# Patient Record
Sex: FEMALE | Race: BLACK OR AFRICAN AMERICAN | NOT HISPANIC OR LATINO | Employment: OTHER | URBAN - METROPOLITAN AREA
[De-identification: names, ages, dates, MRNs, and addresses within clinical notes are randomized per-mention and may not be internally consistent; named-entity substitution may affect disease eponyms.]

---

## 2019-01-30 ENCOUNTER — HOSPITAL ENCOUNTER (OUTPATIENT)
Dept: RADIOLOGY | Facility: HOSPITAL | Age: 64
Discharge: HOME | End: 2019-01-30
Attending: INTERNAL MEDICINE
Payer: COMMERCIAL

## 2019-01-30 ENCOUNTER — IMMUNIZATION (OUTPATIENT)
Dept: INTERNAL MEDICINE | Facility: CLINIC | Age: 64
End: 2019-01-30
Payer: COMMERCIAL

## 2019-01-30 ENCOUNTER — OFFICE VISIT (OUTPATIENT)
Dept: INTERNAL MEDICINE | Facility: CLINIC | Age: 64
End: 2019-01-30
Payer: COMMERCIAL

## 2019-01-30 VITALS
HEART RATE: 74 BPM | DIASTOLIC BLOOD PRESSURE: 98 MMHG | SYSTOLIC BLOOD PRESSURE: 146 MMHG | BODY MASS INDEX: 37.04 KG/M2 | WEIGHT: 201.25 LBS | HEIGHT: 62 IN

## 2019-01-30 DIAGNOSIS — Z23 INFLUENZA VACCINE NEEDED: ICD-10-CM

## 2019-01-30 DIAGNOSIS — L23.9 ALLERGIC DERMATITIS: ICD-10-CM

## 2019-01-30 DIAGNOSIS — Z12.11 COLON CANCER SCREENING: ICD-10-CM

## 2019-01-30 DIAGNOSIS — Z12.31 ENCOUNTER FOR SCREENING MAMMOGRAM FOR BREAST CANCER: ICD-10-CM

## 2019-01-30 DIAGNOSIS — M25.561 CHRONIC PAIN OF RIGHT KNEE: ICD-10-CM

## 2019-01-30 DIAGNOSIS — Z11.59 NEED FOR HEPATITIS C SCREENING TEST: ICD-10-CM

## 2019-01-30 DIAGNOSIS — G89.29 CHRONIC PAIN OF RIGHT KNEE: ICD-10-CM

## 2019-01-30 DIAGNOSIS — Z00.00 ROUTINE MEDICAL EXAM: Primary | ICD-10-CM

## 2019-01-30 DIAGNOSIS — R03.0 ELEVATED BLOOD PRESSURE READING WITHOUT DIAGNOSIS OF HYPERTENSION: ICD-10-CM

## 2019-01-30 DIAGNOSIS — E66.01 SEVERE OBESITY (BMI 35.0-39.9) WITH COMORBIDITY: ICD-10-CM

## 2019-01-30 DIAGNOSIS — Z83.3 FAMILY HISTORY OF TYPE 2 DIABETES MELLITUS IN SISTER: ICD-10-CM

## 2019-01-30 LAB
BILIRUB UR QL STRIP: NEGATIVE
CLARITY UR REFRACT.AUTO: ABNORMAL
COLOR UR AUTO: YELLOW
GLUCOSE UR QL STRIP: NEGATIVE
HGB UR QL STRIP: NEGATIVE
KETONES UR QL STRIP: NEGATIVE
LEUKOCYTE ESTERASE UR QL STRIP: NEGATIVE
NITRITE UR QL STRIP: NEGATIVE
PH UR STRIP: 6 [PH] (ref 5–8)
PROT UR QL STRIP: NEGATIVE
SP GR UR STRIP: 1.02 (ref 1–1.03)
URN SPEC COLLECT METH UR: ABNORMAL

## 2019-01-30 PROCEDURE — 77067 SCR MAMMO BI INCL CAD: CPT | Mod: TC

## 2019-01-30 PROCEDURE — 73560 XR KNEE 1 OR 2 VIEW RIGHT: ICD-10-PCS | Mod: 26,RT,, | Performed by: RADIOLOGY

## 2019-01-30 PROCEDURE — 73560 X-RAY EXAM OF KNEE 1 OR 2: CPT | Mod: TC,RT

## 2019-01-30 PROCEDURE — 99999 PR PBB SHADOW E&M-NEW PATIENT-LVL III: CPT | Mod: PBBFAC,,, | Performed by: INTERNAL MEDICINE

## 2019-01-30 PROCEDURE — 77067 SCR MAMMO BI INCL CAD: CPT | Mod: 26,,, | Performed by: RADIOLOGY

## 2019-01-30 PROCEDURE — 93005 ELECTROCARDIOGRAM TRACING: CPT | Mod: S$GLB,,, | Performed by: INTERNAL MEDICINE

## 2019-01-30 PROCEDURE — 99386 PREV VISIT NEW AGE 40-64: CPT | Mod: 25,S$GLB,, | Performed by: INTERNAL MEDICINE

## 2019-01-30 PROCEDURE — 81003 URINALYSIS AUTO W/O SCOPE: CPT

## 2019-01-30 PROCEDURE — 90686 FLU VACCINE (QUAD) GREATER THAN OR EQUAL TO 3YO PRESERVATIVE FREE IM: ICD-10-PCS | Mod: S$GLB,,, | Performed by: INTERNAL MEDICINE

## 2019-01-30 PROCEDURE — 90471 IMMUNIZATION ADMIN: CPT | Mod: S$GLB,,, | Performed by: INTERNAL MEDICINE

## 2019-01-30 PROCEDURE — 93010 ELECTROCARDIOGRAM REPORT: CPT | Mod: S$GLB,,, | Performed by: INTERNAL MEDICINE

## 2019-01-30 PROCEDURE — 90686 IIV4 VACC NO PRSV 0.5 ML IM: CPT | Mod: S$GLB,,, | Performed by: INTERNAL MEDICINE

## 2019-01-30 PROCEDURE — 90471 FLU VACCINE (QUAD) GREATER THAN OR EQUAL TO 3YO PRESERVATIVE FREE IM: ICD-10-PCS | Mod: S$GLB,,, | Performed by: INTERNAL MEDICINE

## 2019-01-30 PROCEDURE — 93005 EKG 12-LEAD: ICD-10-PCS | Mod: S$GLB,,, | Performed by: INTERNAL MEDICINE

## 2019-01-30 PROCEDURE — 99386 PR PREVENTIVE VISIT,NEW,40-64: ICD-10-PCS | Mod: 25,S$GLB,, | Performed by: INTERNAL MEDICINE

## 2019-01-30 PROCEDURE — 73560 X-RAY EXAM OF KNEE 1 OR 2: CPT | Mod: 26,RT,, | Performed by: RADIOLOGY

## 2019-01-30 PROCEDURE — 77067 MAMMO DIGITAL SCREENING BILAT WITH CAD: ICD-10-PCS | Mod: 26,,, | Performed by: RADIOLOGY

## 2019-01-30 PROCEDURE — 99999 PR PBB SHADOW E&M-NEW PATIENT-LVL III: ICD-10-PCS | Mod: PBBFAC,,, | Performed by: INTERNAL MEDICINE

## 2019-01-30 PROCEDURE — 93010 EKG 12-LEAD: ICD-10-PCS | Mod: S$GLB,,, | Performed by: INTERNAL MEDICINE

## 2019-02-03 ENCOUNTER — TELEPHONE (OUTPATIENT)
Dept: INTERNAL MEDICINE | Facility: CLINIC | Age: 64
End: 2019-02-03

## 2019-02-03 DIAGNOSIS — E55.9 VITAMIN D DEFICIENCY: ICD-10-CM

## 2019-02-03 RX ORDER — ERGOCALCIFEROL 1.25 MG/1
50000 CAPSULE ORAL
Qty: 12 CAPSULE | Refills: 1 | Status: SHIPPED | OUTPATIENT
Start: 2019-02-03 | End: 2019-08-13 | Stop reason: SDUPTHER

## 2019-02-03 NOTE — PROGRESS NOTES
Subjective:       Patient ID: Kandace Rapp is a 63 y.o. female.    Chief Complaint: Establish Care and Annual Exam    She is new to the clinic. Last seen by previous PCP at [facility] . Presents for annual exam with few concerns detailed below.     PMH: .  Mitral Valve Prolapse on Echo .  Plantar Fasciitis and Heel Spurs, bilateral.  Varicose Veins.     PSH: Tonsillectomy and Adenoidectomy. C/S x 1. Cataract Extraction, Benign Left Breast lumpectomy.    Pap normal . Mammogram normal . BMD normal . Cologuard negative . No Colonoscopy. Eye exam 2017, Dr. Elio Cleaning. Last labs 2017. No Flu shots, Shingles or Td.    Social: Non-smoker, occasional alcohol.   (complications of stroke), one child here, one in [place]. 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     FMH: Heart dis in multiple, DM in sister, Breast cancer in mother and maternal aunt, Lymphoma in mother.     NKDA.    Medications: No Rx. Multiple supplements including Glucosamine, Niacin, Turmeric, Cranberry, etc.       Review of Systems   Constitutional: Negative for activity change, appetite change, fatigue, fever and unexpected weight change.   HENT: Negative for congestion, hearing loss, rhinorrhea, sneezing, sore throat, trouble swallowing and voice change.    Eyes: Negative for pain and visual disturbance.   Respiratory: Negative for cough, chest tightness, shortness of breath and wheezing.    Cardiovascular: Negative for chest pain, palpitations and leg swelling.   Gastrointestinal: Negative for abdominal pain, blood in stool, constipation, diarrhea, nausea and vomiting.        Belches a lot.    Genitourinary: Negative for difficulty urinating, dysuria, flank pain, frequency, hematuria, urgency and vaginal bleeding.   Musculoskeletal: Positive for arthralgias. Negative for back pain, joint swelling,  "myalgias and neck pain.        Bilateral knee (worse on right) and foot pain. Mild paresthesias in the feet.   Skin: Positive for rash. Negative for color change.        Intermittent erythematous fine papular eruption on mid upper chest, no known allergies.    Neurological: Negative for dizziness, syncope, facial asymmetry, speech difficulty, weakness, numbness and headaches.   Hematological: Negative for adenopathy. Does not bruise/bleed easily.   Psychiatric/Behavioral: Negative for agitation, dysphoric mood and sleep disturbance. The patient is not nervous/anxious.        Objective:    /96, repeat 146/98, Pulse 74, Ht 5' 2", Wt 201 lbs, BMI=36.8  Physical Exam   Constitutional: She is oriented to person, place, and time. She appears well-developed and well-nourished. No distress.   HENT:   Head: Normocephalic and atraumatic.   Right Ear: External ear normal.   Left Ear: External ear normal.   Nose: Nose normal.   Mouth/Throat: Oropharynx is clear and moist. No oropharyngeal exudate.   Eyes: Conjunctivae and EOM are normal. Pupils are equal, round, and reactive to light. Right conjunctiva is not injected. Left conjunctiva is not injected. No scleral icterus.   Neck: Normal range of motion. Neck supple. No JVD present. Carotid bruit is not present. No thyromegaly present.   Cardiovascular: Normal rate, regular rhythm, normal heart sounds and intact distal pulses. Exam reveals no gallop and no friction rub.   No murmur heard.  Pulmonary/Chest: Effort normal and breath sounds normal. No respiratory distress. She has no wheezes. She has no rhonchi. She has no rales.   Abdominal: Soft. Bowel sounds are normal. She exhibits no distension and no mass. There is no hepatosplenomegaly. There is no tenderness. There is no CVA tenderness.   Musculoskeletal: Normal range of motion. She exhibits no edema, tenderness or deformity.   Crepitus in right knee with pain on range of motion, not much on left, non-tender. "   Lymphadenopathy:     She has no cervical adenopathy.   Neurological: She is alert and oriented to person, place, and time. She has normal strength and normal reflexes. No cranial nerve deficit. She exhibits normal muscle tone. Coordination and gait normal.   Skin: Skin is warm and dry. No lesion noted. She is not diaphoretic. No cyanosis or erythema. No pallor. Nails show no clubbing.   Small area of rash on mid upper chest, 4cm cluster of moderately erythematous fine papules with no vesicles, induration, open sores or drainage.    Psychiatric: She has a normal mood and affect. Her behavior is normal. Judgment and thought content normal.   Vitals reviewed.      EKG: NSR, 70 bpm, non-spec. T abnl (less than 1mm ST elev ant leads).     Assessment:       1. Routine medical exam    2. Elevated blood pressure reading without diagnosis of hypertension    3. Severe obesity (BMI 35.0-39.9) with comorbidity    4. Family history of type 2 diabetes mellitus in sister    5. Chronic pain of right knee    6. Allergic dermatitis    7. Need for hepatitis C screening test    8. Encounter for screening mammogram for breast cancer    9. Colon cancer screening    10. Influenza vaccine needed        Plan:       Routine medical exam  -     CBC auto differential; Future; Expected date: 01/30/2019  -     Comprehensive metabolic panel; Future; Expected date: 01/30/2019  -     Lipid panel; Future; Expected date: 01/30/2019  -     Vitamin D; Future; Expected date: 01/30/2019  -     Urinalysis    Elevated blood pressure reading without diagnosis of hypertension  -     IN OFFICE EKG 12-LEAD (to Muse)  -     Begin daily home monitoring and forward results in 2 weeks.    Severe obesity (BMI 35.0-39.9) with comorbidity  -     TSH; Future; Expected date: 01/30/2019  -     Counseled on diet for weight reduction, diabetes prevention and cardiovascular health.    Family history of type 2 diabetes mellitus in sister  -     Hemoglobin A1c; Future;  Expected date: 01/30/2019    Chronic pain of right knee  -     X-Ray Knee 1 or 2 View Right; Future; Expected date: 01/30/2019    Allergic dermatitis        -     Mild - topical Cortisone cream BID prn.     Need for hepatitis C screening test  -     Hepatitis C antibody; Future; Expected date: 01/30/2019    Encounter for screening mammogram for breast cancer  -     Mammo Digital Screening Bilat; Future; Expected date: 01/30/2019    Colon cancer screening  -     Case request GI: COLONOSCOPY    Influenza vaccine needed - Flu shot today.     RETURN IN 6 WEEKS for BP check - scheduled.

## 2019-02-04 NOTE — TELEPHONE ENCOUNTER
Blood and urine tests are all normal except Vitamin D is very low - Rx vitamin D ordered to be called in, we had no pharmacy on record.   Knee x-rays show mild arthritis.   Mammogram shows a spot on the left, they are going to get previous films for comparison.

## 2019-02-09 ENCOUNTER — PATIENT MESSAGE (OUTPATIENT)
Dept: INTERNAL MEDICINE | Facility: CLINIC | Age: 64
End: 2019-02-09

## 2019-03-13 ENCOUNTER — OFFICE VISIT (OUTPATIENT)
Dept: INTERNAL MEDICINE | Facility: CLINIC | Age: 64
End: 2019-03-13
Payer: COMMERCIAL

## 2019-03-13 VITALS
SYSTOLIC BLOOD PRESSURE: 132 MMHG | HEIGHT: 62 IN | HEART RATE: 73 BPM | DIASTOLIC BLOOD PRESSURE: 86 MMHG | WEIGHT: 205 LBS | BODY MASS INDEX: 37.73 KG/M2

## 2019-03-13 DIAGNOSIS — I10 ESSENTIAL HYPERTENSION: Primary | ICD-10-CM

## 2019-03-13 PROCEDURE — 3008F BODY MASS INDEX DOCD: CPT | Mod: CPTII,S$GLB,, | Performed by: INTERNAL MEDICINE

## 2019-03-13 PROCEDURE — 99213 PR OFFICE/OUTPT VISIT, EST, LEVL III, 20-29 MIN: ICD-10-PCS | Mod: S$GLB,,, | Performed by: INTERNAL MEDICINE

## 2019-03-13 PROCEDURE — 3008F PR BODY MASS INDEX (BMI) DOCUMENTED: ICD-10-PCS | Mod: CPTII,S$GLB,, | Performed by: INTERNAL MEDICINE

## 2019-03-13 PROCEDURE — 3075F SYST BP GE 130 - 139MM HG: CPT | Mod: CPTII,S$GLB,, | Performed by: INTERNAL MEDICINE

## 2019-03-13 PROCEDURE — 3079F PR MOST RECENT DIASTOLIC BLOOD PRESSURE 80-89 MM HG: ICD-10-PCS | Mod: CPTII,S$GLB,, | Performed by: INTERNAL MEDICINE

## 2019-03-13 PROCEDURE — 3075F PR MOST RECENT SYSTOLIC BLOOD PRESS GE 130-139MM HG: ICD-10-PCS | Mod: CPTII,S$GLB,, | Performed by: INTERNAL MEDICINE

## 2019-03-13 PROCEDURE — 99999 PR PBB SHADOW E&M-EST. PATIENT-LVL III: ICD-10-PCS | Mod: PBBFAC,,, | Performed by: INTERNAL MEDICINE

## 2019-03-13 PROCEDURE — 99213 OFFICE O/P EST LOW 20 MIN: CPT | Mod: S$GLB,,, | Performed by: INTERNAL MEDICINE

## 2019-03-13 PROCEDURE — 3079F DIAST BP 80-89 MM HG: CPT | Mod: CPTII,S$GLB,, | Performed by: INTERNAL MEDICINE

## 2019-03-13 PROCEDURE — 99999 PR PBB SHADOW E&M-EST. PATIENT-LVL III: CPT | Mod: PBBFAC,,, | Performed by: INTERNAL MEDICINE

## 2019-03-13 RX ORDER — HYDROCHLOROTHIAZIDE 12.5 MG/1
12.5 TABLET ORAL DAILY
Qty: 30 TABLET | Refills: 3 | Status: SHIPPED | OUTPATIENT
Start: 2019-03-13 | End: 2020-12-02

## 2019-03-14 NOTE — PROGRESS NOTES
Subjective:       Patient ID: Kandace Rapp is a 63 y.o. female.    Chief Complaint: Hypertension    Seen for initial visit to [facility] six weeks ago with elevated blood pressure 146/98 and no known history of hypertension. Screening labs came back normal aside from Vitamin D deficiency. She returns for blood pressure check but has not monitored at home as recommended, even though she already has the equipment. She blames a busy work schedule keeping her preoccupied. No lifestyle modification, has gained four pounds. BP is still elevated, will need treatment with medication.      PMH: .  Mitral Valve Prolapse on Echo .  Plantar Fasciitis and Heel Spurs, bilateral.  Varicose Veins.   Vitamin D deficiency.   Osteoarthritis right knee.     PSH: Tonsillectomy and Adenoidectomy. C/S x 1. Cataract Extraction, Benign Left Breast lumpectomy.    Pap normal . Mammogram normal . BMD normal . Cologuard negative . Colonoscopy ordered, not done yet. Eye exam 2017, Dr. Ballard Leader. Flu shot . Labs : CBC normal, CMP normal, HbA1c 5.3%, Vit D 8, TSH 1.594, Hep C negative, TChol 143, TG 66, HDL 50, LDL 80, Urinalysis clear.     Social: Non-smoker, occasional alcohol.   (complications of stroke), one child here, one in [place]. 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     FMH: Heart dis in multiple, DM in sister, Breast cancer in mother and maternal aunt, Lymphoma in mother.     NKDA.    Medications: list reviewed.        Review of Systems   Constitutional: Negative for diaphoresis, fatigue and fever.   Eyes: Negative for pain and visual disturbance.   Respiratory: Negative for cough and shortness of breath.    Cardiovascular: Negative for chest pain, palpitations and leg swelling.   Neurological: Negative for dizziness, syncope, weakness and headaches.        Objective:    /90, repeat 132/86, Pulse 73, Wt 205 lbs (from 201)  Physical Exam   Constitutional: She is oriented to person, place, and time. She appears well-developed and well-nourished. No distress.   Cardiovascular: Normal rate, regular rhythm and normal heart sounds.   Pulmonary/Chest: Effort normal and breath sounds normal. No respiratory distress.   Musculoskeletal: Normal range of motion. She exhibits no edema.   Neurological: She is alert and oriented to person, place, and time. No cranial nerve deficit.       Assessment:       1. Essential hypertension        Plan:       Essential hypertension  -     Start HydroCHLOROthiazide (HYDRODIURIL) 12.5 MG Tab; Take 1 tablet (12.5 mg total) by mouth once daily. For Blood Pressure.  Dispense: 30 tablet; Refill: 3  -     Basic metabolic panel in 3-4 weeks.   -     Counseled on diet, exercise and home monitoring.   Nurse visit for BP check in 3-4 weeks.

## 2019-08-13 DIAGNOSIS — E55.9 VITAMIN D DEFICIENCY: ICD-10-CM

## 2019-08-13 RX ORDER — ERGOCALCIFEROL 1.25 MG/1
CAPSULE ORAL
Qty: 12 CAPSULE | Refills: 1 | Status: SHIPPED | OUTPATIENT
Start: 2019-08-13 | End: 2020-12-02

## 2020-10-08 ENCOUNTER — PATIENT MESSAGE (OUTPATIENT)
Dept: PRIMARY CARE CLINIC | Facility: CLINIC | Age: 65
End: 2020-10-08

## 2020-10-30 ENCOUNTER — PATIENT MESSAGE (OUTPATIENT)
Dept: ADMINISTRATIVE | Facility: HOSPITAL | Age: 65
End: 2020-10-30

## 2020-12-02 ENCOUNTER — OFFICE VISIT (OUTPATIENT)
Dept: INTERNAL MEDICINE | Facility: CLINIC | Age: 65
End: 2020-12-02
Payer: MEDICARE

## 2020-12-02 ENCOUNTER — IMMUNIZATION (OUTPATIENT)
Dept: INTERNAL MEDICINE | Facility: CLINIC | Age: 65
End: 2020-12-02
Payer: MEDICARE

## 2020-12-02 VITALS
HEIGHT: 62 IN | WEIGHT: 211.88 LBS | BODY MASS INDEX: 38.99 KG/M2 | SYSTOLIC BLOOD PRESSURE: 124 MMHG | HEART RATE: 94 BPM | OXYGEN SATURATION: 97 % | DIASTOLIC BLOOD PRESSURE: 82 MMHG

## 2020-12-02 DIAGNOSIS — Z12.11 COLON CANCER SCREENING: ICD-10-CM

## 2020-12-02 DIAGNOSIS — K21.9 GASTROESOPHAGEAL REFLUX DISEASE WITHOUT ESOPHAGITIS: ICD-10-CM

## 2020-12-02 DIAGNOSIS — R82.90 CLOUDY URINE: Primary | ICD-10-CM

## 2020-12-02 DIAGNOSIS — Z12.31 ENCOUNTER FOR SCREENING MAMMOGRAM FOR MALIGNANT NEOPLASM OF BREAST: ICD-10-CM

## 2020-12-02 PROCEDURE — 99499 RISK ADDL DX/OHS AUDIT: ICD-10-PCS | Mod: S$GLB,,, | Performed by: INTERNAL MEDICINE

## 2020-12-02 PROCEDURE — 90694 FLU VACCINE - QUADRIVALENT - ADJUVANTED: ICD-10-PCS | Mod: S$GLB,,, | Performed by: INTERNAL MEDICINE

## 2020-12-02 PROCEDURE — 3079F PR MOST RECENT DIASTOLIC BLOOD PRESSURE 80-89 MM HG: ICD-10-PCS | Mod: CPTII,S$GLB,, | Performed by: INTERNAL MEDICINE

## 2020-12-02 PROCEDURE — 3074F SYST BP LT 130 MM HG: CPT | Mod: CPTII,S$GLB,, | Performed by: INTERNAL MEDICINE

## 2020-12-02 PROCEDURE — 3288F PR FALLS RISK ASSESSMENT DOCUMENTED: ICD-10-PCS | Mod: CPTII,S$GLB,, | Performed by: INTERNAL MEDICINE

## 2020-12-02 PROCEDURE — 1126F PR PAIN SEVERITY QUANTIFIED, NO PAIN PRESENT: ICD-10-PCS | Mod: S$GLB,,, | Performed by: INTERNAL MEDICINE

## 2020-12-02 PROCEDURE — 87086 URINE CULTURE/COLONY COUNT: CPT

## 2020-12-02 PROCEDURE — 81003 URINALYSIS AUTO W/O SCOPE: CPT

## 2020-12-02 PROCEDURE — G0008 FLU VACCINE - QUADRIVALENT - ADJUVANTED: ICD-10-PCS | Mod: S$GLB,,, | Performed by: INTERNAL MEDICINE

## 2020-12-02 PROCEDURE — 99214 PR OFFICE/OUTPT VISIT, EST, LEVL IV, 30-39 MIN: ICD-10-PCS | Mod: 25,S$GLB,, | Performed by: INTERNAL MEDICINE

## 2020-12-02 PROCEDURE — 1159F MED LIST DOCD IN RCRD: CPT | Mod: S$GLB,,, | Performed by: INTERNAL MEDICINE

## 2020-12-02 PROCEDURE — 99999 PR PBB SHADOW E&M-EST. PATIENT-LVL IV: ICD-10-PCS | Mod: PBBFAC,,, | Performed by: INTERNAL MEDICINE

## 2020-12-02 PROCEDURE — 3074F PR MOST RECENT SYSTOLIC BLOOD PRESSURE < 130 MM HG: ICD-10-PCS | Mod: CPTII,S$GLB,, | Performed by: INTERNAL MEDICINE

## 2020-12-02 PROCEDURE — 3008F PR BODY MASS INDEX (BMI) DOCUMENTED: ICD-10-PCS | Mod: CPTII,S$GLB,, | Performed by: INTERNAL MEDICINE

## 2020-12-02 PROCEDURE — 99499 UNLISTED E&M SERVICE: CPT | Mod: S$GLB,,, | Performed by: INTERNAL MEDICINE

## 2020-12-02 PROCEDURE — 3008F BODY MASS INDEX DOCD: CPT | Mod: CPTII,S$GLB,, | Performed by: INTERNAL MEDICINE

## 2020-12-02 PROCEDURE — G0008 ADMIN INFLUENZA VIRUS VAC: HCPCS | Mod: S$GLB,,, | Performed by: INTERNAL MEDICINE

## 2020-12-02 PROCEDURE — 3079F DIAST BP 80-89 MM HG: CPT | Mod: CPTII,S$GLB,, | Performed by: INTERNAL MEDICINE

## 2020-12-02 PROCEDURE — 99214 OFFICE O/P EST MOD 30 MIN: CPT | Mod: 25,S$GLB,, | Performed by: INTERNAL MEDICINE

## 2020-12-02 PROCEDURE — 1126F AMNT PAIN NOTED NONE PRSNT: CPT | Mod: S$GLB,,, | Performed by: INTERNAL MEDICINE

## 2020-12-02 PROCEDURE — 99999 PR PBB SHADOW E&M-EST. PATIENT-LVL IV: CPT | Mod: PBBFAC,,, | Performed by: INTERNAL MEDICINE

## 2020-12-02 PROCEDURE — 1159F PR MEDICATION LIST DOCUMENTED IN MEDICAL RECORD: ICD-10-PCS | Mod: S$GLB,,, | Performed by: INTERNAL MEDICINE

## 2020-12-02 PROCEDURE — 1101F PT FALLS ASSESS-DOCD LE1/YR: CPT | Mod: CPTII,S$GLB,, | Performed by: INTERNAL MEDICINE

## 2020-12-02 PROCEDURE — 1101F PR PT FALLS ASSESS DOC 0-1 FALLS W/OUT INJ PAST YR: ICD-10-PCS | Mod: CPTII,S$GLB,, | Performed by: INTERNAL MEDICINE

## 2020-12-02 PROCEDURE — 3288F FALL RISK ASSESSMENT DOCD: CPT | Mod: CPTII,S$GLB,, | Performed by: INTERNAL MEDICINE

## 2020-12-02 PROCEDURE — 90694 VACC AIIV4 NO PRSRV 0.5ML IM: CPT | Mod: S$GLB,,, | Performed by: INTERNAL MEDICINE

## 2020-12-02 RX ORDER — OMEPRAZOLE 40 MG/1
40 CAPSULE, DELAYED RELEASE ORAL EVERY MORNING
Qty: 30 CAPSULE | Refills: 2 | Status: SHIPPED | OUTPATIENT
Start: 2020-12-02 | End: 2022-08-17

## 2020-12-02 NOTE — PROGRESS NOTES
Subjective:       Patient ID: Kandace Rapp is a 65 y.o. female.    Chief Complaint: Establish Care    Here for annual exam and to est care.    She suffers from acid reflux. Much belching and gas.   denies warning signs for GERD -- no dysphagia, no odynophagia, no wt loss, no blood in stool or melena, no fam hx of esophageal cancer.      Chronic knee pains from OA, R>L.    She has chronic fatty swelling of the lateral neck bilat. Not new and stable.    Needs to see eye doctor.    Knot R forehead.    Sees cloudy urine, no dysuria, no blood.    Review of Systems   Constitutional: Negative for activity change, appetite change and unexpected weight change.   Eyes: Positive for visual disturbance.   Respiratory: Negative for cough, chest tightness and shortness of breath.    Cardiovascular: Negative for chest pain.   Gastrointestinal: Negative for abdominal distention and abdominal pain.   Genitourinary: Negative for difficulty urinating and urgency.        No breast lumps/masses/pain/nipple d/c in either breast     Musculoskeletal: Positive for arthralgias.   Skin: Negative for rash.           Objective:      Physical Exam  Constitutional:       General: She is not in acute distress.     Appearance: Normal appearance. She is well-developed. She is not ill-appearing, toxic-appearing or diaphoretic.   HENT:      Head: Normocephalic and atraumatic.   Eyes:      General: No scleral icterus.     Pupils: Pupils are equal, round, and reactive to light.   Neck:      Musculoskeletal: Normal range of motion.      Thyroid: No thyromegaly.   Cardiovascular:      Rate and Rhythm: Normal rate and regular rhythm.      Heart sounds: Normal heart sounds. No murmur. No friction rub. No gallop.    Pulmonary:      Effort: Pulmonary effort is normal. No respiratory distress.      Breath sounds: Normal breath sounds. No wheezing or rales.   Abdominal:      General: Bowel sounds are normal. There is no distension.      Palpations: Abdomen  is soft. There is no mass.      Tenderness: There is no abdominal tenderness. There is no guarding or rebound.   Musculoskeletal: Normal range of motion.         General: No tenderness.      Comments: bilat knee crepitus R>L, but rom is normal.     Lymphadenopathy:      Cervical: No cervical adenopathy.   Skin:     Comments: R forehead with dermal based small nodule,  moveable, nontender, pea sized, not hard.    Fatty aread around bilat ant neck c/w lipoma and obesity   Neurological:      General: No focal deficit present.      Mental Status: She is alert and oriented to person, place, and time.   Psychiatric:         Mood and Affect: Mood normal.         Speech: Speech normal.         Behavior: Behavior normal.         Assessment:       1. Cloudy urine    2. Encounter for screening mammogram for malignant neoplasm of breast    3. Colon cancer screening    4. Gastroesophageal reflux disease without esophagitis        Plan:       Kandace was seen today for establish care.    Diagnoses and all orders for this visit:    Cloudy urine  -     Urinalysis  -     Urine culture    Encounter for screening mammogram for malignant neoplasm of breast  -     Mammo Digital Screening Bilat; Future    Colon cancer screening  -     Fecal Immunochemical Test (iFOBT); Future    Gastroesophageal reflux disease without esophagitis  -     omeprazole (PRILOSEC) 40 MG capsule; Take 1 capsule (40 mg total) by mouth every morning. Take 30 minutes before eating on an empty stomach  Explained that if not better in 1-2 mos, pt should rtc/call PCP    Lengthy discussion re importance of wt loss    HTN controlled    Knee pains from OA, she needs to lose wt    Forehead cyst, she declines derm referral for now    Health Maintenance       Date Due Completion Date    TETANUS VACCINE 09/23/1972 ---    DEXA SCAN 09/23/1994 ---    Shingles Vaccine (1 of 2) 09/23/2004 ---    Colorectal Cancer Screening 09/23/2004 ---    Pneumococcal Vaccine (65+ Low/Medium  Risk) (1 of 2 - PCV13) 09/23/2019 ---    Influenza Vaccine (1) 08/01/2020 1/30/2019    Mammogram 02/07/2021 2/7/2019    Lipid Panel 01/30/2024 1/30/2019      She only wants flu vax  Next time bmd and dexa  Also see back eye doctor    Follow up in about 6 months (around 6/2/2021) for Flu vax please.    Future Appointments   Date Time Provider Department Center   12/9/2020  1:45 PM [facility] OIC-MAMMO2 [facility]   2/15/2021  2:00 PM Angelina Velasquez MD [facility] Get [facility]   6/3/2021 10:40 AM Ishmael Jacobson MD [facility] Get [facility]

## 2020-12-02 NOTE — PATIENT INSTRUCTIONS
Tips to Control Acid Reflux  To control acid reflux, youll need to make some basic diet and lifestyle changes. The simple steps outlined below may be all youll need to relieve discomfort.  Watch What You Eat    · Avoid fatty foods and spicy foods.  · Eat fewer acidic foods, such as citrus and tomato-based foods. These can increase symptoms.  · Limit drinking alcohol, caffeine, and fizzy beverages. All increase acid reflux.  · Try limiting chocolate, peppermint, and spearmint. These can worsen acid reflux in some people.  Watch When You Eat  · Avoid lying down for 3 hours after eating.  · Do not snack before going to bed.  Raise Your Head    Raising your head and upper body by 4 inches to 6 inches helps limit reflux when youre lying down. Put blocks under the head of the bed frame to raise it.  Other Changes  · Lose weight, if you need to.  · Dont work out near bedtime.  · Avoid tight-fitting clothes.  · Limit aspirin and ibuprofen.  · Stop smoking.   © 1812-9246 [employer]. [address] [place] [address], [place] [address]. All rights reserved. This information is not intended as a substitute for professional medical care. Always follow your healthcare professional's instructions.        What Is Acid Reflux?    Do you have to clear your throat or cough often? Are you hoarse? Do you have difficulty swallowing? If you have these or other throat symptoms, you may have acid reflux (when stomach acid washes up and irritates your throat).  Why You Have Throat Symptoms  At both ends of the esophagus (the tube that carries food to the stomach) are the esophageal sphincters. These muscles relax to let food pass, then tighten to keep stomach acid down. When the lower esophageal sphincter (LES) doesnt tighten enough, acid can reflux from the stomach into the esophagus. This may cause heartburn. If the upper esophageal sphincter (UES) also doesnt work well, acid can travel higher and enter your throat (pharynx). In  many cases, this causes throat symptoms.  Common Throat Symptoms  · Frequent need to clear your throat  · Feeling like youre choking  · Chronic cough  · Hoarseness  · Trouble swallowing  · Sensation of having a lump in the throat  · Sour or acid taste  · Recurrent sore throat   © 7729-4703 [employer]. [address] [place] [address], [place] [address]. All rights reserved. This information is not intended as a substitute for professional medical care. Always follow your healthcare professional's instructions.

## 2020-12-03 LAB — BACTERIA UR CULT: NORMAL

## 2020-12-09 ENCOUNTER — HOSPITAL ENCOUNTER (OUTPATIENT)
Dept: RADIOLOGY | Facility: HOSPITAL | Age: 65
Discharge: HOME | End: 2020-12-09
Attending: INTERNAL MEDICINE
Payer: MEDICARE

## 2020-12-09 VITALS — BODY MASS INDEX: 38.94 KG/M2 | HEIGHT: 62 IN | WEIGHT: 211.63 LBS

## 2020-12-09 DIAGNOSIS — Z12.31 ENCOUNTER FOR SCREENING MAMMOGRAM FOR MALIGNANT NEOPLASM OF BREAST: ICD-10-CM

## 2020-12-09 PROCEDURE — 77067 MAMMO DIGITAL SCREENING BILAT WITH TOMO: ICD-10-PCS | Mod: 26,,, | Performed by: RADIOLOGY

## 2020-12-09 PROCEDURE — 77063 BREAST TOMOSYNTHESIS BI: CPT | Mod: 26,,, | Performed by: RADIOLOGY

## 2020-12-09 PROCEDURE — 77067 SCR MAMMO BI INCL CAD: CPT | Mod: 26,,, | Performed by: RADIOLOGY

## 2020-12-09 PROCEDURE — 77063 MAMMO DIGITAL SCREENING BILAT WITH TOMO: ICD-10-PCS | Mod: 26,,, | Performed by: RADIOLOGY

## 2020-12-09 PROCEDURE — 77067 SCR MAMMO BI INCL CAD: CPT | Mod: TC

## 2021-01-04 ENCOUNTER — PATIENT MESSAGE (OUTPATIENT)
Dept: ADMINISTRATIVE | Facility: HOSPITAL | Age: 66
End: 2021-01-04

## 2021-04-05 ENCOUNTER — PATIENT MESSAGE (OUTPATIENT)
Dept: ADMINISTRATIVE | Facility: HOSPITAL | Age: 66
End: 2021-04-05

## 2021-04-16 ENCOUNTER — PATIENT MESSAGE (OUTPATIENT)
Dept: RESEARCH | Facility: HOSPITAL | Age: 66
End: 2021-04-16

## 2021-05-13 ENCOUNTER — TELEPHONE (OUTPATIENT)
Dept: INTERNAL MEDICINE | Facility: CLINIC | Age: 66
End: 2021-05-13

## 2021-05-13 DIAGNOSIS — M25.562 PAIN IN BOTH KNEES, UNSPECIFIED CHRONICITY: Primary | ICD-10-CM

## 2021-05-13 DIAGNOSIS — M25.561 PAIN IN BOTH KNEES, UNSPECIFIED CHRONICITY: Primary | ICD-10-CM

## 2021-05-14 ENCOUNTER — HOSPITAL ENCOUNTER (OUTPATIENT)
Dept: RADIOLOGY | Facility: HOSPITAL | Age: 66
Discharge: HOME | End: 2021-05-14
Attending: NURSE PRACTITIONER
Payer: MEDICARE

## 2021-05-14 ENCOUNTER — OFFICE VISIT (OUTPATIENT)
Dept: ORTHOPEDICS | Facility: CLINIC | Age: 66
End: 2021-05-14
Payer: MEDICARE

## 2021-05-14 VITALS
HEART RATE: 73 BPM | HEIGHT: 62 IN | WEIGHT: 212.31 LBS | DIASTOLIC BLOOD PRESSURE: 85 MMHG | SYSTOLIC BLOOD PRESSURE: 141 MMHG | BODY MASS INDEX: 39.07 KG/M2

## 2021-05-14 DIAGNOSIS — G89.29 CHRONIC PAIN OF BOTH KNEES: Primary | ICD-10-CM

## 2021-05-14 DIAGNOSIS — M17.0 OSTEOARTHRITIS OF BOTH KNEES, UNSPECIFIED OSTEOARTHRITIS TYPE: Primary | ICD-10-CM

## 2021-05-14 DIAGNOSIS — M25.562 CHRONIC PAIN OF BOTH KNEES: Primary | ICD-10-CM

## 2021-05-14 DIAGNOSIS — M25.562 CHRONIC PAIN OF BOTH KNEES: ICD-10-CM

## 2021-05-14 DIAGNOSIS — M25.561 CHRONIC PAIN OF BOTH KNEES: Primary | ICD-10-CM

## 2021-05-14 DIAGNOSIS — M25.561 CHRONIC PAIN OF BOTH KNEES: ICD-10-CM

## 2021-05-14 DIAGNOSIS — E66.9 OBESITY (BMI 30-39.9): ICD-10-CM

## 2021-05-14 DIAGNOSIS — G89.29 CHRONIC PAIN OF BOTH KNEES: ICD-10-CM

## 2021-05-14 PROCEDURE — 99204 OFFICE O/P NEW MOD 45 MIN: CPT | Mod: S$GLB,,, | Performed by: NURSE PRACTITIONER

## 2021-05-14 PROCEDURE — 1101F PR PT FALLS ASSESS DOC 0-1 FALLS W/OUT INJ PAST YR: ICD-10-PCS | Mod: CPTII,S$GLB,, | Performed by: NURSE PRACTITIONER

## 2021-05-14 PROCEDURE — 3288F FALL RISK ASSESSMENT DOCD: CPT | Mod: CPTII,S$GLB,, | Performed by: NURSE PRACTITIONER

## 2021-05-14 PROCEDURE — 73564 X-RAY EXAM KNEE 4 OR MORE: CPT | Mod: 26,50,, | Performed by: RADIOLOGY

## 2021-05-14 PROCEDURE — 73564 X-RAY EXAM KNEE 4 OR MORE: CPT | Mod: TC,50

## 2021-05-14 PROCEDURE — 99999 PR PBB SHADOW E&M-EST. PATIENT-LVL III: CPT | Mod: PBBFAC,,, | Performed by: NURSE PRACTITIONER

## 2021-05-14 PROCEDURE — 1125F PR PAIN SEVERITY QUANTIFIED, PAIN PRESENT: ICD-10-PCS | Mod: S$GLB,,, | Performed by: NURSE PRACTITIONER

## 2021-05-14 PROCEDURE — 3288F PR FALLS RISK ASSESSMENT DOCUMENTED: ICD-10-PCS | Mod: CPTII,S$GLB,, | Performed by: NURSE PRACTITIONER

## 2021-05-14 PROCEDURE — 1159F PR MEDICATION LIST DOCUMENTED IN MEDICAL RECORD: ICD-10-PCS | Mod: S$GLB,,, | Performed by: NURSE PRACTITIONER

## 2021-05-14 PROCEDURE — 1125F AMNT PAIN NOTED PAIN PRSNT: CPT | Mod: S$GLB,,, | Performed by: NURSE PRACTITIONER

## 2021-05-14 PROCEDURE — 3008F BODY MASS INDEX DOCD: CPT | Mod: CPTII,S$GLB,, | Performed by: NURSE PRACTITIONER

## 2021-05-14 PROCEDURE — 73564 XR KNEE ORTHO BILAT WITH FLEXION: ICD-10-PCS | Mod: 26,50,, | Performed by: RADIOLOGY

## 2021-05-14 PROCEDURE — 99999 PR PBB SHADOW E&M-EST. PATIENT-LVL III: ICD-10-PCS | Mod: PBBFAC,,, | Performed by: NURSE PRACTITIONER

## 2021-05-14 PROCEDURE — 99204 PR OFFICE/OUTPT VISIT, NEW, LEVL IV, 45-59 MIN: ICD-10-PCS | Mod: S$GLB,,, | Performed by: NURSE PRACTITIONER

## 2021-05-14 PROCEDURE — 3008F PR BODY MASS INDEX (BMI) DOCUMENTED: ICD-10-PCS | Mod: CPTII,S$GLB,, | Performed by: NURSE PRACTITIONER

## 2021-05-14 PROCEDURE — 1101F PT FALLS ASSESS-DOCD LE1/YR: CPT | Mod: CPTII,S$GLB,, | Performed by: NURSE PRACTITIONER

## 2021-05-14 PROCEDURE — 1159F MED LIST DOCD IN RCRD: CPT | Mod: S$GLB,,, | Performed by: NURSE PRACTITIONER

## 2021-05-20 ENCOUNTER — OFFICE VISIT (OUTPATIENT)
Dept: ORTHOPEDICS | Facility: CLINIC | Age: 66
End: 2021-05-20
Payer: MEDICARE

## 2021-05-20 VITALS
WEIGHT: 203 LBS | BODY MASS INDEX: 37.36 KG/M2 | HEART RATE: 77 BPM | DIASTOLIC BLOOD PRESSURE: 80 MMHG | SYSTOLIC BLOOD PRESSURE: 128 MMHG | HEIGHT: 62 IN

## 2021-05-20 DIAGNOSIS — M17.0 OSTEOARTHRITIS OF BOTH KNEES, UNSPECIFIED OSTEOARTHRITIS TYPE: Primary | ICD-10-CM

## 2021-05-20 PROCEDURE — 99999 PR PBB SHADOW E&M-EST. PATIENT-LVL III: ICD-10-PCS | Mod: PBBFAC,,, | Performed by: NURSE PRACTITIONER

## 2021-05-20 PROCEDURE — 99999 PR PBB SHADOW E&M-EST. PATIENT-LVL III: CPT | Mod: PBBFAC,,, | Performed by: NURSE PRACTITIONER

## 2021-05-20 PROCEDURE — 20610 PR DRAIN/INJECT LARGE JOINT/BURSA: ICD-10-PCS | Mod: 50,S$GLB,, | Performed by: NURSE PRACTITIONER

## 2021-05-20 PROCEDURE — 20610 DRAIN/INJ JOINT/BURSA W/O US: CPT | Mod: 50,S$GLB,, | Performed by: NURSE PRACTITIONER

## 2021-05-20 PROCEDURE — 99499 NO LOS: ICD-10-PCS | Mod: S$GLB,,, | Performed by: NURSE PRACTITIONER

## 2021-05-20 PROCEDURE — 1125F AMNT PAIN NOTED PAIN PRSNT: CPT | Mod: S$GLB,,, | Performed by: NURSE PRACTITIONER

## 2021-05-20 PROCEDURE — 99499 UNLISTED E&M SERVICE: CPT | Mod: S$GLB,,, | Performed by: NURSE PRACTITIONER

## 2021-05-20 PROCEDURE — 1125F PR PAIN SEVERITY QUANTIFIED, PAIN PRESENT: ICD-10-PCS | Mod: S$GLB,,, | Performed by: NURSE PRACTITIONER

## 2021-05-20 PROCEDURE — 3008F PR BODY MASS INDEX (BMI) DOCUMENTED: ICD-10-PCS | Mod: CPTII,S$GLB,, | Performed by: NURSE PRACTITIONER

## 2021-05-20 PROCEDURE — 3008F BODY MASS INDEX DOCD: CPT | Mod: CPTII,S$GLB,, | Performed by: NURSE PRACTITIONER

## 2021-05-20 RX ORDER — TRIAMCINOLONE ACETONIDE 40 MG/ML
40 INJECTION, SUSPENSION INTRA-ARTICULAR; INTRAMUSCULAR
Status: COMPLETED | OUTPATIENT
Start: 2021-05-20 | End: 2021-05-20

## 2021-05-20 RX ADMIN — TRIAMCINOLONE ACETONIDE 40 MG: 40 INJECTION, SUSPENSION INTRA-ARTICULAR; INTRAMUSCULAR at 02:05

## 2021-06-21 ENCOUNTER — LAB VISIT (OUTPATIENT)
Dept: LAB | Facility: HOSPITAL | Age: 66
End: 2021-06-21
Attending: INTERNAL MEDICINE
Payer: MEDICARE

## 2021-06-21 ENCOUNTER — OFFICE VISIT (OUTPATIENT)
Dept: INTERNAL MEDICINE | Facility: CLINIC | Age: 66
End: 2021-06-21
Payer: MEDICARE

## 2021-06-21 VITALS
HEART RATE: 79 BPM | OXYGEN SATURATION: 95 % | BODY MASS INDEX: 36.55 KG/M2 | WEIGHT: 198.63 LBS | DIASTOLIC BLOOD PRESSURE: 72 MMHG | SYSTOLIC BLOOD PRESSURE: 126 MMHG | HEIGHT: 62 IN

## 2021-06-21 DIAGNOSIS — R53.83 OTHER FATIGUE: ICD-10-CM

## 2021-06-21 DIAGNOSIS — Z78.0 MENOPAUSE: Primary | ICD-10-CM

## 2021-06-21 DIAGNOSIS — E66.01 MORBID OBESITY, UNSPECIFIED OBESITY TYPE: ICD-10-CM

## 2021-06-21 DIAGNOSIS — Z12.11 COLON CANCER SCREENING: ICD-10-CM

## 2021-06-21 LAB
ALBUMIN SERPL BCP-MCNC: 4 G/DL (ref 3.5–5.2)
ALP SERPL-CCNC: 103 U/L (ref 55–135)
ALT SERPL W/O P-5'-P-CCNC: 14 U/L (ref 10–44)
ANION GAP SERPL CALC-SCNC: 9 MMOL/L (ref 8–16)
AST SERPL-CCNC: 16 U/L (ref 10–40)
BASOPHILS # BLD AUTO: 0.02 K/UL (ref 0–0.2)
BASOPHILS NFR BLD: 0.3 % (ref 0–1.9)
BILIRUB SERPL-MCNC: 0.5 MG/DL (ref 0.1–1)
BUN SERPL-MCNC: 8 MG/DL (ref 8–23)
CALCIUM SERPL-MCNC: 10.1 MG/DL (ref 8.7–10.5)
CHLORIDE SERPL-SCNC: 108 MMOL/L (ref 95–110)
CO2 SERPL-SCNC: 29 MMOL/L (ref 23–29)
CREAT SERPL-MCNC: 0.8 MG/DL (ref 0.5–1.4)
DIFFERENTIAL METHOD: ABNORMAL
EOSINOPHIL # BLD AUTO: 0.1 K/UL (ref 0–0.5)
EOSINOPHIL NFR BLD: 0.7 % (ref 0–8)
ERYTHROCYTE [DISTWIDTH] IN BLOOD BY AUTOMATED COUNT: 12.2 % (ref 11.5–14.5)
EST. GFR  (AFRICAN AMERICAN): >60 ML/MIN/1.73 M^2
EST. GFR  (NON AFRICAN AMERICAN): >60 ML/MIN/1.73 M^2
GLUCOSE SERPL-MCNC: 90 MG/DL (ref 70–110)
HCT VFR BLD AUTO: 39.6 % (ref 37–48.5)
HGB BLD-MCNC: 11.9 G/DL (ref 12–16)
IMM GRANULOCYTES # BLD AUTO: 0.02 K/UL (ref 0–0.04)
IMM GRANULOCYTES NFR BLD AUTO: 0.3 % (ref 0–0.5)
LYMPHOCYTES # BLD AUTO: 2.1 K/UL (ref 1–4.8)
LYMPHOCYTES NFR BLD: 30.5 % (ref 18–48)
MCH RBC QN AUTO: 28.3 PG (ref 27–31)
MCHC RBC AUTO-ENTMCNC: 30.1 G/DL (ref 32–36)
MCV RBC AUTO: 94 FL (ref 82–98)
MONOCYTES # BLD AUTO: 0.5 K/UL (ref 0.3–1)
MONOCYTES NFR BLD: 7 % (ref 4–15)
NEUTROPHILS # BLD AUTO: 4.3 K/UL (ref 1.8–7.7)
NEUTROPHILS NFR BLD: 61.2 % (ref 38–73)
NRBC BLD-RTO: 0 /100 WBC
PLATELET # BLD AUTO: 344 K/UL (ref 150–450)
PMV BLD AUTO: 10.5 FL (ref 9.2–12.9)
POTASSIUM SERPL-SCNC: 4.7 MMOL/L (ref 3.5–5.1)
PROT SERPL-MCNC: 7.3 G/DL (ref 6–8.4)
RBC # BLD AUTO: 4.21 M/UL (ref 4–5.4)
SODIUM SERPL-SCNC: 146 MMOL/L (ref 136–145)
TSH SERPL DL<=0.005 MIU/L-ACNC: 1.66 UIU/ML (ref 0.4–4)
WBC # BLD AUTO: 6.98 K/UL (ref 3.9–12.7)

## 2021-06-21 PROCEDURE — 3288F FALL RISK ASSESSMENT DOCD: CPT | Mod: CPTII,S$GLB,, | Performed by: INTERNAL MEDICINE

## 2021-06-21 PROCEDURE — 85025 COMPLETE CBC W/AUTO DIFF WBC: CPT | Performed by: INTERNAL MEDICINE

## 2021-06-21 PROCEDURE — 1159F PR MEDICATION LIST DOCUMENTED IN MEDICAL RECORD: ICD-10-PCS | Mod: S$GLB,,, | Performed by: INTERNAL MEDICINE

## 2021-06-21 PROCEDURE — 1125F AMNT PAIN NOTED PAIN PRSNT: CPT | Mod: S$GLB,,, | Performed by: INTERNAL MEDICINE

## 2021-06-21 PROCEDURE — 36415 COLL VENOUS BLD VENIPUNCTURE: CPT | Performed by: INTERNAL MEDICINE

## 2021-06-21 PROCEDURE — 99499 RISK ADDL DX/OHS AUDIT: ICD-10-PCS | Mod: S$GLB,,, | Performed by: INTERNAL MEDICINE

## 2021-06-21 PROCEDURE — 99999 PR PBB SHADOW E&M-EST. PATIENT-LVL III: CPT | Mod: PBBFAC,,, | Performed by: INTERNAL MEDICINE

## 2021-06-21 PROCEDURE — 84443 ASSAY THYROID STIM HORMONE: CPT | Performed by: INTERNAL MEDICINE

## 2021-06-21 PROCEDURE — 3288F PR FALLS RISK ASSESSMENT DOCUMENTED: ICD-10-PCS | Mod: CPTII,S$GLB,, | Performed by: INTERNAL MEDICINE

## 2021-06-21 PROCEDURE — 99999 PR PBB SHADOW E&M-EST. PATIENT-LVL III: ICD-10-PCS | Mod: PBBFAC,,, | Performed by: INTERNAL MEDICINE

## 2021-06-21 PROCEDURE — 1159F MED LIST DOCD IN RCRD: CPT | Mod: S$GLB,,, | Performed by: INTERNAL MEDICINE

## 2021-06-21 PROCEDURE — 99214 PR OFFICE/OUTPT VISIT, EST, LEVL IV, 30-39 MIN: ICD-10-PCS | Mod: S$GLB,,, | Performed by: INTERNAL MEDICINE

## 2021-06-21 PROCEDURE — 99214 OFFICE O/P EST MOD 30 MIN: CPT | Mod: S$GLB,,, | Performed by: INTERNAL MEDICINE

## 2021-06-21 PROCEDURE — 1125F PR PAIN SEVERITY QUANTIFIED, PAIN PRESENT: ICD-10-PCS | Mod: S$GLB,,, | Performed by: INTERNAL MEDICINE

## 2021-06-21 PROCEDURE — 3008F PR BODY MASS INDEX (BMI) DOCUMENTED: ICD-10-PCS | Mod: CPTII,S$GLB,, | Performed by: INTERNAL MEDICINE

## 2021-06-21 PROCEDURE — 1101F PR PT FALLS ASSESS DOC 0-1 FALLS W/OUT INJ PAST YR: ICD-10-PCS | Mod: CPTII,S$GLB,, | Performed by: INTERNAL MEDICINE

## 2021-06-21 PROCEDURE — 1101F PT FALLS ASSESS-DOCD LE1/YR: CPT | Mod: CPTII,S$GLB,, | Performed by: INTERNAL MEDICINE

## 2021-06-21 PROCEDURE — 80053 COMPREHEN METABOLIC PANEL: CPT | Performed by: INTERNAL MEDICINE

## 2021-06-21 PROCEDURE — 99499 UNLISTED E&M SERVICE: CPT | Mod: S$GLB,,, | Performed by: INTERNAL MEDICINE

## 2021-06-21 PROCEDURE — 3008F BODY MASS INDEX DOCD: CPT | Mod: CPTII,S$GLB,, | Performed by: INTERNAL MEDICINE

## 2021-07-06 ENCOUNTER — PATIENT MESSAGE (OUTPATIENT)
Dept: ADMINISTRATIVE | Facility: HOSPITAL | Age: 66
End: 2021-07-06

## 2021-10-04 ENCOUNTER — PATIENT MESSAGE (OUTPATIENT)
Dept: ADMINISTRATIVE | Facility: HOSPITAL | Age: 66
End: 2021-10-04

## 2021-10-22 ENCOUNTER — PES CALL (OUTPATIENT)
Dept: ADMINISTRATIVE | Facility: CLINIC | Age: 66
End: 2021-10-22

## 2021-12-15 ENCOUNTER — OFFICE VISIT (OUTPATIENT)
Dept: INTERNAL MEDICINE | Facility: CLINIC | Age: 66
End: 2021-12-15
Payer: MEDICARE

## 2021-12-15 VITALS
DIASTOLIC BLOOD PRESSURE: 82 MMHG | SYSTOLIC BLOOD PRESSURE: 126 MMHG | BODY MASS INDEX: 36.76 KG/M2 | HEART RATE: 71 BPM | OXYGEN SATURATION: 97 % | WEIGHT: 199.75 LBS | HEIGHT: 62 IN

## 2021-12-15 DIAGNOSIS — F32.A DEPRESSION, UNSPECIFIED DEPRESSION TYPE: ICD-10-CM

## 2021-12-15 DIAGNOSIS — B02.9 HERPES ZOSTER WITHOUT COMPLICATION: Primary | ICD-10-CM

## 2021-12-15 DIAGNOSIS — Z12.31 ENCOUNTER FOR SCREENING MAMMOGRAM FOR MALIGNANT NEOPLASM OF BREAST: ICD-10-CM

## 2021-12-15 PROCEDURE — 99499 UNLISTED E&M SERVICE: CPT | Mod: S$GLB,,, | Performed by: NURSE PRACTITIONER

## 2021-12-15 PROCEDURE — 99999 PR PBB SHADOW E&M-EST. PATIENT-LVL IV: CPT | Mod: PBBFAC,,, | Performed by: NURSE PRACTITIONER

## 2021-12-15 PROCEDURE — 99214 PR OFFICE/OUTPT VISIT, EST, LEVL IV, 30-39 MIN: ICD-10-PCS | Mod: S$GLB,,, | Performed by: NURSE PRACTITIONER

## 2021-12-15 PROCEDURE — 99499 RISK ADDL DX/OHS AUDIT: ICD-10-PCS | Mod: S$GLB,,, | Performed by: NURSE PRACTITIONER

## 2021-12-15 PROCEDURE — 99999 PR PBB SHADOW E&M-EST. PATIENT-LVL IV: ICD-10-PCS | Mod: PBBFAC,,, | Performed by: NURSE PRACTITIONER

## 2021-12-15 PROCEDURE — 99214 OFFICE O/P EST MOD 30 MIN: CPT | Mod: S$GLB,,, | Performed by: NURSE PRACTITIONER

## 2021-12-15 RX ORDER — GABAPENTIN 100 MG/1
100 CAPSULE ORAL 3 TIMES DAILY
Qty: 90 CAPSULE | Refills: 0 | Status: SHIPPED | OUTPATIENT
Start: 2021-12-15 | End: 2022-08-17

## 2021-12-15 RX ORDER — VALACYCLOVIR HYDROCHLORIDE 1 G/1
1000 TABLET, FILM COATED ORAL 3 TIMES DAILY
Qty: 21 TABLET | Refills: 0 | Status: SHIPPED | OUTPATIENT
Start: 2021-12-15 | End: 2022-03-23 | Stop reason: ALTCHOICE

## 2021-12-22 ENCOUNTER — PATIENT MESSAGE (OUTPATIENT)
Dept: BEHAVIORAL HEALTH | Facility: CLINIC | Age: 66
End: 2021-12-22
Payer: MEDICARE

## 2021-12-22 ENCOUNTER — TELEPHONE (OUTPATIENT)
Dept: BEHAVIORAL HEALTH | Facility: CLINIC | Age: 66
End: 2021-12-22
Payer: MEDICARE

## 2022-01-05 ENCOUNTER — TELEPHONE (OUTPATIENT)
Dept: BEHAVIORAL HEALTH | Facility: CLINIC | Age: 67
End: 2022-01-05
Payer: MEDICARE

## 2022-01-13 ENCOUNTER — PES CALL (OUTPATIENT)
Dept: ADMINISTRATIVE | Facility: CLINIC | Age: 67
End: 2022-01-13
Payer: MEDICARE

## 2022-01-18 ENCOUNTER — PATIENT MESSAGE (OUTPATIENT)
Dept: ADMINISTRATIVE | Facility: HOSPITAL | Age: 67
End: 2022-01-18
Payer: MEDICARE

## 2022-01-19 ENCOUNTER — TELEPHONE (OUTPATIENT)
Dept: BEHAVIORAL HEALTH | Facility: CLINIC | Age: 67
End: 2022-01-19
Payer: MEDICARE

## 2022-01-19 NOTE — PROGRESS NOTES
Patient was referred to the [facility] Non Opioid program by PCP.  CHW tired to reach out to patient a total of three times.  Patient referral was removed from the [facility] program.  CHW sent follow up message to patient's PCP via [employer].

## 2022-01-25 ENCOUNTER — HOSPITAL ENCOUNTER (OUTPATIENT)
Dept: RADIOLOGY | Facility: HOSPITAL | Age: 67
Discharge: HOME | End: 2022-01-25
Attending: NURSE PRACTITIONER
Payer: MEDICARE

## 2022-01-25 VITALS — WEIGHT: 200 LBS | BODY MASS INDEX: 36.8 KG/M2 | HEIGHT: 62 IN

## 2022-01-25 DIAGNOSIS — Z12.31 ENCOUNTER FOR SCREENING MAMMOGRAM FOR MALIGNANT NEOPLASM OF BREAST: ICD-10-CM

## 2022-01-25 PROCEDURE — 77063 BREAST TOMOSYNTHESIS BI: CPT | Mod: TC,PN

## 2022-01-25 PROCEDURE — 77063 BREAST TOMOSYNTHESIS BI: CPT | Mod: 26,,, | Performed by: RADIOLOGY

## 2022-01-25 PROCEDURE — 77063 MAMMO DIGITAL SCREENING BILAT WITH TOMO: ICD-10-PCS | Mod: 26,,, | Performed by: RADIOLOGY

## 2022-01-25 PROCEDURE — 77067 SCR MAMMO BI INCL CAD: CPT | Mod: 26,,, | Performed by: RADIOLOGY

## 2022-01-25 PROCEDURE — 77067 SCR MAMMO BI INCL CAD: CPT | Mod: TC,PN

## 2022-01-25 PROCEDURE — 77067 MAMMO DIGITAL SCREENING BILAT WITH TOMO: ICD-10-PCS | Mod: 26,,, | Performed by: RADIOLOGY

## 2022-01-31 ENCOUNTER — OFFICE VISIT (OUTPATIENT)
Dept: INTERNAL MEDICINE | Facility: CLINIC | Age: 67
End: 2022-01-31
Payer: MEDICARE

## 2022-01-31 VITALS
RESPIRATION RATE: 18 BRPM | WEIGHT: 205 LBS | SYSTOLIC BLOOD PRESSURE: 128 MMHG | BODY MASS INDEX: 37.73 KG/M2 | HEIGHT: 62 IN | DIASTOLIC BLOOD PRESSURE: 78 MMHG | HEART RATE: 82 BPM | OXYGEN SATURATION: 98 %

## 2022-01-31 DIAGNOSIS — Z78.0 MENOPAUSE: ICD-10-CM

## 2022-01-31 DIAGNOSIS — M72.2 PLANTAR FASCIITIS, RIGHT: ICD-10-CM

## 2022-01-31 DIAGNOSIS — H02.401 PTOSIS OF RIGHT EYELID: ICD-10-CM

## 2022-01-31 DIAGNOSIS — Z00.00 ENCOUNTER FOR ANNUAL HEALTH EXAMINATION: Primary | ICD-10-CM

## 2022-01-31 DIAGNOSIS — R01.1 SYSTOLIC MURMUR AT CARDIAC APEX: ICD-10-CM

## 2022-01-31 DIAGNOSIS — E66.01 MORBID OBESITY, UNSPECIFIED OBESITY TYPE: ICD-10-CM

## 2022-01-31 PROCEDURE — 99999 PR PBB SHADOW E&M-EST. PATIENT-LVL IV: ICD-10-PCS | Mod: PBBFAC,,, | Performed by: INTERNAL MEDICINE

## 2022-01-31 PROCEDURE — 99999 PR PBB SHADOW E&M-EST. PATIENT-LVL IV: CPT | Mod: PBBFAC,,, | Performed by: INTERNAL MEDICINE

## 2022-01-31 PROCEDURE — 99214 OFFICE O/P EST MOD 30 MIN: CPT | Mod: S$GLB,,, | Performed by: INTERNAL MEDICINE

## 2022-01-31 PROCEDURE — 99214 PR OFFICE/OUTPT VISIT, EST, LEVL IV, 30-39 MIN: ICD-10-PCS | Mod: S$GLB,,, | Performed by: INTERNAL MEDICINE

## 2022-01-31 NOTE — PROGRESS NOTES
"INTERNAL MEDICINE CLINIC VISIT    Subjective     Chief Complaint:   Chief Complaint   Patient presents with    Follow-up       History of Present Illness:  Ms. Kandace Rapp is a 66 y.o. female presenting for annual follow up. She reports no acute issues today. She says that since her last visit she has had shingles around Thanksgiving. Lesions along her back and visited doctor's office for Valtrex, but says symptoms weren't severe. Also reports plantar fascitis for years, with worsening foot arch and R heel pain that she feels has gotten worse because of weight gain. Has tried using inserts, with some improvement. Also reports having R eyelid droop/closure and "jumping" that she says has gotten worse. Only history of remote trauma (child), but cannot identify an inciting event for the jumping eyelid. It is bothersome to her and she would like an evaluation with ophthalmology.    Review of Systems   Constitutional: Negative for chills, fever, malaise/fatigue and weight loss.   HENT: Negative for congestion, hearing loss and sinus pain.    Eyes: Negative for blurred vision, double vision and pain.        R eyelid droop and jumping   Respiratory: Negative for cough, shortness of breath and wheezing.    Cardiovascular: Negative for chest pain, palpitations, orthopnea and leg swelling.   Gastrointestinal: Negative for abdominal pain, constipation, diarrhea, heartburn, nausea and vomiting.   Genitourinary: Negative for dysuria.   Musculoskeletal: Negative for joint pain and myalgias.        Plantar and heel pain R foot   Skin: Negative for rash.   Neurological: Negative for dizziness, weakness and headaches.   Psychiatric/Behavioral: Negative for depression. The patient is not nervous/anxious.        PAST HISTORY:     Past Medical History:   Diagnosis Date    Mitral valve prolapse        Past Surgical History:   Procedure Laterality Date    BREAST CYST EXCISION Left 80s    BREAST SURGERY      Benign lump on left. " "   CATARACT EXTRACTION       SECTION      x1       Family History   Problem Relation Age of Onset    Breast cancer Mother     Lymphoma Mother     Heart disease Mother     Cancer Mother     Diabetes Sister     Heart disease Brother     Alcohol abuse Brother     Heart disease Maternal Uncle     Heart disease Maternal Grandfather     Cancer Father         panc ca    Breast cancer Paternal Aunt        Social History     Socioeconomic History    Marital status:     Number of children: 2   Tobacco Use    Smoking status: Never Smoker    Smokeless tobacco: Never Used   Substance and Sexual Activity    Alcohol use: Yes     Comment: Rare.     Drug use: No    Sexual activity: Not Currently   Social History Narrative       , one child local dtr lives with her, one in [place] son in [place]/hx of [facility]. 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        MEDICATIONS & ALLERGIES:     Current Outpatient Medications on File Prior to Visit   Medication Sig    gabapentin (NEURONTIN) 100 MG capsule Take 1 capsule (100 mg total) by mouth 3 (three) times daily. (Patient not taking: Reported on 2022)    omeprazole (PRILOSEC) 40 MG capsule Take 1 capsule (40 mg total) by mouth every morning. Take 30 minutes before eating on an empty stomach (Patient not taking: No sig reported)    valACYclovir (VALTREX) 1000 MG tablet Take 1 tablet (1,000 mg total) by mouth 3 (three) times daily. for 7 days     No current facility-administered medications on file prior to visit.       Review of patient's allergies indicates:  No Known Allergies    OBJECTIVE:     Vital Signs:  Vitals:    22 1304   BP: 128/78   BP Location: Right arm   Patient Position: Sitting   BP Method: Large (Manual)   Pulse: 82   Resp: 18   SpO2: 98%   Weight: 93 kg (205 lb 0.4 oz)   Height: 5' 2" (1.575 m)       Body mass index is 37.5 kg/m².     Physical Exam  Vitals and nursing note " reviewed.   Constitutional:       Appearance: Normal appearance.   HENT:      Head: Normocephalic and atraumatic.   Eyes:      General: Gaze aligned appropriately.      Extraocular Movements: Extraocular movements intact.      Pupils: Pupils are equal, round, and reactive to light.      Comments: R eyelid droop, without CN III changes   Cardiovascular:      Rate and Rhythm: Normal rate and regular rhythm.      Heart sounds: Murmur heard.       Pulmonary:      Effort: Pulmonary effort is normal.      Breath sounds: Normal breath sounds.   Abdominal:      General: Bowel sounds are normal. There is no distension.      Palpations: Abdomen is soft.   Musculoskeletal:         General: Normal range of motion.      Cervical back: Normal range of motion.   Skin:     General: Skin is warm and dry.   Neurological:      General: No focal deficit present.      Mental Status: She is alert and oriented to person, place, and time.   Psychiatric:         Mood and Affect: Mood normal.         Laboratory  Lab Results   Component Value Date    WBC 6.98 06/21/2021    HGB 11.9 (L) 06/21/2021    HCT 39.6 06/21/2021    MCV 94 06/21/2021     06/21/2021     BMP  Lab Results   Component Value Date     (H) 06/21/2021    K 4.7 06/21/2021     06/21/2021    CO2 29 06/21/2021    BUN 8 06/21/2021    CREATININE 0.8 06/21/2021    CALCIUM 10.1 06/21/2021    ANIONGAP 9 06/21/2021    ESTGFRAFRICA >60.0 06/21/2021    EGFRNONAA >60.0 06/21/2021     No results found for: INR, PROTIME  Lab Results   Component Value Date    HGBA1C 5.3 01/30/2019       Diagnostic Results:  Labs: Reviewed    Health Maintenance Due   Topic Date Due    TETANUS VACCINE  Never done    DEXA SCAN  Never done    Colorectal Cancer Screening  Never done    Shingles Vaccine (1 of 2) Never done    Influenza Vaccine (1) 09/01/2021         ASSESSMENT & PLAN:   Ms. Kandace Rapp is a 66 y.o. female presenting for annual checkup.         Encounter for annual  health examination    Ptosis of right eyelid  -     Ambulatory referral/consult to Ophthalmology; Future; Expected date: 02/07/2022  - Remote hx of R eye trauma as a child and post-cataract surgery. No concern for CVA given no neuro deficits. No cranial nerve deficits--ptosis possibly related to levator palpebrae superioris muscle issue.    Systolic murmur at cardiac apex        -     Echo; Future        -     No evidence of increased murmur, no significant findings given history and physical exam. Plans for Echo at pt's request, will review findings.    Plantar fasciitis, right  -     Ambulatory referral/consult to Podiatry; Future; Expected date: 02/07/2022  - Worsening in spite of conservative measures. Pt advised that pain may persist for some time, but plans to refer to podiatry for further treatment.     Menopause  -     DXA Bone Density Spine And Hip; Future; Expected date: 01/31/2022    Morbid obesity -- discussed importance of wt loss.      Problem List Items Addressed This Visit    None     Visit Diagnoses     Encounter for annual health examination    -  Primary    Ptosis of right eyelid        Relevant Orders    Ambulatory referral/consult to Ophthalmology    Systolic murmur at cardiac apex        Plantar fasciitis, right        Relevant Orders    Echo    Ambulatory referral/consult to Podiatry    Menopause        Relevant Orders    DXA Bone Density Spine And Hip                 RTC in 10 months    Discussed with Dr. Jacobson  - attestation to follow      Robbie Cloud MD  Internal Medicine, PGY-I  [facility]  [address], [place] [id number] [phone]    I have reviewed and concur with the resident's history, physical, assessment, and plan.  I have personally interviewed and examined the patient at bedside.  See below addendum for my evaluation and additional findings.          Health Maintenance       Date Due Completion Date    TETANUS VACCINE Never done ---    DEXA SCAN  Never done ---    Colorectal Cancer Screening Never done ---    Shingles Vaccine (1 of 2) Never done ---    Influenza Vaccine (1) 09/01/2021 12/2/2020    Pneumococcal Vaccines (Age 65+) (2 of 2 - PPSV23) 06/21/2022 6/21/2021    Mammogram 01/25/2023 1/25/2022    Lipid Panel 01/30/2024 1/30/2019          Follow up in about 10 months (around 11/30/2022) for Flu and Shingles vaccine please.    Future Appointments   Date Time Provider Department Center   4/11/2022  2:00 PM Arely Gaitan DPM [facility]   12/5/2022  1:00 PM Ishmael Jacobson MD [facility]

## 2022-02-02 ENCOUNTER — TELEPHONE (OUTPATIENT)
Dept: OPHTHALMOLOGY | Facility: CLINIC | Age: 67
End: 2022-02-02
Payer: MEDICARE

## 2022-02-02 NOTE — TELEPHONE ENCOUNTER
----- Message from Danuta Villa MA sent at 2/2/2022  3:02 PM CST -----  Regarding: FW: Assist with scheduling  Pt being referred by PCP for ptosis.    ----- Message -----  From: Sonia Stack  Sent: 2/2/2022  12:29 PM CST  To: [facility] Clinical Support Staff  Subject: Assist with scheduling                           Please assist pt with scheduling appointment with Ophthalmology

## 2022-02-03 PROBLEM — E66.01 MORBID OBESITY, UNSPECIFIED OBESITY TYPE: Status: ACTIVE | Noted: 2022-02-03

## 2022-02-24 ENCOUNTER — PES CALL (OUTPATIENT)
Dept: ADMINISTRATIVE | Facility: CLINIC | Age: 67
End: 2022-02-24
Payer: MEDICARE

## 2022-03-09 ENCOUNTER — PES CALL (OUTPATIENT)
Dept: ADMINISTRATIVE | Facility: CLINIC | Age: 67
End: 2022-03-09
Payer: MEDICARE

## 2022-03-17 ENCOUNTER — TELEPHONE (OUTPATIENT)
Dept: INTERNAL MEDICINE | Facility: CLINIC | Age: 67
End: 2022-03-17
Payer: MEDICARE

## 2022-03-17 DIAGNOSIS — I35.0 NONRHEUMATIC AORTIC VALVE STENOSIS: Primary | ICD-10-CM

## 2022-03-17 NOTE — TELEPHONE ENCOUNTER
Hi, please call her -- her heart ultrasound shows that the aortic valve is narrowed and not fully opening all the way. I recommend that she see a cardiologist.  please assist in scheduling    Orders Placed This Encounter    Ambulatory referral/consult to Cardiology   Let me know if patient has any questions.  Thank you, Ishmael Jacobson

## 2022-03-18 ENCOUNTER — PES CALL (OUTPATIENT)
Dept: ADMINISTRATIVE | Facility: CLINIC | Age: 67
End: 2022-03-18
Payer: MEDICARE

## 2022-03-23 ENCOUNTER — OFFICE VISIT (OUTPATIENT)
Dept: INTERNAL MEDICINE | Facility: CLINIC | Age: 67
End: 2022-03-23
Payer: MEDICARE

## 2022-03-23 VITALS
SYSTOLIC BLOOD PRESSURE: 120 MMHG | BODY MASS INDEX: 38.59 KG/M2 | HEART RATE: 70 BPM | DIASTOLIC BLOOD PRESSURE: 76 MMHG | RESPIRATION RATE: 16 BRPM | HEIGHT: 62 IN | WEIGHT: 209.69 LBS

## 2022-03-23 DIAGNOSIS — Z00.00 ENCOUNTER FOR PREVENTIVE HEALTH EXAMINATION: Primary | ICD-10-CM

## 2022-03-23 DIAGNOSIS — F33.1 MODERATE EPISODE OF RECURRENT MAJOR DEPRESSIVE DISORDER: ICD-10-CM

## 2022-03-23 DIAGNOSIS — E66.01 MORBID OBESITY, UNSPECIFIED OBESITY TYPE: ICD-10-CM

## 2022-03-23 DIAGNOSIS — E55.9 VITAMIN D DEFICIENCY: ICD-10-CM

## 2022-03-23 DIAGNOSIS — I34.1 MITRAL VALVE PROLAPSE: ICD-10-CM

## 2022-03-23 DIAGNOSIS — R26.9 ABNORMALITY OF GAIT AND MOBILITY: ICD-10-CM

## 2022-03-23 DIAGNOSIS — I35.0 AORTIC VALVE STENOSIS, ETIOLOGY OF CARDIAC VALVE DISEASE UNSPECIFIED: ICD-10-CM

## 2022-03-23 DIAGNOSIS — Z13.31 POSITIVE DEPRESSION SCREENING: ICD-10-CM

## 2022-03-23 PROCEDURE — G0439 PPPS, SUBSEQ VISIT: HCPCS | Mod: S$GLB,,, | Performed by: NURSE PRACTITIONER

## 2022-03-23 PROCEDURE — 1159F PR MEDICATION LIST DOCUMENTED IN MEDICAL RECORD: ICD-10-PCS | Mod: CPTII,S$GLB,, | Performed by: NURSE PRACTITIONER

## 2022-03-23 PROCEDURE — 1126F PR PAIN SEVERITY QUANTIFIED, NO PAIN PRESENT: ICD-10-PCS | Mod: CPTII,S$GLB,, | Performed by: NURSE PRACTITIONER

## 2022-03-23 PROCEDURE — 1159F MED LIST DOCD IN RCRD: CPT | Mod: CPTII,S$GLB,, | Performed by: NURSE PRACTITIONER

## 2022-03-23 PROCEDURE — 1160F RVW MEDS BY RX/DR IN RCRD: CPT | Mod: CPTII,S$GLB,, | Performed by: NURSE PRACTITIONER

## 2022-03-23 PROCEDURE — 1160F PR REVIEW ALL MEDS BY PRESCRIBER/CLIN PHARMACIST DOCUMENTED: ICD-10-PCS | Mod: CPTII,S$GLB,, | Performed by: NURSE PRACTITIONER

## 2022-03-23 PROCEDURE — G0439 PR MEDICARE ANNUAL WELLNESS SUBSEQUENT VISIT: ICD-10-PCS | Mod: S$GLB,,, | Performed by: NURSE PRACTITIONER

## 2022-03-23 PROCEDURE — 99499 RISK ADDL DX/OHS AUDIT: ICD-10-PCS | Mod: S$GLB,,, | Performed by: NURSE PRACTITIONER

## 2022-03-23 PROCEDURE — 3288F FALL RISK ASSESSMENT DOCD: CPT | Mod: CPTII,S$GLB,, | Performed by: NURSE PRACTITIONER

## 2022-03-23 PROCEDURE — 1101F PT FALLS ASSESS-DOCD LE1/YR: CPT | Mod: CPTII,S$GLB,, | Performed by: NURSE PRACTITIONER

## 2022-03-23 PROCEDURE — 1101F PR PT FALLS ASSESS DOC 0-1 FALLS W/OUT INJ PAST YR: ICD-10-PCS | Mod: CPTII,S$GLB,, | Performed by: NURSE PRACTITIONER

## 2022-03-23 PROCEDURE — 99499 UNLISTED E&M SERVICE: CPT | Mod: S$GLB,,, | Performed by: NURSE PRACTITIONER

## 2022-03-23 PROCEDURE — 3008F PR BODY MASS INDEX (BMI) DOCUMENTED: ICD-10-PCS | Mod: CPTII,S$GLB,, | Performed by: NURSE PRACTITIONER

## 2022-03-23 PROCEDURE — 1170F PR FUNCTIONAL STATUS ASSESSED: ICD-10-PCS | Mod: CPTII,S$GLB,, | Performed by: NURSE PRACTITIONER

## 2022-03-23 PROCEDURE — 99999 PR PBB SHADOW E&M-EST. PATIENT-LVL IV: CPT | Mod: PBBFAC,,, | Performed by: NURSE PRACTITIONER

## 2022-03-23 PROCEDURE — 1170F FXNL STATUS ASSESSED: CPT | Mod: CPTII,S$GLB,, | Performed by: NURSE PRACTITIONER

## 2022-03-23 PROCEDURE — 99999 PR PBB SHADOW E&M-EST. PATIENT-LVL IV: ICD-10-PCS | Mod: PBBFAC,,, | Performed by: NURSE PRACTITIONER

## 2022-03-23 PROCEDURE — 1126F AMNT PAIN NOTED NONE PRSNT: CPT | Mod: CPTII,S$GLB,, | Performed by: NURSE PRACTITIONER

## 2022-03-23 PROCEDURE — 3288F PR FALLS RISK ASSESSMENT DOCUMENTED: ICD-10-PCS | Mod: CPTII,S$GLB,, | Performed by: NURSE PRACTITIONER

## 2022-03-23 PROCEDURE — 3008F BODY MASS INDEX DOCD: CPT | Mod: CPTII,S$GLB,, | Performed by: NURSE PRACTITIONER

## 2022-03-23 NOTE — Clinical Note
Medicare AWV completed. Defer shingles vaccines until 12/22, had shingles in 12/21. Recommend flu shot. Instructed to submit cologuard testing. PHQ9 score of 10 today indicating moderate depression. Denies SI/HI. Living with daughter is stressful. Referred to .  --Corrine

## 2022-03-23 NOTE — PROGRESS NOTES
"Kandace Rapp presented for a  Medicare AWV and comprehensive Health Risk Assessment today. The following components were reviewed and updated:    · Medical history  · Family History  · Social history  · Allergies and Current Medications  · Health Risk Assessment  · Health Maintenance  · Care Team         ** See Completed Assessments for Annual Wellness Visit within the encounter summary.**         The following assessments were completed:  · Living Situation  · CAGE  · Depression Screening  · Timed Get Up and Go  · Whisper Test  · Cognitive Function Screening    · Nutrition Screening  · ADL Screening  · PAQ Screening        Vitals:    03/23/22 1445   BP: 120/76   BP Location: Right arm   Patient Position: Sitting   BP Method: Large (Manual)   Pulse: 70   Resp: 16   Weight: 95.1 kg (209 lb 10.5 oz)   Height: 5' 2" (1.575 m)     Body mass index is 38.35 kg/m².     Physical Exam  Vitals reviewed.   Constitutional:       Appearance: Normal appearance. She is obese.   HENT:      Head: Normocephalic.   Cardiovascular:      Rate and Rhythm: Normal rate and regular rhythm.      Heart sounds: Murmur heard.   Pulmonary:      Effort: Pulmonary effort is normal.      Breath sounds: Normal breath sounds.   Abdominal:      General: Bowel sounds are normal.   Musculoskeletal:         General: Normal range of motion.      Cervical back: Normal range of motion.      Right lower leg: Edema present.      Left lower leg: Edema present.   Skin:     General: Skin is warm and dry.      Capillary Refill: Capillary refill takes less than 2 seconds.   Neurological:      Mental Status: She is alert and oriented to person, place, and time.   Psychiatric:         Behavior: Behavior normal.         Thought Content: Thought content normal.         Judgment: Judgment normal.               Diagnoses and health risks identified today and associated recommendations/orders:    1. Encounter for preventive health examination  Assessments " completed.   recommendations reviewed. Defer shingles vaccines until 12/22, had shingles in 12/21. Recommend flu shot. Instructed to submit cologuard testing.  F/u with PCP as instructed.    2. Morbid obesity, unspecified obesity type  Chronic, stable on current regimen. Followed by PCP.    3. Moderate episode of recurrent major depressive disorder  Chronic, stable. PHQ9 score of 10 today indicating moderate depression. Denies SI/HI. Living with daughter is stressful. Referred to . Followed by PCP.  - Ambulatory referral/consult to Primary Care Behavioral Health (Non-Opioids); Future    4. Aortic valve stenosis, etiology of cardiac valve disease unspecified  Chronic, stable on current regimen. Followed by PCP. Establishing with cardiology.    5. Mitral valve prolapse  Chronic, stable on current regimen. Followed by PCP. Establishing with cardiology.    6. Vitamin D deficiency  Chronic, stable on current regimen. Followed by PCP.    7. Positive depression screening  Chronic, stable. PHQ9 score of 10 today indicating moderate depression. Denies SI/HI. Living with daughter is stressful. Referred to . Followed by PCP.   I have used clinical judgement based on duration and functional status to consider definite necessity for treatment. See plan above.    8. Abnormality of gait and mobility  Chronic, stable. No recent falls. Does not use assistive devices. Followed by PCP.      Provided Kandace with a 5-10 year written screening schedule and personal prevention plan. Recommendations were developed using the USPSTF age appropriate recommendations. Education, counseling, and referrals were provided as needed. After Visit Summary printed and given to patient which includes a list of additional screenings\tests needed.    Follow up in about 1 year (around 3/23/2023) for Medicare AWV and with PCP as instructed..        Corrine Hancock NP     I offered to discuss advanced care planning, including how to pick a person who  would make decisions for you if you were unable to make them for yourself, called a health care power of , and what kind of decisions you might make such as use of life sustaining treatments such as ventilators and tube feeding when faced with a life limiting illness recorded on a living will that they will need to know. (How you want to be cared for as you near the end of your natural life)     X Patient is interested in learning more about how to make advanced directives.  I provided them paperwork and offered to discuss this with them.

## 2022-03-23 NOTE — PATIENT INSTRUCTIONS
1. Follow up with Dr. Ishmael Jacobson MD as scheduled.    2. Listen out for call from behavioral health (therapist) to schedule.    3. Submit colonguard testing.    4. Tetanus booster if needed.    5. Recommend flu shot.    Counseling and Referral of Other Preventative  (Italic type indicates deductible and co-insurance are waived)    Patient Name: Kandace Rapp  Today's Date: 3/23/2022    Health Maintenance         Date Due Completion Date    TETANUS VACCINE Never done ---    Colorectal Cancer Screening Never done ---    Shingles Vaccine (1 of 2) Never done ---    Influenza Vaccine (1) 09/01/2021 12/2/2020    Pneumococcal Vaccines (Age 65+) (2 of 2 - PPSV23) 06/21/2022 6/21/2021    Mammogram 01/25/2023 1/25/2022    Lipid Panel 01/30/2024 1/30/2019    DEXA Scan 03/14/2025 3/14/2022          Orders Placed This Encounter   Procedures    Ambulatory referral/consult to Primary Care Behavioral Health (Non-Opioids)     The following information is provided to all patients.  This information is to help you find resources for any of the problems found today that may be affecting your health:                Living healthy guide: www.[facility].[place].gov      Understanding Diabetes: www.diabetes.org      Eating healthy: www.cdc.gov/healthyweight      CDC home safety checklist: www.cdc.gov/steadi/patient.html      Agency on Aging: www.goea.[place].[facility]      Alcoholics anonymous (AA): www.aa.org      Physical Activity: www.pancho.nih.gov/lm2kweo      Tobacco use: www.quitwithusla.org

## 2022-03-31 ENCOUNTER — OFFICE VISIT (OUTPATIENT)
Dept: CARDIOLOGY | Facility: CLINIC | Age: 67
End: 2022-03-31
Payer: MEDICARE

## 2022-03-31 VITALS
SYSTOLIC BLOOD PRESSURE: 124 MMHG | BODY MASS INDEX: 38.37 KG/M2 | HEIGHT: 62 IN | DIASTOLIC BLOOD PRESSURE: 88 MMHG | WEIGHT: 208.5 LBS

## 2022-03-31 DIAGNOSIS — I70.0: ICD-10-CM

## 2022-03-31 DIAGNOSIS — T81.718A: ICD-10-CM

## 2022-03-31 DIAGNOSIS — E66.01 MORBID OBESITY, UNSPECIFIED OBESITY TYPE: Primary | ICD-10-CM

## 2022-03-31 DIAGNOSIS — I35.0 NONRHEUMATIC AORTIC VALVE STENOSIS: ICD-10-CM

## 2022-03-31 PROCEDURE — 99204 PR OFFICE/OUTPT VISIT, NEW, LEVL IV, 45-59 MIN: ICD-10-PCS | Mod: S$GLB,,, | Performed by: INTERNAL MEDICINE

## 2022-03-31 PROCEDURE — 93010 EKG 12-LEAD: ICD-10-PCS | Mod: S$GLB,,, | Performed by: INTERNAL MEDICINE

## 2022-03-31 PROCEDURE — 99999 PR PBB SHADOW E&M-EST. PATIENT-LVL III: ICD-10-PCS | Mod: PBBFAC,,, | Performed by: INTERNAL MEDICINE

## 2022-03-31 PROCEDURE — 1126F PR PAIN SEVERITY QUANTIFIED, NO PAIN PRESENT: ICD-10-PCS | Mod: CPTII,S$GLB,, | Performed by: INTERNAL MEDICINE

## 2022-03-31 PROCEDURE — 3008F PR BODY MASS INDEX (BMI) DOCUMENTED: ICD-10-PCS | Mod: CPTII,S$GLB,, | Performed by: INTERNAL MEDICINE

## 2022-03-31 PROCEDURE — 3079F PR MOST RECENT DIASTOLIC BLOOD PRESSURE 80-89 MM HG: ICD-10-PCS | Mod: CPTII,S$GLB,, | Performed by: INTERNAL MEDICINE

## 2022-03-31 PROCEDURE — 1126F AMNT PAIN NOTED NONE PRSNT: CPT | Mod: CPTII,S$GLB,, | Performed by: INTERNAL MEDICINE

## 2022-03-31 PROCEDURE — 3079F DIAST BP 80-89 MM HG: CPT | Mod: CPTII,S$GLB,, | Performed by: INTERNAL MEDICINE

## 2022-03-31 PROCEDURE — 93010 ELECTROCARDIOGRAM REPORT: CPT | Mod: S$GLB,,, | Performed by: INTERNAL MEDICINE

## 2022-03-31 PROCEDURE — 99204 OFFICE O/P NEW MOD 45 MIN: CPT | Mod: S$GLB,,, | Performed by: INTERNAL MEDICINE

## 2022-03-31 PROCEDURE — 1101F PT FALLS ASSESS-DOCD LE1/YR: CPT | Mod: CPTII,S$GLB,, | Performed by: INTERNAL MEDICINE

## 2022-03-31 PROCEDURE — 99999 PR PBB SHADOW E&M-EST. PATIENT-LVL III: CPT | Mod: PBBFAC,,, | Performed by: INTERNAL MEDICINE

## 2022-03-31 PROCEDURE — 1159F PR MEDICATION LIST DOCUMENTED IN MEDICAL RECORD: ICD-10-PCS | Mod: CPTII,S$GLB,, | Performed by: INTERNAL MEDICINE

## 2022-03-31 PROCEDURE — 93005 ELECTROCARDIOGRAM TRACING: CPT

## 2022-03-31 PROCEDURE — 3288F PR FALLS RISK ASSESSMENT DOCUMENTED: ICD-10-PCS | Mod: CPTII,S$GLB,, | Performed by: INTERNAL MEDICINE

## 2022-03-31 PROCEDURE — 3008F BODY MASS INDEX DOCD: CPT | Mod: CPTII,S$GLB,, | Performed by: INTERNAL MEDICINE

## 2022-03-31 PROCEDURE — 3288F FALL RISK ASSESSMENT DOCD: CPT | Mod: CPTII,S$GLB,, | Performed by: INTERNAL MEDICINE

## 2022-03-31 PROCEDURE — 1159F MED LIST DOCD IN RCRD: CPT | Mod: CPTII,S$GLB,, | Performed by: INTERNAL MEDICINE

## 2022-03-31 PROCEDURE — 3074F PR MOST RECENT SYSTOLIC BLOOD PRESSURE < 130 MM HG: ICD-10-PCS | Mod: CPTII,S$GLB,, | Performed by: INTERNAL MEDICINE

## 2022-03-31 PROCEDURE — 1101F PR PT FALLS ASSESS DOC 0-1 FALLS W/OUT INJ PAST YR: ICD-10-PCS | Mod: CPTII,S$GLB,, | Performed by: INTERNAL MEDICINE

## 2022-03-31 PROCEDURE — 3074F SYST BP LT 130 MM HG: CPT | Mod: CPTII,S$GLB,, | Performed by: INTERNAL MEDICINE

## 2022-03-31 NOTE — PROGRESS NOTES
Cardiology    3/31/2022  4:08 PM    Problem list  Patient Active Problem List   Diagnosis    Mitral valve prolapse    Vitamin D deficiency    Morbid obesity, unspecified obesity type    Nonrheumatic aortic valve stenosis       CC:  Abnormal echo    HPI:  Patient is referred by PCP for abnormal echocardiogram.  Echocardiogram done on  which showed moderately severe aortic stenosis.  Patient has been known to have valve problems.  She denies any angina, dyspnea on exertion, paroxysmal nocturnal dyspnea, orthopnea, palpitation or syncope.    Medications  Current Outpatient Medications   Medication Sig Dispense Refill    gabapentin (NEURONTIN) 100 MG capsule Take 1 capsule (100 mg total) by mouth 3 (three) times daily. (Patient not taking: No sig reported) 90 capsule 0    omeprazole (PRILOSEC) 40 MG capsule Take 1 capsule (40 mg total) by mouth every morning. Take 30 minutes before eating on an empty stomach (Patient not taking: No sig reported) 30 capsule 2     No current facility-administered medications for this visit.      Prior to Admission medications    Medication Sig Start Date End Date Taking? Authorizing Provider   gabapentin (NEURONTIN) 100 MG capsule Take 1 capsule (100 mg total) by mouth 3 (three) times daily.  Patient not taking: No sig reported 12/15/21   Juany Avery, NP   omeprazole (PRILOSEC) 40 MG capsule Take 1 capsule (40 mg total) by mouth every morning. Take 30 minutes before eating on an empty stomach  Patient not taking: No sig reported 20   Ishmael Jacobson MD         History  Past Medical History:   Diagnosis Date    Mitral valve prolapse      Past Surgical History:   Procedure Laterality Date    BREAST CYST EXCISION Left 80s    BREAST SURGERY      Benign lump on left.    CATARACT EXTRACTION       SECTION      x1     Social History     Socioeconomic History    Marital status:     Number of children: 2   Tobacco Use    Smoking status:  Never Smoker    Smokeless tobacco: Never Used   Substance and Sexual Activity    Alcohol use: Yes     Comment: Rare.     Drug use: No    Sexual activity: Not Currently   Social History Narrative       , one child local dtr lives with her, one in [place] son in [place]/hx of [facility]. 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      Social Determinants of Health     Financial Resource Strain: Low Risk     Difficulty of Paying Living Expenses: Not hard at all   Food Insecurity: No Food Insecurity    Worried About Running Out of Food in the Last Year: Never true    Ran Out of Food in the Last Year: Never true   Transportation Needs: No Transportation Needs    Lack of Transportation (Medical): No    Lack of Transportation (Non-Medical): No   Physical Activity: Inactive    Days of Exercise per Week: 0 days    Minutes of Exercise per Session: 0 min   Stress: Stress Concern Present    Feeling of Stress : Very much   Social Connections: Socially Isolated    Frequency of Communication with Friends and Family: More than three times a week    Frequency of Social Gatherings with Friends and Family: Once a week    Attends Taoist Services: Never    Active Member of Clubs or Organizations: No    Attends Club or Organization Meetings: Never    Marital Status:    Housing Stability: Low Risk     Unable to Pay for Housing in the Last Year: No    Number of Places Lived in the Last Year: 1    Unstable Housing in the Last Year: No         Allergies  Review of patient's allergies indicates:  No Known Allergies      Review of Systems   Review of Systems   Constitutional: Negative for decreased appetite, fever and weight loss.   HENT: Negative for congestion and nosebleeds.    Eyes: Negative for double vision, vision loss in left eye, vision loss in right eye and visual disturbance.   Cardiovascular: Negative for chest pain, claudication, cyanosis, dyspnea on  exertion, irregular heartbeat, leg swelling, near-syncope, orthopnea, palpitations, paroxysmal nocturnal dyspnea and syncope.   Respiratory: Negative for cough, hemoptysis, shortness of breath, sleep disturbances due to breathing, snoring, sputum production and wheezing.    Endocrine: Negative for cold intolerance and heat intolerance.   Skin: Negative for nail changes and rash.   Musculoskeletal: Negative for joint pain, muscle cramps, muscle weakness and myalgias.   Gastrointestinal: Negative for change in bowel habit, heartburn, hematemesis, hematochezia, hemorrhoids and melena.   Neurological: Negative for dizziness, focal weakness and headaches.         Physical Exam  Wt Readings from Last 1 Encounters:   03/31/22 94.6 kg (208 lb 8 oz)     BP Readings from Last 3 Encounters:   03/31/22 124/88   03/23/22 120/76   01/31/22 128/78     Pulse Readings from Last 1 Encounters:   03/31/22 (P) 79     Body mass index is 38.14 kg/m².    Physical Exam  Vitals reviewed.   Constitutional:       Appearance: She is obese.   Cardiovascular:      Rate and Rhythm: Normal rate and regular rhythm.      Pulses:           Carotid pulses are 1+ on the right side and 1+ on the left side.       Radial pulses are 2+ on the right side and 2+ on the left side.      Heart sounds: S1 normal and S2 normal. Murmur heard.    Harsh midsystolic murmur is present with a grade of 2/6 at the upper right sternal border radiating to the neck.  Pulmonary:      Breath sounds: Normal breath sounds and air entry.   Musculoskeletal:      Right lower leg: No edema.      Left lower leg: No edema.   Neurological:      Mental Status: She is alert.         EKG:  Sinus rhythm rate of 76    Assessment  1. Nonrheumatic aortic valve stenosis  Moderately severe on echocardiogram  - Ambulatory referral/consult to Cardiology  - EKG 12-lead  - Echo; Future  - Lipid Panel; Future    2. 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 Morbid obesity, unspecified obesity type  Unchanged        Plan and Discussion  Discussed her echocardiogram finding which showed moderately severe aortic stenosis.  Discussed symptoms of angina, shortness of breath and syncope to be aware of.  Encouraged to start exercising by walking at least 30 minutes daily for 5 days a week.  Will repeat her echocardiogram in 6 months to evaluate the progression of her valvular disease.    Follow Up  Six months with echo and labs      Yordy Ng MD, F.A.C.C, F.S.C.A.I.

## 2022-04-04 ENCOUNTER — TELEPHONE (OUTPATIENT)
Dept: BEHAVIORAL HEALTH | Facility: CLINIC | Age: 67
End: 2022-04-04
Payer: MEDICARE

## 2022-04-04 NOTE — PROGRESS NOTES
Behavioral Health Community Health Worker  Initial Assessment  Completed by:  Saran Rapp    Date:  4/4/2022    Patient Enrollment in Behavioral Health Program:  · Patient verbalized understanding of Behavioral Health Integration services to include:  · Patient understands that CHW, LCSW, PharmD and consulting Psychiatrist are members of the care team working collaboratively with his/her primary care provider: Yes  · Patient understands that activation of their [facility] patient portal account is required for accessing the full scope of team services: Yes  · Patient understands that some counseling sessions may occur via video: Yes  · Clinic visits with the psychiatrist may be subject to a co-pay based on your insurance: Yes  · Patient consents to enroll in BHI program: Yes    Assessments     Single Item Health Literacy Scale:  · How often do you need to have someone help you read instructions, pamphlets or other written material from your doctor or pharmacy?: Never    Promis 10:  · Promis 10 Responses  · In general, would you say your health is: Good  · In general, would you say your quality of life is: Good  · In general, how would you rate your physical health?: Good  · In general, how would you rate your mental health, including your mood and your ability to think?: Good  · In general, how would you rate your satisfaction with your social activities and relationships?: Good  · In general, please rate how well you carry out your usual social activities and roles. (This includes activities at home, at work and in your community, and responsibilities as a parent, child, spouse, employee, friend, etc.): Very good  · To what extent are you able to carry out your everyday physical activities such as walking, climbing stairs, carrying groceries, or moving a chair? : Completely  · How often have you been bothered by emotional problems such as feeling anxious, depressed or irritable?: Sometimes  · In the past 7 days,  how would you rate your fatigue on average?: Mild  · In the past 7 days, on a scale of 0 to 10 (where 0 is no pain and 10 is the worst pain imaginable) how would you rate your pain on average?: 3  · Global Physical Health: 13  · Global Mental health Score: 12    Depression PHQ:  PHQ9 3/23/2022   Total Score 10         Generalized Anxiety Disorder 7-Item Scale:  GAD7 2022   1. Feeling nervous, anxious, or on edge? 1   2. Not being able to stop or control worrying? 1   3. Worrying too much about different things? 1   4. Trouble relaxing? 0   5. Being so restless that it is hard to sit still? 0   6. Becoming easily annoyed or irritable? 1   7. Feeling afraid as if something awful might happen? 0   8. If you checked off any problems, how difficult have these problems made it for you to do your work, take care of things at home, or get along with other people? 1   SKYE-7 Score 4       History     Social History     Socioeconomic History    Marital status:     Number of children: 2   Tobacco Use    Smoking status: Never Smoker    Smokeless tobacco: Never Used   Substance and Sexual Activity    Alcohol use: Yes     Comment: Rare.     Drug use: No    Sexual activity: Not Currently   Social History Narrative       , one child local dtr lives with her, one in [place] son in [place]. 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      Social Determinants of Health     Financial Resource Strain: Low Risk     Difficulty of Paying Living Expenses: Not hard at all   Food Insecurity: No Food Insecurity    Worried About Running Out of Food in the Last Year: Never true    Ran Out of Food in the Last Year: Never true   Transportation Needs: No Transportation Needs    Lack of Transportation (Medical): No    Lack of Transportation (Non-Medical): No   Physical Activity: Inactive    Days of Exercise per Week: 0 days    Minutes of Exercise per Session: 0 min  "  Stress: Stress Concern Present    Feeling of Stress : Very much   Social Connections: Socially Isolated    Frequency of Communication with Friends and Family: More than three times a week    Frequency of Social Gatherings with Friends and Family: Once a week    Attends Orthodox Services: Never    Active Member of Clubs or Organizations: No    Attends Club or Organization Meetings: Never    Marital Status:    Housing Stability: Low Risk     Unable to Pay for Housing in the Last Year: No    Number of Places Lived in the Last Year: 1    Unstable Housing in the Last Year: No       Call Summary      Patient was referred to the BHI (Non-opioid) program by Primary Care Provider,   CHW contacted Kandace Rapp who reports depresion that limits [her] activities of daily living (ADLs).   Patient scored "10" on the PHQ9 and "4" on the SKYE 7. Based on these scores patient is eligible for the Behavioral health Integration (Non-opioid) Program. CHW completed the intake and scheduled an appointment for patient with Juany Norris LCSW, on 4/25/22.           "

## 2022-04-11 ENCOUNTER — OFFICE VISIT (OUTPATIENT)
Dept: PODIATRY | Facility: CLINIC | Age: 67
End: 2022-04-11
Payer: MEDICARE

## 2022-04-11 VITALS
HEART RATE: 82 BPM | SYSTOLIC BLOOD PRESSURE: 140 MMHG | DIASTOLIC BLOOD PRESSURE: 79 MMHG | WEIGHT: 207 LBS | BODY MASS INDEX: 37.86 KG/M2

## 2022-04-11 DIAGNOSIS — M72.2 PLANTAR FASCIITIS, RIGHT: ICD-10-CM

## 2022-04-11 DIAGNOSIS — B35.1 ONYCHOMYCOSIS OF TOENAIL: ICD-10-CM

## 2022-04-11 DIAGNOSIS — E66.01 MORBID OBESITY, UNSPECIFIED OBESITY TYPE: Primary | ICD-10-CM

## 2022-04-11 PROCEDURE — 1126F PR PAIN SEVERITY QUANTIFIED, NO PAIN PRESENT: ICD-10-PCS | Mod: CPTII,S$GLB,, | Performed by: PODIATRIST

## 2022-04-11 PROCEDURE — 3008F PR BODY MASS INDEX (BMI) DOCUMENTED: ICD-10-PCS | Mod: CPTII,S$GLB,, | Performed by: PODIATRIST

## 2022-04-11 PROCEDURE — 99999 PR PBB SHADOW E&M-EST. PATIENT-LVL III: CPT | Mod: PBBFAC,,, | Performed by: PODIATRIST

## 2022-04-11 PROCEDURE — 1126F AMNT PAIN NOTED NONE PRSNT: CPT | Mod: CPTII,S$GLB,, | Performed by: PODIATRIST

## 2022-04-11 PROCEDURE — 1159F MED LIST DOCD IN RCRD: CPT | Mod: CPTII,S$GLB,, | Performed by: PODIATRIST

## 2022-04-11 PROCEDURE — 3008F BODY MASS INDEX DOCD: CPT | Mod: CPTII,S$GLB,, | Performed by: PODIATRIST

## 2022-04-11 PROCEDURE — 99203 PR OFFICE/OUTPT VISIT, NEW, LEVL III, 30-44 MIN: ICD-10-PCS | Mod: S$GLB,,, | Performed by: PODIATRIST

## 2022-04-11 PROCEDURE — 1159F PR MEDICATION LIST DOCUMENTED IN MEDICAL RECORD: ICD-10-PCS | Mod: CPTII,S$GLB,, | Performed by: PODIATRIST

## 2022-04-11 PROCEDURE — 99203 OFFICE O/P NEW LOW 30 MIN: CPT | Mod: S$GLB,,, | Performed by: PODIATRIST

## 2022-04-11 PROCEDURE — 3077F SYST BP >= 140 MM HG: CPT | Mod: CPTII,S$GLB,, | Performed by: PODIATRIST

## 2022-04-11 PROCEDURE — 99999 PR PBB SHADOW E&M-EST. PATIENT-LVL III: ICD-10-PCS | Mod: PBBFAC,,, | Performed by: PODIATRIST

## 2022-04-11 PROCEDURE — 3078F PR MOST RECENT DIASTOLIC BLOOD PRESSURE < 80 MM HG: ICD-10-PCS | Mod: CPTII,S$GLB,, | Performed by: PODIATRIST

## 2022-04-11 PROCEDURE — 3078F DIAST BP <80 MM HG: CPT | Mod: CPTII,S$GLB,, | Performed by: PODIATRIST

## 2022-04-11 PROCEDURE — 3077F PR MOST RECENT SYSTOLIC BLOOD PRESSURE >= 140 MM HG: ICD-10-PCS | Mod: CPTII,S$GLB,, | Performed by: PODIATRIST

## 2022-04-11 NOTE — PATIENT INSTRUCTIONS
PPT arch pads w/ adhesive - small    Visco-gel universal metatarsal strap - small/medium right and left

## 2022-04-11 NOTE — PROGRESS NOTES
Subjective:      Patient ID: Kandace Rapp is a 66 y.o. female.    Chief Complaint: Follow-up (Pain both feet)    Kandace is a 66 y.o. female who presents to the clinic complaining of previous history of heel pain in the B/L in her 20s & recurrent in 40s w/ weight gain & again now 60s, right greater than left. Previous had injections. Got OTC inserts. In tennis shoes out & thick rubber-soled slipper @ home.    B/L nail fungus hallux after removed nail polish last week; started Kerasal & Tea tree oil - working.  Past Medical History:   Diagnosis Date    Mitral valve prolapse      Patient Active Problem List   Diagnosis    Mitral valve prolapse    Vitamin D deficiency    Morbid obesity, unspecified obesity type    Nonrheumatic aortic valve stenosis     PCP:  Ishmael Jacobson MD/Corrine Hancock MD 3/23/22    Objective:      Review of Systems   Constitutional: Negative for malaise/fatigue.   Cardiovascular: Negative for claudication and leg swelling.   Skin: Positive for color change and nail changes. Negative for dry skin, rash and suspicious lesions.   Musculoskeletal: Positive for myalgias. Negative for arthritis, falls and joint pain.   Neurological: Positive for loss of balance. Negative for focal weakness, numbness, paresthesias and weakness.   Psychiatric/Behavioral: The patient is not nervous/anxious.      Physical Exam  Vitals reviewed.   Constitutional:       General: She is not in acute distress.     Appearance: She is well-developed. She is morbidly obese.   Cardiovascular:      Pulses:           Dorsalis pedis pulses are 1+ on the right side and 1+ on the left side.   Musculoskeletal:         General: Tenderness present. No swelling or signs of injury.      Right lower leg: No edema.      Left lower leg: No edema.      Right foot: Normal range of motion.      Left foot: Normal range of motion.   Feet:      Right foot:      Protective Sensation: 2 sites tested. 2 sites sensed.      Skin integrity: Skin  integrity normal.      Toenail Condition: Fungal disease present.     Left foot:      Protective Sensation: 2 sites tested. 2 sites sensed.      Skin integrity: Skin integrity normal.      Toenail Condition: Fungal disease present.     Comments: Dystrophic changes and thickening with discoloration as well as subungual debris consistent with mycosis bilateral hallux nail plate.    Some tenderness to palpation with palpable edema plantar medial insertion of fascia into the calcaneus right foot with palpable edema to the left foot but no elicited discomfort.  No radiating pain proximally nor distally.  No pain along the PT tendon.  Skin:     General: Skin is warm and dry.      Capillary Refill: Capillary refill takes less than 2 seconds.      Findings: No bruising, erythema, lesion or rash.      Nails: There is no clubbing.   Neurological:      Mental Status: She is alert and oriented to person, place, and time.      Sensory: No sensory deficit.      Motor: No weakness.      Gait: Gait (States occasionally feels unstable but baseline, chronic without need for assistance.) normal.   Psychiatric:         Mood and Affect: Mood and affect normal.         Behavior: Behavior normal. Behavior is cooperative.         Assessment:      Encounter Diagnoses   Name Primary?    Plantar fasciitis, right     Morbid obesity, unspecified obesity type Yes    Onychomycosis of toenail        Problem List Items Addressed This Visit        Endocrine    Morbid obesity, unspecified obesity type - Primary    Current Assessment & Plan     BMI 37.86 kg/M2-no serious comorbidities.  Follows with PCP regularly.             Other Visit Diagnoses     Plantar fasciitis, right        Onychomycosis of toenail            Plan:       Kandace was seen today for follow-up.    Diagnoses and all orders for this visit:    Morbid obesity, unspecified obesity type    Plantar fasciitis, right  -     Ambulatory referral/consult to Podiatry    Onychomycosis of  toenail    I counseled the patient on her conditions, their implications and medical management.    - Shoe inspection. Patient instructed on proper foot hygeine. We discussed wearing proper supportive shoe gear, never walking without protective shoe gear.    PPT arch pads and metatarsal gel strap to be used along the medial longitudinal arch bilaterally depending on which fits in shoe gear.    Advised on continued use of topical antifungal such as tea tree oil q.d. until resolution of mycosis.  Advise other OTC antifungal treatment options also affective as long she is using on a regular basis until cleared.    Follow-up here in 3-4 weeks p.r.n..

## 2022-04-21 ENCOUNTER — PATIENT MESSAGE (OUTPATIENT)
Dept: ADMINISTRATIVE | Facility: HOSPITAL | Age: 67
End: 2022-04-21
Payer: MEDICARE

## 2022-04-21 ENCOUNTER — PATIENT OUTREACH (OUTPATIENT)
Dept: ADMINISTRATIVE | Facility: HOSPITAL | Age: 67
End: 2022-04-21
Payer: MEDICARE

## 2022-04-21 NOTE — PROGRESS NOTES
Health Maintenance Due   Topic Date Due    TETANUS VACCINE  Never done    Colorectal Cancer Screening  05/15/2020     Triggered LINKS. Updated Care Everywhere. Imported Cologuard results into . Portal message sent to pt asking pt to complete Cologuard screening. Chart review completed as part of PHN attestation.

## 2022-05-09 ENCOUNTER — CLINICAL SUPPORT (OUTPATIENT)
Dept: BEHAVIORAL HEALTH | Facility: CLINIC | Age: 67
End: 2022-05-09
Payer: MEDICARE

## 2022-05-09 DIAGNOSIS — F33.1 MODERATE EPISODE OF RECURRENT MAJOR DEPRESSIVE DISORDER: ICD-10-CM

## 2022-05-09 PROCEDURE — 99499 RISK ADDL DX/OHS AUDIT: ICD-10-PCS | Mod: S$GLB,,, | Performed by: SOCIAL WORKER

## 2022-05-09 PROCEDURE — 99499 UNLISTED E&M SERVICE: CPT | Mod: S$GLB,,, | Performed by: SOCIAL WORKER

## 2022-05-09 PROCEDURE — 90791 PSYCH DIAGNOSTIC EVALUATION: CPT | Mod: S$GLB,,, | Performed by: SOCIAL WORKER

## 2022-05-09 PROCEDURE — 90791 PR PSYCHIATRIC DIAGNOSTIC EVALUATION: ICD-10-PCS | Mod: S$GLB,,, | Performed by: SOCIAL WORKER
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
## 2022-05-16 PROBLEM — F33.1 MODERATE EPISODE OF RECURRENT MAJOR DEPRESSIVE DISORDER: Status: ACTIVE | Noted: 2022-05-16

## 2022-05-27 ENCOUNTER — PATIENT MESSAGE (OUTPATIENT)
Dept: BEHAVIORAL HEALTH | Facility: CLINIC | Age: 67
End: 2022-05-27
Payer: MEDICARE

## 2022-06-21 ENCOUNTER — PATIENT MESSAGE (OUTPATIENT)
Dept: ADMINISTRATIVE | Facility: HOSPITAL | Age: 67
End: 2022-06-21
Payer: MEDICARE

## 2022-06-22 ENCOUNTER — TELEPHONE (OUTPATIENT)
Dept: INTERNAL MEDICINE | Facility: CLINIC | Age: 67
End: 2022-06-22
Payer: MEDICARE

## 2022-06-22 ENCOUNTER — TELEPHONE (OUTPATIENT)
Dept: BEHAVIORAL HEALTH | Facility: CLINIC | Age: 67
End: 2022-06-22
Payer: MEDICARE

## 2022-06-22 DIAGNOSIS — U07.1 COVID-19 VIRUS INFECTION: Primary | ICD-10-CM

## 2022-06-22 NOTE — TELEPHONE ENCOUNTER
----- Message from Sam Knight sent at 6/22/2022 12:00 PM CDT -----  Contact: [phone]  Pt wants a call back she tested pos for covid last night.

## 2022-06-22 NOTE — TELEPHONE ENCOUNTER
I spoke to pt regarding message stating that she took a home test, and tested positive for Covid 19 last night. Pt reports starting symptoms 2 days ago, gever and chills last night. She admits that she is taking otc tylenol, mucinex syrup and mucinex nasal spray as of today. Pt states that she is fully vaccinated and has had her booster. Pt denies sob, difficulty breathing or chest pain. Please advise.

## 2022-06-22 NOTE — TELEPHONE ENCOUNTER
Hi, I recommend that the patient take this medicine for covid infection:  Orders Placed This Encounter    nirmatrelvir-ritonavir 150 mg x 2- 100 mg copackaged tablets (EUA)   rx sent to [facility]    3 tabs twice per day for 5 days.    The patient should call us back if symptoms get worse and if shortness of breath comes on.    Let me know if patient has any questions.  Thank you, Ishmael Jacobson

## 2022-06-27 ENCOUNTER — TELEPHONE (OUTPATIENT)
Dept: INTERNAL MEDICINE | Facility: CLINIC | Age: 67
End: 2022-06-27
Payer: MEDICARE

## 2022-06-27 NOTE — TELEPHONE ENCOUNTER
----- Message from Mona Stack sent at 6/27/2022  2:23 PM CDT -----  Contact: [phone]  Pt is calling she is asking when she should retest for covid from being on the medication and how long should she quarantine for please advise and give return call

## 2022-06-27 NOTE — TELEPHONE ENCOUNTER
Spoke with the patient regarding the amount of time she needed to quarantine, advised her that 5 days is the recommended time by the CDC however if she is running fever a couple extra days would not hurt. Also advised the patient that currently they do not retest after completion of the antiviral medication. Patient stated she is feeling better at this time. Patient also expressed understanding of our conversation-Irma TORRES MA

## 2022-07-08 ENCOUNTER — OFFICE VISIT (OUTPATIENT)
Dept: BEHAVIORAL HEALTH | Facility: CLINIC | Age: 67
End: 2022-07-08
Payer: MEDICARE

## 2022-07-08 DIAGNOSIS — F33.1 MODERATE EPISODE OF RECURRENT MAJOR DEPRESSIVE DISORDER: Primary | ICD-10-CM

## 2022-07-08 PROCEDURE — 90832 PSYTX W PT 30 MINUTES: CPT | Mod: 95,,, | Performed by: SOCIAL WORKER

## 2022-07-08 PROCEDURE — 90832 PR PSYCHOTHERAPY W/PATIENT, 30 MIN: ICD-10-PCS | Mod: 95,,, | Performed by: SOCIAL WORKER

## 2022-07-08 NOTE — PROGRESS NOTES
"The patient location is: [place]  The chief complaint leading to consultation is: depression    Visit type: audiovisual    Face to Face time with patient: 30  32 minutes of total time spent on the encounter, which includes face to face time and non-face to face time preparing to see the patient (eg, review of tests), Obtaining and/or reviewing separately obtained history, Documenting clinical information in the electronic or other health record, Independently interpreting results (not separately reported) and communicating results to the patient/family/caregiver, or Care coordination (not separately reported).     Individual Psychotherapy (LCSW/PhD)  Kandace Rapp,  7/8/2022    Site:  Telemed         Therapeutic Intervention: Met with patient for individual psychotherapy.    Chief complaint/reason for encounter: depression     Interval history and content of current session: Pt reports she enjoyed her girl's trip to [facility]. Pt went to see her cardiologist when she was planning on her trip. Pt is worried she might have to have surgery. PT reports she couldn't sleep for a bit after this appointment.  She was worried she wouldn't wake up because of her heart issues. She was worrying she was having worse symptoms. PT was glad she didn't let her anxiety about her health keep her from going to the trip.  She is thinking of going back to school to learn medical billing and coding. PT reports her brother called her a couple of weeks ago.  He is an alcoholic and wanted help with his bills. She pat a boundary with him about helping him in the future. She feels guilty for not rescuing him though.  She reports his daughters don't help.   PT is trying to lose weight.  She has been working out some.  She notices when she works out she's not as stiff. Pt also caught Covid. She feels her frame of mind is better. "I've had to realize you have to accept the things you cannot change." She also wants to live in the moment. Pt is " considering getting back involved in the local community politics but she has better boundaries about it.     Treatment plan:  · Target symptoms: depression  · Why chosen therapy is appropriate versus another modality: relevant to diagnosis, patient responds to this modality  · Outcome monitoring methods: self-report, observation  · Therapeutic intervention type: behavior modifying psychotherapy, supportive psychotherapy    Risk parameters:  Patient reports no suicidal ideation  Patient reports no homicidal ideation  Patient reports no self-injurious behavior  Patient reports no violent behavior    Verbal deficits: None    Patient's response to intervention:  The patient's response to intervention is accepting.    Progress toward goals and other mental status changes:  The patient's progress toward goals is fair .    Diagnosis:     ICD-10-CM ICD-9-CM   1. Moderate episode of recurrent major depressive disorder  F33.1 296.32       Plan: Pt plans to continue individual psychotherapy    Return to clinic: 3 weeks    Length of Service (minutes): 30        Each patient to whom he or she provides medical services by telemedicine is:  (1) informed of the relationship between the physician and patient and the respective role of any other health care provider with respect to management of the patient; and (2) notified that he or she may decline to receive medical services by telemedicine and may withdraw from such care at any time.

## 2022-07-13 ENCOUNTER — PATIENT MESSAGE (OUTPATIENT)
Dept: CARDIOLOGY | Facility: CLINIC | Age: 67
End: 2022-07-13
Payer: MEDICARE

## 2022-07-18 ENCOUNTER — OFFICE VISIT (OUTPATIENT)
Dept: CARDIOLOGY | Facility: CLINIC | Age: 67
End: 2022-07-18
Payer: MEDICARE

## 2022-07-18 VITALS
WEIGHT: 205.88 LBS | DIASTOLIC BLOOD PRESSURE: 88 MMHG | HEIGHT: 62 IN | OXYGEN SATURATION: 97 % | BODY MASS INDEX: 37.89 KG/M2 | SYSTOLIC BLOOD PRESSURE: 120 MMHG | HEART RATE: 89 BPM

## 2022-07-18 DIAGNOSIS — I35.0 NONRHEUMATIC AORTIC VALVE STENOSIS: Primary | ICD-10-CM

## 2022-07-18 PROCEDURE — 99214 OFFICE O/P EST MOD 30 MIN: CPT | Mod: S$GLB,,, | Performed by: INTERNAL MEDICINE

## 2022-07-18 PROCEDURE — 3288F PR FALLS RISK ASSESSMENT DOCUMENTED: ICD-10-PCS | Mod: CPTII,S$GLB,, | Performed by: INTERNAL MEDICINE

## 2022-07-18 PROCEDURE — 3074F PR MOST RECENT SYSTOLIC BLOOD PRESSURE < 130 MM HG: ICD-10-PCS | Mod: CPTII,S$GLB,, | Performed by: INTERNAL MEDICINE

## 2022-07-18 PROCEDURE — 99214 PR OFFICE/OUTPT VISIT, EST, LEVL IV, 30-39 MIN: ICD-10-PCS | Mod: S$GLB,,, | Performed by: INTERNAL MEDICINE

## 2022-07-18 PROCEDURE — 1101F PT FALLS ASSESS-DOCD LE1/YR: CPT | Mod: CPTII,S$GLB,, | Performed by: INTERNAL MEDICINE

## 2022-07-18 PROCEDURE — 1126F AMNT PAIN NOTED NONE PRSNT: CPT | Mod: CPTII,S$GLB,, | Performed by: INTERNAL MEDICINE

## 2022-07-18 PROCEDURE — 1126F PR PAIN SEVERITY QUANTIFIED, NO PAIN PRESENT: ICD-10-PCS | Mod: CPTII,S$GLB,, | Performed by: INTERNAL MEDICINE

## 2022-07-18 PROCEDURE — 99999 PR PBB SHADOW E&M-EST. PATIENT-LVL III: CPT | Mod: PBBFAC,,, | Performed by: INTERNAL MEDICINE

## 2022-07-18 PROCEDURE — 1160F PR REVIEW ALL MEDS BY PRESCRIBER/CLIN PHARMACIST DOCUMENTED: ICD-10-PCS | Mod: CPTII,S$GLB,, | Performed by: INTERNAL MEDICINE

## 2022-07-18 PROCEDURE — 3079F PR MOST RECENT DIASTOLIC BLOOD PRESSURE 80-89 MM HG: ICD-10-PCS | Mod: CPTII,S$GLB,, | Performed by: INTERNAL MEDICINE

## 2022-07-18 PROCEDURE — 3288F FALL RISK ASSESSMENT DOCD: CPT | Mod: CPTII,S$GLB,, | Performed by: INTERNAL MEDICINE

## 2022-07-18 PROCEDURE — 99999 PR PBB SHADOW E&M-EST. PATIENT-LVL III: ICD-10-PCS | Mod: PBBFAC,,, | Performed by: INTERNAL MEDICINE

## 2022-07-18 PROCEDURE — 3008F BODY MASS INDEX DOCD: CPT | Mod: CPTII,S$GLB,, | Performed by: INTERNAL MEDICINE

## 2022-07-18 PROCEDURE — 3008F PR BODY MASS INDEX (BMI) DOCUMENTED: ICD-10-PCS | Mod: CPTII,S$GLB,, | Performed by: INTERNAL MEDICINE

## 2022-07-18 PROCEDURE — 1101F PR PT FALLS ASSESS DOC 0-1 FALLS W/OUT INJ PAST YR: ICD-10-PCS | Mod: CPTII,S$GLB,, | Performed by: INTERNAL MEDICINE

## 2022-07-18 PROCEDURE — 1159F PR MEDICATION LIST DOCUMENTED IN MEDICAL RECORD: ICD-10-PCS | Mod: CPTII,S$GLB,, | Performed by: INTERNAL MEDICINE

## 2022-07-18 PROCEDURE — 1160F RVW MEDS BY RX/DR IN RCRD: CPT | Mod: CPTII,S$GLB,, | Performed by: INTERNAL MEDICINE

## 2022-07-18 PROCEDURE — 3074F SYST BP LT 130 MM HG: CPT | Mod: CPTII,S$GLB,, | Performed by: INTERNAL MEDICINE

## 2022-07-18 PROCEDURE — 1159F MED LIST DOCD IN RCRD: CPT | Mod: CPTII,S$GLB,, | Performed by: INTERNAL MEDICINE

## 2022-07-18 PROCEDURE — 3079F DIAST BP 80-89 MM HG: CPT | Mod: CPTII,S$GLB,, | Performed by: INTERNAL MEDICINE

## 2022-07-18 NOTE — PROGRESS NOTES
Cardiology    7/18/2022  4:15 PM    Problem list  Patient Active Problem List   Diagnosis    Mitral valve prolapse    Vitamin D deficiency    Morbid obesity, unspecified obesity type    Nonrheumatic aortic valve stenosis    Moderate episode of recurrent major depressive disorder    BMI 37.0-37.9, adult       CC:  Shortness of breath    HPI:  Patient made this appointment today to evaluate her shortness of breath and dyspnea on exertion.  She has noticed that she gets more RAWLS with walking and making her bed.  She recently traveled to [place] and finds herself having more SOB walking. No syncope. No angina.  She got COVID 3 weeks ago.  Has repeat echo scheduled in September.  Her mother was Clarissa Sofia, our patient.    Medications  Current Outpatient Medications   Medication Sig Dispense Refill    gabapentin (NEURONTIN) 100 MG capsule Take 1 capsule (100 mg total) by mouth 3 (three) times daily. (Patient not taking: Reported on 7/18/2022) 90 capsule 0    omeprazole (PRILOSEC) 40 MG capsule Take 1 capsule (40 mg total) by mouth every morning. Take 30 minutes before eating on an empty stomach (Patient not taking: Reported on 7/18/2022) 30 capsule 2     No current facility-administered medications for this visit.      Prior to Admission medications    Medication Sig Start Date End Date Taking? Authorizing Provider   gabapentin (NEURONTIN) 100 MG capsule Take 1 capsule (100 mg total) by mouth 3 (three) times daily.  Patient not taking: Reported on 7/18/2022 12/15/21   Juany Avery, NP   omeprazole (PRILOSEC) 40 MG capsule Take 1 capsule (40 mg total) by mouth every morning. Take 30 minutes before eating on an empty stomach  Patient not taking: Reported on 7/18/2022 12/2/20   Ishmael Jacobson MD         History  Past Medical History:   Diagnosis Date    Mitral valve prolapse      Past Surgical History:   Procedure Laterality Date    BREAST CYST EXCISION Left 80s    BREAST SURGERY      Benign lump  on left.    CATARACT EXTRACTION       SECTION      x1     Social History     Socioeconomic History    Marital status:     Number of children: 2   Tobacco Use    Smoking status: Never Smoker    Smokeless tobacco: Never Used   Substance and Sexual Activity    Alcohol use: Yes     Comment: Rare.     Drug use: No    Sexual activity: Not Currently   Social History Narrative       , one child local dtr lives with her, one in [place] son in [place]/hx of [facility]. 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      Social Determinants of Health     Financial Resource Strain: Low Risk     Difficulty of Paying Living Expenses: Not hard at all   Food Insecurity: No Food Insecurity    Worried About Running Out of Food in the Last Year: Never true    Ran Out of Food in the Last Year: Never true   Transportation Needs: No Transportation Needs    Lack of Transportation (Medical): No    Lack of Transportation (Non-Medical): No   Physical Activity: Inactive    Days of Exercise per Week: 0 days    Minutes of Exercise per Session: 0 min   Stress: Stress Concern Present    Feeling of Stress : Very much   Social Connections: Socially Isolated    Frequency of Communication with Friends and Family: More than three times a week    Frequency of Social Gatherings with Friends and Family: Once a week    Attends Mandaen Services: Never    Active Member of Clubs or Organizations: No    Attends Club or Organization Meetings: Never    Marital Status:    Housing Stability: Low Risk     Unable to Pay for Housing in the Last Year: No    Number of Places Lived in the Last Year: 1    Unstable Housing in the Last Year: No         Allergies  Review of patient's allergies indicates:  No Known Allergies      Review of Systems   Review of Systems   Constitutional: Negative for decreased appetite, fever and weight loss.   HENT: Negative for congestion and  nosebleeds.    Eyes: Negative for double vision, vision loss in left eye, vision loss in right eye and visual disturbance.   Cardiovascular: Positive for dyspnea on exertion. Negative for chest pain, claudication, cyanosis, irregular heartbeat, leg swelling, near-syncope, orthopnea, palpitations, paroxysmal nocturnal dyspnea and syncope.   Respiratory: Positive for shortness of breath. Negative for cough, hemoptysis, sleep disturbances due to breathing, snoring, sputum production and wheezing.    Endocrine: Negative for cold intolerance and heat intolerance.   Skin: Negative for nail changes and rash.   Musculoskeletal: Negative for joint pain, muscle cramps, muscle weakness and myalgias.   Gastrointestinal: Negative for change in bowel habit, heartburn, hematemesis, hematochezia, hemorrhoids and melena.   Neurological: Negative for dizziness, focal weakness and headaches.         Physical Exam  Wt Readings from Last 1 Encounters:   07/18/22 93.4 kg (205 lb 14.4 oz)     BP Readings from Last 3 Encounters:   07/18/22 120/88   04/11/22 (!) 140/79   03/31/22 124/88     Pulse Readings from Last 1 Encounters:   07/18/22 89     Body mass index is 37.66 kg/m².    Physical Exam  Vitals reviewed.   Constitutional:       Appearance: She is obese.   Cardiovascular:      Rate and Rhythm: Normal rate and regular rhythm.      Pulses:           Carotid pulses are 1+ on the right side and 1+ on the left side.       Radial pulses are 2+ on the right side and 2+ on the left side.      Heart sounds: S1 normal and S2 normal. Murmur heard.    Harsh midsystolic murmur is present with a grade of 2/6 at the upper right sternal border radiating to the neck.  Pulmonary:      Breath sounds: Normal breath sounds and air entry.   Musculoskeletal:      Right lower leg: No edema.      Left lower leg: No edema.   Neurological:      Mental Status: She is alert.             Assessment  1. Nonrheumatic aortic valve stenosis  Being evaluated    2. BMI  37.0-37.9, adult  unchanged        Plan and Discussion  Discussed her exertional symptoms may be due to her worsening aortic valve stenosis.  Will order her echocardiogram to compare to prior echocardiogram.  Clinically she may have worsening aortic stenosis.    Follow Up  1month      Yordy Ng MD, F.A.C.C, F.S.C.A.I.

## 2022-07-21 ENCOUNTER — HOSPITAL ENCOUNTER (OUTPATIENT)
Dept: CARDIOLOGY | Facility: OTHER | Age: 67
Discharge: HOME | End: 2022-07-21
Attending: INTERNAL MEDICINE
Payer: MEDICARE

## 2022-07-21 VITALS
HEART RATE: 89 BPM | DIASTOLIC BLOOD PRESSURE: 88 MMHG | BODY MASS INDEX: 37.73 KG/M2 | HEIGHT: 62 IN | WEIGHT: 205 LBS | SYSTOLIC BLOOD PRESSURE: 120 MMHG

## 2022-07-21 DIAGNOSIS — I35.0 NONRHEUMATIC AORTIC VALVE STENOSIS: ICD-10-CM

## 2022-07-21 LAB
ASCENDING AORTA: 3.46 CM
AV INDEX (PROSTH): 0.29
AV MEAN GRADIENT: 22 MMHG
AV PEAK GRADIENT: 44 MMHG
AV VALVE AREA: 1.02 CM2
AV VELOCITY RATIO: 0.23
BSA FOR ECHO PROCEDURE: 2.02 M2
CV ECHO LV RWT: 0.45 CM
DOP CALC AO PEAK VEL: 3.31 M/S
DOP CALC AO VTI: 67.53 CM
DOP CALC LVOT AREA: 3.5 CM2
DOP CALC LVOT DIAMETER: 2.12 CM
DOP CALC LVOT PEAK VEL: 0.77 M/S
DOP CALC LVOT STROKE VOLUME: 68.62 CM3
DOP CALCLVOT PEAK VEL VTI: 19.45 CM
E WAVE DECELERATION TIME: 198.79 MSEC
E/A RATIO: 0.79
E/E' RATIO: 12.5 M/S
ECHO LV POSTERIOR WALL: 1.12 CM (ref 0.6–1.1)
EJECTION FRACTION: 55 %
FRACTIONAL SHORTENING: 30 % (ref 28–44)
INTERVENTRICULAR SEPTUM: 1.08 CM (ref 0.6–1.1)
IVRT: 78.43 MSEC
LA MAJOR: 5.15 CM
LA MINOR: 4.37 CM
LA WIDTH: 4 CM
LEFT ATRIUM SIZE: 3.01 CM
LEFT ATRIUM VOLUME INDEX MOD: 28 ML/M2
LEFT ATRIUM VOLUME INDEX: 25.1 ML/M2
LEFT ATRIUM VOLUME MOD: 54 CM3
LEFT ATRIUM VOLUME: 48.39 CM3
LEFT INTERNAL DIMENSION IN SYSTOLE: 3.52 CM (ref 2.1–4)
LEFT VENTRICLE DIASTOLIC VOLUME INDEX: 61.97 ML/M2
LEFT VENTRICLE DIASTOLIC VOLUME: 119.6 ML
LEFT VENTRICLE MASS INDEX: 108 G/M2
LEFT VENTRICLE SYSTOLIC VOLUME INDEX: 26.8 ML/M2
LEFT VENTRICLE SYSTOLIC VOLUME: 51.68 ML
LEFT VENTRICULAR INTERNAL DIMENSION IN DIASTOLE: 5.02 CM (ref 3.5–6)
LEFT VENTRICULAR MASS: 208.48 G
LV LATERAL E/E' RATIO: 10.71 M/S
LV SEPTAL E/E' RATIO: 15 M/S
MV PEAK A VEL: 0.95 M/S
MV PEAK E VEL: 0.75 M/S
MV STENOSIS PRESSURE HALF TIME: 57.65 MS
MV VALVE AREA P 1/2 METHOD: 3.82 CM2
PISA MRMAX VEL: 0.05 M/S
PISA TR MAX VEL: 1.99 M/S
PULM VEIN S/D RATIO: 1.31
PV PEAK D VEL: 0.35 M/S
PV PEAK S VEL: 0.46 M/S
PV PEAK VELOCITY: 0.96 CM/S
RA MAJOR: 4.22 CM
SINUS: 3.45 CM
STJ: 3.18 CM
TDI LATERAL: 0.07 M/S
TDI SEPTAL: 0.05 M/S
TDI: 0.06 M/S
TR MAX PG: 16 MMHG
TRICUSPID ANNULAR PLANE SYSTOLIC EXCURSION: 1.84 CM

## 2022-07-21 PROCEDURE — 93306 TTE W/DOPPLER COMPLETE: CPT

## 2022-07-21 PROCEDURE — 93306 TTE W/DOPPLER COMPLETE: CPT | Mod: 26,,, | Performed by: INTERNAL MEDICINE

## 2022-07-21 PROCEDURE — 93306 ECHO (CUPID ONLY): ICD-10-PCS | Mod: 26,,, | Performed by: INTERNAL MEDICINE

## 2022-07-28 ENCOUNTER — PATIENT MESSAGE (OUTPATIENT)
Dept: BEHAVIORAL HEALTH | Facility: CLINIC | Age: 67
End: 2022-07-28
Payer: MEDICARE

## 2022-07-29 ENCOUNTER — TELEPHONE (OUTPATIENT)
Dept: BEHAVIORAL HEALTH | Facility: CLINIC | Age: 67
End: 2022-07-29
Payer: MEDICARE

## 2022-07-29 ENCOUNTER — OFFICE VISIT (OUTPATIENT)
Dept: BEHAVIORAL HEALTH | Facility: CLINIC | Age: 67
End: 2022-07-29
Payer: MEDICARE

## 2022-07-29 DIAGNOSIS — F33.1 MODERATE EPISODE OF RECURRENT MAJOR DEPRESSIVE DISORDER: Primary | ICD-10-CM

## 2022-07-29 PROCEDURE — 90832 PR PSYCHOTHERAPY W/PATIENT, 30 MIN: ICD-10-PCS | Mod: 95,,, | Performed by: SOCIAL WORKER

## 2022-07-29 PROCEDURE — 90832 PSYTX W PT 30 MINUTES: CPT | Mod: 95,,, | Performed by: SOCIAL WORKER

## 2022-07-29 NOTE — PROGRESS NOTES
Behavioral Health Community Health Worker  Follow-Up  Completed by: Pj Avina    Date:  7/29/2022    Patient Enrollment in Behavioral Health Program:  · Kandace Rapp was enrolled in the Behavioral Health Program on 4/4/22    Assessments     Promis 10:  PROMIS-10 Questionnaire Scores 4/4/2022   Global Physical Health 13   Global Mental health Score 12       Depression PHQ:  PHQ9 7/29/2022   Total Score 0       Generalized Anxiety Disorder 7-Item Scale:  GAD7 7/29/2022   1. Feeling nervous, anxious, or on edge? 0   2. Not being able to stop or control worrying? 0   3. Worrying too much about different things? 0   4. Trouble relaxing? 0   5. Being so restless that it is hard to sit still? 0   6. Becoming easily annoyed or irritable? 0   7. Feeling afraid as if something awful might happen? 0   8. If you checked off any problems, how difficult have these problems made it for you to do your work, take care of things at home, or get along with other people? -   SKYE-7 Score 0       Patients' Global Impression of Change (PGIC) Scale:  Since beginning treatment at this clinic, how would you describe the change (if any) in ACTIVITY LIMITATIONS, SYMPTOMS, EMOTIONS, and OVERALL QUALITY OF LIFE, related to your painful condition?  No Value exists for the : OHS#11665      In a similar way, please check the number below that matches your degree of change since beginning care at this clinic (Much better (0) - Much Worse (10)): No Value exists for the : OHS#[id number]        Much Better                                     No Change                                    Much Worse                        -----------------------------------------------------------------------------                        0       1       2       3       4       5       6       7      8       9      10                     Call Summary     Assessment updated

## 2022-07-29 NOTE — PROGRESS NOTES
"The patient location is: [place]  The chief complaint leading to consultation is: depression    Visit type: audiovisual    Face to Face time with patient: 30  32 minutes of total time spent on the encounter, which includes face to face time and non-face to face time preparing to see the patient (eg, review of tests), Obtaining and/or reviewing separately obtained history, Documenting clinical information in the electronic or other health record, Independently interpreting results (not separately reported) and communicating results to the patient/family/caregiver, or Care coordination (not separately reported).     Individual Psychotherapy (LCSW/PhD)  Kandace Rapp,  7/29/2022    Site:  Telemed         Therapeutic Intervention: Met with patient for individual psychotherapy.    Chief complaint/reason for encounter: depression     Interval history and content of current session: Pt has been doing some deep cleaning around her house. Pt went to f/u with cardiologist and had an echogram. Her cardiologist told her to lose weight. She wants to join a gym. Pt is feeling "soured on [place]." She is frustrated with repairs that need to be done in her neighborhood.  She hopes working out will give her the energy to start doing some community organizing to make some changes in her neighborhood.  Processed how this feels given her experiences of community organizing in the past.     Treatment plan:  · Target symptoms: depression  · Why chosen therapy is appropriate versus another modality: patient responds to this modality, evidence based practice  · Outcome monitoring methods: self-report, observation  · Therapeutic intervention type: behavior modifying psychotherapy, supportive psychotherapy    Risk parameters:  Patient reports no suicidal ideation  Patient reports no homicidal ideation  Patient reports no self-injurious behavior  Patient reports no violent behavior    Verbal deficits: None    Patient's response to " intervention:  The patient's response to intervention is motivated.    Progress toward goals and other mental status changes:  The patient's progress toward goals is good.    Diagnosis:     ICD-10-CM ICD-9-CM   1. Moderate episode of recurrent major depressive disorder  F33.1 296.32       Plan: Pt plans to continue individual psychotherapy    Return to clinic: 3 weeks    Length of Service (minutes): 30        Each patient to whom he or she provides medical services by telemedicine is:  (1) informed of the relationship between the physician and patient and the respective role of any other health care provider with respect to management of the patient; and (2) notified that he or she may decline to receive medical services by telemedicine and may withdraw from such care at any time.

## 2022-08-09 DIAGNOSIS — Z12.11 COLON CANCER SCREENING: ICD-10-CM

## 2022-08-17 ENCOUNTER — HOSPITAL ENCOUNTER (OUTPATIENT)
Dept: RADIOLOGY | Facility: HOSPITAL | Age: 67
Discharge: HOME | End: 2022-08-17
Attending: NURSE PRACTITIONER
Payer: MEDICARE

## 2022-08-17 ENCOUNTER — OFFICE VISIT (OUTPATIENT)
Dept: ORTHOPEDICS | Facility: CLINIC | Age: 67
End: 2022-08-17
Payer: MEDICARE

## 2022-08-17 VITALS — BODY MASS INDEX: 37.73 KG/M2 | HEIGHT: 62 IN | WEIGHT: 205 LBS

## 2022-08-17 DIAGNOSIS — M25.561 ACUTE PAIN OF BOTH KNEES: Primary | ICD-10-CM

## 2022-08-17 DIAGNOSIS — M25.561 ACUTE PAIN OF BOTH KNEES: ICD-10-CM

## 2022-08-17 DIAGNOSIS — M25.562 ACUTE PAIN OF BOTH KNEES: ICD-10-CM

## 2022-08-17 DIAGNOSIS — M17.0 OSTEOARTHRITIS OF BOTH KNEES, UNSPECIFIED OSTEOARTHRITIS TYPE: Primary | ICD-10-CM

## 2022-08-17 DIAGNOSIS — M25.562 ACUTE PAIN OF BOTH KNEES: Primary | ICD-10-CM

## 2022-08-17 PROCEDURE — 1160F RVW MEDS BY RX/DR IN RCRD: CPT | Mod: CPTII,S$GLB,, | Performed by: NURSE PRACTITIONER

## 2022-08-17 PROCEDURE — 3288F FALL RISK ASSESSMENT DOCD: CPT | Mod: CPTII,S$GLB,, | Performed by: NURSE PRACTITIONER

## 2022-08-17 PROCEDURE — 99214 OFFICE O/P EST MOD 30 MIN: CPT | Mod: S$GLB,,, | Performed by: NURSE PRACTITIONER

## 2022-08-17 PROCEDURE — 1160F PR REVIEW ALL MEDS BY PRESCRIBER/CLIN PHARMACIST DOCUMENTED: ICD-10-PCS | Mod: CPTII,S$GLB,, | Performed by: NURSE PRACTITIONER

## 2022-08-17 PROCEDURE — 1159F PR MEDICATION LIST DOCUMENTED IN MEDICAL RECORD: ICD-10-PCS | Mod: CPTII,S$GLB,, | Performed by: NURSE PRACTITIONER

## 2022-08-17 PROCEDURE — 73564 XR KNEE ORTHO BILAT WITH FLEXION: ICD-10-PCS | Mod: 26,50,, | Performed by: RADIOLOGY

## 2022-08-17 PROCEDURE — 3008F PR BODY MASS INDEX (BMI) DOCUMENTED: ICD-10-PCS | Mod: CPTII,S$GLB,, | Performed by: NURSE PRACTITIONER

## 2022-08-17 PROCEDURE — 1101F PT FALLS ASSESS-DOCD LE1/YR: CPT | Mod: CPTII,S$GLB,, | Performed by: NURSE PRACTITIONER

## 2022-08-17 PROCEDURE — 1125F AMNT PAIN NOTED PAIN PRSNT: CPT | Mod: CPTII,S$GLB,, | Performed by: NURSE PRACTITIONER

## 2022-08-17 PROCEDURE — 99999 PR PBB SHADOW E&M-EST. PATIENT-LVL III: ICD-10-PCS | Mod: PBBFAC,,, | Performed by: NURSE PRACTITIONER

## 2022-08-17 PROCEDURE — 99214 PR OFFICE/OUTPT VISIT, EST, LEVL IV, 30-39 MIN: ICD-10-PCS | Mod: S$GLB,,, | Performed by: NURSE PRACTITIONER

## 2022-08-17 PROCEDURE — 3288F PR FALLS RISK ASSESSMENT DOCUMENTED: ICD-10-PCS | Mod: CPTII,S$GLB,, | Performed by: NURSE PRACTITIONER

## 2022-08-17 PROCEDURE — 3008F BODY MASS INDEX DOCD: CPT | Mod: CPTII,S$GLB,, | Performed by: NURSE PRACTITIONER

## 2022-08-17 PROCEDURE — 1101F PR PT FALLS ASSESS DOC 0-1 FALLS W/OUT INJ PAST YR: ICD-10-PCS | Mod: CPTII,S$GLB,, | Performed by: NURSE PRACTITIONER

## 2022-08-17 PROCEDURE — 1125F PR PAIN SEVERITY QUANTIFIED, PAIN PRESENT: ICD-10-PCS | Mod: CPTII,S$GLB,, | Performed by: NURSE PRACTITIONER

## 2022-08-17 PROCEDURE — 1159F MED LIST DOCD IN RCRD: CPT | Mod: CPTII,S$GLB,, | Performed by: NURSE PRACTITIONER

## 2022-08-17 PROCEDURE — 99999 PR PBB SHADOW E&M-EST. PATIENT-LVL III: CPT | Mod: PBBFAC,,, | Performed by: NURSE PRACTITIONER

## 2022-08-17 PROCEDURE — 73564 X-RAY EXAM KNEE 4 OR MORE: CPT | Mod: TC,50

## 2022-08-17 PROCEDURE — 73564 X-RAY EXAM KNEE 4 OR MORE: CPT | Mod: 26,50,, | Performed by: RADIOLOGY

## 2022-08-17 RX ORDER — METHYLPREDNISOLONE 4 MG/1
TABLET ORAL
Qty: 1 EACH | Refills: 0 | Status: SHIPPED | OUTPATIENT
Start: 2022-08-17 | End: 2022-09-07

## 2022-08-17 NOTE — PROGRESS NOTES
SUBJECTIVE:     Chief Complaint & History of Present Illness:  Kandace Rapp is a Established 66 y.o. year old female patient here with a history of constant bilateral knee pain (L>R).  She was last seen by me on 5/14/2021 and then given CSI to her bilateral knees on 5/20/2021 and did well.  She reports her pain has returned and wants to see if there is anything else that could be done other than injections.  States pain returned a few weeks ago.  Denies falls or injuries since her last visit.  Since her last visit, she was diagnosed with MVR and aortic stenosis and is seeing cardiology about a valve replacement.  OTC and topical analgesics are not helping.  She reports her pain is on the medial side.  It is sharp and rates 8/10.  There is not radiation.  There is not catching or locking.  Aggravating factors include walking.  Associated symptoms include none.  There is not numbness or tingling of the lower extremity.  There is not back pain. Previous treatments include Tylenol, Aleve, Motrin, RICE therapy, Svetlana Root, Apple Cider Vinegar, Epson Salt soaks which have provided adequate relief.  There is not a history of previous injury or surgery to the knee.  The patient does not use an assistive device.    Review of patient's allergies indicates:  No Known Allergies      Current Outpatient Medications   Medication Sig Dispense Refill    gabapentin (NEURONTIN) 100 MG capsule Take 1 capsule (100 mg total) by mouth 3 (three) times daily. (Patient not taking: No sig reported) 90 capsule 0    omeprazole (PRILOSEC) 40 MG capsule Take 1 capsule (40 mg total) by mouth every morning. Take 30 minutes before eating on an empty stomach (Patient not taking: No sig reported) 30 capsule 2     No current facility-administered medications for this visit.       Past Medical History:   Diagnosis Date    Mitral valve prolapse        Past Surgical History:   Procedure Laterality Date    BREAST CYST EXCISION Left 80s     "BREAST SURGERY      Benign lump on left.    CATARACT EXTRACTION       SECTION      x1       Family History   Problem Relation Age of Onset    Breast cancer Mother     Lymphoma Mother     Heart disease Mother     Cancer Mother     Diabetes Sister     Heart disease Brother     Alcohol abuse Brother     Heart disease Maternal Uncle     Heart disease Maternal Grandfather     Cancer Father         panc ca    Breast cancer Paternal Aunt          Review of Systems:  ROS:  Constitutional: no fever or chills  Eyes: no visual changes  ENT: no nasal congestion or sore throat  Respiratory: no cough or shortness of breath  Cardiovascular: no chest pain or palpitations  Gastrointestinal: no nausea or vomiting, tolerating diet  Genitourinary: no hematuria or dysuria  Integument/Breast: no rash or pruritis  Hematologic/Lymphatic: no easy bruising or lymphadenopathy  Musculoskeletal: positive for arthralgias  Neurological: no seizures or tremors  Behavioral/Psych: no auditory or visual hallucinations  Endocrine: no heat or cold intolerance      OBJECTIVE:     PHYSICAL EXAM:  Vital Signs (Most Recent)  There were no vitals filed for this visit.  Height: 5' 2" (157.5 cm) Weight: 93 kg (205 lb 0.4 oz),   General Appearance: Well nourished, well developed, in no acute distress.  HENT: Normal cephalic, oropharynx pink and moist  Eyes: PERRLA bilaterally and EOM x 4  Respiratory: Even and unlabored  Skin: Warm and Dry.   Psychiatric: AAO x 4, Mood & affect are normal.    right  Knee Exam:  Knee Range of Motion:normal   Effusion:none  Condition of skin:intact  Location of tenderness:Medial joint line   Strength:4/5  Stability:  stable to testing, Lachman: stable, LCL: stable, MCL: stable and PCL: stable  Varus /Valgus stress:   Moderate varus  Imtiaz:   negative    left  Knee Exam:  Knee Range of Motion:normal   Effusion:none  Condition of skin:intact  Location of tenderness:Medial joint line "   Strength:4/5  Stability:  stable to testing, Lachman: stable, LCL: stable, MCL: stable and PCL: stable  Varus /Valgus stress:   Moderate varus  Imtiaz:   negative      Hip Examination:  full painless range of motion, without tenderness    RADIOGRAPHS:  X-ray of bilateral knees were obtained and personally reviewed by me.  She has bilateral medial tibiofemoral joint space narrowing and patella femoral narrowing R>L.  No acute fractures seen.    ASSESSMENT/PLAN:       ICD-10-CM ICD-9-CM   1. Osteoarthritis of both knees, unspecified osteoarthritis type  M17.0 715.96       Plan: We discussed with the patient at length all the different treatment options available for  the knee including anti-inflammatories, acetaminophen, rest, ice, knee strengthening exercise, occasional cortisone injections for temporary relief, Viscosupplimentation injections, arthroscopic debridement osteotomy, and finally knee arthroplasty.     -Kandace Rapp presents to clinic today with c/c bilateral knee pain for the past year, had good response to steroid injections in the past and does not want to do again.    -X-ray as above.  -Recommend RICE therapy.  -Her BMI is 37.5, discussed how her weight negatively impacts her joints.  Recommend diet and exercise that would burn more calories than consumption.      -We discussed CSI vs HA injections, she would like to avoid injections at this time.  -Will trial a medrol dose pack.  -Advised her to speak with cardiologist and let them know she may require TKR and will need to address her heart issues first.  -She can follow up with joint replacement team to discuss surgical options but advised she must be cleared from a cardiac standpoint.  -Will refer her PT with [facility].  -Follow up in 4-6 weeks PRN, sooner if pain persist or worsens.

## 2022-09-01 ENCOUNTER — TELEPHONE (OUTPATIENT)
Dept: BEHAVIORAL HEALTH | Facility: CLINIC | Age: 67
End: 2022-09-01
Payer: MEDICARE

## 2022-09-13 ENCOUNTER — PATIENT MESSAGE (OUTPATIENT)
Dept: BEHAVIORAL HEALTH | Facility: CLINIC | Age: 67
End: 2022-09-13
Payer: MEDICARE

## 2022-09-13 ENCOUNTER — LAB VISIT (OUTPATIENT)
Dept: PRIMARY CARE CLINIC | Facility: CLINIC | Age: 67
End: 2022-09-13
Payer: MEDICARE

## 2022-09-13 DIAGNOSIS — I35.0 NONRHEUMATIC AORTIC VALVE STENOSIS: ICD-10-CM

## 2022-09-13 DIAGNOSIS — T81.718A: ICD-10-CM

## 2022-09-13 DIAGNOSIS — I70.0: ICD-10-CM

## 2022-09-13 LAB
CHOLEST SERPL-MCNC: 122 MG/DL (ref 120–199)
CHOLEST/HDLC SERPL: 2.7 {RATIO} (ref 2–5)
HDLC SERPL-MCNC: 45 MG/DL (ref 40–75)
HDLC SERPL: 36.9 % (ref 20–50)
LDLC SERPL CALC-MCNC: 63.4 MG/DL (ref 63–159)
NONHDLC SERPL-MCNC: 77 MG/DL
TRIGL SERPL-MCNC: 68 MG/DL (ref 30–150)

## 2022-09-13 PROCEDURE — 80061 LIPID PANEL: CPT | Performed by: INTERNAL MEDICINE

## 2022-09-14 ENCOUNTER — OFFICE VISIT (OUTPATIENT)
Dept: CARDIOLOGY | Facility: CLINIC | Age: 67
End: 2022-09-14
Payer: MEDICARE

## 2022-09-14 VITALS
BODY MASS INDEX: 37.93 KG/M2 | HEART RATE: 80 BPM | OXYGEN SATURATION: 97 % | HEIGHT: 62 IN | SYSTOLIC BLOOD PRESSURE: 118 MMHG | WEIGHT: 206.13 LBS | DIASTOLIC BLOOD PRESSURE: 84 MMHG

## 2022-09-14 DIAGNOSIS — I35.0 NONRHEUMATIC AORTIC VALVE STENOSIS: ICD-10-CM

## 2022-09-14 PROCEDURE — 1101F PR PT FALLS ASSESS DOC 0-1 FALLS W/OUT INJ PAST YR: ICD-10-PCS | Mod: CPTII,S$GLB,, | Performed by: INTERNAL MEDICINE

## 2022-09-14 PROCEDURE — 1101F PT FALLS ASSESS-DOCD LE1/YR: CPT | Mod: CPTII,S$GLB,, | Performed by: INTERNAL MEDICINE

## 2022-09-14 PROCEDURE — 3008F PR BODY MASS INDEX (BMI) DOCUMENTED: ICD-10-PCS | Mod: CPTII,S$GLB,, | Performed by: INTERNAL MEDICINE

## 2022-09-14 PROCEDURE — 3288F PR FALLS RISK ASSESSMENT DOCUMENTED: ICD-10-PCS | Mod: CPTII,S$GLB,, | Performed by: INTERNAL MEDICINE

## 2022-09-14 PROCEDURE — 3288F FALL RISK ASSESSMENT DOCD: CPT | Mod: CPTII,S$GLB,, | Performed by: INTERNAL MEDICINE

## 2022-09-14 PROCEDURE — 99999 PR PBB SHADOW E&M-EST. PATIENT-LVL II: CPT | Mod: PBBFAC,,, | Performed by: INTERNAL MEDICINE

## 2022-09-14 PROCEDURE — 99999 PR PBB SHADOW E&M-EST. PATIENT-LVL II: ICD-10-PCS | Mod: PBBFAC,,, | Performed by: INTERNAL MEDICINE

## 2022-09-14 PROCEDURE — 99214 PR OFFICE/OUTPT VISIT, EST, LEVL IV, 30-39 MIN: ICD-10-PCS | Mod: S$GLB,,, | Performed by: INTERNAL MEDICINE

## 2022-09-14 PROCEDURE — 1159F MED LIST DOCD IN RCRD: CPT | Mod: CPTII,S$GLB,, | Performed by: INTERNAL MEDICINE

## 2022-09-14 PROCEDURE — 3008F BODY MASS INDEX DOCD: CPT | Mod: CPTII,S$GLB,, | Performed by: INTERNAL MEDICINE

## 2022-09-14 PROCEDURE — 3079F PR MOST RECENT DIASTOLIC BLOOD PRESSURE 80-89 MM HG: ICD-10-PCS | Mod: CPTII,S$GLB,, | Performed by: INTERNAL MEDICINE

## 2022-09-14 PROCEDURE — 1126F AMNT PAIN NOTED NONE PRSNT: CPT | Mod: CPTII,S$GLB,, | Performed by: INTERNAL MEDICINE

## 2022-09-14 PROCEDURE — 3079F DIAST BP 80-89 MM HG: CPT | Mod: CPTII,S$GLB,, | Performed by: INTERNAL MEDICINE

## 2022-09-14 PROCEDURE — 3074F SYST BP LT 130 MM HG: CPT | Mod: CPTII,S$GLB,, | Performed by: INTERNAL MEDICINE

## 2022-09-14 PROCEDURE — 99214 OFFICE O/P EST MOD 30 MIN: CPT | Mod: S$GLB,,, | Performed by: INTERNAL MEDICINE

## 2022-09-14 PROCEDURE — 1126F PR PAIN SEVERITY QUANTIFIED, NO PAIN PRESENT: ICD-10-PCS | Mod: CPTII,S$GLB,, | Performed by: INTERNAL MEDICINE

## 2022-09-14 PROCEDURE — 3074F PR MOST RECENT SYSTOLIC BLOOD PRESSURE < 130 MM HG: ICD-10-PCS | Mod: CPTII,S$GLB,, | Performed by: INTERNAL MEDICINE

## 2022-09-14 PROCEDURE — 1159F PR MEDICATION LIST DOCUMENTED IN MEDICAL RECORD: ICD-10-PCS | Mod: CPTII,S$GLB,, | Performed by: INTERNAL MEDICINE

## 2022-09-14 NOTE — PROGRESS NOTES
Cardiology    2022  10:26 AM    Problem list  Patient Active Problem List   Diagnosis    Mitral valve prolapse    Vitamin D deficiency    Morbid obesity, unspecified obesity type    Nonrheumatic aortic valve stenosis    Moderate episode of recurrent major depressive disorder    BMI 37.0-37.9, adult       CC:  F/u    HPI:  She is here for follow-up.  Main complaint is pain in her left knee due to osteoarthritis.  She is undergoing physical therapy.  She also got her echocardiogram done which showed moderate aortic valve stenosis with peak velocity across aortic valve at 3.3 m/sec.  She still has shortness of breath and dyspnea on exertion with moderate activity.    Medications  No current outpatient medications on file.     No current facility-administered medications for this visit.      Prior to Admission medications    Not on File         History  Past Medical History:   Diagnosis Date    Mitral valve prolapse      Past Surgical History:   Procedure Laterality Date    BREAST CYST EXCISION Left 80s    BREAST SURGERY      Benign lump on left.    CATARACT EXTRACTION       SECTION      x1     Social History     Socioeconomic History    Marital status:     Number of children: 2   Tobacco Use    Smoking status: Never    Smokeless tobacco: Never   Substance and Sexual Activity    Alcohol use: Yes     Comment: Rare.     Drug use: No    Sexual activity: Not Currently   Social History Narrative       , one child local dtr lives with her, one in [place] son in [place]/hx of [facility]. 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      Social Determinants of Health     Financial Resource Strain: Low Risk     Difficulty of Paying Living Expenses: Not hard at all   Food Insecurity: No Food Insecurity    Worried About Running Out of Food in the Last Year: Never true    Ran Out of Food in the Last Year: Never true   Transportation Needs: No Transportation  Needs    Lack of Transportation (Medical): No    Lack of Transportation (Non-Medical): No   Physical Activity: Inactive    Days of Exercise per Week: 0 days    Minutes of Exercise per Session: 0 min   Stress: Stress Concern Present    Feeling of Stress : Very much   Social Connections: Socially Isolated    Frequency of Communication with Friends and Family: More than three times a week    Frequency of Social Gatherings with Friends and Family: Once a week    Attends Faith Services: Never    Active Member of Clubs or Organizations: No    Attends Club or Organization Meetings: Never    Marital Status:    Housing Stability: Low Risk     Unable to Pay for Housing in the Last Year: No    Number of Places Lived in the Last Year: 1    Unstable Housing in the Last Year: No         Allergies  Review of patient's allergies indicates:   Allergen Reactions    [place] Rash         Review of Systems   Review of Systems   Constitutional: Negative for decreased appetite, fever and weight loss.   HENT:  Negative for congestion and nosebleeds.    Eyes:  Negative for double vision, vision loss in left eye, vision loss in right eye and visual disturbance.   Cardiovascular:  Positive for dyspnea on exertion. Negative for chest pain, claudication, cyanosis, irregular heartbeat, leg swelling, near-syncope, orthopnea, palpitations, paroxysmal nocturnal dyspnea and syncope.   Respiratory:  Positive for shortness of breath. Negative for cough, hemoptysis, sleep disturbances due to breathing, snoring, sputum production and wheezing.    Endocrine: Negative for cold intolerance and heat intolerance.   Skin:  Negative for nail changes and rash.   Musculoskeletal:  Negative for joint pain, muscle cramps, muscle weakness and myalgias.   Gastrointestinal:  Negative for change in bowel habit, heartburn, hematemesis, hematochezia, hemorrhoids and melena.   Neurological:  Negative for dizziness, focal weakness and headaches.       Physical  Exam  Wt Readings from Last 1 Encounters:   09/14/22 93.5 kg (206 lb 2.1 oz)     BP Readings from Last 3 Encounters:   09/14/22 118/84   07/21/22 120/88   07/18/22 120/88     Pulse Readings from Last 1 Encounters:   09/14/22 80     Body mass index is 37.7 kg/m².    Physical Exam  Vitals reviewed.   Constitutional:       Appearance: She is obese.   Cardiovascular:      Rate and Rhythm: Normal rate and regular rhythm.      Pulses:           Carotid pulses are 1+ on the right side and 1+ on the left side.       Radial pulses are 2+ on the right side and 2+ on the left side.      Heart sounds: S1 normal and S2 normal. Murmur heard.   Harsh midsystolic murmur is present with a grade of 2/6 at the upper right sternal border radiating to the neck.   Pulmonary:      Breath sounds: Normal breath sounds and air entry.   Musculoskeletal:      Right lower leg: No edema.      Left lower leg: No edema.   Neurological:      Mental Status: She is alert.           Assessment  1. BMI 37.0-37.9, adult  unchanged    2. Nonrheumatic aortic valve stenosis  Stage B, progressive AS        Plan and Discussion  Discussed that her symptoms are likely from aortic stenosis which is progressively worsening.  Will monitor echo closely in 6 months.    Follow Up  3 months      Yordy Ng MD, F.A.C.C, F.S.C.A.I.

## 2022-09-20 ENCOUNTER — PATIENT MESSAGE (OUTPATIENT)
Dept: BEHAVIORAL HEALTH | Facility: CLINIC | Age: 67
End: 2022-09-20
Payer: MEDICARE

## 2022-09-27 ENCOUNTER — TELEPHONE (OUTPATIENT)
Dept: BEHAVIORAL HEALTH | Facility: CLINIC | Age: 67
End: 2022-09-27
Payer: MEDICARE

## 2022-11-14 ENCOUNTER — IMMUNIZATION (OUTPATIENT)
Dept: INTERNAL MEDICINE | Facility: CLINIC | Age: 67
End: 2022-11-14
Payer: MEDICARE

## 2022-11-14 DIAGNOSIS — Z23 NEED FOR VACCINATION: Primary | ICD-10-CM

## 2022-11-14 PROCEDURE — G0008 ADMIN INFLUENZA VIRUS VAC: HCPCS | Mod: S$GLB,,, | Performed by: INTERNAL MEDICINE

## 2022-11-14 PROCEDURE — 90694 FLU VACCINE - QUADRIVALENT - ADJUVANTED: ICD-10-PCS | Mod: S$GLB,,, | Performed by: INTERNAL MEDICINE

## 2022-11-14 PROCEDURE — 91312 COVID-19, MRNA, LNP-S, BIVALENT BOOSTER, PF, 30 MCG/0.3 ML DOSE: ICD-10-PCS | Mod: S$GLB,,, | Performed by: INTERNAL MEDICINE

## 2022-11-14 PROCEDURE — 91312 COVID-19, MRNA, LNP-S, BIVALENT BOOSTER, PF, 30 MCG/0.3 ML DOSE: CPT | Mod: S$GLB,,, | Performed by: INTERNAL MEDICINE

## 2022-11-14 PROCEDURE — 0124A COVID-19, MRNA, LNP-S, BIVALENT BOOSTER, PF, 30 MCG/0.3 ML DOSE: CPT | Mod: CV19,PBBFAC | Performed by: INTERNAL MEDICINE

## 2022-11-14 PROCEDURE — G0008 FLU VACCINE - QUADRIVALENT - ADJUVANTED: ICD-10-PCS | Mod: S$GLB,,, | Performed by: INTERNAL MEDICINE

## 2022-11-14 PROCEDURE — 90694 VACC AIIV4 NO PRSRV 0.5ML IM: CPT | Mod: S$GLB,,, | Performed by: INTERNAL MEDICINE

## 2023-01-10 ENCOUNTER — OFFICE VISIT (OUTPATIENT)
Dept: CARDIOLOGY | Facility: CLINIC | Age: 68
End: 2023-01-10
Payer: MEDICARE

## 2023-01-10 VITALS
HEIGHT: 62 IN | HEART RATE: 78 BPM | SYSTOLIC BLOOD PRESSURE: 146 MMHG | DIASTOLIC BLOOD PRESSURE: 82 MMHG | WEIGHT: 207.31 LBS | BODY MASS INDEX: 38.15 KG/M2

## 2023-01-10 DIAGNOSIS — I35.0 NONRHEUMATIC AORTIC VALVE STENOSIS: Primary | ICD-10-CM

## 2023-01-10 DIAGNOSIS — I34.1 MITRAL VALVE PROLAPSE: ICD-10-CM

## 2023-01-10 PROCEDURE — 3288F FALL RISK ASSESSMENT DOCD: CPT | Mod: CPTII,S$GLB,, | Performed by: INTERNAL MEDICINE

## 2023-01-10 PROCEDURE — 3008F PR BODY MASS INDEX (BMI) DOCUMENTED: ICD-10-PCS | Mod: CPTII,S$GLB,, | Performed by: INTERNAL MEDICINE

## 2023-01-10 PROCEDURE — 3288F PR FALLS RISK ASSESSMENT DOCUMENTED: ICD-10-PCS | Mod: CPTII,S$GLB,, | Performed by: INTERNAL MEDICINE

## 2023-01-10 PROCEDURE — 99214 PR OFFICE/OUTPT VISIT, EST, LEVL IV, 30-39 MIN: ICD-10-PCS | Mod: S$GLB,,, | Performed by: INTERNAL MEDICINE

## 2023-01-10 PROCEDURE — 1159F MED LIST DOCD IN RCRD: CPT | Mod: CPTII,S$GLB,, | Performed by: INTERNAL MEDICINE

## 2023-01-10 PROCEDURE — 99214 OFFICE O/P EST MOD 30 MIN: CPT | Mod: S$GLB,,, | Performed by: INTERNAL MEDICINE

## 2023-01-10 PROCEDURE — 3077F SYST BP >= 140 MM HG: CPT | Mod: CPTII,S$GLB,, | Performed by: INTERNAL MEDICINE

## 2023-01-10 PROCEDURE — 3079F DIAST BP 80-89 MM HG: CPT | Mod: CPTII,S$GLB,, | Performed by: INTERNAL MEDICINE

## 2023-01-10 PROCEDURE — 1101F PR PT FALLS ASSESS DOC 0-1 FALLS W/OUT INJ PAST YR: ICD-10-PCS | Mod: CPTII,S$GLB,, | Performed by: INTERNAL MEDICINE

## 2023-01-10 PROCEDURE — 3079F PR MOST RECENT DIASTOLIC BLOOD PRESSURE 80-89 MM HG: ICD-10-PCS | Mod: CPTII,S$GLB,, | Performed by: INTERNAL MEDICINE

## 2023-01-10 PROCEDURE — 1126F PR PAIN SEVERITY QUANTIFIED, NO PAIN PRESENT: ICD-10-PCS | Mod: CPTII,S$GLB,, | Performed by: INTERNAL MEDICINE

## 2023-01-10 PROCEDURE — 3008F BODY MASS INDEX DOCD: CPT | Mod: CPTII,S$GLB,, | Performed by: INTERNAL MEDICINE

## 2023-01-10 PROCEDURE — 1126F AMNT PAIN NOTED NONE PRSNT: CPT | Mod: CPTII,S$GLB,, | Performed by: INTERNAL MEDICINE

## 2023-01-10 PROCEDURE — 1101F PT FALLS ASSESS-DOCD LE1/YR: CPT | Mod: CPTII,S$GLB,, | Performed by: INTERNAL MEDICINE

## 2023-01-10 PROCEDURE — 3077F PR MOST RECENT SYSTOLIC BLOOD PRESSURE >= 140 MM HG: ICD-10-PCS | Mod: CPTII,S$GLB,, | Performed by: INTERNAL MEDICINE

## 2023-01-10 PROCEDURE — 99999 PR PBB SHADOW E&M-EST. PATIENT-LVL III: CPT | Mod: PBBFAC,,, | Performed by: INTERNAL MEDICINE

## 2023-01-10 PROCEDURE — 1159F PR MEDICATION LIST DOCUMENTED IN MEDICAL RECORD: ICD-10-PCS | Mod: CPTII,S$GLB,, | Performed by: INTERNAL MEDICINE

## 2023-01-10 PROCEDURE — 99999 PR PBB SHADOW E&M-EST. PATIENT-LVL III: ICD-10-PCS | Mod: PBBFAC,,, | Performed by: INTERNAL MEDICINE

## 2023-01-10 RX ORDER — NIFEDIPINE 30 MG/1
30 TABLET, EXTENDED RELEASE ORAL DAILY
Qty: 90 TABLET | Refills: 3 | Status: SHIPPED | OUTPATIENT
Start: 2023-01-10 | End: 2024-01-10

## 2023-01-10 NOTE — PROGRESS NOTES
Cardiology    1/10/2023  11:42 AM    Problem list  Patient Active Problem List   Diagnosis    Mitral valve prolapse    Vitamin D deficiency    Morbid obesity, unspecified obesity type    Nonrheumatic aortic valve stenosis    Moderate episode of recurrent major depressive disorder    BMI 37.0-37.9, adult       CC:  F/u    HPI:  Patient has no new complaints.  Her shortness of breath on exertion is stable.  She spent a month in [place] during the holiday.  She is gained a few lb.    Medications  Current Outpatient Medications   Medication Sig Dispense Refill    NIFEdipine (PROCARDIA-XL) 30 MG (OSM) 24 hr tablet Take 1 tablet (30 mg total) by mouth once daily. 90 tablet 3     No current facility-administered medications for this visit.      Prior to Admission medications    Medication Sig Start Date End Date Taking? Authorizing Provider   NIFEdipine (PROCARDIA-XL) 30 MG (OSM) 24 hr tablet Take 1 tablet (30 mg total) by mouth once daily. 1/10/23 1/10/24  Yordy Ng MD         History  Past Medical History:   Diagnosis Date    Mitral valve prolapse      Past Surgical History:   Procedure Laterality Date    BREAST CYST EXCISION Left 80s    BREAST SURGERY      Benign lump on left.    CATARACT EXTRACTION       SECTION      x1     Social History     Socioeconomic History    Marital status:     Number of children: 2   Tobacco Use    Smoking status: Never    Smokeless tobacco: Never   Substance and Sexual Activity    Alcohol use: Yes     Comment: Rare.     Drug use: No    Sexual activity: Not Currently   Social History Narrative       , one child local dtr lives with her, one in [place] son in [place]/hx of [facility]. 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      Social Determinants of Health     Financial Resource Strain: Low Risk     Difficulty of Paying Living Expenses: Not hard at all   Food Insecurity: No Food Insecurity    Worried About Running  Out of Food in the Last Year: Never true    Ran Out of Food in the Last Year: Never true   Transportation Needs: No Transportation Needs    Lack of Transportation (Medical): No    Lack of Transportation (Non-Medical): No   Physical Activity: Inactive    Days of Exercise per Week: 0 days    Minutes of Exercise per Session: 0 min   Stress: Stress Concern Present    Feeling of Stress : Very much   Social Connections: Socially Isolated    Frequency of Communication with Friends and Family: More than three times a week    Frequency of Social Gatherings with Friends and Family: Once a week    Attends [employer] Services: Never    Active Member of Clubs or Organizations: No    Attends Club or Organization Meetings: Never    Marital Status:    Housing Stability: Low Risk     Unable to Pay for Housing in the Last Year: No    Number of Places Lived in the Last Year: 1    Unstable Housing in the Last Year: No         Allergies  Review of patient's allergies indicates:   Allergen Reactions    Davion Rash         Review of Systems   Review of Systems   Constitutional: Negative for decreased appetite, fever and weight loss.   HENT:  Negative for congestion and nosebleeds.    Eyes:  Negative for double vision, vision loss in left eye, vision loss in right eye and visual disturbance.   Cardiovascular:  Positive for dyspnea on exertion. Negative for chest pain, claudication, cyanosis, irregular heartbeat, leg swelling, near-syncope, orthopnea, palpitations, paroxysmal nocturnal dyspnea and syncope.   Respiratory:  Positive for shortness of breath. Negative for cough, hemoptysis, sleep disturbances due to breathing, snoring, sputum production and wheezing.    Endocrine: Negative for cold intolerance and heat intolerance.   Skin:  Negative for nail changes and rash.   Musculoskeletal:  Negative for joint pain, muscle cramps, muscle weakness and myalgias.   Gastrointestinal:  Negative for change in bowel habit, heartburn,  hematemesis, hematochezia, hemorrhoids and melena.   Neurological:  Negative for dizziness, focal weakness and headaches.       Physical Exam  Wt Readings from Last 1 Encounters:   01/10/23 94 kg (207 lb 5.5 oz)     BP Readings from Last 3 Encounters:   01/10/23 (!) 146/82   09/14/22 118/84   07/21/22 120/88     Pulse Readings from Last 1 Encounters:   01/10/23 78     Body mass index is 37.92 kg/m².    Physical Exam  Vitals reviewed.   Constitutional:       Appearance: She is obese.   Cardiovascular:      Rate and Rhythm: Normal rate and regular rhythm.      Pulses:           Carotid pulses are 1+ on the right side and 1+ on the left side.       Radial pulses are 2+ on the right side and 2+ on the left side.      Heart sounds: S1 normal and S2 normal. Murmur heard.   Harsh midsystolic murmur is present with a grade of 2/6 at the upper right sternal border radiating to the neck.   Pulmonary:      Breath sounds: Normal breath sounds and air entry.   Musculoskeletal:      Right lower leg: No edema.      Left lower leg: No edema.   Neurological:      Mental Status: She is alert.           Assessment  1. Nonrheumatic aortic valve stenosis  Unchanged    2. Mitral valve prolapse  Unchanged    3. BMI 37.0-37.9, adult  Unchanged    4.  HTN, not well controlled        Plan and Discussion  Discussed that her blood pressure is mildly elevated.  Will start nifedipine 30mg once a day.  Encouraged her to check her blood pressure.  She is considering moving to [place] to live with her son.    Follow Up  3 months      Yordy Ng MD, F.A.C.C, F.S.C.A.I.        35 minutes were spent in chart review, documentation and review of results, and evaluation, treatment, and counseling of patient on the same day of service.

## 2023-01-11 ENCOUNTER — PES CALL (OUTPATIENT)
Dept: ADMINISTRATIVE | Facility: CLINIC | Age: 68
End: 2023-01-11
Payer: MEDICARE

## 2023-01-23 ENCOUNTER — TELEPHONE (OUTPATIENT)
Dept: INTERNAL MEDICINE | Facility: CLINIC | Age: 68
End: 2023-01-23
Payer: MEDICARE

## 2023-01-23 NOTE — TELEPHONE ENCOUNTER
----- Message from Modesta Ornelas sent at 1/23/2023  2:52 PM CST -----  Contact: self [phone]  Pt requesting a call for eye surgery clearance 2/9/2023.    Please call and advise

## 2023-01-24 ENCOUNTER — TELEPHONE (OUTPATIENT)
Dept: INTERNAL MEDICINE | Facility: CLINIC | Age: 68
End: 2023-01-24
Payer: MEDICARE

## 2023-01-24 NOTE — TELEPHONE ENCOUNTER
Hi, I have not seen her in over one year. Please offer her an appt--  It is OK to double book her for 1/27 at 2pm.    Thank you, Ishmael Jacobson

## 2023-01-24 NOTE — TELEPHONE ENCOUNTER
Called patient advised that she will need to be seen in our office as it has been over 1 year. Pt stated she needs sx clearance and will be here for 1/27 @ 2pm. -Irma TORRES MA

## 2023-01-27 ENCOUNTER — OFFICE VISIT (OUTPATIENT)
Dept: INTERNAL MEDICINE | Facility: CLINIC | Age: 68
End: 2023-01-27
Payer: MEDICARE

## 2023-01-27 ENCOUNTER — TELEPHONE (OUTPATIENT)
Dept: INTERNAL MEDICINE | Facility: CLINIC | Age: 68
End: 2023-01-27
Payer: MEDICARE

## 2023-01-27 ENCOUNTER — PATIENT MESSAGE (OUTPATIENT)
Dept: PHARMACY | Facility: CLINIC | Age: 68
End: 2023-01-27
Payer: MEDICARE

## 2023-01-27 ENCOUNTER — LAB VISIT (OUTPATIENT)
Dept: LAB | Facility: HOSPITAL | Age: 68
End: 2023-01-27
Attending: INTERNAL MEDICINE
Payer: MEDICARE

## 2023-01-27 VITALS
OXYGEN SATURATION: 97 % | HEIGHT: 62 IN | BODY MASS INDEX: 37.73 KG/M2 | WEIGHT: 205 LBS | HEART RATE: 76 BPM | DIASTOLIC BLOOD PRESSURE: 84 MMHG | SYSTOLIC BLOOD PRESSURE: 138 MMHG | RESPIRATION RATE: 18 BRPM

## 2023-01-27 DIAGNOSIS — F33.1 MODERATE EPISODE OF RECURRENT MAJOR DEPRESSIVE DISORDER: ICD-10-CM

## 2023-01-27 DIAGNOSIS — I35.0 NONRHEUMATIC AORTIC VALVE STENOSIS: ICD-10-CM

## 2023-01-27 DIAGNOSIS — Z13.1 SCREENING FOR DIABETES MELLITUS: ICD-10-CM

## 2023-01-27 DIAGNOSIS — I10 ESSENTIAL HYPERTENSION: ICD-10-CM

## 2023-01-27 DIAGNOSIS — R79.9 ABNORMAL FINDING OF BLOOD CHEMISTRY, UNSPECIFIED: ICD-10-CM

## 2023-01-27 DIAGNOSIS — H02.401 PTOSIS OF RIGHT EYELID: ICD-10-CM

## 2023-01-27 DIAGNOSIS — Z01.810 PREOP CARDIOVASCULAR EXAM: Primary | ICD-10-CM

## 2023-01-27 DIAGNOSIS — Z12.11 COLON CANCER SCREENING: ICD-10-CM

## 2023-01-27 DIAGNOSIS — E66.01 MORBID OBESITY, UNSPECIFIED OBESITY TYPE: ICD-10-CM

## 2023-01-27 DIAGNOSIS — Z12.31 ENCOUNTER FOR SCREENING MAMMOGRAM FOR MALIGNANT NEOPLASM OF BREAST: ICD-10-CM

## 2023-01-27 LAB
ESTIMATED AVG GLUCOSE: 103 MG/DL (ref 68–131)
HBA1C MFR BLD: 5.2 % (ref 4–5.6)

## 2023-01-27 PROCEDURE — 1160F RVW MEDS BY RX/DR IN RCRD: CPT | Mod: CPTII,S$GLB,, | Performed by: INTERNAL MEDICINE

## 2023-01-27 PROCEDURE — 3044F PR MOST RECENT HEMOGLOBIN A1C LEVEL <7.0%: ICD-10-PCS | Mod: CPTII,S$GLB,, | Performed by: INTERNAL MEDICINE

## 2023-01-27 PROCEDURE — 1101F PR PT FALLS ASSESS DOC 0-1 FALLS W/OUT INJ PAST YR: ICD-10-PCS | Mod: CPTII,S$GLB,, | Performed by: INTERNAL MEDICINE

## 2023-01-27 PROCEDURE — 3288F FALL RISK ASSESSMENT DOCD: CPT | Mod: CPTII,S$GLB,, | Performed by: INTERNAL MEDICINE

## 2023-01-27 PROCEDURE — 3008F PR BODY MASS INDEX (BMI) DOCUMENTED: ICD-10-PCS | Mod: CPTII,S$GLB,, | Performed by: INTERNAL MEDICINE

## 2023-01-27 PROCEDURE — 99214 OFFICE O/P EST MOD 30 MIN: CPT | Mod: S$GLB,,, | Performed by: INTERNAL MEDICINE

## 2023-01-27 PROCEDURE — 3079F PR MOST RECENT DIASTOLIC BLOOD PRESSURE 80-89 MM HG: ICD-10-PCS | Mod: CPTII,S$GLB,, | Performed by: INTERNAL MEDICINE

## 2023-01-27 PROCEDURE — 99999 PR PBB SHADOW E&M-EST. PATIENT-LVL V: CPT | Mod: PBBFAC,,, | Performed by: INTERNAL MEDICINE

## 2023-01-27 PROCEDURE — 99214 PR OFFICE/OUTPT VISIT, EST, LEVL IV, 30-39 MIN: ICD-10-PCS | Mod: S$GLB,,, | Performed by: INTERNAL MEDICINE

## 2023-01-27 PROCEDURE — 1101F PT FALLS ASSESS-DOCD LE1/YR: CPT | Mod: CPTII,S$GLB,, | Performed by: INTERNAL MEDICINE

## 2023-01-27 PROCEDURE — 3008F BODY MASS INDEX DOCD: CPT | Mod: CPTII,S$GLB,, | Performed by: INTERNAL MEDICINE

## 2023-01-27 PROCEDURE — 36415 COLL VENOUS BLD VENIPUNCTURE: CPT | Performed by: INTERNAL MEDICINE

## 2023-01-27 PROCEDURE — 3044F HG A1C LEVEL LT 7.0%: CPT | Mod: CPTII,S$GLB,, | Performed by: INTERNAL MEDICINE

## 2023-01-27 PROCEDURE — 1160F PR REVIEW ALL MEDS BY PRESCRIBER/CLIN PHARMACIST DOCUMENTED: ICD-10-PCS | Mod: CPTII,S$GLB,, | Performed by: INTERNAL MEDICINE

## 2023-01-27 PROCEDURE — 83036 HEMOGLOBIN GLYCOSYLATED A1C: CPT | Performed by: INTERNAL MEDICINE

## 2023-01-27 PROCEDURE — 3075F SYST BP GE 130 - 139MM HG: CPT | Mod: CPTII,S$GLB,, | Performed by: INTERNAL MEDICINE

## 2023-01-27 PROCEDURE — 1159F MED LIST DOCD IN RCRD: CPT | Mod: CPTII,S$GLB,, | Performed by: INTERNAL MEDICINE

## 2023-01-27 PROCEDURE — 1126F AMNT PAIN NOTED NONE PRSNT: CPT | Mod: CPTII,S$GLB,, | Performed by: INTERNAL MEDICINE

## 2023-01-27 PROCEDURE — 3079F DIAST BP 80-89 MM HG: CPT | Mod: CPTII,S$GLB,, | Performed by: INTERNAL MEDICINE

## 2023-01-27 PROCEDURE — 3075F PR MOST RECENT SYSTOLIC BLOOD PRESS GE 130-139MM HG: ICD-10-PCS | Mod: CPTII,S$GLB,, | Performed by: INTERNAL MEDICINE

## 2023-01-27 PROCEDURE — 1126F PR PAIN SEVERITY QUANTIFIED, NO PAIN PRESENT: ICD-10-PCS | Mod: CPTII,S$GLB,, | Performed by: INTERNAL MEDICINE

## 2023-01-27 PROCEDURE — 80048 BASIC METABOLIC PNL TOTAL CA: CPT | Performed by: INTERNAL MEDICINE

## 2023-01-27 PROCEDURE — 99999 PR PBB SHADOW E&M-EST. PATIENT-LVL V: ICD-10-PCS | Mod: PBBFAC,,, | Performed by: INTERNAL MEDICINE

## 2023-01-27 PROCEDURE — 1159F PR MEDICATION LIST DOCUMENTED IN MEDICAL RECORD: ICD-10-PCS | Mod: CPTII,S$GLB,, | Performed by: INTERNAL MEDICINE

## 2023-01-27 PROCEDURE — 3288F PR FALLS RISK ASSESSMENT DOCUMENTED: ICD-10-PCS | Mod: CPTII,S$GLB,, | Performed by: INTERNAL MEDICINE

## 2023-01-27 NOTE — PROGRESS NOTES
Subjective:       Patient ID: Kandace Rapp is a 67 y.o. female.    Chief Complaint: Follow-up (ED follow up)    Patient is here for followup for chronic conditions.      Will be having cataract surgery 2/9/23.    Thinks pressure med may be causing a headache, has mild intermittent headache. Has not interfered with her activities.     Review of Systems   Constitutional:  Negative for activity change, appetite change and unexpected weight change.   Eyes:  Positive for visual disturbance.   Respiratory:  Negative for cough, chest tightness and shortness of breath.    Cardiovascular:  Negative for chest pain.   Gastrointestinal:  Negative for abdominal distention and abdominal pain.   Genitourinary:  Negative for difficulty urinating and urgency.        No breast lumps/masses/pain/nipple d/c in either breast     Musculoskeletal:  Positive for arthralgias.   Skin:  Negative for rash.         Objective:      Physical Exam  Vitals reviewed.   Constitutional:       General: She is not in acute distress.     Appearance: Normal appearance. She is well-developed. She is obese. She is not ill-appearing, toxic-appearing or diaphoretic.   HENT:      Head: Normocephalic and atraumatic.   Eyes:      General: No scleral icterus.     Pupils: Pupils are equal, round, and reactive to light.      Comments: Right eyelid ptosis seen   Neck:      Thyroid: No thyromegaly.   Cardiovascular:      Rate and Rhythm: Normal rate and regular rhythm.      Heart sounds: Murmur heard.     No friction rub. No gallop.   Pulmonary:      Effort: Pulmonary effort is normal. No respiratory distress.      Breath sounds: Normal breath sounds. No wheezing or rales.   Abdominal:      General: Bowel sounds are normal. There is no distension.      Palpations: Abdomen is soft. There is no mass.      Tenderness: There is no abdominal tenderness. There is no guarding or rebound.   Musculoskeletal:         General: No tenderness. Normal range of motion.       Cervical back: Normal range of motion.      Comments: bilat knee crepitus R>L, but rom is normal.     Lymphadenopathy:      Cervical: No cervical adenopathy.   Skin:     Comments: Fatty aread around bilat ant neck c/w lipoma and obesity   Neurological:      General: No focal deficit present.      Mental Status: She is alert and oriented to person, place, and time.   Psychiatric:         Mood and Affect: Mood normal.         Speech: Speech normal.         Behavior: Behavior normal.       Assessment:       1. Preop cardiovascular exam    2. Ptosis of right eyelid    3. Colon cancer screening    4. Encounter for screening mammogram for malignant neoplasm of breast    5. Nonrheumatic aortic valve stenosis    6. Essential hypertension    7. Screening for diabetes mellitus    8. Morbid obesity, unspecified obesity type    9. Moderate episode of recurrent major depressive disorder    10. Abnormal finding of blood chemistry, unspecified        Plan:       Kandace was seen today for follow-up.    Diagnoses and all orders for this visit:    Preop cardiovascular exam  For catacr surgery -- Pt here for preop exam.  Pt is at low risk for a low risk procedure, I do not see any medical contra-indications for proceeding with this surgery -- pt is medically cleared.      Ptosis of right eyelid  -     Ambulatory referral/consult to Ophthalmology; Future    Colon cancer screening  -     Ambulatory referral/consult to Endo Procedure ; Future    Encounter for screening mammogram for malignant neoplasm of breast  -     Mammo Digital Screening Bilat w/ Darryl; Future    Nonrheumatic aortic valve stenosis  asymptomatic      Essential hypertension  -     BASIC METABOLIC PANEL; Future    Screening for diabetes mellitus  -     Hemoglobin A1C; Future    Morbid obesity, unspecified obesity type  Discussed importance of wt loss    Moderate episode of recurrent major depressive disorder  Stable symptoms    Abnormal finding of blood  chemistry, unspecified  -     Hemoglobin A1C; Future        Health Maintenance         Date Due Completion Date    TETANUS VACCINE Never done ---    Colorectal Cancer Screening 05/15/2020 5/15/2017    Mammogram 01/25/2023 1/25/2022    Shingles Vaccine (2 of 2) 03/24/2023 1/27/2023    DEXA Scan 03/14/2025 3/14/2022    Hemoglobin A1c (Diabetic Prevention Screening) 01/27/2026 1/27/2023    Lipid Panel 09/13/2027 9/13/2022            Follow up in about 1 year (around 1/27/2024).    Future Appointments   Date Time Provider Department Center   2/28/2023 11:00 AM Yordy Ng MD [facility]   3/20/2023  8:45 AM [facility]

## 2023-01-28 LAB
ANION GAP SERPL CALC-SCNC: 11 MMOL/L (ref 8–16)
BUN SERPL-MCNC: 11 MG/DL (ref 8–23)
CALCIUM SERPL-MCNC: 10.1 MG/DL (ref 8.7–10.5)
CHLORIDE SERPL-SCNC: 103 MMOL/L (ref 95–110)
CO2 SERPL-SCNC: 26 MMOL/L (ref 23–29)
CREAT SERPL-MCNC: 0.8 MG/DL (ref 0.5–1.4)
EST. GFR  (NO RACE VARIABLE): >60 ML/MIN/1.73 M^2
GLUCOSE SERPL-MCNC: 92 MG/DL (ref 70–110)
POTASSIUM SERPL-SCNC: 4.5 MMOL/L (ref 3.5–5.1)
SODIUM SERPL-SCNC: 140 MMOL/L (ref 136–145)

## 2023-02-01 ENCOUNTER — TELEPHONE (OUTPATIENT)
Dept: INTERNAL MEDICINE | Facility: CLINIC | Age: 68
End: 2023-02-01
Payer: MEDICARE

## 2023-02-01 NOTE — TELEPHONE ENCOUNTER
----- Message from Ame Olmstead sent at 2/1/2023 11:57 AM CST -----  Contact: [phone]  Pt is wanting to know where exactly was the form faxed. Was it fax to the Dr's office itself or [facility]. Their fax number is [phone]. Please call pt to confirm where it was sent.            Thank you

## 2023-02-22 ENCOUNTER — PES CALL (OUTPATIENT)
Dept: ADMINISTRATIVE | Facility: CLINIC | Age: 68
End: 2023-02-22
Payer: MEDICARE

## 2023-04-03 ENCOUNTER — TELEPHONE (OUTPATIENT)
Dept: OPTOMETRY | Facility: CLINIC | Age: 68
End: 2023-04-03
Payer: MEDICARE

## 2023-04-03 NOTE — TELEPHONE ENCOUNTER
----- Message from Nadia Krishnamurthy sent at 4/3/2023 11:11 AM CDT -----  Regarding: Waiting List  Pt called about her referral for ptosis and would like to be added to waiting list.     Pts call [phone]

## 2023-04-13 ENCOUNTER — PES CALL (OUTPATIENT)
Dept: ADMINISTRATIVE | Facility: CLINIC | Age: 68
End: 2023-04-13
Payer: MEDICARE

## 2023-04-21 ENCOUNTER — PATIENT MESSAGE (OUTPATIENT)
Dept: ADMINISTRATIVE | Facility: HOSPITAL | Age: 68
End: 2023-04-21
Payer: MEDICARE

## 2023-04-24 ENCOUNTER — HOSPITAL ENCOUNTER (OUTPATIENT)
Dept: RADIOLOGY | Facility: HOSPITAL | Age: 68
Discharge: HOME | End: 2023-04-24
Attending: INTERNAL MEDICINE
Payer: MEDICARE

## 2023-04-24 DIAGNOSIS — Z12.31 ENCOUNTER FOR SCREENING MAMMOGRAM FOR MALIGNANT NEOPLASM OF BREAST: ICD-10-CM

## 2023-04-24 PROCEDURE — 77067 SCR MAMMO BI INCL CAD: CPT | Mod: 26,,, | Performed by: RADIOLOGY

## 2023-04-24 PROCEDURE — 77063 BREAST TOMOSYNTHESIS BI: CPT | Mod: 26,,, | Performed by: RADIOLOGY

## 2023-04-24 PROCEDURE — 77067 MAMMO DIGITAL SCREENING BILAT WITH TOMO: ICD-10-PCS | Mod: 26,,, | Performed by: RADIOLOGY

## 2023-04-24 PROCEDURE — 77063 MAMMO DIGITAL SCREENING BILAT WITH TOMO: ICD-10-PCS | Mod: 26,,, | Performed by: RADIOLOGY

## 2023-04-24 PROCEDURE — 77067 SCR MAMMO BI INCL CAD: CPT | Mod: TC

## 2023-05-01 ENCOUNTER — OFFICE VISIT (OUTPATIENT)
Dept: CARDIOLOGY | Facility: CLINIC | Age: 68
End: 2023-05-01
Payer: MEDICARE

## 2023-05-01 VITALS
HEIGHT: 62 IN | SYSTOLIC BLOOD PRESSURE: 134 MMHG | BODY MASS INDEX: 37.84 KG/M2 | WEIGHT: 205.63 LBS | HEART RATE: 91 BPM | OXYGEN SATURATION: 98 % | DIASTOLIC BLOOD PRESSURE: 90 MMHG

## 2023-05-01 DIAGNOSIS — I35.0 NONRHEUMATIC AORTIC VALVE STENOSIS: Primary | ICD-10-CM

## 2023-05-01 PROCEDURE — 3080F PR MOST RECENT DIASTOLIC BLOOD PRESSURE >= 90 MM HG: ICD-10-PCS | Mod: CPTII,S$GLB,, | Performed by: INTERNAL MEDICINE

## 2023-05-01 PROCEDURE — 3008F PR BODY MASS INDEX (BMI) DOCUMENTED: ICD-10-PCS | Mod: CPTII,S$GLB,, | Performed by: INTERNAL MEDICINE

## 2023-05-01 PROCEDURE — 1126F AMNT PAIN NOTED NONE PRSNT: CPT | Mod: CPTII,S$GLB,, | Performed by: INTERNAL MEDICINE

## 2023-05-01 PROCEDURE — 1126F PR PAIN SEVERITY QUANTIFIED, NO PAIN PRESENT: ICD-10-PCS | Mod: CPTII,S$GLB,, | Performed by: INTERNAL MEDICINE

## 2023-05-01 PROCEDURE — 3075F SYST BP GE 130 - 139MM HG: CPT | Mod: CPTII,S$GLB,, | Performed by: INTERNAL MEDICINE

## 2023-05-01 PROCEDURE — 3080F DIAST BP >= 90 MM HG: CPT | Mod: CPTII,S$GLB,, | Performed by: INTERNAL MEDICINE

## 2023-05-01 PROCEDURE — 3044F PR MOST RECENT HEMOGLOBIN A1C LEVEL <7.0%: ICD-10-PCS | Mod: CPTII,S$GLB,, | Performed by: INTERNAL MEDICINE

## 2023-05-01 PROCEDURE — 99999 PR PBB SHADOW E&M-EST. PATIENT-LVL III: CPT | Mod: PBBFAC,,, | Performed by: INTERNAL MEDICINE

## 2023-05-01 PROCEDURE — 3044F HG A1C LEVEL LT 7.0%: CPT | Mod: CPTII,S$GLB,, | Performed by: INTERNAL MEDICINE

## 2023-05-01 PROCEDURE — 1159F PR MEDICATION LIST DOCUMENTED IN MEDICAL RECORD: ICD-10-PCS | Mod: CPTII,S$GLB,, | Performed by: INTERNAL MEDICINE

## 2023-05-01 PROCEDURE — 99214 PR OFFICE/OUTPT VISIT, EST, LEVL IV, 30-39 MIN: ICD-10-PCS | Mod: S$GLB,,, | Performed by: INTERNAL MEDICINE

## 2023-05-01 PROCEDURE — 99999 PR PBB SHADOW E&M-EST. PATIENT-LVL III: ICD-10-PCS | Mod: PBBFAC,,, | Performed by: INTERNAL MEDICINE

## 2023-05-01 PROCEDURE — 1160F RVW MEDS BY RX/DR IN RCRD: CPT | Mod: CPTII,S$GLB,, | Performed by: INTERNAL MEDICINE

## 2023-05-01 PROCEDURE — 3075F PR MOST RECENT SYSTOLIC BLOOD PRESS GE 130-139MM HG: ICD-10-PCS | Mod: CPTII,S$GLB,, | Performed by: INTERNAL MEDICINE

## 2023-05-01 PROCEDURE — 1159F MED LIST DOCD IN RCRD: CPT | Mod: CPTII,S$GLB,, | Performed by: INTERNAL MEDICINE

## 2023-05-01 PROCEDURE — 3008F BODY MASS INDEX DOCD: CPT | Mod: CPTII,S$GLB,, | Performed by: INTERNAL MEDICINE

## 2023-05-01 PROCEDURE — 1160F PR REVIEW ALL MEDS BY PRESCRIBER/CLIN PHARMACIST DOCUMENTED: ICD-10-PCS | Mod: CPTII,S$GLB,, | Performed by: INTERNAL MEDICINE

## 2023-05-01 PROCEDURE — 99214 OFFICE O/P EST MOD 30 MIN: CPT | Mod: S$GLB,,, | Performed by: INTERNAL MEDICINE

## 2023-05-01 NOTE — PROGRESS NOTES
Cardiology    2023  4:12 PM    Problem list  Patient Active Problem List   Diagnosis    Mitral valve prolapse    Vitamin D deficiency    Morbid obesity, unspecified obesity type    Nonrheumatic aortic valve stenosis    Moderate episode of recurrent major depressive disorder    BMI 37.0-37.9, adult       CC:  Shortness of breath    HPI:  Patient is here for follow-up.  She reports having more shortness of breath with exertion.  She denies any rest symptoms.  She denies any angina.  She is concerned regarding her shortness of breath as she is about to travel next month to [place] and [place].    Medications  Current Outpatient Medications   Medication Sig Dispense Refill    NIFEdipine (PROCARDIA-XL) 30 MG (OSM) 24 hr tablet Take 1 tablet (30 mg total) by mouth once daily. 90 tablet 3    pneumoc 20-yulia conj-dip cr,PF, (PREVNAR-20, PF,) 0.5 mL Syrg injection Inject 0.5 mLs into the muscle. 0.5 mL 0     No current facility-administered medications for this visit.      Prior to Admission medications    Medication Sig Start Date End Date Taking? Authorizing Provider   NIFEdipine (PROCARDIA-XL) 30 MG (OSM) 24 hr tablet Take 1 tablet (30 mg total) by mouth once daily. 1/10/23 1/10/24  Yordy Ng MD   pneumoc 20-yulia conj-dip cr,PF, (PREVNAR-20, PF,) 0.5 mL Syrg injection Inject 0.5 mLs into the muscle. 23   Nahomy Ga PharmD         History  Past Medical History:   Diagnosis Date    Mitral valve prolapse      Past Surgical History:   Procedure Laterality Date    BREAST CYST EXCISION Left 80s    BREAST SURGERY      Benign lump on left.    CATARACT EXTRACTION       SECTION      x1     Social History     Socioeconomic History    Marital status:     Number of children: 2   Tobacco Use    Smoking status: Never    Smokeless tobacco: Never   Substance and Sexual Activity    Alcohol use: Yes     Comment: Rare.     Drug use: No    Sexual activity: Not Currently   Social History Narrative        , one child local dtr lives with her, one in [place] son in [place]/hx of [facility]. 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          Allergies  Review of patient's allergies indicates:   Allergen Reactions    Davion Rash         Review of Systems   Review of Systems   Constitutional: Negative for decreased appetite, fever and weight loss.   HENT:  Negative for congestion and nosebleeds.    Eyes:  Negative for double vision, vision loss in left eye, vision loss in right eye and visual disturbance.   Cardiovascular:  Positive for dyspnea on exertion. Negative for chest pain, claudication, cyanosis, irregular heartbeat, leg swelling, near-syncope, orthopnea, palpitations, paroxysmal nocturnal dyspnea and syncope.   Respiratory:  Positive for shortness of breath. Negative for cough, hemoptysis, sleep disturbances due to breathing, snoring, sputum production and wheezing.    Endocrine: Negative for cold intolerance and heat intolerance.   Skin:  Negative for nail changes and rash.   Musculoskeletal:  Negative for joint pain, muscle cramps, muscle weakness and myalgias.   Gastrointestinal:  Negative for change in bowel habit, heartburn, hematemesis, hematochezia, hemorrhoids and melena.   Neurological:  Negative for dizziness, focal weakness and headaches.       Physical Exam  Wt Readings from Last 1 Encounters:   23 93.3 kg (205 lb 9.6 oz)     BP Readings from Last 3 Encounters:   23 (!) 134/90   23 138/84   01/10/23 (!) 146/82     Pulse Readings from Last 1 Encounters:   23 91     Body mass index is 37.6 kg/m².    Physical Exam  Vitals reviewed.   Constitutional:       Appearance: She is obese.   Cardiovascular:      Rate and Rhythm: Normal rate and regular rhythm.      Pulses:           Carotid pulses are 1+ on the right side and 1+ on the left side.       Radial pulses are 2+ on the right side and 2+ on the left side.      Heart sounds: S1  normal and S2 normal. Murmur heard.   Harsh midsystolic murmur is present with a grade of 2/6 at the upper right sternal border radiating to the neck.   Pulmonary:      Breath sounds: Normal breath sounds and air entry.   Musculoskeletal:      Right lower leg: No edema.      Left lower leg: No edema.   Neurological:      Mental Status: She is alert.           Assessment  1. Nonrheumatic aortic valve stenosis  Moderate aortic stenosis on echocardiogram in 2022  - Echo; Future    2. BMI 37.0-37.9, adult  Unchanged        Plan and Discussion  Discussed her shortness of breath.  Discussed her aortic stenosis and will need to evaluate to see the severity of her aortic stenosis which could be causing her shortness of breath.  Will get an echocardiogram to assess the severity of the aortic stenosis.    Follow Up  3 months      Yordy Ng MD, F.A.C.C, F.S.C.A.I.        30 minutes were spent in chart review, documentation and review of results, and evaluation, treatment, and counseling of patient on the same day of service.    Disclaimer: This document was created using voice recognition software ([employer]). Although it may be edited, this document may contain errors related to incorrect recognition of the spoken word. Please call the physician if clarification is needed.

## 2023-05-04 ENCOUNTER — OFFICE VISIT (OUTPATIENT)
Dept: PRIMARY CARE CLINIC | Facility: CLINIC | Age: 68
End: 2023-05-04
Payer: MEDICARE

## 2023-05-04 VITALS
HEART RATE: 83 BPM | BODY MASS INDEX: 37.73 KG/M2 | HEIGHT: 62 IN | OXYGEN SATURATION: 97 % | TEMPERATURE: 98 F | WEIGHT: 205 LBS | DIASTOLIC BLOOD PRESSURE: 78 MMHG | SYSTOLIC BLOOD PRESSURE: 124 MMHG

## 2023-05-04 DIAGNOSIS — J20.8 ACUTE BACTERIAL BRONCHITIS: Primary | ICD-10-CM

## 2023-05-04 DIAGNOSIS — B96.89 ACUTE BACTERIAL BRONCHITIS: Primary | ICD-10-CM

## 2023-05-04 PROCEDURE — 3078F PR MOST RECENT DIASTOLIC BLOOD PRESSURE < 80 MM HG: ICD-10-PCS | Mod: CPTII,S$GLB,, | Performed by: FAMILY MEDICINE

## 2023-05-04 PROCEDURE — 3044F HG A1C LEVEL LT 7.0%: CPT | Mod: CPTII,S$GLB,, | Performed by: FAMILY MEDICINE

## 2023-05-04 PROCEDURE — 3044F PR MOST RECENT HEMOGLOBIN A1C LEVEL <7.0%: ICD-10-PCS | Mod: CPTII,S$GLB,, | Performed by: FAMILY MEDICINE

## 2023-05-04 PROCEDURE — 3008F PR BODY MASS INDEX (BMI) DOCUMENTED: ICD-10-PCS | Mod: CPTII,S$GLB,, | Performed by: FAMILY MEDICINE

## 2023-05-04 PROCEDURE — 3074F SYST BP LT 130 MM HG: CPT | Mod: CPTII,S$GLB,, | Performed by: FAMILY MEDICINE

## 2023-05-04 PROCEDURE — 99999 PR PBB SHADOW E&M-EST. PATIENT-LVL III: CPT | Mod: PBBFAC,,, | Performed by: FAMILY MEDICINE

## 2023-05-04 PROCEDURE — 1126F PR PAIN SEVERITY QUANTIFIED, NO PAIN PRESENT: ICD-10-PCS | Mod: CPTII,S$GLB,, | Performed by: FAMILY MEDICINE

## 2023-05-04 PROCEDURE — 3288F PR FALLS RISK ASSESSMENT DOCUMENTED: ICD-10-PCS | Mod: CPTII,S$GLB,, | Performed by: FAMILY MEDICINE

## 2023-05-04 PROCEDURE — 1159F MED LIST DOCD IN RCRD: CPT | Mod: CPTII,S$GLB,, | Performed by: FAMILY MEDICINE

## 2023-05-04 PROCEDURE — 3074F PR MOST RECENT SYSTOLIC BLOOD PRESSURE < 130 MM HG: ICD-10-PCS | Mod: CPTII,S$GLB,, | Performed by: FAMILY MEDICINE

## 2023-05-04 PROCEDURE — 1101F PR PT FALLS ASSESS DOC 0-1 FALLS W/OUT INJ PAST YR: ICD-10-PCS | Mod: CPTII,S$GLB,, | Performed by: FAMILY MEDICINE

## 2023-05-04 PROCEDURE — 99999 PR PBB SHADOW E&M-EST. PATIENT-LVL III: ICD-10-PCS | Mod: PBBFAC,,, | Performed by: FAMILY MEDICINE

## 2023-05-04 PROCEDURE — 99213 PR OFFICE/OUTPT VISIT, EST, LEVL III, 20-29 MIN: ICD-10-PCS | Mod: S$GLB,,, | Performed by: FAMILY MEDICINE

## 2023-05-04 PROCEDURE — 1159F PR MEDICATION LIST DOCUMENTED IN MEDICAL RECORD: ICD-10-PCS | Mod: CPTII,S$GLB,, | Performed by: FAMILY MEDICINE

## 2023-05-04 PROCEDURE — 3288F FALL RISK ASSESSMENT DOCD: CPT | Mod: CPTII,S$GLB,, | Performed by: FAMILY MEDICINE

## 2023-05-04 PROCEDURE — 3008F BODY MASS INDEX DOCD: CPT | Mod: CPTII,S$GLB,, | Performed by: FAMILY MEDICINE

## 2023-05-04 PROCEDURE — 99213 OFFICE O/P EST LOW 20 MIN: CPT | Mod: S$GLB,,, | Performed by: FAMILY MEDICINE

## 2023-05-04 PROCEDURE — 1101F PT FALLS ASSESS-DOCD LE1/YR: CPT | Mod: CPTII,S$GLB,, | Performed by: FAMILY MEDICINE

## 2023-05-04 PROCEDURE — 1160F PR REVIEW ALL MEDS BY PRESCRIBER/CLIN PHARMACIST DOCUMENTED: ICD-10-PCS | Mod: CPTII,S$GLB,, | Performed by: FAMILY MEDICINE

## 2023-05-04 PROCEDURE — 1160F RVW MEDS BY RX/DR IN RCRD: CPT | Mod: CPTII,S$GLB,, | Performed by: FAMILY MEDICINE

## 2023-05-04 PROCEDURE — 3078F DIAST BP <80 MM HG: CPT | Mod: CPTII,S$GLB,, | Performed by: FAMILY MEDICINE

## 2023-05-04 PROCEDURE — 1126F AMNT PAIN NOTED NONE PRSNT: CPT | Mod: CPTII,S$GLB,, | Performed by: FAMILY MEDICINE

## 2023-05-04 RX ORDER — FLUCONAZOLE 150 MG/1
150 TABLET ORAL DAILY
Qty: 1 TABLET | Refills: 1 | Status: SHIPPED | OUTPATIENT
Start: 2023-05-04 | End: 2023-05-05

## 2023-05-04 RX ORDER — PROMETHAZINE HYDROCHLORIDE AND DEXTROMETHORPHAN HYDROBROMIDE 6.25; 15 MG/5ML; MG/5ML
5 SYRUP ORAL EVERY 4 HOURS PRN
Qty: 118 ML | Refills: 0 | Status: SHIPPED | OUTPATIENT
Start: 2023-05-04 | End: 2023-05-14

## 2023-05-04 RX ORDER — OFLOXACIN 3 MG/ML
SOLUTION/ DROPS OPHTHALMIC
COMMUNITY
Start: 2023-02-20 | End: 2023-09-01

## 2023-05-04 RX ORDER — AZITHROMYCIN 250 MG/1
TABLET, FILM COATED ORAL
Qty: 6 TABLET | Refills: 0 | Status: SHIPPED | OUTPATIENT
Start: 2023-05-04 | End: 2023-05-09

## 2023-05-04 RX ORDER — ALBUTEROL SULFATE 90 UG/1
2 AEROSOL, METERED RESPIRATORY (INHALATION) EVERY 6 HOURS PRN
Qty: 18 G | Refills: 1 | Status: SHIPPED | OUTPATIENT
Start: 2023-05-04 | End: 2023-09-01

## 2023-05-04 RX ORDER — PREDNISOLONE ACETATE 10 MG/ML
1 SUSPENSION/ DROPS OPHTHALMIC 4 TIMES DAILY
COMMUNITY
Start: 2023-02-20 | End: 2023-09-01

## 2023-05-04 RX ORDER — NEPAFENAC 3 MG/ML
1 SUSPENSION/ DROPS OPHTHALMIC
COMMUNITY
Start: 2023-02-21 | End: 2023-09-01

## 2023-05-04 NOTE — PROGRESS NOTES
"    /78 (BP Location: Left arm, Patient Position: Sitting, BP Method: Large (Manual))   Pulse 83   Temp 97.9 °F (36.6 °C) (Oral)   Ht 5' 2" (1.575 m)   Wt 93 kg (205 lb 0.4 oz)   SpO2 97%   BMI 37.50 kg/m²       ===========    Chief Complaint: No chief complaint on file.          Cough  This is a new problem. The current episode started 1 to 4 weeks ago. The problem has been rapidly worsening. The problem occurs every few minutes. The cough is Productive of sputum and productive of purulent sputum. Associated symptoms include chills and nasal congestion. Pertinent negatives include no chest pain, ear congestion, ear pain, eye redness, fever, headaches, myalgias, postnasal drip, rash, rhinorrhea, sore throat, shortness of breath or wheezing.     Kandace Rapp is a 67 y.o. female     here for cough for 2 weeks that has progressively worsening  Has been taking  Nyquil, dayquil, vics vapor rub, eating fruit, drinking water to assure good hydration and tylenol      Patient queried and denies any further complaints    Patient has MEDICAL HISTORY OF  Patient Active Problem List   Diagnosis    Mitral valve prolapse    Vitamin D deficiency    Morbid obesity, unspecified obesity type    Nonrheumatic aortic valve stenosis    Moderate episode of recurrent major depressive disorder    BMI 37.0-37.9, adult       SURGICAL AND MEDICAL HISTORY: updated and reviewed.  Past Surgical History:   Procedure Laterality Date    BREAST CYST EXCISION Left 80s    BREAST SURGERY      Benign lump on left.    CATARACT EXTRACTION       SECTION      x1     ALLERGIES updated and reviewed.  Review of patient's allergies indicates:   Allergen Reactions    [place] Rash       CURRENT OUTPATIENT MEDICATIONS updated and reviewed    Current Outpatient Medications:     ILEVRO 0.3 % DrpS, Place 1 drop into the left eye., Disp: , Rfl:     NIFEdipine (PROCARDIA-XL) 30 MG (OSM) 24 hr tablet, Take 1 tablet (30 mg total) by mouth once " daily., Disp: 90 tablet, Rfl: 3    ofloxacin (OCUFLOX) 0.3 % ophthalmic solution, , Disp: , Rfl:     pneumoc 20-yulia conj-dip cr,PF, (PREVNAR-20, PF,) 0.5 mL Syrg injection, Inject 0.5 mLs into the muscle., Disp: 0.5 mL, Rfl: 0    prednisoLONE acetate (PRED FORTE) 1 % DrpS, Place 1 drop into the left eye 4 (four) times daily., Disp: , Rfl:     albuterol (PROVENTIL/VENTOLIN HFA) 90 mcg/actuation inhaler, Inhale 2 puffs into the lungs every 6 (six) hours as needed for Wheezing or Shortness of Breath., Disp: 18 g, Rfl: 1    Review of Systems   Constitutional:  Positive for chills. Negative for activity change, appetite change, diaphoresis, fatigue, fever and unexpected weight change.   HENT:  Negative for congestion, ear discharge, ear pain, facial swelling, hearing loss, nosebleeds, postnasal drip, rhinorrhea, sinus pressure, sneezing, sore throat, tinnitus, trouble swallowing and voice change.    Eyes:  Negative for photophobia, pain, discharge, redness, itching and visual disturbance.   Respiratory:  Positive for cough. Negative for chest tightness, shortness of breath and wheezing.    Cardiovascular:  Negative for chest pain, palpitations and leg swelling.   Gastrointestinal:  Negative for abdominal distention, abdominal pain, anal bleeding, blood in stool, constipation, diarrhea, nausea, rectal pain and vomiting.   Endocrine: Negative for cold intolerance, heat intolerance, polydipsia, polyphagia and polyuria.   Genitourinary:  Negative for difficulty urinating, dysuria and flank pain.   Musculoskeletal:  Negative for arthralgias, back pain, joint swelling, myalgias and neck pain.   Skin:  Negative for rash.   Neurological:  Negative for dizziness, tremors, seizures, syncope, speech difficulty, weakness, light-headedness, numbness and headaches.   Psychiatric/Behavioral:  Negative for behavioral problems, confusion, decreased concentration, dysphoric mood, sleep disturbance and suicidal ideas. The patient is not  "nervous/anxious and is not hyperactive.      /78 (BP Location: Left arm, Patient Position: Sitting, BP Method: Large (Manual))   Pulse 83   Temp 97.9 °F (36.6 °C) (Oral)   Ht 5' 2" (1.575 m)   Wt 93 kg (205 lb 0.4 oz)   SpO2 97%   BMI 37.50 kg/m²   Physical Exam  Vitals and nursing note reviewed.   Constitutional:       General: She is not in acute distress.     Appearance: Normal appearance. She is well-developed. She is not ill-appearing or toxic-appearing.   HENT:      Head: Normocephalic and atraumatic.      Right Ear: Tympanic membrane, ear canal and external ear normal.      Left Ear: Tympanic membrane, ear canal and external ear normal.      Nose: Nose normal.      Mouth/Throat:      Lips: Pink.      Mouth: Mucous membranes are moist.      Pharynx: Posterior oropharyngeal erythema present. No oropharyngeal exudate.   Eyes:      General: No scleral icterus.        Right eye: No discharge.         Left eye: No discharge.      Extraocular Movements: Extraocular movements intact.      Conjunctiva/sclera: Conjunctivae normal.   Cardiovascular:      Rate and Rhythm: Normal rate and regular rhythm.      Pulses: Normal pulses.      Heart sounds: Normal heart sounds. No murmur heard.  Pulmonary:      Effort: Pulmonary effort is normal. No respiratory distress.      Breath sounds: Normal breath sounds. No wheezing or rales.   Musculoskeletal:      Cervical back: Normal range of motion and neck supple. No rigidity or tenderness.   Lymphadenopathy:      Cervical: No cervical adenopathy.   Skin:     General: Skin is warm and dry.   Neurological:      Mental Status: She is alert. Mental status is at baseline.   Psychiatric:         Mood and Affect: Mood normal.         Behavior: Behavior normal. Behavior is cooperative.       ASSESSMENT/PLAN  Diagnoses and all orders for this visit:    Acute bacterial bronchitis    Other orders  -     azithromycin (Z-YANY) 250 MG tablet; Take 2 tablets by mouth on day 1; Take 1 " tablet by mouth on days 2-5  -     promethazine-dextromethorphan (PROMETHAZINE-DM) 6.25-15 mg/5 mL Syrp; Take 5 mLs by mouth every 4 (four) hours as needed (severe cough; do not take and drive).  -     albuterol (PROVENTIL/VENTOLIN HFA) 90 mcg/actuation inhaler; Inhale 2 puffs into the lungs every 6 (six) hours as needed for Wheezing or Shortness of Breath.  -     fluconazole (DIFLUCAN) 150 MG Tab; Take 1 tablet (150 mg total) by mouth once daily. Prn yeast infection for 1 day        Hydrate, rest, Mucinex Expectorant as directed for thinning out the mucus; or MucinexDM if with significant cough and mucus.  Zyrtec, Xyzal, Allegra or Claritin. (Would lean towards Allegra which may be a bit more effective than claritin and will not cause sedation as Xyzal or Zyrtec may.)  Nasal saline spray such as Harrison brand as needed.  Flonase or Nasonex nasal spray after the nasal saline.  Warm salt water gargles and warm liquids may help soothe a sore throat better than cool liquids.  Tylenol as directed as needed for fever, body aches, headaches.   All are OTC  Most of all, stay well-hydrated.      All lab results over past 2 years available reviewed inc labs and any cardiology or radiology studies.  Any new prescription medications gone over in detail including reason for taking the medication, the general mechanism of action, most common possible side effects and possible costs, etcetera.    Chronic conditions updated. Other than changes or additions as above, cont current medications and maintain follow-up with specialists if indicated.     Adam Norris MD  A dictation device was used to produce this document. Use of such devices sometimes results in grammatical errors or replacement of words that sound similarly.

## 2023-05-08 ENCOUNTER — HOSPITAL ENCOUNTER (OUTPATIENT)
Dept: CARDIOLOGY | Facility: OTHER | Age: 68
Discharge: HOME | End: 2023-05-08
Attending: INTERNAL MEDICINE
Payer: MEDICARE

## 2023-05-08 VITALS
BODY MASS INDEX: 37.73 KG/M2 | WEIGHT: 205 LBS | HEIGHT: 62 IN | HEART RATE: 83 BPM | DIASTOLIC BLOOD PRESSURE: 78 MMHG | SYSTOLIC BLOOD PRESSURE: 124 MMHG

## 2023-05-08 DIAGNOSIS — I35.0 NONRHEUMATIC AORTIC VALVE STENOSIS: ICD-10-CM

## 2023-05-08 LAB
ASCENDING AORTA: 3.63 CM
AV INDEX (PROSTH): 0.31
AV MEAN GRADIENT: 34 MMHG
AV PEAK GRADIENT: 51 MMHG
AV REGURGITATION PRESSURE HALF TIME: 549 MS
AV VALVE AREA: 1.2 CM2
AV VELOCITY RATIO: 0.31
BSA FOR ECHO PROCEDURE: 2.02 M2
CV ECHO LV RWT: 0.35 CM
DOP CALC AO PEAK VEL: 3.58 M/S
DOP CALC AO VTI: 79.4 CM
DOP CALC LVOT AREA: 3.8 CM2
DOP CALC LVOT DIAMETER: 2.2 CM
DOP CALC LVOT PEAK VEL: 1.12 M/S
DOP CALC LVOT STROKE VOLUME: 94.99 CM3
DOP CALCLVOT PEAK VEL VTI: 25 CM
E WAVE DECELERATION TIME: 224.06 MSEC
E/A RATIO: 0.64
E/E' RATIO: 12.22 M/S
ECHO LV POSTERIOR WALL: 0.88 CM (ref 0.6–1.1)
EJECTION FRACTION: 58 %
FRACTIONAL SHORTENING: 25 % (ref 28–44)
INTERVENTRICULAR SEPTUM: 0.93 CM (ref 0.6–1.1)
IVC DIAMETER: 0.82 CM
IVRT: 115.34 MSEC
LA MAJOR: 4.73 CM
LA MINOR: 4.75 CM
LA WIDTH: 3.8 CM
LEFT ATRIUM SIZE: 2.91 CM
LEFT ATRIUM VOLUME INDEX MOD: 24.9 ML/M2
LEFT ATRIUM VOLUME INDEX: 23.1 ML/M2
LEFT ATRIUM VOLUME MOD: 48 CM3
LEFT ATRIUM VOLUME: 44.55 CM3
LEFT INTERNAL DIMENSION IN SYSTOLE: 3.75 CM (ref 2.1–4)
LEFT VENTRICLE DIASTOLIC VOLUME INDEX: 61.78 ML/M2
LEFT VENTRICLE DIASTOLIC VOLUME: 119.24 ML
LEFT VENTRICLE MASS INDEX: 83 G/M2
LEFT VENTRICLE SYSTOLIC VOLUME INDEX: 31 ML/M2
LEFT VENTRICLE SYSTOLIC VOLUME: 59.92 ML
LEFT VENTRICULAR INTERNAL DIMENSION IN DIASTOLE: 5.02 CM (ref 3.5–6)
LEFT VENTRICULAR MASS: 160.43 G
LV LATERAL E/E' RATIO: 11 M/S
LV SEPTAL E/E' RATIO: 13.75 M/S
LVOT MG: 2.72 MMHG
LVOT MV: 0.78 CM/S
MV PEAK A VEL: 0.86 M/S
MV PEAK E VEL: 0.55 M/S
MV STENOSIS PRESSURE HALF TIME: 64.98 MS
MV VALVE AREA P 1/2 METHOD: 3.39 CM2
PISA AR MAX VEL: 3.97 M/S
PISA MRMAX VEL: 4.23 M/S
PISA TR MAX VEL: 2.14 M/S
PULM VEIN S/D RATIO: 1.62
PV PEAK D VEL: 0.34 M/S
PV PEAK S VEL: 0.55 M/S
PV PEAK VELOCITY: 1.04 CM/S
RA MAJOR: 3.95 CM
RA PRESSURE: 3 MMHG
RA WIDTH: 3.9 CM
RIGHT VENTRICULAR END-DIASTOLIC DIMENSION: 3.42 CM
RV TISSUE DOPPLER FREE WALL SYSTOLIC VELOCITY 1 (APICAL 4 CHAMBER VIEW): 13 CM/S
SINUS: 3.3 CM
STJ: 3.33 CM
TDI LATERAL: 0.05 M/S
TDI SEPTAL: 0.04 M/S
TDI: 0.05 M/S
TR MAX PG: 18 MMHG
TRICUSPID ANNULAR PLANE SYSTOLIC EXCURSION: 2.4 CM
TV REST PULMONARY ARTERY PRESSURE: 21 MMHG

## 2023-05-08 PROCEDURE — 93306 ECHO (CUPID ONLY): ICD-10-PCS | Mod: 26,,, | Performed by: INTERNAL MEDICINE

## 2023-05-08 PROCEDURE — 93306 TTE W/DOPPLER COMPLETE: CPT | Mod: 26,,, | Performed by: INTERNAL MEDICINE

## 2023-05-08 PROCEDURE — 93306 TTE W/DOPPLER COMPLETE: CPT

## 2023-07-27 ENCOUNTER — PATIENT MESSAGE (OUTPATIENT)
Dept: ADMINISTRATIVE | Facility: HOSPITAL | Age: 68
End: 2023-07-27
Payer: MEDICARE

## 2023-09-01 ENCOUNTER — OFFICE VISIT (OUTPATIENT)
Dept: CARDIOLOGY | Facility: CLINIC | Age: 68
End: 2023-09-01
Payer: MEDICARE

## 2023-09-01 VITALS
DIASTOLIC BLOOD PRESSURE: 80 MMHG | OXYGEN SATURATION: 98 % | BODY MASS INDEX: 36.93 KG/M2 | HEART RATE: 91 BPM | SYSTOLIC BLOOD PRESSURE: 116 MMHG | WEIGHT: 201.88 LBS

## 2023-09-01 DIAGNOSIS — I35.0 NONRHEUMATIC AORTIC VALVE STENOSIS: Primary | ICD-10-CM

## 2023-09-01 DIAGNOSIS — I34.1 MITRAL VALVE PROLAPSE: ICD-10-CM

## 2023-09-01 PROCEDURE — 93010 ELECTROCARDIOGRAM REPORT: CPT | Mod: S$GLB,,, | Performed by: INTERNAL MEDICINE

## 2023-09-01 PROCEDURE — 3288F PR FALLS RISK ASSESSMENT DOCUMENTED: ICD-10-PCS | Mod: CPTII,S$GLB,, | Performed by: INTERNAL MEDICINE

## 2023-09-01 PROCEDURE — 93005 ELECTROCARDIOGRAM TRACING: CPT

## 2023-09-01 PROCEDURE — 3288F FALL RISK ASSESSMENT DOCD: CPT | Mod: CPTII,S$GLB,, | Performed by: INTERNAL MEDICINE

## 2023-09-01 PROCEDURE — 1101F PT FALLS ASSESS-DOCD LE1/YR: CPT | Mod: CPTII,S$GLB,, | Performed by: INTERNAL MEDICINE

## 2023-09-01 PROCEDURE — 3079F DIAST BP 80-89 MM HG: CPT | Mod: CPTII,S$GLB,, | Performed by: INTERNAL MEDICINE

## 2023-09-01 PROCEDURE — 99214 OFFICE O/P EST MOD 30 MIN: CPT | Mod: S$GLB,,, | Performed by: INTERNAL MEDICINE

## 2023-09-01 PROCEDURE — 1126F PR PAIN SEVERITY QUANTIFIED, NO PAIN PRESENT: ICD-10-PCS | Mod: CPTII,S$GLB,, | Performed by: INTERNAL MEDICINE

## 2023-09-01 PROCEDURE — 93010 EKG 12-LEAD: ICD-10-PCS | Mod: S$GLB,,, | Performed by: INTERNAL MEDICINE

## 2023-09-01 PROCEDURE — 1159F MED LIST DOCD IN RCRD: CPT | Mod: CPTII,S$GLB,, | Performed by: INTERNAL MEDICINE

## 2023-09-01 PROCEDURE — 99999 PR PBB SHADOW E&M-EST. PATIENT-LVL III: CPT | Mod: PBBFAC,,, | Performed by: INTERNAL MEDICINE

## 2023-09-01 PROCEDURE — 3008F BODY MASS INDEX DOCD: CPT | Mod: CPTII,S$GLB,, | Performed by: INTERNAL MEDICINE

## 2023-09-01 PROCEDURE — 3044F PR MOST RECENT HEMOGLOBIN A1C LEVEL <7.0%: ICD-10-PCS | Mod: CPTII,S$GLB,, | Performed by: INTERNAL MEDICINE

## 2023-09-01 PROCEDURE — 99214 PR OFFICE/OUTPT VISIT, EST, LEVL IV, 30-39 MIN: ICD-10-PCS | Mod: S$GLB,,, | Performed by: INTERNAL MEDICINE

## 2023-09-01 PROCEDURE — 1101F PR PT FALLS ASSESS DOC 0-1 FALLS W/OUT INJ PAST YR: ICD-10-PCS | Mod: CPTII,S$GLB,, | Performed by: INTERNAL MEDICINE

## 2023-09-01 PROCEDURE — 1126F AMNT PAIN NOTED NONE PRSNT: CPT | Mod: CPTII,S$GLB,, | Performed by: INTERNAL MEDICINE

## 2023-09-01 PROCEDURE — 3044F HG A1C LEVEL LT 7.0%: CPT | Mod: CPTII,S$GLB,, | Performed by: INTERNAL MEDICINE

## 2023-09-01 PROCEDURE — 99999 PR PBB SHADOW E&M-EST. PATIENT-LVL III: ICD-10-PCS | Mod: PBBFAC,,, | Performed by: INTERNAL MEDICINE

## 2023-09-01 PROCEDURE — 3074F SYST BP LT 130 MM HG: CPT | Mod: CPTII,S$GLB,, | Performed by: INTERNAL MEDICINE

## 2023-09-01 PROCEDURE — 1159F PR MEDICATION LIST DOCUMENTED IN MEDICAL RECORD: ICD-10-PCS | Mod: CPTII,S$GLB,, | Performed by: INTERNAL MEDICINE

## 2023-09-01 PROCEDURE — 3074F PR MOST RECENT SYSTOLIC BLOOD PRESSURE < 130 MM HG: ICD-10-PCS | Mod: CPTII,S$GLB,, | Performed by: INTERNAL MEDICINE

## 2023-09-01 PROCEDURE — 3008F PR BODY MASS INDEX (BMI) DOCUMENTED: ICD-10-PCS | Mod: CPTII,S$GLB,, | Performed by: INTERNAL MEDICINE

## 2023-09-01 PROCEDURE — 3079F PR MOST RECENT DIASTOLIC BLOOD PRESSURE 80-89 MM HG: ICD-10-PCS | Mod: CPTII,S$GLB,, | Performed by: INTERNAL MEDICINE

## 2023-09-01 NOTE — PROGRESS NOTES
Cardiology    9/1/2023  9:36 AM    Problem list  Patient Active Problem List   Diagnosis    Mitral valve prolapse    Vitamin D deficiency    Morbid obesity, unspecified obesity type    Nonrheumatic aortic valve stenosis    Moderate episode of recurrent major depressive disorder    BMI 37.0-37.9, adult       CC:  Follow-up    HPI:  Patient is here for follow-up.  She has no exertional symptoms.  She traveled to [facility] he was able to walk without any chest pain or shortness of breath.  She denies any palpitation or syncope.  Her repeat echocardiogram showed moderate aortic stenosis with a velocity of 3.5 m/sec across aortic valve.    Medications  Current Outpatient Medications   Medication Sig Dispense Refill    NIFEdipine (PROCARDIA-XL) 30 MG (OSM) 24 hr tablet Take 1 tablet (30 mg total) by mouth once daily. 90 tablet 3     No current facility-administered medications for this visit.      Prior to Admission medications    Medication Sig Start Date End Date Taking? Authorizing Provider   NIFEdipine (PROCARDIA-XL) 30 MG (OSM) 24 hr tablet Take 1 tablet (30 mg total) by mouth once daily. 1/10/23 1/10/24 Yes Yordy Ng MD   albuterol (PROVENTIL/VENTOLIN HFA) 90 mcg/actuation inhaler Inhale 2 puffs into the lungs every 6 (six) hours as needed for Wheezing or Shortness of Breath. 5/4/23 9/1/23  Adam Norris MD   ILEVRO 0.3 % DrpS Place 1 drop into the left eye. 2/21/23 9/1/23  Provider, Historical   ofloxacin (OCUFLOX) 0.3 % ophthalmic solution  2/20/23 9/1/23  Provider, Historical   pneumoc 20-yulia conj-dip cr,PF, (PREVNAR-20, PF,) 0.5 mL Syrg injection Inject 0.5 mLs into the muscle. 1/27/23 9/1/23  Nahomy Ga, PharmD   prednisoLONE acetate (PRED FORTE) 1 % DrpS Place 1 drop into the left eye 4 (four) times daily. 2/20/23 9/1/23  Provider, Historical         History  Past Medical History:   Diagnosis Date    Mitral valve prolapse      Past Surgical History:   Procedure Laterality Date     BREAST CYST EXCISION Left 80s    BREAST SURGERY      Benign lump on left.    CATARACT EXTRACTION       SECTION      x1     Social History     Socioeconomic History    Marital status:     Number of children: 2   Tobacco Use    Smoking status: Never    Smokeless tobacco: Never   Substance and Sexual Activity    Alcohol use: Yes     Comment: Rare.     Drug use: No    Sexual activity: Not Currently   Social History Narrative       , one child local dtr lives with her, one in [place] son in [place]/hx of AVR. 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      Social Determinants of Health     Financial Resource Strain: Low Risk  (3/23/2022)    Overall Financial Resource Strain (CARDIA)     Difficulty of Paying Living Expenses: Not hard at all   Food Insecurity: No Food Insecurity (3/23/2022)    Hunger Vital Sign     Worried About Running Out of Food in the Last Year: Never true     Ran Out of Food in the Last Year: Never true   Transportation Needs: No Transportation Needs (3/23/2022)    PRAPARE - Transportation     Lack of Transportation (Medical): No     Lack of Transportation (Non-Medical): No   Physical Activity: Inactive (3/23/2022)    Exercise Vital Sign     Days of Exercise per Week: 0 days     Minutes of Exercise per Session: 0 min   Stress: Stress Concern Present (3/23/2022)    [place] [employer] - Occupational Stress Questionnaire     Feeling of Stress : Very much   Social Connections: Socially Isolated (3/23/2022)    Social Connection and Isolation Panel [NHANES]     Frequency of Communication with Friends and Family: More than three times a week     Frequency of Social Gatherings with Friends and Family: Once a week     Attends Orthodox Services: Never     Active Member of Clubs or Organizations: No     Attends Club or Organization Meetings: Never     Marital Status:    Housing Stability: Low Risk  (3/23/2022)     Housing Stability Vital Sign     Unable to Pay for Housing in the Last Year: No     Number of Places Lived in the Last Year: 1     Unstable Housing in the Last Year: No         Allergies  Review of patient's allergies indicates:   Allergen Reactions    [place] Rash         Review of Systems   Review of Systems   Constitutional: Negative for decreased appetite, fever and weight loss.   HENT:  Negative for congestion and nosebleeds.    Eyes:  Negative for double vision, vision loss in left eye, vision loss in right eye and visual disturbance.   Cardiovascular:  Negative for chest pain, claudication, cyanosis, dyspnea on exertion, irregular heartbeat, leg swelling, near-syncope, orthopnea, palpitations, paroxysmal nocturnal dyspnea and syncope.   Respiratory:  Positive for shortness of breath. Negative for cough, hemoptysis, sleep disturbances due to breathing, snoring, sputum production and wheezing.    Endocrine: Negative for cold intolerance and heat intolerance.   Skin:  Negative for nail changes and rash.   Musculoskeletal:  Negative for joint pain, muscle cramps, muscle weakness and myalgias.   Gastrointestinal:  Negative for change in bowel habit, heartburn, hematemesis, hematochezia, hemorrhoids and melena.   Neurological:  Negative for dizziness, focal weakness and headaches.         Physical Exam  Wt Readings from Last 1 Encounters:   09/01/23 91.6 kg (201 lb 14.4 oz)     BP Readings from Last 3 Encounters:   09/01/23 116/80   05/08/23 124/78   05/04/23 124/78     Pulse Readings from Last 1 Encounters:   09/01/23 91     Body mass index is 36.93 kg/m².    Physical Exam  Vitals reviewed.   Constitutional:       Appearance: She is obese.   Neck:      Vascular: No carotid bruit.   Cardiovascular:      Rate and Rhythm: Normal rate and regular rhythm.      Pulses:           Carotid pulses are 1+ on the right side and 1+ on the left side.       Radial pulses are 2+ on the right side and 2+ on the left side.      Heart  sounds: S1 normal and S2 normal. Murmur heard.      Harsh midsystolic murmur is present with a grade of 2/6 at the upper right sternal border radiating to the neck.   Pulmonary:      Breath sounds: Normal breath sounds and air entry.   Musculoskeletal:      Right lower leg: No edema.      Left lower leg: No edema.   Neurological:      Mental Status: She is alert.             Assessment  1. Nonrheumatic aortic valve stenosis, stage B  Moderate aortic stenosis    2. Mitral valve prolapse  Unchanged    3. BMI 37.0-37.9, adult  Unchanged        Plan and Discussion  Discussed her a EKG which showed normal sinus rhythm rate 91 with nonspecific T-wave change.  Discussed echocardiogram which showed moderate aortic stenosis with an velocity of 3.5 m/sec across aortic valve.  She is asymptomatic.    Follow Up  Six-month      Yordy Ng MD, F.A.C.C, F.S.C.A.I.        30 minutes were spent in chart review, documentation and review of results, and evaluation, treatment, and counseling of patient on the same day of service.    Disclaimer: This document was created using voice recognition software ([employer]). Although it may be edited, this document may contain errors related to incorrect recognition of the spoken word. Please call the physician if clarification is needed.

## 2023-09-12 ENCOUNTER — TELEPHONE (OUTPATIENT)
Dept: FAMILY MEDICINE | Facility: CLINIC | Age: 68
End: 2023-09-12
Payer: MEDICARE

## 2023-11-15 ENCOUNTER — PATIENT MESSAGE (OUTPATIENT)
Dept: ADMINISTRATIVE | Facility: HOSPITAL | Age: 68
End: 2023-11-15
Payer: MEDICARE

## 2023-11-20 ENCOUNTER — OFFICE VISIT (OUTPATIENT)
Dept: INTERNAL MEDICINE | Facility: CLINIC | Age: 68
End: 2023-11-20
Payer: MEDICARE

## 2023-11-20 VITALS
SYSTOLIC BLOOD PRESSURE: 122 MMHG | OXYGEN SATURATION: 99 % | HEART RATE: 84 BPM | DIASTOLIC BLOOD PRESSURE: 72 MMHG | HEIGHT: 62 IN | WEIGHT: 203.5 LBS | BODY MASS INDEX: 37.45 KG/M2

## 2023-11-20 DIAGNOSIS — L29.3 ITCHY SKIN OF ANUS AND GENITALS: ICD-10-CM

## 2023-11-20 DIAGNOSIS — R82.90 ABNORMAL URINE: ICD-10-CM

## 2023-11-20 DIAGNOSIS — Z12.11 COLON CANCER SCREENING: Primary | ICD-10-CM

## 2023-11-20 LAB
BILIRUB UR QL STRIP: NEGATIVE
CLARITY UR REFRACT.AUTO: CLEAR
COLOR UR AUTO: YELLOW
GLUCOSE UR QL STRIP: NEGATIVE
HGB UR QL STRIP: NEGATIVE
KETONES UR QL STRIP: NEGATIVE
LEUKOCYTE ESTERASE UR QL STRIP: NEGATIVE
NITRITE UR QL STRIP: NEGATIVE
PH UR STRIP: 6 [PH] (ref 5–8)
PROT UR QL STRIP: ABNORMAL
SP GR UR STRIP: 1.02 (ref 1–1.03)
URN SPEC COLLECT METH UR: ABNORMAL

## 2023-11-20 PROCEDURE — 1159F PR MEDICATION LIST DOCUMENTED IN MEDICAL RECORD: ICD-10-PCS | Mod: CPTII,S$GLB,, | Performed by: INTERNAL MEDICINE

## 2023-11-20 PROCEDURE — 3078F DIAST BP <80 MM HG: CPT | Mod: CPTII,S$GLB,, | Performed by: INTERNAL MEDICINE

## 2023-11-20 PROCEDURE — 1101F PT FALLS ASSESS-DOCD LE1/YR: CPT | Mod: CPTII,S$GLB,, | Performed by: INTERNAL MEDICINE

## 2023-11-20 PROCEDURE — 1101F PR PT FALLS ASSESS DOC 0-1 FALLS W/OUT INJ PAST YR: ICD-10-PCS | Mod: CPTII,S$GLB,, | Performed by: INTERNAL MEDICINE

## 2023-11-20 PROCEDURE — 3288F PR FALLS RISK ASSESSMENT DOCUMENTED: ICD-10-PCS | Mod: CPTII,S$GLB,, | Performed by: INTERNAL MEDICINE

## 2023-11-20 PROCEDURE — 1159F MED LIST DOCD IN RCRD: CPT | Mod: CPTII,S$GLB,, | Performed by: INTERNAL MEDICINE

## 2023-11-20 PROCEDURE — 99213 PR OFFICE/OUTPT VISIT, EST, LEVL III, 20-29 MIN: ICD-10-PCS | Mod: S$GLB,,, | Performed by: INTERNAL MEDICINE

## 2023-11-20 PROCEDURE — 3074F SYST BP LT 130 MM HG: CPT | Mod: CPTII,S$GLB,, | Performed by: INTERNAL MEDICINE

## 2023-11-20 PROCEDURE — 3078F PR MOST RECENT DIASTOLIC BLOOD PRESSURE < 80 MM HG: ICD-10-PCS | Mod: CPTII,S$GLB,, | Performed by: INTERNAL MEDICINE

## 2023-11-20 PROCEDURE — 3288F FALL RISK ASSESSMENT DOCD: CPT | Mod: CPTII,S$GLB,, | Performed by: INTERNAL MEDICINE

## 2023-11-20 PROCEDURE — 1160F PR REVIEW ALL MEDS BY PRESCRIBER/CLIN PHARMACIST DOCUMENTED: ICD-10-PCS | Mod: CPTII,S$GLB,, | Performed by: INTERNAL MEDICINE

## 2023-11-20 PROCEDURE — 99213 OFFICE O/P EST LOW 20 MIN: CPT | Mod: S$GLB,,, | Performed by: INTERNAL MEDICINE

## 2023-11-20 PROCEDURE — 99999 PR PBB SHADOW E&M-EST. PATIENT-LVL IV: ICD-10-PCS | Mod: PBBFAC,,, | Performed by: INTERNAL MEDICINE

## 2023-11-20 PROCEDURE — 3008F PR BODY MASS INDEX (BMI) DOCUMENTED: ICD-10-PCS | Mod: CPTII,S$GLB,, | Performed by: INTERNAL MEDICINE

## 2023-11-20 PROCEDURE — 1160F RVW MEDS BY RX/DR IN RCRD: CPT | Mod: CPTII,S$GLB,, | Performed by: INTERNAL MEDICINE

## 2023-11-20 PROCEDURE — 3044F HG A1C LEVEL LT 7.0%: CPT | Mod: CPTII,S$GLB,, | Performed by: INTERNAL MEDICINE

## 2023-11-20 PROCEDURE — 3074F PR MOST RECENT SYSTOLIC BLOOD PRESSURE < 130 MM HG: ICD-10-PCS | Mod: CPTII,S$GLB,, | Performed by: INTERNAL MEDICINE

## 2023-11-20 PROCEDURE — 1126F PR PAIN SEVERITY QUANTIFIED, NO PAIN PRESENT: ICD-10-PCS | Mod: CPTII,S$GLB,, | Performed by: INTERNAL MEDICINE

## 2023-11-20 PROCEDURE — 99999 PR PBB SHADOW E&M-EST. PATIENT-LVL IV: CPT | Mod: PBBFAC,,, | Performed by: INTERNAL MEDICINE

## 2023-11-20 PROCEDURE — 81003 URINALYSIS AUTO W/O SCOPE: CPT | Performed by: INTERNAL MEDICINE

## 2023-11-20 PROCEDURE — 3008F BODY MASS INDEX DOCD: CPT | Mod: CPTII,S$GLB,, | Performed by: INTERNAL MEDICINE

## 2023-11-20 PROCEDURE — 1126F AMNT PAIN NOTED NONE PRSNT: CPT | Mod: CPTII,S$GLB,, | Performed by: INTERNAL MEDICINE

## 2023-11-20 PROCEDURE — 3044F PR MOST RECENT HEMOGLOBIN A1C LEVEL <7.0%: ICD-10-PCS | Mod: CPTII,S$GLB,, | Performed by: INTERNAL MEDICINE

## 2023-11-20 NOTE — PATIENT INSTRUCTIONS
Please call the endoscopy department to schedule your endoscopy -- [phone].    Flu vax please

## 2023-11-20 NOTE — PROGRESS NOTES
Subjective:       Patient ID: Kandace Rapp is a 68 y.o. female.    Chief Complaint: Constipation    Patient is here for followup for chronic conditions.    Has bump in anal area.    Has been taking Azo for symptoms in  area. She actually denies having dysuria or blood in urine.    Gets some pains behind R knee.      Review of Systems   Constitutional:  Negative for activity change and unexpected weight change.   HENT:  Negative for hearing loss, rhinorrhea and trouble swallowing.    Eyes:  Negative for discharge and visual disturbance.   Respiratory:  Negative for chest tightness and wheezing.    Cardiovascular:  Negative for chest pain and palpitations.   Gastrointestinal:  Positive for constipation and diarrhea. Negative for blood in stool and vomiting.   Endocrine: Positive for polyuria. Negative for polydipsia.   Genitourinary:  Negative for difficulty urinating, dysuria, hematuria and menstrual problem.   Musculoskeletal:  Positive for arthralgias. Negative for joint swelling and neck pain.   Neurological:  Positive for headaches. Negative for weakness.   Psychiatric/Behavioral:  Negative for confusion and dysphoric mood.            Objective:      Physical Exam  Constitutional:       General: She is not in acute distress.     Appearance: Normal appearance. She is well-developed. She is not ill-appearing, toxic-appearing or diaphoretic.      Comments: Well appearing, breathing comfortably, speaking full sentences, no coughing during encounter     Pulmonary:      Effort: Pulmonary effort is normal. No respiratory distress.   Genitourinary:     Comments: Female MA offered as chaperone, patient declines    Pelvic examination reveals:  normal External genitalia general appearance, hair distribution, and no lesions    normal Urethral meatus size and location, no lesions and no prolapse   normal Urethra, no masses, no tenderness, no scarring   Bladder no fullness, no masses, and no tenderness   Vagina with  possible white discharge seen    Anal exam w/o any obvious hemorrhoid or mass/lesion  Musculoskeletal:      Comments: R knee crepitus, no posterior knee swelling detected   Skin:     Findings: No rash.   Neurological:      Mental Status: She is alert and oriented to person, place, and time.   Psychiatric:         Behavior: Behavior normal.         Thought Content: Thought content normal.         Judgment: Judgment normal.         Assessment:       1. Colon cancer screening    2. Abnormal urine    3. Itchy skin of anus and genitals        Plan:       Kandace was seen today for constipation.    Diagnoses and all orders for this visit:    Colon cancer screening  -     Ambulatory referral/consult to Endo Procedure ; Future    Abnormal urine  -     Urinalysis, Reflex to Urine Culture Urine, Clean Catch  She actual denies dysuria  Could be yeast infection but she denies vaginal d/c. Advised she monitor symptoms and call if any changes    Itchy skin of anus and genitals  -     Ambulatory referral/consult to Gynecology; Future        Health Maintenance         Date Due Completion Date    TETANUS VACCINE Never done ---    RSV Vaccine (Age 60+ and Pregnant patients) (1 - 1-dose 60+ series) Never done ---    Colorectal Cancer Screening 05/15/2020 5/15/2017    COVID-19 Vaccine (5 - 2023-24 season) 09/01/2023 11/14/2022    Mammogram 04/24/2024 4/24/2023    DEXA Scan 03/14/2025 3/14/2022    Hemoglobin A1c (Diabetic Prevention Screening) 01/27/2026 1/27/2023    Lipid Panel 09/13/2027 9/13/2022   Flu vaccine today         No follow-ups on file.    Future Appointments   Date Time Provider Department Center   11/24/2023  8:40 AM PRIV PRE-ADMIT, ENDO -[facility] ENDO4 [facility]   12/28/2023  9:20 AM Ayesha Singh DNP [facility] [place]   3/1/2024  9:40 AM Yordy Ng MD [id number]

## 2023-11-24 ENCOUNTER — CLINICAL SUPPORT (OUTPATIENT)
Dept: ENDOSCOPY | Facility: HOSPITAL | Age: 68
End: 2023-11-24
Attending: INTERNAL MEDICINE
Payer: MEDICARE

## 2023-11-24 ENCOUNTER — TELEPHONE (OUTPATIENT)
Dept: ENDOSCOPY | Facility: HOSPITAL | Age: 68
End: 2023-11-24

## 2023-11-24 VITALS — HEIGHT: 62 IN | BODY MASS INDEX: 37.36 KG/M2 | WEIGHT: 203 LBS

## 2023-11-24 DIAGNOSIS — Z12.11 SPECIAL SCREENING FOR MALIGNANT NEOPLASMS, COLON: Primary | ICD-10-CM

## 2023-11-24 DIAGNOSIS — Z12.11 COLON CANCER SCREENING: ICD-10-CM

## 2023-11-24 NOTE — TELEPHONE ENCOUNTER
Spoke to patient to schedule procedure(s) Colonoscopy       Physician to perform procedure(s) Dr. GILBERT Setphenson   Date of Procedure (s) 11/27/23  Arrival Time 9:30 AM  Time of Procedure(s) 10:30 AM   Location of Procedure(s) [place] 4th Floor  Type of Rx Prep sent to patient: PEG  Instructions provided to patient via [facility]    Patient was informed on the following information and verbalized understanding. Screening questionnaire reviewed with patient and complete. If procedure requires anesthesia, a responsible adult needs to be present to accompany the patient home, patient cannot drive after receiving anesthesia. Appointment details are tentative, especially check-in time. Patient will receive a prep-op call 7 days prior to confirm check-in time for procedure. If applicable the patient should contact their pharmacy to verify Rx for procedure prep is ready for pick-up. Patient was advised to call the scheduling department at [phone] if pharmacy states no Rx is available. Patient was advised to call the endoscopy scheduling department if any questions or concerns arise.      [facility] Scheduling Department

## 2023-11-27 ENCOUNTER — ANESTHESIA EVENT (OUTPATIENT)
Dept: ENDOSCOPY | Facility: HOSPITAL | Age: 68
End: 2023-11-27
Payer: MEDICARE

## 2023-11-27 ENCOUNTER — ANESTHESIA (OUTPATIENT)
Dept: ENDOSCOPY | Facility: HOSPITAL | Age: 68
End: 2023-11-27
Payer: MEDICARE

## 2023-11-27 ENCOUNTER — HOSPITAL ENCOUNTER (OUTPATIENT)
Facility: HOSPITAL | Age: 68
Discharge: HOME | End: 2023-11-27
Attending: INTERNAL MEDICINE | Admitting: INTERNAL MEDICINE
Payer: MEDICARE

## 2023-11-27 VITALS
TEMPERATURE: 98 F | BODY MASS INDEX: 37.36 KG/M2 | OXYGEN SATURATION: 99 % | HEIGHT: 62 IN | HEART RATE: 73 BPM | SYSTOLIC BLOOD PRESSURE: 142 MMHG | WEIGHT: 203 LBS | DIASTOLIC BLOOD PRESSURE: 75 MMHG | RESPIRATION RATE: 18 BRPM

## 2023-11-27 DIAGNOSIS — Z12.11 COLON CANCER SCREENING: Primary | ICD-10-CM

## 2023-11-27 PROCEDURE — 37000009 HC ANESTHESIA EA ADD 15 MINS: Performed by: INTERNAL MEDICINE

## 2023-11-27 PROCEDURE — 25000003 PHARM REV CODE 250: Performed by: STUDENT IN AN ORGANIZED HEALTH CARE EDUCATION/TRAINING PROGRAM

## 2023-11-27 PROCEDURE — G0121 COLON CA SCRN NOT HI RSK IND: HCPCS | Mod: ,,, | Performed by: INTERNAL MEDICINE

## 2023-11-27 PROCEDURE — E9220 PRA ENDO ANESTHESIA: HCPCS | Mod: ,,, | Performed by: STUDENT IN AN ORGANIZED HEALTH CARE EDUCATION/TRAINING PROGRAM

## 2023-11-27 PROCEDURE — 37000008 HC ANESTHESIA 1ST 15 MINUTES: Performed by: INTERNAL MEDICINE

## 2023-11-27 PROCEDURE — G0121 COLON CA SCRN NOT HI RSK IND: ICD-10-PCS | Mod: ,,, | Performed by: INTERNAL MEDICINE

## 2023-11-27 PROCEDURE — G0121 COLON CA SCRN NOT HI RSK IND: HCPCS | Performed by: INTERNAL MEDICINE

## 2023-11-27 PROCEDURE — 63600175 PHARM REV CODE 636 W HCPCS: Performed by: STUDENT IN AN ORGANIZED HEALTH CARE EDUCATION/TRAINING PROGRAM

## 2023-11-27 PROCEDURE — E9220 PRA ENDO ANESTHESIA: ICD-10-PCS | Mod: ,,, | Performed by: STUDENT IN AN ORGANIZED HEALTH CARE EDUCATION/TRAINING PROGRAM

## 2023-11-27 PROCEDURE — 25000003 PHARM REV CODE 250: Performed by: INTERNAL MEDICINE

## 2023-11-27 RX ORDER — SODIUM CHLORIDE 9 MG/ML
INJECTION, SOLUTION INTRAVENOUS CONTINUOUS
Status: DISCONTINUED | OUTPATIENT
Start: 2023-11-27 | End: 2023-11-27 | Stop reason: HOSPADM

## 2023-11-27 RX ORDER — LIDOCAINE HYDROCHLORIDE 20 MG/ML
INJECTION INTRAVENOUS
Status: DISCONTINUED | OUTPATIENT
Start: 2023-11-27 | End: 2023-11-27

## 2023-11-27 RX ORDER — PROPOFOL 10 MG/ML
VIAL (ML) INTRAVENOUS
Status: DISCONTINUED | OUTPATIENT
Start: 2023-11-27 | End: 2023-11-27

## 2023-11-27 RX ORDER — PROPOFOL 10 MG/ML
VIAL (ML) INTRAVENOUS CONTINUOUS PRN
Status: DISCONTINUED | OUTPATIENT
Start: 2023-11-27 | End: 2023-11-27

## 2023-11-27 RX ADMIN — PROPOFOL 150 MCG/KG/MIN: 10 INJECTION, EMULSION INTRAVENOUS at 10:11

## 2023-11-27 RX ADMIN — Medication 30 MG: at 10:11

## 2023-11-27 RX ADMIN — SODIUM CHLORIDE: 0.9 INJECTION, SOLUTION INTRAVENOUS at 10:11

## 2023-11-27 RX ADMIN — LIDOCAINE HYDROCHLORIDE 50 MG: 20 INJECTION INTRAVENOUS at 10:11

## 2023-11-27 RX ADMIN — Medication 20 MG: at 10:11

## 2023-11-27 RX ADMIN — Medication 50 MG: at 10:11

## 2023-11-27 NOTE — PROVATION PATIENT INSTRUCTIONS
Discharge Summary/Instructions after an Endoscopic Procedure  Patient Name: Kandace Rapp  Patient MRN: [id number]  Patient YOB: 1955 Monday, November 27, 2023  Pattie Stephenson MD  Dear patient,  As a result of recent federal legislation (The Federal Cures Act), you may   receive lab or pathology results from your procedure in your [facility]   account before your physician is able to contact you. Your physician or   their representative will relay the results to you with their   recommendations at their soonest availability.  Thank you,  RESTRICTIONS:  During your procedure today, you received medications for sedation.  These   medications may affect your judgment, balance and coordination.  Therefore,   for 24 hours, you have the following restrictions:   - DO NOT drive a car, operate machinery, make legal/financial decisions,   sign important papers or drink alcohol.    ACTIVITY:  Today: no heavy lifting, straining or running due to procedural   sedation/anesthesia.  The following day: return to full activity including work.  DIET:  Eat and drink normally unless instructed otherwise.     TREATMENT FOR COMMON SIDE EFFECTS:  - Mild abdominal pain, nausea, belching, bloating or excessive gas:  rest,   eat lightly and use a heating pad.  - Sore Throat: treat with throat lozenges and/or gargle with warm salt   water.  - Because air was used during the procedure, expelling large amounts of air   from your rectum or belching is normal.  - If a bowel prep was taken, you may not have a bowel movement for 1-3 days.    This is normal.  SYMPTOMS TO WATCH FOR AND REPORT TO YOUR PHYSICIAN:  1. Abdominal pain or bloating, other than gas cramps.  2. Chest pain.  3. Back pain.  4. Signs of infection such as: chills or fever occurring within 24 hours   after the procedure.  5. Rectal bleeding, which would show as bright red, maroon, or black stools.   (A tablespoon of blood from the rectum is not serious, especially  if   hemorrhoids are present.)  6. Vomiting.  7. Weakness or dizziness.  GO DIRECTLY TO THE NEAREST EMERGENCY ROOM IF YOU HAVE ANY OF THE FOLLOWING:      Difficulty breathing              Chills and/or fever over 101 F   Persistent vomiting and/or vomiting blood   Severe abdominal pain   Severe chest pain   Black, tarry stools   Bleeding- more than one tablespoon   Any other symptom or condition that you feel may need urgent attention  Your doctor recommends these additional instructions:  If any biopsies were taken, your doctors clinic will contact you in 1 to 2   weeks with any results.  - Discharge patient to home (ambulatory).   - Resume previous diet.   - Continue present medications.   - Repeat colonoscopy in 1 year because the bowel preparation was suboptimal.     - Patient has a contact number available for emergencies.  The signs and   symptoms of potential delayed complications were discussed with the   patient.  Return to normal activities tomorrow.  Written discharge   instructions were provided to the patient.  For questions, problems or results please call your physician - Pattie Stephenson MD at Work:  [phone].  [facility] [place], EMERGENCY ROOM PHONE NUMBER: [phone]  IF A COMPLICATION OR EMERGENCY SITUATION ARISES AND YOU ARE UNABLE TO REACH   YOUR PHYSICIAN - GO DIRECTLY TO THE EMERGENCY ROOM.  Pattie Stephenson MD  11/27/2023 11:28:53 AM  This report has been verified and signed electronically.  Dear patient,  As a result of recent federal legislation (The Federal Cures Act), you may   receive lab or pathology results from your procedure in your [facility]   account before your physician is able to contact you. Your physician or   their representative will relay the results to you with their   recommendations at their soonest availability.  Thank you,  PROVATION

## 2023-11-27 NOTE — H&P
Short Stay Endoscopy History and Physical    PCP - Ishmael Jacobson MD    Procedure - Colonoscopy  ASA - per anesthesia  Mallampati - per anesthesia  History of Anesthesia problems - no  Family history Anesthesia problems - no   Plan of anesthesia - General    HPI:  This is a 68 y.o. female here for evaluation of : asymptomatic screening exam      ROS:  Constitutional: No fevers, chills, No weight loss  CV: No chest pain  Pulm: No cough, No shortness of breath  GI: see HPI  Derm: No rash    Medical History:  has a past medical history of Mitral valve prolapse.    Surgical History:  has a past surgical history that includes  section; Breast surgery; Cataract extraction; and Breast cyst excision (Left, 80s).    Family History: family history includes Alcohol abuse in her brother; Breast cancer in her mother and paternal aunt; Cancer in her father and mother; Diabetes in her sister; Heart disease in her brother, maternal grandfather, maternal uncle, and mother; Lymphoma in her mother.. Otherwise no colon cancer, inflammatory bowel disease, or GI malignancies.    Social History:  reports that she has never smoked. She has never used smokeless tobacco. She reports current alcohol use. She reports that she does not use drugs.    Review of patient's allergies indicates:   Allergen Reactions    Davion Rash       Medications:   Medications Prior to Admission   Medication Sig Dispense Refill Last Dose    NIFEdipine (PROCARDIA-XL) 30 MG (OSM) 24 hr tablet Take 1 tablet (30 mg total) by mouth once daily. 90 tablet 3          Physical Exam:    Vital Signs: There were no vitals filed for this visit.    Gen: NAD, lying comfortably  HENT: NCAT, oropharynx clear  Eyes: anicteric sclerae, EOMI grossly  Neck: supple, no visible masses/goiter  Cardiac: RRR  Lungs: non-labored breathing  Abd: soft, NT/ND, normoactive BS  Ext: no LE edema, warm, well perfused  Skin: skin intact on exposed body parts, no visible rashes,  lesions  Neuro: A&Ox4, neuro exam grossly intact, moves all extremities  Psych: appropriate mood, affect        Labs:  Lab Results   Component Value Date    WBC 6.98 06/21/2021    HGB 11.9 (L) 06/21/2021    HCT 39.6 06/21/2021     06/21/2021    CHOL 122 09/13/2022    TRIG 68 09/13/2022    HDL 45 09/13/2022    ALT 14 06/21/2021    AST 16 06/21/2021     01/27/2023    K 4.5 01/27/2023     01/27/2023    CREATININE 0.8 01/27/2023    BUN 11 01/27/2023    CO2 26 01/27/2023    TSH 1.655 06/21/2021    HGBA1C 5.2 01/27/2023       Plan:  Colonoscopy for colon cancer screening.    I have explained the risks and benefits of endoscopy procedures to the patient including but not limited to bleeding, perforation, infection, and death.  The patient was asked if they understand and allowed to ask any further questions to their satisfaction.    Pattie Stephenson MD

## 2023-11-27 NOTE — ANESTHESIA POSTPROCEDURE EVALUATION
Anesthesia Post Evaluation    Patient: Kandace Rapp    Procedure(s) Performed: Procedure(s) (LRB):  COLONOSCOPY (N/A)    Final Anesthesia Type: general      Patient location during evaluation: GI PACU  Patient participation: Yes- Able to Participate  Level of consciousness: awake and alert  Post-procedure vital signs: reviewed and stable  Pain management: adequate  Airway patency: patent    PONV status at discharge: No PONV  Anesthetic complications: no      Cardiovascular status: hemodynamically stable  Respiratory status: unassisted, spontaneous ventilation and room air  Hydration status: euvolemic            Vitals Value Taken Time   /75 11/27/23 1151   Temp 36.7 °C (98.1 °F) 11/27/23 1122   Pulse 73 11/27/23 1151   Resp 18 11/27/23 1151   SpO2 99 % 11/27/23 1151         Event Time   Out of Recovery 12:05:19         Pain/Melony Score: Melony Score: 10 (11/27/2023 11:32 AM)

## 2023-11-27 NOTE — TRANSFER OF CARE
"Anesthesia Transfer of Care Note    Patient: Kandace Rapp    Procedure(s) Performed: Procedure(s) (LRB):  COLONOSCOPY (N/A)    Patient location: GI    Anesthesia Type: general    Transport from OR: Transported from OR on 6-10 L/min O2 by face mask with adequate spontaneous ventilation    Post pain: adequate analgesia    Post assessment: no apparent anesthetic complications    Post vital signs: stable    Level of consciousness: awake    Nausea/Vomiting: no nausea/vomiting    Complications: none    Transfer of care protocol was followed      Last vitals: Visit Vitals  /64 (BP Location: Left arm, Patient Position: Lying)   Pulse 88   Temp 36.7 °C (98.1 °F) (Temporal)   Resp 17   Ht 5' 2" (1.575 m)   Wt 92.1 kg (203 lb)   SpO2 97%   Breastfeeding No   BMI 37.13 kg/m²     "

## 2023-11-27 NOTE — ANESTHESIA PREPROCEDURE EVALUATION
2023  Kandace Rapp is a 68 y.o., female.    Patient Active Problem List   Diagnosis    Mitral valve prolapse    Vitamin D deficiency    Morbid obesity, unspecified obesity type    Nonrheumatic aortic valve stenosis    Moderate episode of recurrent major depressive disorder    BMI 37.0-37.9, adult     Past Medical History:   Diagnosis Date    Mitral valve prolapse      Past Surgical History:   Procedure Laterality Date    BREAST CYST EXCISION Left 80s    BREAST SURGERY      Benign lump on left.    CATARACT EXTRACTION       SECTION      x1           Pre-op Assessment    I have reviewed the Patient Summary Reports.     I have reviewed the Nursing Notes. I have reviewed the NPO Status.   I have reviewed the Medications.     Review of Systems  Anesthesia Hx:  No problems with previous Anesthesia             Denies Family Hx of Anesthesia complications.    Denies Personal Hx of Anesthesia complications.                    Social:  Non-Smoker, Social Alcohol Use   Denies Tobacco Use.    Cardiovascular:  Exercise tolerance: good                                           Psych:  Psychiatric History                  Physical Exam  General: Well nourished, Cooperative and Alert    Airway:  Mallampati: II / II  Mouth Opening: Normal  TM Distance: Normal  Tongue: Normal  Neck ROM: Normal ROM    Dental:  Intact    Chest/Lungs:  Clear to auscultation, Normal Respiratory Rate    Heart:  Rate: Normal  Rhythm: Regular Rhythm  Sounds: Normal    Abdomen:  Normal, Soft, Nontender        Anesthesia Plan  Type of Anesthesia, risks & benefits discussed:    Anesthesia Type: Gen Natural Airway  Intra-op Monitoring Plan: Standard ASA Monitors  Induction:  IV  Airway Plan: Direct  Informed Consent: Informed consent signed with the Patient and all parties understand the risks and agree with anesthesia plan.  All  questions answered. Patient consented to blood products? No  ASA Score: 3  Day of Surgery Review of History & Physical: H&P Update referred to the surgeon/provider.    Ready For Surgery From Anesthesia Perspective.     .

## 2024-01-31 DIAGNOSIS — I10 ESSENTIAL HYPERTENSION: ICD-10-CM

## 2024-03-01 ENCOUNTER — OFFICE VISIT (OUTPATIENT)
Dept: CARDIOLOGY | Facility: CLINIC | Age: 69
End: 2024-03-01
Payer: MEDICARE

## 2024-03-01 VITALS
OXYGEN SATURATION: 97 % | BODY MASS INDEX: 36.76 KG/M2 | HEART RATE: 82 BPM | SYSTOLIC BLOOD PRESSURE: 114 MMHG | WEIGHT: 201 LBS | DIASTOLIC BLOOD PRESSURE: 80 MMHG

## 2024-03-01 DIAGNOSIS — I34.1 MITRAL VALVE PROLAPSE: Primary | ICD-10-CM

## 2024-03-01 DIAGNOSIS — I35.0 NONRHEUMATIC AORTIC VALVE STENOSIS: ICD-10-CM

## 2024-03-01 DIAGNOSIS — E66.01 MORBID OBESITY, UNSPECIFIED OBESITY TYPE: ICD-10-CM

## 2024-03-01 PROCEDURE — 99999 PR PBB SHADOW E&M-EST. PATIENT-LVL III: CPT | Mod: PBBFAC,,, | Performed by: INTERNAL MEDICINE

## 2024-03-01 PROCEDURE — 99214 OFFICE O/P EST MOD 30 MIN: CPT | Mod: S$GLB,,, | Performed by: INTERNAL MEDICINE

## 2024-03-01 NOTE — PROGRESS NOTES
Cardiology    3/1/2024  9:55 AM    Problem list  Patient Active Problem List   Diagnosis    Mitral valve prolapse    Vitamin D deficiency    Morbid obesity, unspecified obesity type    Nonrheumatic aortic valve stenosis    Moderate episode of recurrent major depressive disorder    BMI 37.0-37.9, adult       CC:  F/u    HPI:    She has been doing well.  She denies any exertional symptoms.  She chest pain dyspnea palpitation or syncope.  Her blood pressure is well controlled.  She decided to stay in [facility] and not move to [place] with her son.    Medications  Current Outpatient Medications   Medication Sig Dispense Refill    NIFEdipine (PROCARDIA-XL) 30 MG (OSM) 24 hr tablet Take 1 tablet (30 mg total) by mouth once daily. 90 tablet 3     No current facility-administered medications for this visit.      Prior to Admission medications    Medication Sig Start Date End Date Taking? Authorizing Provider   NIFEdipine (PROCARDIA-XL) 30 MG (OSM) 24 hr tablet Take 1 tablet (30 mg total) by mouth once daily. 1/10/23 1/10/24  Yordy Ng MD         History  Past Medical History:   Diagnosis Date    Mitral valve prolapse      Past Surgical History:   Procedure Laterality Date    BREAST CYST EXCISION Left 80s    BREAST SURGERY      Benign lump on left.    CATARACT EXTRACTION       SECTION      x1    COLONOSCOPY N/A 2023    Procedure: COLONOSCOPY;  Surgeon: Pattie Stephenson MD;  Location: [facility] ([address] [place] [address]);  Service: Endoscopy;  Laterality: N/A;   ref by Ishmael Jacobson MD, PEG, instr. to [facility]     Social History     Socioeconomic History    Marital status:     Number of children: 2   Tobacco Use    Smoking status: Never    Smokeless tobacco: Never   Substance and Sexual Activity    Alcohol use: Yes     Comment: Rare.     Drug use: No    Sexual activity: Not Currently   Social History Narrative       , one child local dtr lives with her, one in [place] son in [place]/  of [employer]. 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      Social Determinants of Health     Financial Resource Strain: Low Risk  (11/19/2023)    Overall Financial Resource Strain (CARDIA)     Difficulty of Paying Living Expenses: Not very hard   Food Insecurity: No Food Insecurity (11/19/2023)    Hunger Vital Sign     Worried About Running Out of Food in the Last Year: Never true     Ran Out of Food in the Last Year: Never true   Transportation Needs: No Transportation Needs (11/19/2023)    PRAPARE - Transportation     Lack of Transportation (Medical): No     Lack of Transportation (Non-Medical): No   Physical Activity: Insufficiently Active (11/19/2023)    Exercise Vital Sign     Days of Exercise per Week: 2 days     Minutes of Exercise per Session: 20 min   Stress: No Stress Concern Present (11/19/2023)    [employer] - Occupational Stress Questionnaire     Feeling of Stress : Only a little   Social Connections: Unknown (11/19/2023)    Social Connection and Isolation Panel [NHANES]     Frequency of Communication with Friends and Family: Three times a week     Frequency of Social Gatherings with Friends and Family: Once a week     Active Member of Clubs or Organizations: No     Attends Club or Organization Meetings: Never     Marital Status:    Housing Stability: Unknown (11/19/2023)    Housing Stability Vital Sign     Unable to Pay for Housing in the Last Year: No     Unstable Housing in the Last Year: No         Allergies  Review of patient's allergies indicates:   Allergen Reactions    Davion Rash         Review of Systems   Review of Systems   Constitutional: Negative for decreased appetite, fever and weight loss.   HENT:  Negative for congestion and nosebleeds.    Eyes:  Negative for double vision, vision loss in left eye, vision loss in right eye and visual disturbance.   Cardiovascular:  Negative for chest pain, claudication, cyanosis,  dyspnea on exertion, irregular heartbeat, leg swelling, near-syncope, orthopnea, palpitations, paroxysmal nocturnal dyspnea and syncope.   Respiratory:  Positive for shortness of breath. Negative for cough, hemoptysis, sleep disturbances due to breathing, snoring, sputum production and wheezing.    Endocrine: Negative for cold intolerance and heat intolerance.   Skin:  Negative for nail changes and rash.   Musculoskeletal:  Negative for joint pain, muscle cramps, muscle weakness and myalgias.   Gastrointestinal:  Negative for change in bowel habit, heartburn, hematemesis, hematochezia, hemorrhoids and melena.   Neurological:  Negative for dizziness, focal weakness and headaches.         Physical Exam  Wt Readings from Last 1 Encounters:   03/01/24 91.2 kg (201 lb)     BP Readings from Last 3 Encounters:   03/01/24 114/80   11/27/23 (!) 142/75   11/20/23 122/72     Pulse Readings from Last 1 Encounters:   03/01/24 82     Body mass index is 36.76 kg/m².    Physical Exam  Vitals reviewed.   Constitutional:       Appearance: She is obese.   Neck:      Vascular: No carotid bruit.   Cardiovascular:      Rate and Rhythm: Normal rate and regular rhythm.      Pulses:           Carotid pulses are 1+ on the right side and 1+ on the left side.       Radial pulses are 2+ on the right side and 2+ on the left side.      Heart sounds: S1 normal and S2 normal. Murmur heard.      Harsh midsystolic murmur is present with a grade of 2/6 at the upper right sternal border radiating to the neck.   Pulmonary:      Breath sounds: Normal breath sounds and air entry.   Musculoskeletal:      Right lower leg: No edema.      Left lower leg: No edema.   Neurological:      Mental Status: She is alert.             Assessment  1. Morbid obesity, unspecified obesity type  unchanged    2. Mitral valve prolapse  stable    3. Nonrheumatic aortic valve stenosis  Moderate on echo may 2023, asymptomatic        Plan and Discussion    Discussed her  echocardiogram finding in May.  Discussed her blood pressure is well controlled.  Will continue to monitor her aortic stenosis.  Continue with current medications.    Follow Up  6 months      Yordy Ng MD, F.A.C.C, F.S.C.A.I.      Total professional time spent for the encounter: 30 minutes  Time was spent preparing to see the patient, reviewing results of prior testing, obtaining and/or reviewing separately obtained history, performing a medically appropriate examination and interview, counseling and educating the patient/family, ordering medications/tests/procedures, referring and communicating with other health care professionals, documenting clinical information in the electronic health record, and independently interpreting results.    Disclaimer: This document was created using voice recognition software (M*Modal Fluency Direct). Although it may be edited, this document may contain errors related to incorrect recognition of the spoken word. Please call the physician if clarification is needed.

## 2024-03-14 PROBLEM — M17.9 OSTEOARTHRITIS OF KNEE: Status: ACTIVE | Noted: 2024-03-14

## 2024-03-15 PROBLEM — M17.0 ARTHRITIS OF BOTH KNEES: Status: ACTIVE | Noted: 2024-03-15

## 2024-03-17 ENCOUNTER — PATIENT MESSAGE (OUTPATIENT)
Dept: ADMINISTRATIVE | Facility: HOSPITAL | Age: 69
End: 2024-03-17
Payer: MEDICARE

## 2024-03-18 ENCOUNTER — PATIENT OUTREACH (OUTPATIENT)
Dept: ADMINISTRATIVE | Facility: HOSPITAL | Age: 69
End: 2024-03-18
Payer: MEDICARE

## 2024-03-18 DIAGNOSIS — Z12.31 ENCOUNTER FOR SCREENING MAMMOGRAM FOR BREAST CANCER: Primary | ICD-10-CM

## 2024-03-18 NOTE — PROGRESS NOTES
Health Maintenance Due   Topic Date Due    TETANUS VACCINE  Never done    COVID-19 Vaccine (5 - 2023-24 season) 09/01/2023    Mammogram  04/24/2024     Triggered LINKS and reconciled immunizations. Updated Care Everywhere. Pt responded to campaigns outreach asking to schedule mammogram- order placed and pt contacted to schedule. Chart review completed.

## 2024-04-10 ENCOUNTER — PATIENT MESSAGE (OUTPATIENT)
Dept: OPTOMETRY | Facility: CLINIC | Age: 69
End: 2024-04-10
Payer: MEDICARE

## 2024-04-11 ENCOUNTER — OFFICE VISIT (OUTPATIENT)
Dept: OPTOMETRY | Facility: CLINIC | Age: 69
End: 2024-04-11
Payer: MEDICARE

## 2024-04-11 DIAGNOSIS — H01.02A SQUAMOUS BLEPHARITIS OF UPPER AND LOWER EYELIDS OF BOTH EYES: ICD-10-CM

## 2024-04-11 DIAGNOSIS — H02.401 PTOSIS OF RIGHT EYELID: ICD-10-CM

## 2024-04-11 DIAGNOSIS — H01.02B SQUAMOUS BLEPHARITIS OF UPPER AND LOWER EYELIDS OF BOTH EYES: ICD-10-CM

## 2024-04-11 DIAGNOSIS — H04.123 DRY EYE SYNDROME OF BOTH EYES: ICD-10-CM

## 2024-04-11 DIAGNOSIS — H00.012 HORDEOLUM EXTERNUM OF RIGHT LOWER EYELID: Primary | ICD-10-CM

## 2024-04-11 PROCEDURE — 92002 INTRM OPH EXAM NEW PATIENT: CPT | Mod: S$GLB,,, | Performed by: OPTOMETRIST

## 2024-04-11 PROCEDURE — 99999 PR PBB SHADOW E&M-EST. PATIENT-LVL III: CPT | Mod: PBBFAC,,, | Performed by: OPTOMETRIST

## 2024-04-11 RX ORDER — ASPIRIN 325 MG
TABLET, DELAYED RELEASE (ENTERIC COATED) ORAL
COMMUNITY
Start: 2024-03-21

## 2024-04-11 NOTE — PROGRESS NOTES
"HPI    YENY: 2023  Chief complaint (CC): 69 yo F here for urgent care visit. Pt is here today   for ocular concerns due to right upper eyelid discomfort. For the past 3   weeks her right eyelid has been twitching, headaches on the right temple   and she has a stye. Her right ear has also been "popping."   Glasses? +, reading  Contacts? No  H/o eye surgery, injections or laser: PCIOL OU  H/o eye injury: Trauma injury age 4 OD  Known eye conditions? Ptosis  Family h/o eye conditions? No  Eye gtts? Stye gtts prn OD      (-) Flashes (-)  Floaters (-) Mucous   (-)  Tearing (-) Itching (-) Burning   (+) Headaches (+) Eye Pain/discomfort (-) Irritation   (-)  Redness (-) Double vision (-) Blurry vision    Diabetic? No  A1c? Lab Results       Component                Value               Date                       HGBA1C                   5.2                 01/27/2023            Last edited by Jennyfer Coleman, OD on 4/11/2024 10:35 AM.            Assessment /Plan     For exam results, see Encounter Report.      Hordeolum externum of right upper eyelid   - Pt educated on condition. Initiate warm compresses BID-TID for 10 minutes each time followed by a gentle lid massage.     Ptosis of right eyelid   - Referred to Dr. Armando for further evaluation     Squamous blepharitis of upper and lower eyelids of both eyes   - Lid hygiene discussed.     Dry eye syndrome of both eyes   - Recommend artificial tears 1 drop 2-4 x per day. Chronicity of disease and treatment discussed.   - RTC 2-3 weeks for follow up and DFE                 "

## 2024-05-16 ENCOUNTER — OFFICE VISIT (OUTPATIENT)
Dept: OPTOMETRY | Facility: CLINIC | Age: 69
End: 2024-05-16
Payer: MEDICARE

## 2024-05-16 DIAGNOSIS — H01.02B SQUAMOUS BLEPHARITIS OF UPPER AND LOWER EYELIDS OF BOTH EYES: ICD-10-CM

## 2024-05-16 DIAGNOSIS — H04.123 DRY EYE SYNDROME OF BOTH EYES: ICD-10-CM

## 2024-05-16 DIAGNOSIS — H01.02A SQUAMOUS BLEPHARITIS OF UPPER AND LOWER EYELIDS OF BOTH EYES: ICD-10-CM

## 2024-05-16 DIAGNOSIS — H00.012 HORDEOLUM EXTERNUM OF RIGHT LOWER EYELID: Primary | ICD-10-CM

## 2024-05-16 DIAGNOSIS — H02.401 PTOSIS OF RIGHT EYELID: ICD-10-CM

## 2024-05-16 PROCEDURE — 3288F FALL RISK ASSESSMENT DOCD: CPT | Mod: CPTII,S$GLB,, | Performed by: OPTOMETRIST

## 2024-05-16 PROCEDURE — 1159F MED LIST DOCD IN RCRD: CPT | Mod: CPTII,S$GLB,, | Performed by: OPTOMETRIST

## 2024-05-16 PROCEDURE — 99999 PR PBB SHADOW E&M-EST. PATIENT-LVL I: CPT | Mod: PBBFAC,,, | Performed by: OPTOMETRIST

## 2024-05-16 PROCEDURE — 1101F PT FALLS ASSESS-DOCD LE1/YR: CPT | Mod: CPTII,S$GLB,, | Performed by: OPTOMETRIST

## 2024-05-16 PROCEDURE — 1160F RVW MEDS BY RX/DR IN RCRD: CPT | Mod: CPTII,S$GLB,, | Performed by: OPTOMETRIST

## 2024-05-16 PROCEDURE — 92014 COMPRE OPH EXAM EST PT 1/>: CPT | Mod: S$GLB,,, | Performed by: OPTOMETRIST

## 2024-05-16 NOTE — PROGRESS NOTES
HPI    YENY: 4/11/2024 - having DFE today   Chief complaint (CC): 67 yo F here for DFE/Follow up exam   Glasses? +, reading  Contacts? No  H/o eye surgery, injections or laser: PCIOL OU  H/o eye injury: Trauma injury age 4 OD  Known eye conditions? Ptosis  Family h/o eye conditions? No  Eye gtts? Stye gtts prn OD      (-) Flashes (-)  Floaters (-) Mucous   (-)  Tearing (-) Itching (-) Burning   (+) Headaches (+) Eye Pain/discomfort (-) Irritation   (-)  Redness (-) Double vision (-) Blurry vision    Diabetic? No  A1c? Lab Results       Component                Value               Date                       HGBA1C                   5.2                 01/27/2023            Last edited by Jennyfer Coleman, OD on 5/16/2024  2:02 PM.            Assessment /Plan     For exam results, see Encounter Report.        Hordeolum externum of right upper eyelid              - Resolved     Ptosis of right eyelid              - F/u with Dr. Armando as scheduled      Squamous blepharitis of upper and lower eyelids of both eyes              - Lid hygiene discussed.      Dry eye syndrome of both eyes              - Continue artificial tears 1 drop 2-4 x per day. Chronicity of disease and treatment discussed.              - RTC for annual eye exam.

## 2024-06-10 ENCOUNTER — PATIENT MESSAGE (OUTPATIENT)
Dept: INTERNAL MEDICINE | Facility: CLINIC | Age: 69
End: 2024-06-10
Payer: MEDICARE

## 2024-06-26 ENCOUNTER — CLINICAL SUPPORT (OUTPATIENT)
Dept: OPHTHALMOLOGY | Facility: CLINIC | Age: 69
End: 2024-06-26
Payer: MEDICARE

## 2024-06-26 ENCOUNTER — OFFICE VISIT (OUTPATIENT)
Dept: OPHTHALMOLOGY | Facility: CLINIC | Age: 69
End: 2024-06-26
Payer: MEDICARE

## 2024-06-26 DIAGNOSIS — H02.421 ACQUIRED MYOGENIC PTOSIS OF EYELID, RIGHT: Primary | ICD-10-CM

## 2024-06-26 DIAGNOSIS — G51.31 HEMIFACIAL SPASM OF RIGHT SIDE OF FACE: ICD-10-CM

## 2024-06-26 DIAGNOSIS — H02.403 PTOSIS OF BOTH EYELIDS: ICD-10-CM

## 2024-06-26 PROCEDURE — 99999 PR PBB SHADOW E&M-EST. PATIENT-LVL II: CPT | Mod: PBBFAC,,, | Performed by: OPHTHALMOLOGY

## 2024-06-26 PROCEDURE — 92285 EXTERNAL OCULAR PHOTOGRAPHY: CPT | Mod: S$GLB,,, | Performed by: OPHTHALMOLOGY

## 2024-06-26 NOTE — PROGRESS NOTES
COSME Rapp is a/an 68 y.o. female here for ptosis.  Referred by: Dr. Coleman  How long have eyelid(s) been droopy? 15 yeats  Any h/o wearing hard CLs? no  Do eyelids feel heavy? no  Does the height of the eyelid(s) fluctuate throughout the day? yes  Do eyelid(s) interfere with daily activities such as driving, reading,   working? sometimes  Spontaneous orbital pain? no  Orbital pain w eye movement? no  Red eyes? no  Red eyelids? no  Eyelid swelling? sometimes  History of cardiac pacemaker? No    Eye Meds:  Refresh tears prn OU   Last edited by Simone Reynolds on 6/26/2024  3:42 PM.            Assessment /Plan     For exam results, see Encounter Report.    Acquired myogenic ptosis of eyelid, right  -     External/Slit Lamp Photography; Future  -     GOLDMANN PERIMETRY - OU - EXTENDED - BOTH EYES    Hemifacial spasm of right side of face  -     MRI Brain W WO Contrast; Future; Expected date: 06/26/2024  -     Prior authorization Order  -     Creatinine, serum; Future; Expected date: 06/26/2024      The patient is a pleasant 68-year-old female here for evaluation of right upper eyelid heaviness.  This has been prominent over the last 15 years.  The patient has noticed that she has trouble opening up her right eye at times throughout the day.  She does not have any difficulty with opening up her left eye.  The patient has a history of bilateral cataract extraction with placement of posterior chamber intra-ocular lenses.      On exam, the patient has right-sided hemifacial spasm.  She has right upper eyelid acquired myogenic ptosis.    Findings were discussed with the patient.      Recommend MRI brain with IAC protocol to rule out tumor at cerebellopontine angle.    We discussed the option of botulinum toxin to treat the right-sided hemifacial spasm.  Informed consent obtained after pertinent risks benefits alternatives were discussed with the patient.      If the patient does not have any significant  improvement of her right upper eyelid spasm with botulinum toxin, consider right upper eyelid ptosis repair in the future.

## 2024-06-26 NOTE — PROGRESS NOTES
Ptosis GVF/External photos done ou./rel/fix/coop. good ou/ chart checked for latex allergy./ -[facility]

## 2024-07-09 ENCOUNTER — HOSPITAL ENCOUNTER (OUTPATIENT)
Dept: RADIOLOGY | Facility: HOSPITAL | Age: 69
Discharge: HOME | End: 2024-07-09
Payer: MEDICARE

## 2024-07-09 DIAGNOSIS — Z12.31 ENCOUNTER FOR SCREENING MAMMOGRAM FOR MALIGNANT NEOPLASM OF BREAST: ICD-10-CM

## 2024-07-09 PROCEDURE — 77063 BREAST TOMOSYNTHESIS BI: CPT | Mod: 26,,, | Performed by: RADIOLOGY

## 2024-07-09 PROCEDURE — 77067 SCR MAMMO BI INCL CAD: CPT | Mod: TC

## 2024-07-09 PROCEDURE — 77067 SCR MAMMO BI INCL CAD: CPT | Mod: 26,,, | Performed by: RADIOLOGY

## 2024-07-16 ENCOUNTER — LAB VISIT (OUTPATIENT)
Dept: LAB | Facility: HOSPITAL | Age: 69
End: 2024-07-16
Attending: OPHTHALMOLOGY
Payer: MEDICARE

## 2024-07-16 DIAGNOSIS — G51.31 HEMIFACIAL SPASM OF RIGHT SIDE OF FACE: ICD-10-CM

## 2024-07-16 LAB
CREAT SERPL-MCNC: 0.7 MG/DL (ref 0.5–1.4)
EST. GFR  (NO RACE VARIABLE): >60 ML/MIN/1.73 M^2

## 2024-07-16 PROCEDURE — 36415 COLL VENOUS BLD VENIPUNCTURE: CPT | Performed by: OPHTHALMOLOGY

## 2024-07-16 PROCEDURE — 82565 ASSAY OF CREATININE: CPT | Performed by: OPHTHALMOLOGY

## 2024-07-22 ENCOUNTER — HOSPITAL ENCOUNTER (OUTPATIENT)
Dept: RADIOLOGY | Facility: HOSPITAL | Age: 69
Discharge: HOME | End: 2024-07-22
Attending: OPHTHALMOLOGY
Payer: MEDICARE

## 2024-07-22 DIAGNOSIS — G51.31 HEMIFACIAL SPASM OF RIGHT SIDE OF FACE: ICD-10-CM

## 2024-07-22 PROCEDURE — 25500020 PHARM REV CODE 255: Performed by: OPHTHALMOLOGY

## 2024-07-22 PROCEDURE — 70553 MRI BRAIN STEM W/O & W/DYE: CPT | Mod: 26,,, | Performed by: RADIOLOGY

## 2024-07-22 PROCEDURE — A9585 GADOBUTROL INJECTION: HCPCS | Performed by: OPHTHALMOLOGY

## 2024-07-22 PROCEDURE — 70553 MRI BRAIN STEM W/O & W/DYE: CPT | Mod: TC

## 2024-07-22 RX ORDER — GADOBUTROL 604.72 MG/ML
10 INJECTION INTRAVENOUS
Status: COMPLETED | OUTPATIENT
Start: 2024-07-22 | End: 2024-07-22

## 2024-07-22 RX ADMIN — GADOBUTROL 10 ML: 604.72 INJECTION INTRAVENOUS at 08:07

## 2024-08-01 ENCOUNTER — TELEPHONE (OUTPATIENT)
Dept: OPHTHALMOLOGY | Facility: CLINIC | Age: 69
End: 2024-08-01
Payer: MEDICARE

## 2024-08-01 DIAGNOSIS — G51.31 HEMIFACIAL SPASM OF RIGHT SIDE OF FACE: Primary | ICD-10-CM

## 2024-08-01 NOTE — TELEPHONE ENCOUNTER
----- Message from Irene Lane sent at 8/1/2024  2:28 PM CDT -----    ----- Message -----  From: Anahi Ro  Sent: 8/1/2024   1:01 PM CDT  To: Prabha Mcwilliams Staff    Pt calling for her MRI results     Confirmed patient's contact info below:  Contact Name: Kandace Rapp  Phone Number: [phone]

## 2024-08-12 ENCOUNTER — TELEPHONE (OUTPATIENT)
Dept: NEUROSURGERY | Facility: CLINIC | Age: 69
End: 2024-08-12
Payer: MEDICARE

## 2024-08-12 NOTE — TELEPHONE ENCOUNTER
----- Message from Kiya Key sent at 8/12/2024  9:37 AM CDT -----  Regarding: Appt Sooner / Advise  Contact: [phone]  Kandace Johnson calling regarding Appointment Access  (message) for # pt is calling to see if she can be seen sooner or after the date she is scheduled, she states it [birth year] her birthday that day and she was told by her kids they are having a little get together pls advise and r/s if possible

## 2024-08-12 NOTE — TELEPHONE ENCOUNTER
Returned call to pt. Informed that we do not anything sooner and the earliest availability after that will be in October. Pt stated that she will keep the appt, but she also will seek out for another neurosurgery that in her network. Advised pt to give us a call if she want to reschedule or cancel the appt. Pt v/u.

## 2024-08-23 ENCOUNTER — TELEPHONE (OUTPATIENT)
Dept: NEUROSURGERY | Facility: CLINIC | Age: 69
End: 2024-08-23
Payer: MEDICARE

## 2024-09-23 ENCOUNTER — TELEPHONE (OUTPATIENT)
Dept: NEUROSURGERY | Facility: CLINIC | Age: 69
End: 2024-09-23
Payer: MEDICARE

## 2024-09-23 ENCOUNTER — OFFICE VISIT (OUTPATIENT)
Dept: NEUROSURGERY | Facility: CLINIC | Age: 69
End: 2024-09-23
Payer: MEDICARE

## 2024-09-23 VITALS — SYSTOLIC BLOOD PRESSURE: 119 MMHG | HEART RATE: 75 BPM | DIASTOLIC BLOOD PRESSURE: 80 MMHG

## 2024-09-23 DIAGNOSIS — G51.31 HEMIFACIAL SPASM OF RIGHT SIDE OF FACE: ICD-10-CM

## 2024-09-23 PROCEDURE — 3079F DIAST BP 80-89 MM HG: CPT | Mod: CPTII,S$GLB,, | Performed by: NEUROLOGICAL SURGERY

## 2024-09-23 PROCEDURE — 1126F AMNT PAIN NOTED NONE PRSNT: CPT | Mod: CPTII,S$GLB,, | Performed by: NEUROLOGICAL SURGERY

## 2024-09-23 PROCEDURE — 1101F PT FALLS ASSESS-DOCD LE1/YR: CPT | Mod: CPTII,S$GLB,, | Performed by: NEUROLOGICAL SURGERY

## 2024-09-23 PROCEDURE — 3074F SYST BP LT 130 MM HG: CPT | Mod: CPTII,S$GLB,, | Performed by: NEUROLOGICAL SURGERY

## 2024-09-23 PROCEDURE — 99203 OFFICE O/P NEW LOW 30 MIN: CPT | Mod: S$GLB,,, | Performed by: NEUROLOGICAL SURGERY

## 2024-09-23 PROCEDURE — 99999 PR PBB SHADOW E&M-EST. PATIENT-LVL II: CPT | Mod: PBBFAC,,, | Performed by: NEUROLOGICAL SURGERY

## 2024-09-23 PROCEDURE — 3288F FALL RISK ASSESSMENT DOCD: CPT | Mod: CPTII,S$GLB,, | Performed by: NEUROLOGICAL SURGERY

## 2024-09-23 NOTE — PROGRESS NOTES
Neurosurgery  History & Physical    Scribe Attestation  I, Billie Zepeda, attest that this documentation has been prepared under the direction and in the presence of Chino Smart MD.    SUBJECTIVE:     Chief Complaint: right-sided hemifacial spasm, neurovascular compression syndrome    History of Present Illness:  Kandace Rapp is a pleasant 70 y/o female, that is referred to me by Dr. Poppy Armando, presents for evaluation of right-sided hemifacial spasm with possible neurovascular compression syndrome. Today she reports having R eye spasm for a year. No aggravating factors. Denies pain, numbness, hearing difficulties, or dysphagia. No HA, N, or V. She recalls her R eye being hit by a doorknob as a child. Surgical history includes cataract extraction in both eyes. Denies dry eyes. No smoking or drinking history to report. Occasionally takes a Vit C supplement. Endorses compliance with Vit D supplement and nifedipine. Patient shares she has aortic stenosis and is f/u with her physician.    Review of patient's allergies indicates:   Allergen Reactions    Davion Rash       Current Outpatient Medications   Medication Sig Dispense Refill    cholecalciferol, vitamin D3, 1,250 mcg (50,000 unit) capsule Take 1 capsule every week by oral route.      NIFEdipine (PROCARDIA-XL) 30 MG (OSM) 24 hr tablet Take 1 tablet (30 mg total) by mouth once daily. 90 tablet 3     No current facility-administered medications for this visit.       Past Medical History:   Diagnosis Date    Mitral valve prolapse      Past Surgical History:   Procedure Laterality Date    BREAST CYST EXCISION Left 80s    BREAST SURGERY      Benign lump on left.    CATARACT EXTRACTION       SECTION      x1    COLONOSCOPY N/A 2023    Procedure: COLONOSCOPY;  Surgeon: Pattie Stephenson MD;  Location: [facility] ([address] [place] [address]);  Service: Endoscopy;  Laterality: N/A;   ref by Ishmael Jacobson MD, PEG, instr. to portal-st     Family History       Problem  Relation (Age of Onset)    Alcohol abuse Brother    Breast cancer Mother, Paternal Aunt    Cancer Mother, Father    Diabetes Sister    Heart disease Mother, Brother, Maternal Uncle, Maternal Grandfather    Lymphoma Mother          Social History     Socioeconomic History    Marital status:     Number of children: 2   Tobacco Use    Smoking status: Never    Smokeless tobacco: Never   Substance and Sexual Activity    Alcohol use: Yes     Comment: Rare.     Drug use: No    Sexual activity: Not Currently   Social History Narrative       , one child local dtr lives with her, one in [place] son in [place]/hx of [employer]. 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      Social Determinants of Health     Financial Resource Strain: Low Risk  (2023)    Overall Financial Resource Strain (CARDIA)     Difficulty of Paying Living Expenses: Not very hard   Food Insecurity: No Food Insecurity (2023)    Hunger Vital Sign     Worried About Running Out of Food in the Last Year: Never true     Ran Out of Food in the Last Year: Never true   Transportation Needs: No Transportation Needs (2023)    PRAPARE - Transportation     Lack of Transportation (Medical): No     Lack of Transportation (Non-Medical): No   Physical Activity: Insufficiently Active (2023)    Exercise Vital Sign     Days of Exercise per Week: 2 days     Minutes of Exercise per Session: 20 min   Stress: No Stress Concern Present (2023)    [employer] [place] - Occupational Stress Questionnaire     Feeling of Stress : Only a little   Housing Stability: Unknown (2023)    Housing Stability Vital Sign     Unable to Pay for Housing in the Last Year: No     Unstable Housing in the Last Year: No       Review of Systems   Eyes:         R eye spasm   All other systems reviewed and are negative.    OBJECTIVE:     Vital Signs     There is no height or weight on file to  calculate BMI.    Neurosurgery Physical Exam  General: no acute distress  Head: Non-traumatic, normocephalic  Eyes: Pupils equal, EOMI  Neck: Supple, normal ROM, no tenderness to palpation  CVS: Normal rate and rhythm, distal pulses present  Pulm: Symmetric expansion, no respiratory distress  GI: Abdomen nondistended, nontender    MSK: Moves all extremities without restriction, atraumatic  Skin: Dry, intact  Psych: Normal thought content and cognition    Neuro:  Alert, awake, oriented, to self, place, time  Language:  Speech is fluent, goal directed without any noted dysarthria or aphasia    Cranial nerves:    CNII-XII: Intact on fine exam,   Pupils equal round react to light,   Extraocular muscles are intact  V1 to V3 is intact to light touch,   no facial asymmetry,   Hearing is intact to finger rub and voice  tongue/uvula/palate midline,   shoulder shrug equal,     No pronator drift    Extremities:  Motor:  Upper Extremity    Deltoid Triceps Biceps Wrist  Extension Wrist  Flexion Interosseous   R 5/5 5/5 5/5 5/5 5/5 5/5   L 5/5 5/5 5/5 5/5 5/5 5/5       Thumb   Abduction Thumb  ADDuction Finger  Flexion Finger  Extension     R 5/5 5/5 5/5 5/5     L 5/5 5/5 5/5 5/5        Lower Extremity     Iliopsoas Quadriceps Hamstring Plantarflexion Dorsiflexion EHL   R 5/5 5/5 5/5 5/5 5/5 5/5   L 5/5 5/5 5/5 5/5 5/5 5/5       Reflexes:     DTR: 2+ biceps    2+ triceps   2+ brachioradialis   2+ patellar  2+ Achilles     Almeida's: Negative     Babinski's: Negative     Clonus: Negative     Sensory:      Sensation intact to light touch, temperature sensation, vibration, pinprick     Coordination:      Coordination intact throughout, no dysmetria, normal rapid alternating movements, no dysdiadochokinesia  Cerebellar:  Normal finger-to-nose, normal heel-to-shin  Cervical spine:  No midline cervical tenderness, negative Lhermitte, negative Spurling  Thoracic spine:  No midline thoracic tenderness  Lumbar spine:  No midline lumbar  tenderness     Diagnostic Results:  I personally reviewed the patient's Diagnostic Imaging.     MRI Brain W WO Cont 07/22/24  MRI Iac:  Tortuous right distal vertebral artery with distortion and mass effect on the right 7th 8th nerve complex nerve root entry zone concerning for neurovascular compression syndrome in light of history.     No evidence for enhancing lesion 7th 8th nerve complex bilaterally.     MRI brain:  No evidence acute infarction or intracranial enhancing mass lesion     Scattered foci of T2 FLAIR signal abnormality supratentorial white matter which are nonspecific and may represent mild moderate degree of chronic ischemic change. No evidence for acute infarction     Two small probable remote microhemorrhages within the right parietal lobe. No evidence for recent hemorrhage     Partially empty sella, intracranial hypertension to be included in differential. There is questioned slight increased pachymeningeal enhancement overlying the parietal lobes bilaterally which may be in the upper limits of normal for the patient. Sequela lumbar puncture, intracranial hypotension or alternative inflammatory/infectious pachymeningitis felt less likely.    ASSESSMENT/PLAN:   70 y/o female with one year of R eye spasms.    Patient presents with R eye spasms.  Discussed imaging results of MRI Brain revealing tortuous right distal vertebral artery with distortion and mass effect on the right 7th 8th nerve complex nerve root entry zone concerning for neurovascular compression syndrome. Scattered foci of T2 FLAIR signal abnormality supratentorial white matter which are nonspecific and may represent mild moderate degree of chronic ischemic change. Two small probable remote microhemorrhages within the right parietal lobe. Partially empty sella, intracranial hypertension to be included in differential.    After discussion with the patient, I recommend starting carbamazepine to help alleviate the patient's right eye  spasms. Her referring provider, Dr. Armando, has scheduled the patient for Botox treatment. I have answered all of their questions and patient wish to proceed with this plan. We will schedule patient.      Thank you so very much for allowing me to participate in the care of this patient.  Please feel free to call any questions, comments, or concerns.     Chino Smart MD,MSc  Department of Neurosurgery   Department of Radiology  Department of Neurology  [facility]    [facility] [employer] [facility] [employer]     Total time spent in counseling and discussion about further management options including relevant lab work, treatment,  prognosis, medications and intended side effects was more than 60 minutes. More than 50 % of the time was spent in counseling and coordination of care.  I spent a total of 60 minutes on the day of the visit.This includes face to face time and non-face to face time preparing to see the patient (eg, review of tests), Obtaining and/or reviewing separately obtained history, Documenting clinical information in the electronic or other health record, Independently interpreting resultsand communicating results to the patient/family/caregiver, or Care coordination.     Scribe Attestation  I, Dr. Chino Smart personally performed the services described in this documentation. All medical record entries made by the scribe, Billie Zepeda, were at my direction and in my presence.  I have reviewed the chart and agree that the record reflects my personal performance and is accurate and complete.

## 2024-09-25 ENCOUNTER — TELEPHONE (OUTPATIENT)
Dept: NEUROSURGERY | Facility: CLINIC | Age: 69
End: 2024-09-25
Payer: MEDICARE

## 2024-09-25 RX ORDER — CARBAMAZEPINE 100 MG/1
200 TABLET, EXTENDED RELEASE ORAL 2 TIMES DAILY
Qty: 120 TABLET | Refills: 11 | Status: SHIPPED | OUTPATIENT
Start: 2024-09-25 | End: 2025-09-25

## 2024-09-25 NOTE — TELEPHONE ENCOUNTER
Returned call to pt. Informed that the message was relayed to the PA. Advised the patient to call back if she has not received the medication by 4:00 PM today. Pt v/u

## 2024-09-25 NOTE — TELEPHONE ENCOUNTER
----- Message from Lluvia London sent at 9/25/2024  1:03 PM CDT -----  Regarding: pt advice  Contact: [phone]  Pt requesting a call back to discuss plan of care  for Rx please call pt @[phone]

## 2024-10-07 ENCOUNTER — OFFICE VISIT (OUTPATIENT)
Dept: CARDIOLOGY | Facility: CLINIC | Age: 69
End: 2024-10-07
Payer: MEDICARE

## 2024-10-07 VITALS
HEIGHT: 62 IN | BODY MASS INDEX: 36.07 KG/M2 | OXYGEN SATURATION: 96 % | WEIGHT: 196 LBS | SYSTOLIC BLOOD PRESSURE: 140 MMHG | DIASTOLIC BLOOD PRESSURE: 98 MMHG | HEART RATE: 77 BPM

## 2024-10-07 DIAGNOSIS — I34.1 MITRAL VALVE PROLAPSE: ICD-10-CM

## 2024-10-07 DIAGNOSIS — I35.0 NONRHEUMATIC AORTIC VALVE STENOSIS: Primary | ICD-10-CM

## 2024-10-07 PROCEDURE — 3077F SYST BP >= 140 MM HG: CPT | Mod: CPTII,S$GLB,, | Performed by: INTERNAL MEDICINE

## 2024-10-07 PROCEDURE — 3008F BODY MASS INDEX DOCD: CPT | Mod: CPTII,S$GLB,, | Performed by: INTERNAL MEDICINE

## 2024-10-07 PROCEDURE — 3080F DIAST BP >= 90 MM HG: CPT | Mod: CPTII,S$GLB,, | Performed by: INTERNAL MEDICINE

## 2024-10-07 PROCEDURE — 99214 OFFICE O/P EST MOD 30 MIN: CPT | Mod: S$GLB,,, | Performed by: INTERNAL MEDICINE

## 2024-10-07 PROCEDURE — 1159F MED LIST DOCD IN RCRD: CPT | Mod: CPTII,S$GLB,, | Performed by: INTERNAL MEDICINE

## 2024-10-07 PROCEDURE — 1126F AMNT PAIN NOTED NONE PRSNT: CPT | Mod: CPTII,S$GLB,, | Performed by: INTERNAL MEDICINE

## 2024-10-07 PROCEDURE — 99999 PR PBB SHADOW E&M-EST. PATIENT-LVL III: CPT | Mod: PBBFAC,,, | Performed by: INTERNAL MEDICINE

## 2024-10-07 PROCEDURE — 93005 ELECTROCARDIOGRAM TRACING: CPT

## 2024-10-07 RX ORDER — NIFEDIPINE 30 MG/1
30 TABLET, EXTENDED RELEASE ORAL DAILY
Qty: 90 TABLET | Refills: 3 | Status: SHIPPED | OUTPATIENT
Start: 2024-10-07 | End: 2025-10-07

## 2024-10-07 NOTE — PROGRESS NOTES
Cardiology    10/7/2024  3:00 PM    Problem list  Patient Active Problem List   Diagnosis    Mitral valve prolapse    Vitamin D deficiency    Morbid obesity, unspecified obesity type    Nonrheumatic aortic valve stenosis    Moderate episode of recurrent major depressive disorder    BMI 37.0-37.9, adult    Osteoarthritis of knee    Arthritis of both knees       CC:  Follow-up    HPI:  Patient reports having more shortness of breath and more dyspnea on exertion when she is walking.  She denies any rest symptoms.  She denies any chest pain on exertion.  She reports some chest tightness only at nighttime when she is lying down.  She was recently started on Tegretol for spasm of her eyelid.    Medications  Current Outpatient Medications   Medication Sig Dispense Refill    carBAMazepine (TEGRETOL XR) 100 MG 12 hr tablet Take 2 tablets (200 mg total) by mouth 2 (two) times daily. 120 tablet 11    cholecalciferol, vitamin D3, 1,250 mcg (50,000 unit) capsule Take 1 capsule every week by oral route.      NIFEdipine (PROCARDIA-XL) 30 MG (OSM) 24 hr tablet Take 1 tablet (30 mg total) by mouth once daily. 90 tablet 3     No current facility-administered medications for this visit.      Prior to Admission medications    Medication Sig Start Date End Date Taking? Authorizing Provider   carBAMazepine (TEGRETOL XR) 100 MG 12 hr tablet Take 2 tablets (200 mg total) by mouth 2 (two) times daily. 9/25/24 9/25/25 Yes Mayelin Sullivan PA-C   cholecalciferol, vitamin D3, 1,250 mcg (50,000 unit) capsule Take 1 capsule every week by oral route. 3/21/24  Yes Provider, Historical   NIFEdipine (PROCARDIA-XL) 30 MG (OSM) 24 hr tablet Take 1 tablet (30 mg total) by mouth once daily. 10/7/24 10/7/25  Yordy Ng MD   NIFEdipine (PROCARDIA-XL) 30 MG (OSM) 24 hr tablet Take 1 tablet (30 mg total) by mouth once daily. 1/10/23 10/7/24  Yordy Ng MD         History  Past Medical History:   Diagnosis Date    Mitral valve prolapse       Past Surgical History:   Procedure Laterality Date    BREAST CYST EXCISION Left 80s    BREAST SURGERY      Benign lump on left.    CATARACT EXTRACTION       SECTION      x1    COLONOSCOPY N/A 2023    Procedure: COLONOSCOPY;  Surgeon: Pattie Stephenson MD;  Location: [facility] ([address] [place] [address]);  Service: Endoscopy;  Laterality: N/A;   ref by Ishmael Jacobson MD, PEG, instr. to portal-st     Social History     Socioeconomic History    Marital status:     Number of children: 2   Tobacco Use    Smoking status: Never    Smokeless tobacco: Never   Substance and Sexual Activity    Alcohol use: Yes     Comment: Rare.     Drug use: No    Sexual activity: Not Currently   Social History Narrative       , one child local dtr lives with her, one in [place] son in [place]/hx of [facility]. 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      Social Drivers of Health     Financial Resource Strain: Low Risk  (2023)    Overall Financial Resource Strain (CARDIA)     Difficulty of Paying Living Expenses: Not very hard   Food Insecurity: No Food Insecurity (2023)    Hunger Vital Sign     Worried About Running Out of Food in the Last Year: Never true     Ran Out of Food in the Last Year: Never true   Transportation Needs: No Transportation Needs (2023)    PRAPARE - Transportation     Lack of Transportation (Medical): No     Lack of Transportation (Non-Medical): No   Physical Activity: Insufficiently Active (2023)    Exercise Vital Sign     Days of Exercise per Week: 2 days     Minutes of Exercise per Session: 20 min   Stress: No Stress Concern Present (2023)    [employer] [place] [employer] - Occupational Stress Questionnaire     Feeling of Stress : Only a little   Housing Stability: Unknown (2023)    Housing Stability Vital Sign     Unable to Pay for Housing in the Last Year: No     Unstable Housing in the Last Year: No          Allergies  Review of patient's allergies indicates:   Allergen Reactions    Leith Rash         Review of Systems   Review of Systems   Constitutional: Negative for decreased appetite, fever and weight loss.   HENT:  Negative for congestion and nosebleeds.    Eyes:  Negative for double vision, vision loss in left eye, vision loss in right eye and visual disturbance.   Cardiovascular:  Positive for dyspnea on exertion. Negative for chest pain, claudication, cyanosis, irregular heartbeat, leg swelling, near-syncope, orthopnea, palpitations, paroxysmal nocturnal dyspnea and syncope.   Respiratory:  Positive for shortness of breath. Negative for cough, hemoptysis, sleep disturbances due to breathing, snoring, sputum production and wheezing.    Endocrine: Negative for cold intolerance and heat intolerance.   Skin:  Negative for nail changes and rash.   Musculoskeletal:  Negative for joint pain, muscle cramps, muscle weakness and myalgias.   Gastrointestinal:  Negative for change in bowel habit, heartburn, hematemesis, hematochezia, hemorrhoids and melena.   Neurological:  Negative for dizziness, focal weakness and headaches.         Physical Exam  Wt Readings from Last 1 Encounters:   10/07/24 88.9 kg (196 lb)     BP Readings from Last 3 Encounters:   10/07/24 (!) 140/98   09/23/24 119/80   03/01/24 114/80     Pulse Readings from Last 1 Encounters:   10/07/24 77     Body mass index is 35.85 kg/m².    Physical Exam  Vitals reviewed.   Constitutional:       Appearance: She is obese.   Neck:      Vascular: No carotid bruit.   Cardiovascular:      Rate and Rhythm: Normal rate and regular rhythm.      Pulses:           Carotid pulses are 1+ on the right side and 1+ on the left side.       Radial pulses are 2+ on the right side and 2+ on the left side.      Heart sounds: S1 normal and S2 normal. Murmur heard.      Harsh midsystolic murmur is present with a grade of 2/6 at the upper right sternal border radiating to the  neck.   Pulmonary:      Breath sounds: Normal breath sounds and air entry.   Musculoskeletal:      Right lower leg: No edema.      Left lower leg: No edema.   Neurological:      Mental Status: She is alert.             Assessment  1. Nonrheumatic aortic valve stenosis (Primary)  Being evaluated  - Echo; Future    2. Mitral valve prolapse  Being evaluated        Plan and Discussion  Her EKG today which showed normal sinus rhythm rate of 75 with nonspecific T-wave change.  Her echocardiogram in 2023 showed moderate aortic stenosis.  However, given her change in her exertional symptoms, will recheck her echocardiogram to assess her aortic stenosis severity.    Follow Up  One month      Yordy Ng MD, F.A.C.C, F.S.C.A.I.      Total professional time spent for the encounter: 30 minutes  Time was spent preparing to see the patient, reviewing results of prior testing, obtaining and/or reviewing separately obtained history, performing a medically appropriate examination and interview, counseling and educating the patient/family, ordering medications/tests/procedures, referring and communicating with other health care professionals, documenting clinical information in the electronic health record, and independently interpreting results.    Disclaimer: This document was created using voice recognition software (M*Modal Fluency Direct). Although it may be edited, this document may contain errors related to incorrect recognition of the spoken word. Please call the physician if clarification is needed.

## 2024-10-08 LAB
OHS QRS DURATION: 82 MS
OHS QTC CALCULATION: 451 MS

## 2024-10-11 ENCOUNTER — HOSPITAL ENCOUNTER (OUTPATIENT)
Dept: CARDIOLOGY | Facility: OTHER | Age: 69
Discharge: HOME | End: 2024-10-11
Attending: INTERNAL MEDICINE
Payer: MEDICARE

## 2024-10-11 VITALS
HEIGHT: 62 IN | BODY MASS INDEX: 36.07 KG/M2 | SYSTOLIC BLOOD PRESSURE: 140 MMHG | HEART RATE: 77 BPM | WEIGHT: 196 LBS | DIASTOLIC BLOOD PRESSURE: 98 MMHG

## 2024-10-11 DIAGNOSIS — I35.0 NONRHEUMATIC AORTIC VALVE STENOSIS: ICD-10-CM

## 2024-10-11 LAB
ASCENDING AORTA: 4 CM
AV INDEX (PROSTH): 0.23
AV MEAN GRADIENT: 46.5 MMHG
AV PEAK GRADIENT: 74 MMHG
AV REGURGITATION PRESSURE HALF TIME: 643.04 MS
AV VALVE AREA BY VELOCITY RATIO: 0.9 CM²
AV VALVE AREA: 0.8 CM²
AV VELOCITY RATIO: 0.26
BSA FOR ECHO PROCEDURE: 1.97 M2
CV ECHO LV RWT: 0.36 CM
DOP CALC AO PEAK VEL: 4.3 M/S
DOP CALC AO VTI: 102 CM
DOP CALC LVOT AREA: 3.5 CM2
DOP CALC LVOT DIAMETER: 2.1 CM
DOP CALC LVOT PEAK VEL: 1.1 M/S
DOP CALC LVOT STROKE VOLUME: 82.7 CM3
DOP CALC RVOT PEAK VEL: 0.92 M/S
DOP CALCLVOT PEAK VEL VTI: 23.9 CM
E WAVE DECELERATION TIME: 172.21 MSEC
E/A RATIO: 0.7
E/E' RATIO: 11.69 M/S
ECHO LV POSTERIOR WALL: 0.9 CM (ref 0.6–1.1)
FRACTIONAL SHORTENING: 30 % (ref 28–44)
INTERVENTRICULAR SEPTUM: 1.2 CM (ref 0.6–1.1)
IVC DIAMETER: 1.72 CM
LA MAJOR: 5.32 CM
LA MINOR: 5.21 CM
LA WIDTH: 3.3 CM
LEFT ATRIUM AREA SYSTOLIC (APICAL 2 CHAMBER): 21.72 CM2
LEFT ATRIUM AREA SYSTOLIC (APICAL 4 CHAMBER): 17.81 CM2
LEFT ATRIUM SIZE: 3.58 CM
LEFT ATRIUM VOLUME INDEX MOD: 30.8 ML/M2
LEFT ATRIUM VOLUME INDEX: 27.8 ML/M2
LEFT ATRIUM VOLUME MOD: 58.44 ML
LEFT ATRIUM VOLUME: 52.86 CM3
LEFT INTERNAL DIMENSION IN SYSTOLE: 3.5 CM (ref 2.1–4)
LEFT VENTRICLE DIASTOLIC VOLUME INDEX: 63.02 ML/M2
LEFT VENTRICLE DIASTOLIC VOLUME: 119.74 ML
LEFT VENTRICLE END SYSTOLIC VOLUME APICAL 2 CHAMBER: 72.49 ML
LEFT VENTRICLE END SYSTOLIC VOLUME APICAL 4 CHAMBER: 47.18 ML
LEFT VENTRICLE MASS INDEX: 102.3 G/M2
LEFT VENTRICLE SYSTOLIC VOLUME INDEX: 26.4 ML/M2
LEFT VENTRICLE SYSTOLIC VOLUME: 50.09 ML
LEFT VENTRICULAR INTERNAL DIMENSION IN DIASTOLE: 5 CM (ref 3.5–6)
LEFT VENTRICULAR MASS: 194.4 G
LV LATERAL E/E' RATIO: 10.86 M/S
LV SEPTAL E/E' RATIO: 12.67 M/S
LVED V (TEICH): 119.74 ML
LVES V (TEICH): 50.09 ML
LVOT MG: 2.45 MMHG
LVOT MV: 0.74 CM/S
MV PEAK A VEL: 1.09 M/S
MV PEAK E VEL: 0.76 M/S
MV STENOSIS PRESSURE HALF TIME: 49.94 MS
MV VALVE AREA P 1/2 METHOD: 4.41 CM2
OHS CV RV/LV RATIO: 0.82 CM
PISA AR MAX VEL: 4.26 M/S
PISA MRMAX VEL: 3.72 M/S
PISA TR MAX VEL: 2.24 M/S
PV PEAK GRADIENT: 5 MMHG
PV PEAK VELOCITY: 1.11 M/S
RA MAJOR: 4.4 CM
RA PRESSURE ESTIMATED: 3 MMHG
RA WIDTH: 3.8 CM
RIGHT VENTRICLE DIASTOLIC BASEL DIMENSION: 4.1 CM
RV TB RVSP: 5 MMHG
RV TISSUE DOPPLER FREE WALL SYSTOLIC VELOCITY 1 (APICAL 4 CHAMBER VIEW): 13.43 CM/S
SINUS: 3.6 CM
STJ: 3.24 CM
TDI LATERAL: 0.07 M/S
TDI SEPTAL: 0.06 M/S
TDI: 0.07 M/S
TR MAX PG: 20 MMHG
TRICUSPID ANNULAR PLANE SYSTOLIC EXCURSION: 2.6 CM
TV REST PULMONARY ARTERY PRESSURE: 23 MMHG
Z-SCORE OF LEFT VENTRICULAR DIMENSION IN END DIASTOLE: -0.64
Z-SCORE OF LEFT VENTRICULAR DIMENSION IN END SYSTOLE: 0.5

## 2024-10-11 PROCEDURE — 93306 TTE W/DOPPLER COMPLETE: CPT

## 2024-10-11 PROCEDURE — 93306 TTE W/DOPPLER COMPLETE: CPT | Mod: 26,,, | Performed by: INTERNAL MEDICINE

## 2024-10-15 ENCOUNTER — OFFICE VISIT (OUTPATIENT)
Dept: CARDIOLOGY | Facility: CLINIC | Age: 69
End: 2024-10-15
Payer: MEDICARE

## 2024-10-15 ENCOUNTER — LAB VISIT (OUTPATIENT)
Dept: LAB | Facility: OTHER | Age: 69
End: 2024-10-15
Attending: INTERNAL MEDICINE
Payer: MEDICARE

## 2024-10-15 VITALS — SYSTOLIC BLOOD PRESSURE: 126 MMHG | HEART RATE: 80 BPM | DIASTOLIC BLOOD PRESSURE: 84 MMHG | OXYGEN SATURATION: 97 %

## 2024-10-15 DIAGNOSIS — I34.1 MITRAL VALVE PROLAPSE: ICD-10-CM

## 2024-10-15 DIAGNOSIS — I35.0 NONRHEUMATIC AORTIC VALVE STENOSIS: ICD-10-CM

## 2024-10-15 DIAGNOSIS — I35.0 NONRHEUMATIC AORTIC VALVE STENOSIS: Primary | ICD-10-CM

## 2024-10-15 LAB
ALBUMIN SERPL BCP-MCNC: 3.9 G/DL (ref 3.5–5.2)
ALP SERPL-CCNC: 126 U/L (ref 55–135)
ALT SERPL W/O P-5'-P-CCNC: 15 U/L (ref 10–44)
ANION GAP SERPL CALC-SCNC: 5 MMOL/L (ref 8–16)
AST SERPL-CCNC: 13 U/L (ref 10–40)
BASOPHILS # BLD AUTO: 0.04 K/UL (ref 0–0.2)
BASOPHILS NFR BLD: 0.7 % (ref 0–1.9)
BILIRUB SERPL-MCNC: 0.2 MG/DL (ref 0.1–1)
BUN SERPL-MCNC: 19 MG/DL (ref 8–23)
CALCIUM SERPL-MCNC: 9.6 MG/DL (ref 8.7–10.5)
CHLORIDE SERPL-SCNC: 105 MMOL/L (ref 95–110)
CO2 SERPL-SCNC: 30 MMOL/L (ref 23–29)
CREAT SERPL-MCNC: 0.7 MG/DL (ref 0.5–1.4)
DIFFERENTIAL METHOD BLD: ABNORMAL
EOSINOPHIL # BLD AUTO: 0.2 K/UL (ref 0–0.5)
EOSINOPHIL NFR BLD: 3 % (ref 0–8)
ERYTHROCYTE [DISTWIDTH] IN BLOOD BY AUTOMATED COUNT: 11.8 % (ref 11.5–14.5)
EST. GFR  (NO RACE VARIABLE): >60 ML/MIN/1.73 M^2
GLUCOSE SERPL-MCNC: 83 MG/DL (ref 70–110)
HCT VFR BLD AUTO: 37.2 % (ref 37–48.5)
HGB BLD-MCNC: 11.7 G/DL (ref 12–16)
IMM GRANULOCYTES # BLD AUTO: 0.02 K/UL (ref 0–0.04)
IMM GRANULOCYTES NFR BLD AUTO: 0.4 % (ref 0–0.5)
LYMPHOCYTES # BLD AUTO: 2 K/UL (ref 1–4.8)
LYMPHOCYTES NFR BLD: 37.4 % (ref 18–48)
MCH RBC QN AUTO: 28.5 PG (ref 27–31)
MCHC RBC AUTO-ENTMCNC: 31.5 G/DL (ref 32–36)
MCV RBC AUTO: 91 FL (ref 82–98)
MONOCYTES # BLD AUTO: 0.4 K/UL (ref 0.3–1)
MONOCYTES NFR BLD: 6.9 % (ref 4–15)
NEUTROPHILS # BLD AUTO: 2.8 K/UL (ref 1.8–7.7)
NEUTROPHILS NFR BLD: 51.6 % (ref 38–73)
NRBC BLD-RTO: 0 /100 WBC
PLATELET # BLD AUTO: 281 K/UL (ref 150–450)
PMV BLD AUTO: 9.3 FL (ref 9.2–12.9)
POTASSIUM SERPL-SCNC: 4.2 MMOL/L (ref 3.5–5.1)
PROT SERPL-MCNC: 7.2 G/DL (ref 6–8.4)
RBC # BLD AUTO: 4.11 M/UL (ref 4–5.4)
SODIUM SERPL-SCNC: 140 MMOL/L (ref 136–145)
WBC # BLD AUTO: 5.35 K/UL (ref 3.9–12.7)

## 2024-10-15 PROCEDURE — 3074F SYST BP LT 130 MM HG: CPT | Mod: CPTII,S$GLB,, | Performed by: INTERNAL MEDICINE

## 2024-10-15 PROCEDURE — 99214 OFFICE O/P EST MOD 30 MIN: CPT | Mod: S$GLB,,, | Performed by: INTERNAL MEDICINE

## 2024-10-15 PROCEDURE — 3079F DIAST BP 80-89 MM HG: CPT | Mod: CPTII,S$GLB,, | Performed by: INTERNAL MEDICINE

## 2024-10-15 PROCEDURE — 3288F FALL RISK ASSESSMENT DOCD: CPT | Mod: CPTII,S$GLB,, | Performed by: INTERNAL MEDICINE

## 2024-10-15 PROCEDURE — 80053 COMPREHEN METABOLIC PANEL: CPT | Performed by: INTERNAL MEDICINE

## 2024-10-15 PROCEDURE — 36415 COLL VENOUS BLD VENIPUNCTURE: CPT | Performed by: INTERNAL MEDICINE

## 2024-10-15 PROCEDURE — 99999 PR PBB SHADOW E&M-EST. PATIENT-LVL III: CPT | Mod: PBBFAC,,, | Performed by: INTERNAL MEDICINE

## 2024-10-15 PROCEDURE — 85025 COMPLETE CBC W/AUTO DIFF WBC: CPT | Performed by: INTERNAL MEDICINE

## 2024-10-15 PROCEDURE — 1159F MED LIST DOCD IN RCRD: CPT | Mod: CPTII,S$GLB,, | Performed by: INTERNAL MEDICINE

## 2024-10-15 PROCEDURE — 1101F PT FALLS ASSESS-DOCD LE1/YR: CPT | Mod: CPTII,S$GLB,, | Performed by: INTERNAL MEDICINE

## 2024-10-15 PROCEDURE — 1126F AMNT PAIN NOTED NONE PRSNT: CPT | Mod: CPTII,S$GLB,, | Performed by: INTERNAL MEDICINE

## 2024-10-15 NOTE — PATIENT INSTRUCTIONS
Procedure: Coronary Angiogram  Procedure date: October 28, 2024  Procedure time: 9am    Arrive at the Outpatient Surgery Unit ([facility] [facility]) at 7:30 am.  Nothing to eat or drink after midnight.  Take all morning medication with a sip of water.  Please make arrangements for a family member or friend to bring you home after the procedure. You will not be able to drive home.        If you have any questions, please call our office at [phone].

## 2024-10-15 NOTE — PROGRESS NOTES
Cardiology    10/15/2024  2:16 PM    Problem list  Patient Active Problem List   Diagnosis    Mitral valve prolapse    Vitamin D deficiency    Morbid obesity, unspecified obesity type    Nonrheumatic aortic valve stenosis    Moderate episode of recurrent major depressive disorder    BMI 37.0-37.9, adult    Osteoarthritis of knee    Arthritis of both knees       CC:  F/u    HPI:  She is here for follow-up.  Her echocardiogram shows severe aortic stenosis.  She remains with dyspnea on exertion at less than 1/2 block.  She has 5 steps leading to her front door and she has to stop walking up the steps because of shortness of breath.  She denies any rest symptoms.  She denies any chest pain or palpitation or syncope.    Medications  Current Outpatient Medications   Medication Sig Dispense Refill    cholecalciferol, vitamin D3, 1,250 mcg (50,000 unit) capsule Take 1 capsule every week by oral route.      NIFEdipine (PROCARDIA-XL) 30 MG (OSM) 24 hr tablet Take 1 tablet (30 mg total) by mouth once daily. 90 tablet 3    carBAMazepine (TEGRETOL XR) 100 MG 12 hr tablet Take 2 tablets (200 mg total) by mouth 2 (two) times daily. (Patient not taking: Reported on 10/15/2024) 120 tablet 11     No current facility-administered medications for this visit.      Prior to Admission medications    Medication Sig Start Date End Date Taking? Authorizing Provider   cholecalciferol, vitamin D3, 1,250 mcg (50,000 unit) capsule Take 1 capsule every week by oral route. 3/21/24  Yes Provider, Historical   NIFEdipine (PROCARDIA-XL) 30 MG (OSM) 24 hr tablet Take 1 tablet (30 mg total) by mouth once daily. 10/7/24 10/7/25 Yes Yordy Ng MD   carBAMazepine (TEGRETOL XR) 100 MG 12 hr tablet Take 2 tablets (200 mg total) by mouth 2 (two) times daily.  Patient not taking: Reported on 10/15/2024 9/25/24 9/25/25  Mayelin Sullivan PA-C         History  Past Medical History:   Diagnosis Date    Mitral valve prolapse      Past Surgical History:    Procedure Laterality Date    BREAST CYST EXCISION Left 80s    BREAST SURGERY      Benign lump on left.    CATARACT EXTRACTION       SECTION      x1    COLONOSCOPY N/A 2023    Procedure: COLONOSCOPY;  Surgeon: Pattie Stephenson MD;  Location: [facility] ([address] [place] [address]);  Service: Endoscopy;  Laterality: N/A;   ref by Ishmael Jacobson MD, PEG, instr. to portal-st     Social History     Socioeconomic History    Marital status:     Number of children: 2   Tobacco Use    Smoking status: Never    Smokeless tobacco: Never   Substance and Sexual Activity    Alcohol use: Yes     Comment: Rare.     Drug use: No    Sexual activity: Not Currently   Social History Narrative       , one child local dtr lives with her, one in [place] son in [place]/hx of AVR. 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      Social Drivers of Health     Financial Resource Strain: Low Risk  (2023)    Overall Financial Resource Strain (CARDIA)     Difficulty of Paying Living Expenses: Not very hard   Food Insecurity: No Food Insecurity (2023)    Hunger Vital Sign     Worried About Running Out of Food in the Last Year: Never true     Ran Out of Food in the Last Year: Never true   Transportation Needs: No Transportation Needs (2023)    PRAPARE - Transportation     Lack of Transportation (Medical): No     Lack of Transportation (Non-Medical): No   Physical Activity: Insufficiently Active (2023)    Exercise Vital Sign     Days of Exercise per Week: 2 days     Minutes of Exercise per Session: 20 min   Stress: No Stress Concern Present (2023)    [employer] [place] [employer] - Occupational Stress Questionnaire     Feeling of Stress : Only a little   Housing Stability: Unknown (2023)    Housing Stability Vital Sign     Unable to Pay for Housing in the Last Year: No     Unstable Housing in the Last Year: No         Allergies  Review of  patient's allergies indicates:   Allergen Reactions    [place] Rash         Review of Systems   Review of Systems   Constitutional: Negative for decreased appetite, fever and weight loss.   HENT:  Negative for congestion and nosebleeds.    Eyes:  Negative for double vision, vision loss in left eye, vision loss in right eye and visual disturbance.   Cardiovascular:  Positive for dyspnea on exertion. Negative for chest pain, claudication, cyanosis, irregular heartbeat, leg swelling, near-syncope, orthopnea, palpitations, paroxysmal nocturnal dyspnea and syncope.   Respiratory:  Positive for shortness of breath. Negative for cough, hemoptysis, sleep disturbances due to breathing, snoring, sputum production and wheezing.    Endocrine: Negative for cold intolerance and heat intolerance.   Skin:  Negative for nail changes and rash.   Musculoskeletal:  Negative for joint pain, muscle cramps, muscle weakness and myalgias.   Gastrointestinal:  Negative for change in bowel habit, heartburn, hematemesis, hematochezia, hemorrhoids and melena.   Neurological:  Negative for dizziness, focal weakness and headaches.         Physical Exam  Wt Readings from Last 1 Encounters:   10/11/24 88.9 kg (196 lb)     BP Readings from Last 3 Encounters:   10/15/24 126/84   10/11/24 (!) 140/98   10/07/24 (!) 140/98     Pulse Readings from Last 1 Encounters:   10/15/24 80     There is no height or weight on file to calculate BMI.    Physical Exam  Vitals reviewed.   Constitutional:       Appearance: She is obese.   Neck:      Vascular: No carotid bruit.   Cardiovascular:      Rate and Rhythm: Normal rate and regular rhythm.      Pulses:           Carotid pulses are 1+ on the right side and 1+ on the left side.       Radial pulses are 2+ on the right side and 2+ on the left side.      Heart sounds: S1 normal and S2 normal. Murmur heard.      Harsh midsystolic murmur is present with a grade of 2/6 at the upper right sternal border radiating to the  neck.   Pulmonary:      Breath sounds: Normal breath sounds and air entry.   Musculoskeletal:      Right lower leg: No edema.      Left lower leg: No edema.   Neurological:      Mental Status: She is alert.             Assessment  1. Nonrheumatic aortic valve stenosis (Primary)  Severe symptomatic aortic stenosis  - Case Request-RAD/Other Procedure Area: ANGIOGRAM, CORONARY ARTERY  - CBC Auto Differential; Future  - Comprehensive Metabolic Panel; Future  - Ambulatory referral/consult to Interventional Cardiology; Future    2. Mitral valve prolapse  Unchanged        Plan and Discussion  Discussed her symptoms are due to her severe symptomatic aortic stenosis.  Will refer to structural heart clinic.  Will set up coronary angiogram.    Follow Up  One month      Yordy Ng MD, F.A.C.C, F.S.C.A.I.      Total professional time spent for the encounter: 30 minutes  Time was spent preparing to see the patient, reviewing results of prior testing, obtaining and/or reviewing separately obtained history, performing a medically appropriate examination and interview, counseling and educating the patient/family, ordering medications/tests/procedures, referring and communicating with other health care professionals, documenting clinical information in the electronic health record, and independently interpreting results.    Disclaimer: This document was created using voice recognition software (M*Modal Fluency Direct). Although it may be edited, this document may contain errors related to incorrect recognition of the spoken word. Please call the physician if clarification is needed.

## 2024-10-18 ENCOUNTER — PROCEDURE VISIT (OUTPATIENT)
Dept: OPHTHALMOLOGY | Facility: CLINIC | Age: 69
End: 2024-10-18
Payer: MEDICARE

## 2024-10-18 DIAGNOSIS — G51.31 HEMIFACIAL SPASM OF RIGHT SIDE OF FACE: Primary | ICD-10-CM

## 2024-10-21 DIAGNOSIS — I35.0 NONRHEUMATIC AORTIC VALVE STENOSIS: Primary | ICD-10-CM

## 2024-10-21 NOTE — PROGRESS NOTES
HPI    Patient here for botox injection #1     Last edited by Irene Lane on 10/18/2024  9:56 AM.            Assessment /Plan     For exam results, see Encounter Report.    Hemifacial spasm of right side of face  -     onabotulinumtoxina injection 100 Units  -     Prior authorization Order        Here for botulinum toxin for right hemifacial spasm.      Informed consent obtained after extensive risks/benefits/alternatives were discussed with the patient including but not limited to pain, bleeding, infection, ocular injury, loss of the eye, asymmetry, need for revision in future, scarring.  Alternatives such as waiting were discussed.  All questions were answered.     Botox injections right face performed today.  pretarsal orbicularis upper eyelid and lower eyelid 2.5 units each  zygomaticus major (2.5 units X2) and minor 2.5 units each.      12.5 units injected. 87.5 units wasted. No complications. Patient tolerated procedure well.      Return in 3-4 months for repeat evaluation.

## 2024-10-28 ENCOUNTER — HOSPITAL ENCOUNTER (OUTPATIENT)
Facility: OTHER | Age: 69
Discharge: HOME | End: 2024-10-28
Attending: INTERNAL MEDICINE | Admitting: INTERNAL MEDICINE
Payer: MEDICARE

## 2024-10-28 VITALS
OXYGEN SATURATION: 98 % | HEART RATE: 74 BPM | WEIGHT: 196 LBS | RESPIRATION RATE: 16 BRPM | DIASTOLIC BLOOD PRESSURE: 71 MMHG | TEMPERATURE: 98 F | HEIGHT: 62 IN | SYSTOLIC BLOOD PRESSURE: 125 MMHG | BODY MASS INDEX: 36.07 KG/M2

## 2024-10-28 DIAGNOSIS — I35.0 NONRHEUMATIC AORTIC VALVE STENOSIS: ICD-10-CM

## 2024-10-28 PROCEDURE — 25000003 PHARM REV CODE 250: Performed by: INTERNAL MEDICINE

## 2024-10-28 PROCEDURE — 63600175 PHARM REV CODE 636 W HCPCS: Performed by: INTERNAL MEDICINE

## 2024-10-28 PROCEDURE — 25500020 PHARM REV CODE 255: Performed by: INTERNAL MEDICINE

## 2024-10-28 PROCEDURE — C1887 CATHETER, GUIDING: HCPCS | Performed by: INTERNAL MEDICINE

## 2024-10-28 PROCEDURE — C1769 GUIDE WIRE: HCPCS | Performed by: INTERNAL MEDICINE

## 2024-10-28 PROCEDURE — C1894 INTRO/SHEATH, NON-LASER: HCPCS | Performed by: INTERNAL MEDICINE

## 2024-10-28 PROCEDURE — 93454 CORONARY ARTERY ANGIO S&I: CPT | Mod: 26,,, | Performed by: INTERNAL MEDICINE

## 2024-10-28 PROCEDURE — 93454 CORONARY ARTERY ANGIO S&I: CPT | Performed by: INTERNAL MEDICINE

## 2024-10-28 RX ORDER — DIPHENHYDRAMINE HCL 25 MG
25 CAPSULE ORAL
Status: DISCONTINUED | OUTPATIENT
Start: 2024-10-28 | End: 2024-10-28 | Stop reason: HOSPADM

## 2024-10-28 RX ORDER — MIDAZOLAM HYDROCHLORIDE 1 MG/ML
INJECTION INTRAMUSCULAR; INTRAVENOUS
Status: DISCONTINUED | OUTPATIENT
Start: 2024-10-28 | End: 2024-10-28 | Stop reason: HOSPADM

## 2024-10-28 RX ORDER — FENTANYL CITRATE 50 UG/ML
INJECTION, SOLUTION INTRAMUSCULAR; INTRAVENOUS
Status: DISCONTINUED | OUTPATIENT
Start: 2024-10-28 | End: 2024-10-28 | Stop reason: HOSPADM

## 2024-10-28 RX ORDER — HEPARIN SODIUM 1000 [USP'U]/ML
INJECTION, SOLUTION INTRAVENOUS; SUBCUTANEOUS
Status: DISCONTINUED | OUTPATIENT
Start: 2024-10-28 | End: 2024-10-28 | Stop reason: HOSPADM

## 2024-10-28 RX ORDER — SODIUM CHLORIDE 9 MG/ML
INJECTION, SOLUTION INTRAVENOUS CONTINUOUS
Status: DISCONTINUED | OUTPATIENT
Start: 2024-10-28 | End: 2024-10-28 | Stop reason: HOSPADM

## 2024-10-28 RX ORDER — ONDANSETRON 8 MG/1
8 TABLET, ORALLY DISINTEGRATING ORAL EVERY 8 HOURS PRN
Status: DISCONTINUED | OUTPATIENT
Start: 2024-10-28 | End: 2024-10-28 | Stop reason: HOSPADM

## 2024-10-28 RX ORDER — HEPARIN SOD,PORCINE/0.9 % NACL 1000/500ML
INTRAVENOUS SOLUTION INTRAVENOUS
Status: DISCONTINUED | OUTPATIENT
Start: 2024-10-28 | End: 2024-10-28 | Stop reason: HOSPADM

## 2024-10-28 RX ORDER — LIDOCAINE HYDROCHLORIDE 10 MG/ML
INJECTION, SOLUTION EPIDURAL; INFILTRATION; INTRACAUDAL; PERINEURAL
Status: DISCONTINUED | OUTPATIENT
Start: 2024-10-28 | End: 2024-10-28 | Stop reason: HOSPADM

## 2024-10-28 RX ORDER — ACETAMINOPHEN 325 MG/1
650 TABLET ORAL EVERY 4 HOURS PRN
Status: DISCONTINUED | OUTPATIENT
Start: 2024-10-28 | End: 2024-10-28 | Stop reason: HOSPADM

## 2024-10-28 RX ADMIN — DIPHENHYDRAMINE HYDROCHLORIDE 25 MG: 25 CAPSULE ORAL at 08:10

## 2024-10-28 RX ADMIN — SODIUM CHLORIDE: 9 INJECTION, SOLUTION INTRAVENOUS at 08:10

## 2024-11-04 ENCOUNTER — HOSPITAL ENCOUNTER (OUTPATIENT)
Dept: RADIOLOGY | Facility: HOSPITAL | Age: 69
Discharge: HOME | End: 2024-11-04
Attending: INTERNAL MEDICINE
Payer: MEDICARE

## 2024-11-04 ENCOUNTER — OFFICE VISIT (OUTPATIENT)
Dept: CARDIOLOGY | Facility: CLINIC | Age: 69
End: 2024-11-04
Payer: MEDICARE

## 2024-11-04 VITALS
HEIGHT: 62 IN | BODY MASS INDEX: 36.07 KG/M2 | OXYGEN SATURATION: 97 % | WEIGHT: 196 LBS | SYSTOLIC BLOOD PRESSURE: 119 MMHG | HEART RATE: 83 BPM | DIASTOLIC BLOOD PRESSURE: 72 MMHG

## 2024-11-04 DIAGNOSIS — I10 HYPERTENSION, UNSPECIFIED TYPE: ICD-10-CM

## 2024-11-04 DIAGNOSIS — E66.01 MORBID OBESITY, UNSPECIFIED OBESITY TYPE: ICD-10-CM

## 2024-11-04 DIAGNOSIS — I35.0 NONRHEUMATIC AORTIC VALVE STENOSIS: Primary | ICD-10-CM

## 2024-11-04 DIAGNOSIS — I35.0 NONRHEUMATIC AORTIC VALVE STENOSIS: ICD-10-CM

## 2024-11-04 PROCEDURE — 71275 CT ANGIOGRAPHY CHEST: CPT | Mod: 26,,, | Performed by: RADIOLOGY

## 2024-11-04 PROCEDURE — 25500020 PHARM REV CODE 255: Performed by: INTERNAL MEDICINE

## 2024-11-04 PROCEDURE — 99999 PR PBB SHADOW E&M-EST. PATIENT-LVL III: CPT | Mod: PBBFAC,,, | Performed by: INTERNAL MEDICINE

## 2024-11-04 PROCEDURE — 74174 CTA ABD&PLVS W/CONTRAST: CPT | Mod: TC

## 2024-11-04 PROCEDURE — 74174 CTA ABD&PLVS W/CONTRAST: CPT | Mod: 26,,, | Performed by: RADIOLOGY

## 2024-11-04 RX ADMIN — IOHEXOL 100 ML: 350 INJECTION, SOLUTION INTRAVENOUS at 10:11

## 2024-11-04 NOTE — PROGRESS NOTES
"Referring provider: Dr. Yordy Ng     Patient ID:  Kandace Rapp is a 69 y.o. y.o. female who presents for evaluation No chief complaint on file.    Kandace Rapp is a 69 y.o. female referred by Dr Ng for evaluation of severe AS (NYHA Class II sx).     She has a known history of AS, but over the past 7 months she has had progressive dyspnea on exertion when walking short distances such as the 5 steps up to her house.  She does say that her son has a history of a bicuspid aortic valve, that was replaced at age 39.  Denies angina, lower extremity edema, orthopnea or any other associated events.    The patient has undergone the following TAVR work-up:   ECHO (Date 10/11/24): RUDOLPH= 0.8 cm2, MG= 47mmHg, Peak Keyshawn= 4.3 m/s, EF= 55-60%.   LHC (Date 1/28/24): normal coronaries    STS: 1.7%   Frailty: not frail    Iliacs are > on R and >  on L pending CT   LVOT area by CTA is  cm2 ( mm X  mm) and Avg Diameter is  per my independent review of images on [employer].  Incidental findings on CT: pending  CT Surgery risk assessment:  risk, per Dr  due to needs  Rhythm issues: none  PFTs: N/A  KCCQ/5 meter walk: done  Comorbidities: HTN, obesity      Kandace Rapp is a potential TAVR candidate, pending Cta.       ROS     Objective:     /72 (BP Location: Right arm, Patient Position: Sitting)   Pulse 83   Ht 5' 2" (1.575 m)   Wt 88.9 kg (195 lb 15.8 oz)   SpO2 97%   BMI 35.85 kg/m²     Physical Exam    Labs:     Lab Results   Component Value Date     10/15/2024    K 4.2 10/15/2024     10/15/2024    CO2 30 (H) 10/15/2024    BUN 19 10/15/2024    CREATININE 0.7 10/15/2024    ANIONGAP 5 (L) 10/15/2024     Lab Results   Component Value Date    HGBA1C 5.2 01/27/2023     No results found for: "BNP", "BNPTRIAGEBLO"    Lab Results   Component Value Date    WBC 5.35 10/15/2024    HGB 11.7 (L) 10/15/2024    HCT 37.2 10/15/2024     10/15/2024    GRAN 2.8 10/15/2024    GRAN " 51.6 10/15/2024     Lab Results   Component Value Date    CHOL 122 09/13/2022    HDL 45 09/13/2022    LDLCALC 63.4 09/13/2022    TRIG 68 09/13/2022       Meds:     Current Outpatient Medications:     cholecalciferol, vitamin D3, 1,250 mcg (50,000 unit) capsule, Take 1 capsule every week by oral route., Disp: , Rfl:     NIFEdipine (PROCARDIA-XL) 30 MG (OSM) 24 hr tablet, Take 1 tablet (30 mg total) by mouth once daily., Disp: 90 tablet, Rfl: 3    carBAMazepine (TEGRETOL XR) 100 MG 12 hr tablet, Take 2 tablets (200 mg total) by mouth 2 (two) times daily. (Patient not taking: Reported on 11/4/2024), Disp: 120 tablet, Rfl: 11      Assessment & Plan:     HTN (hypertension)  -well controlled, continue current regimen     Morbid obesity, unspecified obesity type  -diet and exercise     Nonrheumatic aortic valve stenosis  Kandace Rapp is a 69 y.o. female referred by Dr Ng for evaluation of severe AS (NYHA Class II sx).    The patient has undergone the following TAVR work-up:   ECHO (Date 10/11/24): RUDOLPH= 0.8 cm2, MG= 47mmHg, Peak Keyshawn= 4.3 m/s, EF= 55-60%.   LHC (Date 1/28/24): normal coronaries    STS: 1.7%   Frailty: not frail    Iliacs are > on R and >  on L pending CT   LVOT area by CTA is  cm2 ( mm X  mm) and Avg Diameter is  per my independent review of images on [employer].  Incidental findings on CT: pending  CT Surgery risk assessment:  risk, per   due to needs  Rhythm issues: none  PFTs: N/A  KCCQ/5 meter walk: done  Comorbidities: HTN, obesity    Kandace Rapp is a potential TAVR candidate, pending Cta.     Plan:   -obtain Cta  -referral to KARLI Norris MD   Interventional Cardiology

## 2024-11-04 NOTE — ASSESSMENT & PLAN NOTE
Kandace Rapp is a 69 y.o. female referred by Dr Ng for evaluation of severe AS (NYHA Class II sx).    The patient has undergone the following TAVR work-up:   ECHO (Date 10/11/24): RUDOLPH= 0.8 cm2, MG= 47mmHg, Peak Keyshawn= 4.3 m/s, EF= 55-60%.   LHC (Date 1/28/24): normal coronaries    STS: 1.7%   Frailty: not frail    Iliacs are > on R and >  on L pending CT   LVOT area by CTA is  cm2 ( mm X  mm) and Avg Diameter is  per my independent review of images on [employer].  Incidental findings on CT: pending  CT Surgery risk assessment:  risk, per   due to needs  Rhythm issues: none  PFTs: N/A  KCCQ/5 meter walk: done  Comorbidities: HTN, obesity    Kandace Rapp is a potential TAVR candidate, pending Cta.     Plan:   -obtain Cta  -referral to CTS

## 2024-11-04 NOTE — H&P (VIEW-ONLY)
"Referring provider: Dr. Yordy Ng     Patient ID:  Kandace Rapp is a 69 y.o. y.o. female who presents for evaluation No chief complaint on file.    Kandace Rapp is a 69 y.o. female referred by Dr Ng for evaluation of severe AS (NYHA Class II sx).     She has a known history of AS, but over the past 7 months she has had progressive dyspnea on exertion when walking short distances such as the 5 steps up to her house.  She does say that her son has a history of a bicuspid aortic valve, that was replaced at age 39.  Denies angina, lower extremity edema, orthopnea or any other associated events.    The patient has undergone the following TAVR work-up:   ECHO (Date 10/11/24): RUDOLPH= 0.8 cm2, MG= 47mmHg, Peak Keyshawn= 4.3 m/s, EF= 55-60%.   LHC (Date 1/28/24): normal coronaries    STS: 1.7%   Frailty: not frail    Iliacs are > on R and >  on L pending CT   LVOT area by CTA is  cm2 ( mm X  mm) and Avg Diameter is  per my independent review of images on [employer].  Incidental findings on CT: pending  CT Surgery risk assessment:  risk, per Dr  due to needs  Rhythm issues: none  PFTs: N/A  KCCQ/5 meter walk: done  Comorbidities: HTN, obesity      Kandace Rapp is a potential TAVR candidate, pending Cta.       ROS     Objective:     /72 (BP Location: Right arm, Patient Position: Sitting)   Pulse 83   Ht 5' 2" (1.575 m)   Wt 88.9 kg (195 lb 15.8 oz)   SpO2 97%   BMI 35.85 kg/m²     Physical Exam    Labs:     Lab Results   Component Value Date     10/15/2024    K 4.2 10/15/2024     10/15/2024    CO2 30 (H) 10/15/2024    BUN 19 10/15/2024    CREATININE 0.7 10/15/2024    ANIONGAP 5 (L) 10/15/2024     Lab Results   Component Value Date    HGBA1C 5.2 01/27/2023     No results found for: "BNP", "BNPTRIAGEBLO"    Lab Results   Component Value Date    WBC 5.35 10/15/2024    HGB 11.7 (L) 10/15/2024    HCT 37.2 10/15/2024     10/15/2024    GRAN 2.8 10/15/2024    GRAN " 51.6 10/15/2024     Lab Results   Component Value Date    CHOL 122 09/13/2022    HDL 45 09/13/2022    LDLCALC 63.4 09/13/2022    TRIG 68 09/13/2022       Meds:     Current Outpatient Medications:     cholecalciferol, vitamin D3, 1,250 mcg (50,000 unit) capsule, Take 1 capsule every week by oral route., Disp: , Rfl:     NIFEdipine (PROCARDIA-XL) 30 MG (OSM) 24 hr tablet, Take 1 tablet (30 mg total) by mouth once daily., Disp: 90 tablet, Rfl: 3    carBAMazepine (TEGRETOL XR) 100 MG 12 hr tablet, Take 2 tablets (200 mg total) by mouth 2 (two) times daily. (Patient not taking: Reported on 11/4/2024), Disp: 120 tablet, Rfl: 11      Assessment & Plan:     HTN (hypertension)  -well controlled, continue current regimen     Morbid obesity, unspecified obesity type  -diet and exercise     Nonrheumatic aortic valve stenosis  Kandace Rapp is a 69 y.o. female referred by Dr Ng for evaluation of severe AS (NYHA Class II sx).    The patient has undergone the following TAVR work-up:   ECHO (Date 10/11/24): RUDOLPH= 0.8 cm2, MG= 47mmHg, Peak Keyshawn= 4.3 m/s, EF= 55-60%.   LHC (Date 1/28/24): normal coronaries    STS: 1.7%   Frailty: not frail    Iliacs are > on R and >  on L pending CT   LVOT area by CTA is  cm2 ( mm X  mm) and Avg Diameter is  per my independent review of images on [employer].  Incidental findings on CT: pending  CT Surgery risk assessment:  risk, per   due to needs  Rhythm issues: none  PFTs: N/A  KCCQ/5 meter walk: done  Comorbidities: HTN, obesity    Kandace Rapp is a potential TAVR candidate, pending Cta.     Plan:   -obtain Cta  -referral to KARLI Norris MD   Interventional Cardiology

## 2024-11-05 ENCOUNTER — DOCUMENTATION ONLY (OUTPATIENT)
Dept: CARDIOLOGY | Facility: CLINIC | Age: 69
End: 2024-11-05
Payer: MEDICARE

## 2024-11-05 ENCOUNTER — PATIENT MESSAGE (OUTPATIENT)
Dept: CARDIOLOGY | Facility: CLINIC | Age: 69
End: 2024-11-05
Payer: MEDICARE

## 2024-11-05 ENCOUNTER — OFFICE VISIT (OUTPATIENT)
Dept: UROLOGY | Facility: CLINIC | Age: 69
End: 2024-11-05
Payer: MEDICARE

## 2024-11-05 ENCOUNTER — EDUCATION (OUTPATIENT)
Dept: CARDIOLOGY | Facility: CLINIC | Age: 69
End: 2024-11-05
Payer: MEDICARE

## 2024-11-05 VITALS
DIASTOLIC BLOOD PRESSURE: 79 MMHG | SYSTOLIC BLOOD PRESSURE: 127 MMHG | BODY MASS INDEX: 36.07 KG/M2 | HEIGHT: 62 IN | WEIGHT: 196 LBS | HEART RATE: 79 BPM

## 2024-11-05 DIAGNOSIS — N28.89 RENAL MASS: Primary | ICD-10-CM

## 2024-11-05 DIAGNOSIS — I35.0 SEVERE AORTIC STENOSIS: Primary | ICD-10-CM

## 2024-11-05 DIAGNOSIS — I10 PRIMARY HYPERTENSION: ICD-10-CM

## 2024-11-05 PROCEDURE — 1126F AMNT PAIN NOTED NONE PRSNT: CPT | Mod: CPTII,S$GLB,, | Performed by: UROLOGY

## 2024-11-05 PROCEDURE — 3008F BODY MASS INDEX DOCD: CPT | Mod: CPTII,S$GLB,, | Performed by: UROLOGY

## 2024-11-05 PROCEDURE — 1101F PT FALLS ASSESS-DOCD LE1/YR: CPT | Mod: CPTII,S$GLB,, | Performed by: UROLOGY

## 2024-11-05 PROCEDURE — 1160F RVW MEDS BY RX/DR IN RCRD: CPT | Mod: CPTII,S$GLB,, | Performed by: UROLOGY

## 2024-11-05 PROCEDURE — 99999 PR PBB SHADOW E&M-EST. PATIENT-LVL III: CPT | Mod: PBBFAC,,, | Performed by: UROLOGY

## 2024-11-05 PROCEDURE — 1159F MED LIST DOCD IN RCRD: CPT | Mod: CPTII,S$GLB,, | Performed by: UROLOGY

## 2024-11-05 PROCEDURE — 3078F DIAST BP <80 MM HG: CPT | Mod: CPTII,S$GLB,, | Performed by: UROLOGY

## 2024-11-05 PROCEDURE — 3074F SYST BP LT 130 MM HG: CPT | Mod: CPTII,S$GLB,, | Performed by: UROLOGY

## 2024-11-05 PROCEDURE — 3288F FALL RISK ASSESSMENT DOCD: CPT | Mod: CPTII,S$GLB,, | Performed by: UROLOGY

## 2024-11-05 PROCEDURE — 99204 OFFICE O/P NEW MOD 45 MIN: CPT | Mod: S$GLB,,, | Performed by: UROLOGY

## 2024-11-05 RX ORDER — DIPHENHYDRAMINE HCL 50 MG
50 CAPSULE ORAL ONCE
Status: CANCELLED | OUTPATIENT
Start: 2024-11-05 | End: 2024-11-05

## 2024-11-05 RX ORDER — SODIUM CHLORIDE 0.9 % (FLUSH) 0.9 %
10 SYRINGE (ML) INJECTION
Status: SHIPPED | OUTPATIENT
Start: 2024-11-05

## 2024-11-05 RX ORDER — SODIUM CHLORIDE 9 MG/ML
INJECTION, SOLUTION INTRAVENOUS CONTINUOUS
Status: CANCELLED | OUTPATIENT
Start: 2024-11-05 | End: 2024-11-05

## 2024-11-05 NOTE — PROGRESS NOTES
Dr. Knutson reviewed TAVR CTA images.  Spoke to patient and informed her of suspicious lesion on left kidney.  Placed referral to Urology/Dr. Sawyer and sent message to staff.  Patient understands plan.

## 2024-11-05 NOTE — PROGRESS NOTES
Dr. Knutson spoke to Ms. Rapp regarding CTA results and discussion with Dr. Sawyer for treatment of renal mass. Will admit on 11/6/24 for BAV.  Questions answered and instructions reviewed with patient.  Verbalized understanding.  Will need labs and consents on arrival.  [employer] rep also notified and will be on site.

## 2024-11-05 NOTE — PROGRESS NOTES
OUTPATIENT CATHETERIZATION INSTRUCTIONS    You have been scheduled for a procedure in the catheterization lab on Wednesday, November 6, 2024.     Please report to the Cardiology Waiting Area on the Third floor of the hospital and check in at 6:00AM.  You will then be taken to the SSCU (Short Stay Cardiac Unit) and prepared for your procedure. Please be aware that this is not the time of your procedure but the time you are to arrive. The procedures are scheduled on an hourly basis; however, emergency cases take precedence over all other cases.       Nothing to eat after MIDNIGHT 12 AM You may have clear liquids until the time of your admission which should be 2 hours prior to your procedure.          You are encouraged to drink at least 16 ounces of clear liquids prior to your admission to SSCU.          Clear liquids include water, black coffee, clear juices, and performance drinks - no pulp or milk.         .    2.   You may take your regular morning medications with water.       3.   Hold the following medications prior to your procedure:NONE                The procedure will take 1-2 hours to perform. During the procedure you will receive IV sedation administered by a nurse.  Most patients will sleep through procedure.  After the procedure, you will either return to SSCU or spend some time in the cath lab recovery room.  If appropriate, your family will be able to stay with you during this time.      You should be discharged home that same day if you are stable and there are no complications.  Your doctor will determine whether you are discharged or kept overnight  based on your particular procedure and time that procedure is completed.   The results of your procedure will be discussed with you before you are discharged.     YOU WILL NOT BE ABLE TO DRIVE YOURSELF HOME.  SOMEONE MUST BE ABLE TO DRIVE YOU HOME FROM HOSPITAL.       If you should have any questions, concerns, or need to change the date of your  procedure, please call  WINSTON Porter @ [phone].      INSTRUCTIONS WERE GIVEN TO THE PATIENT VERBALLY AND THEY VERBALIZED UNDERSTANDING.  THEY DO NOT REQUIRE ANY SPECIAL NEEDS AND DO NOT HAVE ANY LEARNING BARRIERS.                            Directions for Reporting to Cardiology Waiting Area in the Hospital  If you park in the Parking Garage:  Take elevators to the1st floor of the parking garage.  Continue past the gift shop, coffee shop, and piano.  Take a right and go to the gold elevators. (Elevator B)  Take the elevator to the 3rd floor.  Follow the arrow on the sign on the wall that says Cath Lab Registration/EP Lab Registration.  Follow the long hallway all the way around until you come to a big open area.  This is the registration area.  Check in at Reception Desk.    OR    If family is dropping you off:  Have them drop you off at the front of the Hospital under the green overhang.  Enter through the doors and take a right.  Take the E elevators to the 3rd floor Cardiology Waiting Area.  Check in at the Reception Desk in the waiting room.

## 2024-11-05 NOTE — PROGRESS NOTES
Patient ID: Kandace Rapp is a 69 y.o. female.    Chief Complaint: Renal mass      HPI: Kandace Rapp is a 69 y.o. Black or  female who presents today for evaluation and management of a renal mass.    The mass was found on workup for TAVR (plans for sometime in /Feb).  Most recent echo with EF 55-60%.  CTA from 24 obtained independently reviewed and shows 4 cm L lower pole Bosniak 4 lesion.      The patient denies gross hematuria and/or constitutional symptoms attributable to their recently discovered renal mass.    The patient denies a personal and family history of kidney cancer.  Not on any blood thinners.  Normal kidney function.   Abdominal surgeries: C section x2    The patient presents today to discuss how best to proceed with their Left renal mass.    Review of patient's allergies indicates:   Allergen Reactions    [place] Rash       Current Outpatient Medications   Medication Sig Dispense Refill    cholecalciferol, vitamin D3, 1,250 mcg (50,000 unit) capsule Take 1 capsule every week by oral route.      NIFEdipine (PROCARDIA-XL) 30 MG (OSM) 24 hr tablet Take 1 tablet (30 mg total) by mouth once daily. 90 tablet 3    carBAMazepine (TEGRETOL XR) 100 MG 12 hr tablet Take 2 tablets (200 mg total) by mouth 2 (two) times daily. (Patient not taking: Reported on 2024) 120 tablet 11     No current facility-administered medications for this visit.       Past Medical History:   Diagnosis Date    Mitral valve prolapse        Past Surgical History:   Procedure Laterality Date    BREAST CYST EXCISION Left 80s    BREAST SURGERY      Benign lump on left.    CATARACT EXTRACTION       SECTION      x1    COLONOSCOPY N/A 2023    Procedure: COLONOSCOPY;  Surgeon: Pattie Stephenson MD;  Location: [facility] ([address] [place] [address]);  Service: Endoscopy;  Laterality: N/A;   ref by Ishmael Jacobson MD, PEG, instr. to portal-st    CORONARY ANGIOGRAPHY N/A 10/28/2024    Procedure:  ANGIOGRAM, CORONARY ARTERY;  Surgeon: Yordy Ng MD;  Location: [facility] CATH LAB;  Service: Cardiology;  Laterality: N/A;  radial access       Family History   Problem Relation Name Age of Onset    Breast cancer Mother 102     Lymphoma Mother 102     Heart disease Mother 102     Cancer Mother 102     Diabetes Sister      Heart disease Brother      Alcohol abuse Brother      Heart disease Maternal Uncle      Heart disease Maternal Grandfather      Cancer Father          panc ca    Breast cancer Paternal Aunt           Review of Systems  Objective:     Physical Exam  Constitutional:       Appearance: Normal appearance.   HENT:      Head: Atraumatic.   Pulmonary:      Effort: Pulmonary effort is normal.   Neurological:      General: No focal deficit present.      Mental Status: She is alert.       Assessment:       1. Renal mass        Plan:     1. Renal mass        No orders of the defined types were placed in this encounter.    Left renal mass:  - Long talk about renal mass and it's management.  Reviewed images.Discussed options including active surveillance, biopsy, minimally invasive techniques including HFRA, cryo. Discussed open and laparascopic surgical approaches. Discussed partial and radical nephrectomy. Discussed surgery preparation, surgery, recuperation, recovery, exercise restrictions. Discussed risks, benefits, and complications. Answered questions and addressed their concerns.  - will plan for robotic L partial nephrectomy.  Needs cardiac clearance.      -will coordinate tavr and treatment sequencing.     2. HTN:  -BP reviewed  -stable, continue meds and f/u with PCP      The patient was seen and examined with the resident physician.  All questions were answered.  The plan was discussed with the patient and I concur with the resident physician's documentation.

## 2024-11-06 ENCOUNTER — HOSPITAL ENCOUNTER (OUTPATIENT)
Facility: HOSPITAL | Age: 69
Discharge: HOME | End: 2024-11-06
Attending: INTERNAL MEDICINE | Admitting: INTERNAL MEDICINE
Payer: MEDICARE

## 2024-11-06 VITALS
HEART RATE: 77 BPM | BODY MASS INDEX: 36.07 KG/M2 | RESPIRATION RATE: 12 BRPM | HEIGHT: 62 IN | WEIGHT: 196 LBS | TEMPERATURE: 98 F | DIASTOLIC BLOOD PRESSURE: 63 MMHG | OXYGEN SATURATION: 96 % | SYSTOLIC BLOOD PRESSURE: 107 MMHG

## 2024-11-06 DIAGNOSIS — I35.0 NONRHEUMATIC AORTIC VALVE STENOSIS: Primary | ICD-10-CM

## 2024-11-06 DIAGNOSIS — I35.0 AORTIC STENOSIS: ICD-10-CM

## 2024-11-06 DIAGNOSIS — I35.0 SEVERE AORTIC STENOSIS: ICD-10-CM

## 2024-11-06 LAB
ABO + RH BLD: NORMAL
ANION GAP SERPL CALC-SCNC: 8 MMOL/L (ref 8–16)
BLD GP AB SCN CELLS X3 SERPL QL: NORMAL
BUN SERPL-MCNC: 19 MG/DL (ref 8–23)
CALCIUM SERPL-MCNC: 9.7 MG/DL (ref 8.7–10.5)
CHLORIDE SERPL-SCNC: 104 MMOL/L (ref 95–110)
CO2 SERPL-SCNC: 28 MMOL/L (ref 23–29)
CREAT SERPL-MCNC: 0.8 MG/DL (ref 0.5–1.4)
ERYTHROCYTE [DISTWIDTH] IN BLOOD BY AUTOMATED COUNT: 11.9 % (ref 11.5–14.5)
EST. GFR  (NO RACE VARIABLE): >60 ML/MIN/1.73 M^2
GLUCOSE SERPL-MCNC: 99 MG/DL (ref 70–110)
HCT VFR BLD AUTO: 36 % (ref 37–48.5)
HGB BLD-MCNC: 11 G/DL (ref 12–16)
MCH RBC QN AUTO: 28 PG (ref 27–31)
MCHC RBC AUTO-ENTMCNC: 30.6 G/DL (ref 32–36)
MCV RBC AUTO: 92 FL (ref 82–98)
OHS QRS DURATION: 82 MS
OHS QTC CALCULATION: 433 MS
PLATELET # BLD AUTO: 278 K/UL (ref 150–450)
PMV BLD AUTO: 9.8 FL (ref 9.2–12.9)
POTASSIUM SERPL-SCNC: 4.3 MMOL/L (ref 3.5–5.1)
RBC # BLD AUTO: 3.93 M/UL (ref 4–5.4)
SODIUM SERPL-SCNC: 140 MMOL/L (ref 136–145)
SPECIMEN OUTDATE: NORMAL
WBC # BLD AUTO: 7.42 K/UL (ref 3.9–12.7)

## 2024-11-06 PROCEDURE — 63600175 PHARM REV CODE 636 W HCPCS: Performed by: INTERNAL MEDICINE

## 2024-11-06 PROCEDURE — C1725 CATH, TRANSLUMIN NON-LASER: HCPCS | Performed by: INTERNAL MEDICINE

## 2024-11-06 PROCEDURE — 85027 COMPLETE CBC AUTOMATED: CPT | Performed by: INTERNAL MEDICINE

## 2024-11-06 PROCEDURE — 80048 BASIC METABOLIC PNL TOTAL CA: CPT | Performed by: INTERNAL MEDICINE

## 2024-11-06 PROCEDURE — C1894 INTRO/SHEATH, NON-LASER: HCPCS | Performed by: INTERNAL MEDICINE

## 2024-11-06 PROCEDURE — 92986 REVISION OF AORTIC VALVE: CPT | Performed by: INTERNAL MEDICINE

## 2024-11-06 PROCEDURE — 25500020 PHARM REV CODE 255: Performed by: INTERNAL MEDICINE

## 2024-11-06 PROCEDURE — 99153 MOD SED SAME PHYS/QHP EA: CPT | Performed by: INTERNAL MEDICINE

## 2024-11-06 PROCEDURE — 99152 MOD SED SAME PHYS/QHP 5/>YRS: CPT | Mod: ,,, | Performed by: INTERNAL MEDICINE

## 2024-11-06 PROCEDURE — 86900 BLOOD TYPING SEROLOGIC ABO: CPT | Performed by: INTERNAL MEDICINE

## 2024-11-06 PROCEDURE — 93010 ELECTROCARDIOGRAM REPORT: CPT | Mod: ,,, | Performed by: INTERNAL MEDICINE

## 2024-11-06 PROCEDURE — 75625 CONTRAST EXAM ABDOMINL AORTA: CPT | Performed by: INTERNAL MEDICINE

## 2024-11-06 PROCEDURE — 93005 ELECTROCARDIOGRAM TRACING: CPT

## 2024-11-06 PROCEDURE — 99152 MOD SED SAME PHYS/QHP 5/>YRS: CPT | Performed by: INTERNAL MEDICINE

## 2024-11-06 PROCEDURE — C1769 GUIDE WIRE: HCPCS | Performed by: INTERNAL MEDICINE

## 2024-11-06 PROCEDURE — 75625 CONTRAST EXAM ABDOMINL AORTA: CPT | Mod: 26,,, | Performed by: INTERNAL MEDICINE

## 2024-11-06 PROCEDURE — C1760 CLOSURE DEV, VASC: HCPCS | Performed by: INTERNAL MEDICINE

## 2024-11-06 PROCEDURE — 25000003 PHARM REV CODE 250: Performed by: INTERNAL MEDICINE

## 2024-11-06 PROCEDURE — 36415 COLL VENOUS BLD VENIPUNCTURE: CPT | Performed by: INTERNAL MEDICINE

## 2024-11-06 PROCEDURE — 92986 REVISION OF AORTIC VALVE: CPT | Mod: ,,, | Performed by: INTERNAL MEDICINE

## 2024-11-06 PROCEDURE — 27201423 OPTIME MED/SURG SUP & DEVICES STERILE SUPPLY: Performed by: INTERNAL MEDICINE

## 2024-11-06 RX ORDER — SODIUM CHLORIDE 9 MG/ML
INJECTION, SOLUTION INTRAVENOUS CONTINUOUS
Status: ACTIVE | OUTPATIENT
Start: 2024-11-06 | End: 2024-11-06

## 2024-11-06 RX ORDER — DIPHENHYDRAMINE HCL 50 MG
50 CAPSULE ORAL ONCE
Status: COMPLETED | OUTPATIENT
Start: 2024-11-06 | End: 2024-11-06

## 2024-11-06 RX ORDER — PHENYLEPHRINE HCL IN 0.9% NACL 1 MG/10 ML
SYRINGE (ML) INTRAVENOUS
Status: DISCONTINUED | OUTPATIENT
Start: 2024-11-06 | End: 2024-11-06 | Stop reason: HOSPADM

## 2024-11-06 RX ORDER — HEPARIN SOD,PORCINE/0.9 % NACL 1000/500ML
INTRAVENOUS SOLUTION INTRAVENOUS
Status: DISCONTINUED | OUTPATIENT
Start: 2024-11-06 | End: 2024-11-06 | Stop reason: HOSPADM

## 2024-11-06 RX ORDER — SODIUM CHLORIDE 9 MG/ML
INJECTION, SOLUTION INTRAVENOUS CONTINUOUS
Status: DISCONTINUED | OUTPATIENT
Start: 2024-11-06 | End: 2024-11-06 | Stop reason: HOSPADM

## 2024-11-06 RX ORDER — LIDOCAINE HYDROCHLORIDE 20 MG/ML
INJECTION INTRAVENOUS
Status: DISCONTINUED | OUTPATIENT
Start: 2024-11-06 | End: 2024-11-06 | Stop reason: HOSPADM

## 2024-11-06 RX ORDER — MIDAZOLAM HYDROCHLORIDE 2 MG/2ML
INJECTION, SOLUTION INTRAMUSCULAR; INTRAVENOUS
Status: DISCONTINUED | OUTPATIENT
Start: 2024-11-06 | End: 2024-11-06 | Stop reason: HOSPADM

## 2024-11-06 RX ORDER — HEPARIN SODIUM 1000 [USP'U]/ML
INJECTION, SOLUTION INTRAVENOUS; SUBCUTANEOUS
Status: DISCONTINUED | OUTPATIENT
Start: 2024-11-06 | End: 2024-11-06 | Stop reason: HOSPADM

## 2024-11-06 RX ORDER — PROTAMINE SULFATE 10 MG/ML
INJECTION, SOLUTION INTRAVENOUS
Status: DISCONTINUED | OUTPATIENT
Start: 2024-11-06 | End: 2024-11-06 | Stop reason: HOSPADM

## 2024-11-06 RX ORDER — FENTANYL CITRATE 50 UG/ML
INJECTION, SOLUTION INTRAMUSCULAR; INTRAVENOUS
Status: DISCONTINUED | OUTPATIENT
Start: 2024-11-06 | End: 2024-11-06 | Stop reason: HOSPADM

## 2024-11-06 RX ORDER — LIDOCAINE HYDROCHLORIDE 20 MG/ML
INJECTION, SOLUTION EPIDURAL; INFILTRATION; INTRACAUDAL; PERINEURAL
Status: DISCONTINUED | OUTPATIENT
Start: 2024-11-06 | End: 2024-11-06 | Stop reason: HOSPADM

## 2024-11-06 NOTE — BRIEF OP NOTE
Post Cath Note  Preoperative Diagnosis: Severe aortic stenosis [I35.0]   Postop Diagnosis: Severe aortic stenosis [I35.0]  Referring Physician: Rohit Quinonez     Procedure: REPAIR, AORTIC VALVE (N/A), AORTOGRAM (N/A)       Access: Right CFA  : Rohit Quinonez MD   All Operators: Surgeon(s):  Cuate Norris MD Subramaniam, Venkat, MD Tafur Soto, Jose D., MD         See full report for further details    Intervention:   - S/P successful BAV    Treatments/Procedures: Procedure(s) (LRB):  REPAIR, AORTIC VALVE (N/A)  AORTOGRAM (N/A)     -Closure device: Perclosure    Findings:  Mod-severe AS.  Successful BAV with 18mm Jeff. See catheterization report for full details.    Estimated Blood loss: 20 cc    Specimens removed: No  Post Cath Exam:     Vitals:    11/06/24 0618   BP: 130/75   Pulse:    Resp:    Temp:      No unusual pain, hematoma, thrill or bruit at vascular access site.  Distal pulse present without signs of ischemia.    Recommendations:   - Patient tolerated procedure well. No immediate complications  - Routine post-cath care      Rafita Helm - Pager# [phone]  11/6/2024  9:04 AM  Cardiovascular Fellow  [facility]

## 2024-11-06 NOTE — NURSING
Report received from procedural RN Man. Pt brought back to room 3 on SSCU. Pt is Ox4 but drowsy. Pt follows commands appropriately. Pt's right groin puncture site has gauze/tegaderm intact and site is free from s/s of bleeding. Pt's NS gtt running. Pt verbalized understanding of restrictions. Pt's daughter called to the bedside. Safety measures in place.

## 2024-11-06 NOTE — DISCHARGE SUMMARY
[facility] - Short Stay Cardiac Unit  Discharge Note  Short Stay    Procedure(s) (LRB):  REPAIR, AORTIC VALVE (N/A)  AORTOGRAM (N/A)    Pt brought to the cath lab.  RCFA access obtained and pt underwent successful BAV with 18mm Jeff Balloon.  Pt subsequently discharged home without complications.  No medication changes.    OUTCOME: Patient tolerated treatment/procedure well without complication and is now ready for discharge.    DISPOSITION: Home or Self Care    FINAL DIAGNOSIS:  <principal problem not specified>    FOLLOWUP: In clinic    DISCHARGE INSTRUCTIONS:  No discharge procedures on file.     TIME SPENT ON DISCHARGE: 31 minutes

## 2024-11-06 NOTE — NURSING
Pt prepped in room 3 on SSCU. Pt is AAOx4 and free from s/s of pain/distress. Pt's vs wnl and pt moves independently. Preop questions completed, iv started, labs drawn, mepilex applied to heals and bottom, 12 lead EKG verified. Pt's daughter is at bedside. Benadryl not given yet due to pt needing to complete consents.  Procedural and anesthesia consents not done. Safety measures in place.

## 2024-11-06 NOTE — INTERVAL H&P NOTE
The patient has been examined and the H&P has been reviewed:    I concur with the findings and changes have been noted since the H&P was written: Followed up with Urology for CTA from 11/4/24 showed 4 cm L lower pole Bosniak 4 lesion and planning for robotic L partial nephrectomy  .  Today is she is scheduled Aortic Valve Repair. Consents obtained.    Access R CFA  No ASA needed prior to procedure    Procedure risks, benefits and alternative options discussed and understood by patient/family.          There are no hospital problems to display for this patient.

## 2024-11-07 DIAGNOSIS — N28.89 RENAL MASS: Primary | ICD-10-CM

## 2024-11-08 ENCOUNTER — DOCUMENTATION ONLY (OUTPATIENT)
Dept: CARDIOLOGY | Facility: CLINIC | Age: 69
End: 2024-11-08
Payer: MEDICARE

## 2024-11-08 ENCOUNTER — TELEPHONE (OUTPATIENT)
Dept: PREADMISSION TESTING | Facility: HOSPITAL | Age: 69
End: 2024-11-08
Payer: MEDICARE

## 2024-11-08 NOTE — TELEPHONE ENCOUNTER
----- Message from WINSTON Porter sent at 11/8/2024  1:57 PM CST -----  Regarding: RE: cardiac clearance  Dr. Knutson did Aortic Valvuloplasty.  He has cleared for surgery.  He did send a message to Dr. Harris.  I can put a note in her chart as well.  WINSTON Porter  ----- Message -----  From: Jared Arreola RN  Sent: 11/8/2024   9:41 AM CST  To: Rohit Putnam MD; Yordy Ng MD; #  Subject: cardiac clearance                                Good morning, Doctors. I wasn't quite sure who was best to send this request to, so I'm sending to both. Seeking cardiac clearance.      Your patient indicated above requires Pre-Op Clearance/ Risk Stratification for a DV5 ROBOTIC NEPHRECTOMY, PARTIAL  with Dr. HARRIS on # (General anesthesia, 277 minutes).  Anesthesia review is in progress.    If a chart review is appropriate for clearance, please indicate in a note in the EMR.     If not, please advise timely, so that your clinic may schedule an appointment.       The following labs/tests have been ordered: T&S  If further diagnostics are required please feel free to initiate.       Thank you,  Jared Arreola RN  [facility]est[facility], [facility] [place]

## 2024-11-08 NOTE — TELEPHONE ENCOUNTER
----- Message from WINSTON Coleman sent at 11/8/2024  9:33 AM CST -----  12/12 surgery Sawyer    Please schedule pt for lab and NP or Naresh/POC. Thanks!

## 2024-11-08 NOTE — ANESTHESIA PAT ROS NOTE
2024  Kandace Rapp is a 69 y.o., female.      Pre-op Assessment          Review of Systems           Anesthesia Assessment: Preoperative EQUATION    Planned Procedure: Procedure(s) (LRB):  DV5 ROBOTIC NEPHRECTOMY, PARTIAL (Left)  Requested Anesthesia Type:General  Surgeon: Adam Sawyer MD  Service: Urology  Known or anticipated Date of Surgery:2024    Surgeon notes: reviewed    Electronic QUestionnaire Assessment completed via nurse interview with patient.        Triage considerations:         Previous anesthesia records: General Anesthesia, No ett, No problems  23 colonoscopy, gen anes, ASA 3    Last PCP note: > 1 year ago , within [facility]   Subspecialty notes: Cardiology: General, Urology    Other important co-morbidities: HTN, Obesity, and rt renal mass, severe AS s/p 24 ballooning procedure in cath lab pt also being worked up for TAVR (which is how renal mass found), MVP, h/o breast cyst excision, h/o cataract extraction, h/o , vitamin D deficiency, depression, OA knees, h/o tonsillectomy, neurovascular compression syndrome       Tests already available:  Available tests,  within 3 months , within [facility] .   24 BMP, CBC  10/15/24 CMP  24 EKG (NSR)  10/11/24 TTE (EF 55-60%)            Instructions     Optimization:  Anesthesia Preop Clinic Assessment Indicated    Medical Opinion Indicated       Sub-specialist consult indicated: TBD       Plan:    Testing:  T&S   Pre-anesthesia  visit       Visit focus: concerns in complex and/or prolonged anesthesia, clearance Joe 3/ASA 3, last anesthesia over a year ago (23 c-scope)     Consultation: Perioperative Hospitalistnori    Navigation: Tests Scheduled.              Consults scheduled.             Results will be tracked by Preop Clinic.    24 in-basket message sent to [facility]  requesting cardiac clearance

## 2024-11-08 NOTE — TELEPHONE ENCOUNTER
----- Message from WINSTON Porter sent at 11/8/2024  1:57 PM CST -----  Regarding: RE: cardiac clearance  Dr. Knutson did Aortic Valvuloplasty.  He has cleared for surgery.  He did send a message to Dr. Harris.  I can put a note in her chart as well.  WINSTON Porter  ----- Message -----  From: Jared Arreola RN  Sent: 11/8/2024   9:41 AM CST  To: Rohit Putnam MD; Yordy Ng MD; #  Subject: cardiac clearance                                Good morning, Doctors. I wasn't quite sure who was best to send this request to, so I'm sending to both. Seeking cardiac clearance.      Your patient indicated above requires Pre-Op Clearance/ Risk Stratification for a DV5 ROBOTIC NEPHRECTOMY, PARTIAL  with Dr. HARRIS on # (General anesthesia, 277 minutes).  Anesthesia review is in progress.    If a chart review is appropriate for clearance, please indicate in a note in the EMR.     If not, please advise timely, so that your clinic may schedule an appointment.       The following labs/tests have been ordered: T&S  If further diagnostics are required please feel free to initiate.       Thank you,  Jared Arreola RN  Anesthesia [facility], [facility] [place]

## 2024-11-08 NOTE — PROGRESS NOTES
Dr. Knutson has given Cardiac clearance on Ms. Rapp for DV5 ROBOTIC NEPHRECTOMY, PARTIAL  with Dr. HARRIS .  Patient has aortic stenosis and underwent Aortic Valvuloplasty on 11/6/24.   .

## 2024-11-25 ENCOUNTER — TELEPHONE (OUTPATIENT)
Dept: OPHTHALMOLOGY | Facility: CLINIC | Age: 69
End: 2024-11-25
Payer: MEDICARE

## 2024-11-25 NOTE — TELEPHONE ENCOUNTER
----- Message from Juju sent at 11/25/2024 10:10 AM CST -----  Regarding: RE: Billing Inquiry  Pt currently has a $0 self pay balance. Did she have cosmetic botox that needs to be collected?  ----- Message -----  From: Irene Lane  Sent: 11/25/2024   9:36 AM CST  To: Juju De Santiago  Subject: FW: Billing Inquiry                                ----- Message -----  From: aMry De La O  Sent: 11/25/2024   9:25 AM CST  To: Irene Lane  Subject: FW: Billing Inquiry                                ----- Message -----  From: Irene Lane  Sent: 11/25/2024   9:21 AM CST  To: Mary De La O  Subject: FW: Billing Inquiry                                ----- Message -----  From: Nadia Krishnamurthy  Sent: 11/25/2024   9:20 AM CST  To: Prabha Mcwilliams Staff  Subject: Billing Inquiry                                  Type: Billing Inquiry     Who Called:Kandace Rapp    Would the patient rather a call back or a response via [facility]? Call back    Best Call Back Number:  [phone]    Additional Information:Patient has questions about 10/18 procedure and never receiving a bill. Patient would just like to make sure she is not behind on paying any bills.

## 2024-12-01 PROBLEM — H02.409 PTOSIS OF EYELID: Status: ACTIVE | Noted: 2024-03-04

## 2024-12-01 NOTE — H&P (VIEW-ONLY)
Urology ([facility]) H&P for upcoming procedure  Staff:  Adam Sawyer MD     CC: left renal mass    HPI:  Kandace Rapp is a 69 y.o. female with a left Bosniak 4 lesion.      The mass was found on workup for TAVR (plans for sometime in /Feb).  Most recent echo with EF 55-60%.  CTA from 24 obtained independently reviewed and shows 4 cm L lower pole Bosniak 4 lesion.       The patient denies gross hematuria and/or constitutional symptoms attributable to their recently discovered renal mass.     The patient denies a personal and family history of kidney cancer.    Does have history of post anesthesia urinary retention and difficulty tolerating ferguson catheter.     Patient reports no personal history of tobacco use.  Radiation therapy to pelvis: None  Exposure to harmful chemicals:  History of Urolithiasis: None.  Patient has not had recent urine culture.    History of DM: None. Last A1c 5.2 on 23.  No history of CKD: None. Baseline Cr 0.8 on 24. Baseline GFR >60. They have never previously been seen by nephrology.  Additional PMH: MVP.    Previous abdominal surgery:  x2  Blood thinners: None       ROS: Negative except for as stated above    Past Medical History:   Diagnosis Date    Anxiety     Aortic stenosis     Arthritis     CHF (congestive heart failure)     Heart murmur     Hypertension     Mitral valve prolapse        Past Surgical History:   Procedure Laterality Date    AORTIC VALVULOPLASTY N/A 2024    Procedure: REPAIR, AORTIC VALVE;  Surgeon: Rohit Quinonez MD;  Location: [facility] CATH LAB;  Service: Cardiology;  Laterality: N/A;    AORTOGRAPHY N/A 2024    Procedure: AORTOGRAM;  Surgeon: Rohit Quinonez MD;  Location: [facility] CATH LAB;  Service: Cardiology;  Laterality: N/A;    BREAST CYST EXCISION Left 80s    BREAST SURGERY      Benign lump on left.    CATARACT EXTRACTION       SECTION      x1    COLONOSCOPY N/A 2023    Procedure: COLONOSCOPY;   Surgeon: Pattie Stephenson MD;  Location: [facility] ENDO (4TH FLR);  Service: Endoscopy;  Laterality: N/A;   ref by Ishmael Jacobson MD, PEG, instr. to portal-st    CORONARY ANGIOGRAPHY N/A 10/28/2024    Procedure: ANGIOGRAM, CORONARY ARTERY;  Surgeon: Yordy Ng MD;  Location: [facility] CATH LAB;  Service: Cardiology;  Laterality: N/A;  radial access    VALVE STUDY-AORTIC  2024    Procedure: Valve study-aortic;  Surgeon: Rohit Quinonez MD;  Location: [facility] CATH LAB;  Service: Cardiology;;       Social History     Socioeconomic History    Marital status:     Number of children: 2   Tobacco Use    Smoking status: Never    Smokeless tobacco: Never   Substance and Sexual Activity    Alcohol use: Yes     Comment: Rare.     Drug use: No    Sexual activity: Not Currently   Social History Narrative       , one child local dtr lives with her, one in [place] son in [place]/hx of AVR. 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      Social Drivers of Health     Financial Resource Strain: Low Risk  (2023)    Overall Financial Resource Strain (CARDIA)     Difficulty of Paying Living Expenses: Not very hard   Food Insecurity: No Food Insecurity (2023)    Hunger Vital Sign     Worried About Running Out of Food in the Last Year: Never true     Ran Out of Food in the Last Year: Never true   Transportation Needs: No Transportation Needs (2023)    PRAPARE - Transportation     Lack of Transportation (Medical): No     Lack of Transportation (Non-Medical): No   Physical Activity: Insufficiently Active (2023)    Exercise Vital Sign     Days of Exercise per Week: 2 days     Minutes of Exercise per Session: 20 min   Stress: No Stress Concern Present (2023)    [employer] - Occupational Stress Questionnaire     Feeling of Stress : Only a little   Housing Stability: Unknown (2023)    Housing Stability Vital  "Sign     Unable to Pay for Housing in the Last Year: No     Unstable Housing in the Last Year: No       Family History   Problem Relation Name Age of Onset    Breast cancer Mother 102     Lymphoma Mother 102     Heart disease Mother 102     Cancer Mother 102     Diabetes Sister      Heart disease Brother      Alcohol abuse Brother      Heart disease Maternal Uncle      Heart disease Maternal Grandfather      Cancer Father          panc ca    Breast cancer Paternal Aunt         Review of patient's allergies indicates:   Allergen Reactions    Protamine sulfate Other (See Comments)     Bradycardia, Hypotension    Davion Rash       Current Outpatient Medications on File Prior to Visit   Medication Sig Dispense Refill    carBAMazepine (TEGRETOL XR) 100 MG 12 hr tablet Take 2 tablets (200 mg total) by mouth 2 (two) times daily. (Patient not taking: Reported on 12/2/2024) 120 tablet 11    cholecalciferol, vitamin D3, 1,250 mcg (50,000 unit) capsule Take 1 capsule every week by oral route. (Patient not taking: Reported on 12/2/2024)      NIFEdipine (PROCARDIA-XL) 30 MG (OSM) 24 hr tablet Take 1 tablet (30 mg total) by mouth once daily. 90 tablet 3     Current Facility-Administered Medications on File Prior to Visit   Medication Dose Route Frequency Provider Last Rate Last Admin    sodium chloride 0.9% flush 10 mL  10 mL Intravenous PRN Rohit Quinonez MD           Anticoagulation:  No     Physical Exam:  Estimated body mass index is 36.73 kg/m² as calculated from the following:    Height as of an earlier encounter on 12/2/24: 5' 2" (1.575 m).    Weight as of an earlier encounter on 12/2/24: 91.1 kg (200 lb 13.4 oz).     Physical Exam  Constitutional:       General: She is not in acute distress.     Appearance: Normal appearance.   HENT:      Head: Normocephalic and atraumatic.      Nose: Nose normal.   Eyes:      Conjunctiva/sclera: Conjunctivae normal.   Cardiovascular:      Rate and Rhythm: Normal rate.   Pulmonary: "      Effort: Pulmonary effort is normal. No respiratory distress.   Abdominal:      General: There is no distension.       Musculoskeletal:         General: No deformity.      Cervical back: Normal range of motion.   Skin:     General: Skin is warm and dry.   Neurological:      General: No focal deficit present.      Mental Status: She is alert.   Psychiatric:         Mood and Affect: Mood normal.         Behavior: Behavior normal.           Labs:  Urine dipstick today shows negative for all components.    Lab Results   Component Value Date    WBC 7.42 11/06/2024    HGB 11.0 (L) 11/06/2024    HCT 36.0 (L) 11/06/2024    MCV 92 11/06/2024     11/06/2024         BMP  Lab Results   Component Value Date     11/06/2024    K 4.3 11/06/2024     11/06/2024    CO2 28 11/06/2024    BUN 19 11/06/2024    CREATININE 0.8 11/06/2024    CALCIUM 9.7 11/06/2024    ANIONGAP 8 11/06/2024    EGFRNORACEVR >60.0 11/06/2024       Imaging:   CTA Cardiac from 11/4/2024: left renal mass, 4.2 cm, enhancing internal septations at lower pole. No appreciable IVC infiltration. No evidence of metastatic disease on chest imaging.     Assessment: Kandace Rapp is a 69 y.o. female with left Bosniak IV renal lesion    Plan:     1. To OR on 12/12/24 for left partial nephrectomy  2. Type and screen ordered preoperatively. The risks, benefits, and indications of a blood transfusion were discussed. The patient was given a chance to ask questions and all questions answered to her satisfaction. Consent obtained.   3. Surgical consent obtained  4. Urine culture ordered today. Will f/u results.   5. Medical clearance appointment on 12/2/24 with Andres Camp NP. Will f/u results.     Nestor Haas MD

## 2024-12-01 NOTE — PROGRESS NOTES
Urology ([facility]) H&P for upcoming procedure  Staff:  Adam Sawyer MD     CC: left renal mass    HPI:  Kandace Rapp is a 69 y.o. female with a left Bosniak 4 lesion.      The mass was found on workup for TAVR (plans for sometime in /Feb).  Most recent echo with EF 55-60%.  CTA from 24 obtained independently reviewed and shows 4 cm L lower pole Bosniak 4 lesion.       The patient denies gross hematuria and/or constitutional symptoms attributable to their recently discovered renal mass.     The patient denies a personal and family history of kidney cancer.    Does have history of post anesthesia urinary retention and difficulty tolerating ferguson catheter.     Patient reports no personal history of tobacco use.  Radiation therapy to pelvis: None  Exposure to harmful chemicals:  History of Urolithiasis: None.  Patient has not had recent urine culture.    History of DM: None. Last A1c 5.2 on 23.  No history of CKD: None. Baseline Cr 0.8 on 24. Baseline GFR >60. They have never previously been seen by nephrology.  Additional PMH: MVP.    Previous abdominal surgery:  x2  Blood thinners: None       ROS: Negative except for as stated above    Past Medical History:   Diagnosis Date    Anxiety     Aortic stenosis     Arthritis     CHF (congestive heart failure)     Heart murmur     Hypertension     Mitral valve prolapse        Past Surgical History:   Procedure Laterality Date    AORTIC VALVULOPLASTY N/A 2024    Procedure: REPAIR, AORTIC VALVE;  Surgeon: Rohit Quinonez MD;  Location: [facility] CATH LAB;  Service: Cardiology;  Laterality: N/A;    AORTOGRAPHY N/A 2024    Procedure: AORTOGRAM;  Surgeon: Rohit Quinonez MD;  Location: [facility] CATH LAB;  Service: Cardiology;  Laterality: N/A;    BREAST CYST EXCISION Left 80s    BREAST SURGERY      Benign lump on left.    CATARACT EXTRACTION       SECTION      x1    COLONOSCOPY N/A 2023    Procedure: COLONOSCOPY;   Surgeon: Pattie Stephenson MD;  Location: [facility] ENDO (4TH FLR);  Service: Endoscopy;  Laterality: N/A;   ref by Ishmael Jacobson MD, PEG, instr. to portal-st    CORONARY ANGIOGRAPHY N/A 10/28/2024    Procedure: ANGIOGRAM, CORONARY ARTERY;  Surgeon: Yordy Ng MD;  Location: [facility] CATH LAB;  Service: Cardiology;  Laterality: N/A;  radial access    VALVE STUDY-AORTIC  2024    Procedure: Valve study-aortic;  Surgeon: Rohit Quinonez MD;  Location: [facility] CATH LAB;  Service: Cardiology;;       Social History     Socioeconomic History    Marital status:     Number of children: 2   Tobacco Use    Smoking status: Never    Smokeless tobacco: Never   Substance and Sexual Activity    Alcohol use: Yes     Comment: Rare.     Drug use: No    Sexual activity: Not Currently   Social History Narrative       , one child local dtr lives with her, one in [place] son in [place]/hx of AVR. 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      Social Drivers of Health     Financial Resource Strain: Low Risk  (2023)    Overall Financial Resource Strain (CARDIA)     Difficulty of Paying Living Expenses: Not very hard   Food Insecurity: No Food Insecurity (2023)    Hunger Vital Sign     Worried About Running Out of Food in the Last Year: Never true     Ran Out of Food in the Last Year: Never true   Transportation Needs: No Transportation Needs (2023)    PRAPARE - Transportation     Lack of Transportation (Medical): No     Lack of Transportation (Non-Medical): No   Physical Activity: Insufficiently Active (2023)    Exercise Vital Sign     Days of Exercise per Week: 2 days     Minutes of Exercise per Session: 20 min   Stress: No Stress Concern Present (2023)    [place] [employer] - Occupational Stress Questionnaire     Feeling of Stress : Only a little   Housing Stability: Unknown (2023)    Housing Stability Vital  "Sign     Unable to Pay for Housing in the Last Year: No     Unstable Housing in the Last Year: No       Family History   Problem Relation Name Age of Onset    Breast cancer Mother 102     Lymphoma Mother 102     Heart disease Mother 102     Cancer Mother 102     Diabetes Sister      Heart disease Brother      Alcohol abuse Brother      Heart disease Maternal Uncle      Heart disease Maternal Grandfather      Cancer Father          panc ca    Breast cancer Paternal Aunt         Review of patient's allergies indicates:   Allergen Reactions    Protamine sulfate Other (See Comments)     Bradycardia, Hypotension    Davion Rash       Current Outpatient Medications on File Prior to Visit   Medication Sig Dispense Refill    carBAMazepine (TEGRETOL XR) 100 MG 12 hr tablet Take 2 tablets (200 mg total) by mouth 2 (two) times daily. (Patient not taking: Reported on 12/2/2024) 120 tablet 11    cholecalciferol, vitamin D3, 1,250 mcg (50,000 unit) capsule Take 1 capsule every week by oral route. (Patient not taking: Reported on 12/2/2024)      NIFEdipine (PROCARDIA-XL) 30 MG (OSM) 24 hr tablet Take 1 tablet (30 mg total) by mouth once daily. 90 tablet 3     Current Facility-Administered Medications on File Prior to Visit   Medication Dose Route Frequency Provider Last Rate Last Admin    sodium chloride 0.9% flush 10 mL  10 mL Intravenous PRN Rohit Quinonez MD           Anticoagulation:  No     Physical Exam:  Estimated body mass index is 36.73 kg/m² as calculated from the following:    Height as of an earlier encounter on 12/2/24: 5' 2" (1.575 m).    Weight as of an earlier encounter on 12/2/24: 91.1 kg (200 lb 13.4 oz).     Physical Exam  Constitutional:       General: She is not in acute distress.     Appearance: Normal appearance.   HENT:      Head: Normocephalic and atraumatic.      Nose: Nose normal.   Eyes:      Conjunctiva/sclera: Conjunctivae normal.   Cardiovascular:      Rate and Rhythm: Normal rate.   Pulmonary: "      Effort: Pulmonary effort is normal. No respiratory distress.   Abdominal:      General: There is no distension.       Musculoskeletal:         General: No deformity.      Cervical back: Normal range of motion.   Skin:     General: Skin is warm and dry.   Neurological:      General: No focal deficit present.      Mental Status: She is alert.   Psychiatric:         Mood and Affect: Mood normal.         Behavior: Behavior normal.           Labs:  Urine dipstick today shows negative for all components.    Lab Results   Component Value Date    WBC 7.42 11/06/2024    HGB 11.0 (L) 11/06/2024    HCT 36.0 (L) 11/06/2024    MCV 92 11/06/2024     11/06/2024         BMP  Lab Results   Component Value Date     11/06/2024    K 4.3 11/06/2024     11/06/2024    CO2 28 11/06/2024    BUN 19 11/06/2024    CREATININE 0.8 11/06/2024    CALCIUM 9.7 11/06/2024    ANIONGAP 8 11/06/2024    EGFRNORACEVR >60.0 11/06/2024       Imaging:   CTA Cardiac from 11/4/2024: left renal mass, 4.2 cm, enhancing internal septations at lower pole. No appreciable IVC infiltration. No evidence of metastatic disease on chest imaging.     Assessment: Kandace Rapp is a 69 y.o. female with left Bosniak IV renal lesion    Plan:     1. To OR on 12/12/24 for left partial nephrectomy  2. Type and screen ordered preoperatively. The risks, benefits, and indications of a blood transfusion were discussed. The patient was given a chance to ask questions and all questions answered to her satisfaction. Consent obtained.   3. Surgical consent obtained  4. Urine culture ordered today. Will f/u results.   5. Medical clearance appointment on 12/2/24 with Andres Camp NP. Will f/u results.     Nestor Haas MD

## 2024-12-02 ENCOUNTER — LAB VISIT (OUTPATIENT)
Dept: LAB | Facility: HOSPITAL | Age: 69
End: 2024-12-02
Attending: STUDENT IN AN ORGANIZED HEALTH CARE EDUCATION/TRAINING PROGRAM
Payer: MEDICARE

## 2024-12-02 ENCOUNTER — OFFICE VISIT (OUTPATIENT)
Dept: INTERNAL MEDICINE | Facility: CLINIC | Age: 69
End: 2024-12-02
Payer: MEDICARE

## 2024-12-02 ENCOUNTER — OFFICE VISIT (OUTPATIENT)
Dept: UROLOGY | Facility: CLINIC | Age: 69
End: 2024-12-02
Payer: MEDICARE

## 2024-12-02 VITALS
TEMPERATURE: 98 F | HEIGHT: 62 IN | SYSTOLIC BLOOD PRESSURE: 130 MMHG | WEIGHT: 200.81 LBS | BODY MASS INDEX: 36.95 KG/M2 | OXYGEN SATURATION: 97 % | HEART RATE: 92 BPM | DIASTOLIC BLOOD PRESSURE: 77 MMHG

## 2024-12-02 DIAGNOSIS — H02.409 PTOSIS OF EYELID, UNSPECIFIED LATERALITY: Primary | ICD-10-CM

## 2024-12-02 DIAGNOSIS — I10 HYPERTENSION, UNSPECIFIED TYPE: ICD-10-CM

## 2024-12-02 DIAGNOSIS — D64.9 ANEMIA, UNSPECIFIED TYPE: ICD-10-CM

## 2024-12-02 DIAGNOSIS — N28.89 RENAL MASS: ICD-10-CM

## 2024-12-02 DIAGNOSIS — N28.89 RENAL MASS: Primary | ICD-10-CM

## 2024-12-02 DIAGNOSIS — I35.0 NONRHEUMATIC AORTIC VALVE STENOSIS: ICD-10-CM

## 2024-12-02 DIAGNOSIS — Z01.818 PREOP TESTING: ICD-10-CM

## 2024-12-02 LAB
ABO + RH BLD: NORMAL
BLD GP AB SCN CELLS X3 SERPL QL: NORMAL
SPECIMEN OUTDATE: NORMAL

## 2024-12-02 PROCEDURE — 3008F BODY MASS INDEX DOCD: CPT | Mod: CPTII,S$GLB,, | Performed by: NURSE PRACTITIONER

## 2024-12-02 PROCEDURE — 1159F MED LIST DOCD IN RCRD: CPT | Mod: CPTII,S$GLB,, | Performed by: NURSE PRACTITIONER

## 2024-12-02 PROCEDURE — 99499 UNLISTED E&M SERVICE: CPT | Mod: S$GLB,,, | Performed by: UROLOGY

## 2024-12-02 PROCEDURE — 3075F SYST BP GE 130 - 139MM HG: CPT | Mod: CPTII,S$GLB,, | Performed by: NURSE PRACTITIONER

## 2024-12-02 PROCEDURE — 3078F DIAST BP <80 MM HG: CPT | Mod: CPTII,S$GLB,, | Performed by: NURSE PRACTITIONER

## 2024-12-02 PROCEDURE — 99999 PR PBB SHADOW E&M-EST. PATIENT-LVL III: CPT | Mod: PBBFAC,,, | Performed by: NURSE PRACTITIONER

## 2024-12-02 PROCEDURE — 86900 BLOOD TYPING SEROLOGIC ABO: CPT | Performed by: STUDENT IN AN ORGANIZED HEALTH CARE EDUCATION/TRAINING PROGRAM

## 2024-12-02 PROCEDURE — 99215 OFFICE O/P EST HI 40 MIN: CPT | Mod: S$GLB,,, | Performed by: NURSE PRACTITIONER

## 2024-12-02 PROCEDURE — 87086 URINE CULTURE/COLONY COUNT: CPT

## 2024-12-02 PROCEDURE — 1160F RVW MEDS BY RX/DR IN RCRD: CPT | Mod: CPTII,S$GLB,, | Performed by: NURSE PRACTITIONER

## 2024-12-02 PROCEDURE — 36415 COLL VENOUS BLD VENIPUNCTURE: CPT | Performed by: STUDENT IN AN ORGANIZED HEALTH CARE EDUCATION/TRAINING PROGRAM

## 2024-12-02 PROCEDURE — 99999 PR PBB SHADOW E&M-EST. PATIENT-LVL I: CPT | Mod: PBBFAC,,,

## 2024-12-02 NOTE — OUTPATIENT SUBJECTIVE & OBJECTIVE
Outpatient Subjective & Objective      Chief Complaint: Preoperative evaulation, perioperative medical management, and complication reduction plan.     Functional Capacity:  Able to climb a flight of stairs without CP SOB or Syncope.  Able to meet 4 METs      Anesthesia issues: None    Difficulty mouth opening: none    Steroid use in the last 12 months: none     Dental Issues: none    Family anesthesia difficulty: None     Family Hx of Thrombosis none    Past Medical History:   Diagnosis Date    Anxiety     Aortic stenosis     Arthritis     CHF (congestive heart failure)     Heart murmur     Hypertension     Mitral valve prolapse      Past Surgical History:   Procedure Laterality Date    AORTIC VALVULOPLASTY N/A 2024    Procedure: REPAIR, AORTIC VALVE;  Surgeon: Rohit Quinonez MD;  Location: [facility] CATH LAB;  Service: Cardiology;  Laterality: N/A;    AORTOGRAPHY N/A 2024    Procedure: AORTOGRAM;  Surgeon: Rohit Quinonez MD;  Location: [facility] CATH LAB;  Service: Cardiology;  Laterality: N/A;    BREAST CYST EXCISION Left 80s    BREAST SURGERY      Benign lump on left.    CATARACT EXTRACTION       SECTION      x1    COLONOSCOPY N/A 2023    Procedure: COLONOSCOPY;  Surgeon: Pattie Stephenson MD;  Location: [facility] ENDO (4TH FLR);  Service: Endoscopy;  Laterality: N/A;   ref by Ishmael Jacobson MD, PEG, instr. to portal-st    CORONARY ANGIOGRAPHY N/A 10/28/2024    Procedure: ANGIOGRAM, CORONARY ARTERY;  Surgeon: Yordy Ng MD;  Location: [facility] CATH LAB;  Service: Cardiology;  Laterality: N/A;  radial access    VALVE STUDY-AORTIC  2024    Procedure: Valve study-aortic;  Surgeon: Rohit Quinonez MD;  Location: [facility] CATH LAB;  Service: Cardiology;;       Review of Systems   Constitutional:  Negative for chills, fatigue and fever.   HENT:  Negative for trouble swallowing and voice change.    Eyes:  Negative for photophobia and visual disturbance.        No acute visual changes  "  Respiratory:  Negative for apnea, cough, chest tightness, shortness of breath and wheezing.         STOP bang  Score 3  BP, HTN , over 50    High risk GERALDINE   Cardiovascular:  Negative for chest pain, palpitations and leg swelling.   Gastrointestinal:  Negative for abdominal distention, abdominal pain, blood in stool, constipation, diarrhea, nausea and vomiting.        NO FLD, hepatitis, cirrhosis  No BRB or black tarry stool     Genitourinary:  Positive for frequency. Negative for difficulty urinating, dysuria, flank pain, hematuria and urgency.   Musculoskeletal:  Negative for arthralgias, back pain, gait problem, joint swelling, myalgias, neck pain and neck stiffness.   Neurological:  Negative for dizziness, tremors, seizures, syncope, weakness, light-headedness, numbness and headaches.   Psychiatric/Behavioral:  Negative for suicidal ideas.         VITALS  Visit Vitals  /77 (BP Location: Right arm, Patient Position: Sitting)   Pulse 92   Temp 98.3 °F (36.8 °C) (Oral)   Ht 5' 2" (1.575 m)   Wt 91.1 kg (200 lb 13.4 oz)   SpO2 97%   BMI 36.73 kg/m²          Physical Exam  Constitutional:       General: She is not in acute distress.     Appearance: Normal appearance. She is well-developed. She is not diaphoretic.   HENT:      Head: Normocephalic.      Right Ear: Hearing normal.      Left Ear: Hearing normal.      Nose: Nose normal.      Mouth/Throat:      Lips: Pink.      Mouth: Mucous membranes are moist.      Pharynx: No oropharyngeal exudate.   Eyes:      General: Lids are normal.         Right eye: No discharge.         Left eye: No discharge.      Conjunctiva/sclera: Conjunctivae normal.      Pupils: Pupils are equal, round, and reactive to light.   Neck:      Thyroid: No thyromegaly.      Vascular: No carotid bruit or JVD.      Trachea: Trachea and phonation normal. No tracheal deviation.   Cardiovascular:      Rate and Rhythm: Normal rate and regular rhythm.      Pulses: Normal pulses.           Carotid " pulses are 2+ on the right side and 2+ on the left side.       Radial pulses are 2+ on the right side and 2+ on the left side.        Posterior tibial pulses are 2+ on the right side and 2+ on the left side.      Heart sounds: Normal heart sounds. No murmur heard.     No friction rub. No gallop.   Pulmonary:      Effort: Pulmonary effort is normal. No respiratory distress.      Breath sounds: Normal breath sounds. No stridor. No wheezing or rales.      Comments: Clear Anterior and Posterior BBS  Abdominal:      General: Abdomen is protuberant. Bowel sounds are normal. There is no distension.      Palpations: Abdomen is soft.      Tenderness: There is no abdominal tenderness. There is no guarding.   Musculoskeletal:         General: No tenderness or deformity. Normal range of motion.      Cervical back: Normal range of motion and neck supple. No rigidity.      Right lower leg: No edema.      Left lower leg: No edema.   Lymphadenopathy:      Head:      Right side of head: No submental, submandibular, tonsillar, preauricular, posterior auricular or occipital adenopathy.      Left side of head: No submental, submandibular, tonsillar, preauricular, posterior auricular or occipital adenopathy.      Cervical: No cervical adenopathy.      Right cervical: No superficial cervical adenopathy.     Left cervical: No superficial cervical adenopathy.   Skin:     General: Skin is warm and dry.      Capillary Refill: Capillary refill takes 2 to 3 seconds.      Coloration: Skin is not pale.      Findings: No erythema or rash.   Neurological:      Mental Status: She is alert and oriented to person, place, and time. Mental status is at baseline.      GCS: GCS eye subscore is 4. GCS verbal subscore is 5. GCS motor subscore is 6.      Motor: No abnormal muscle tone.      Coordination: Coordination normal.   Psychiatric:         Attention and Perception: Attention and perception normal.         Mood and Affect: Mood and affect normal.          Speech: Speech normal.         Behavior: Behavior normal. Behavior is cooperative.         Thought Content: Thought content normal.         Cognition and Memory: Cognition and memory normal.         Judgment: Judgment normal.        Significant Labs:  Lab Results   Component Value Date    WBC 7.42 11/06/2024    HGB 11.0 (L) 11/06/2024    HCT 36.0 (L) 11/06/2024     11/06/2024    CHOL 122 09/13/2022    TRIG 68 09/13/2022    HDL 45 09/13/2022    ALT 15 10/15/2024    AST 13 10/15/2024     11/06/2024    K 4.3 11/06/2024     11/06/2024    CREATININE 0.8 11/06/2024    BUN 19 11/06/2024    CO2 28 11/06/2024    TSH 1.655 06/21/2021    HGBA1C 5.2 01/27/2023       Diagnostic Studies: No relevant studies.    EKG:   Results for orders placed or performed during the hospital encounter of 11/06/24   EKG 12-lead    Collection Time: 11/06/24  6:08 AM   Result Value Ref Range    QRS Duration 82 ms    OHS QTC Calculation 433 ms    Narrative    Test Reason : I35.0,    Vent. Rate : 080 BPM     Atrial Rate : 080 BPM     P-R Int : 166 ms          QRS Dur : 082 ms      QT Int : 376 ms       P-R-T Axes : 056 036 -11 degrees     QTc Int : 433 ms    Normal sinus rhythm  Normal ECG  When compared with ECG of 07-OCT-2024 14:29,  Nonspecific T wave abnormality no longer evident in Anterior-lateral leads  Confirmed by Adam Carrion MD ([id number]) on 11/6/2024 4:15:29 PM    Referred By: JAMEY GONCALVES           Confirmed By:Adam Carrion MD       2D ECHO:  TTE:  Results for orders placed or performed during the hospital encounter of 10/11/24   Echo   Result Value Ref Range    BSA 1.97 m2    TAPSE 2.60 cm    LA WIDTH 3.3 cm    RA Width 3.8 cm    Sinus 3.6 cm    LVOT stroke volume 82.7 cm3    LVIDd 5.0 3.5 - 6.0 cm    LV Systolic Volume 50.09 mL    LV Systolic Volume Index 26.4 mL/m2    LVIDs 3.5 2.1 - 4.0 cm    LV ESV A2C 72.49 mL    LV Diastolic Volume 119.74 mL    LV ESV A4C 47.18 mL    LV Diastolic Volume Index 63.02  mL/m2    Left Ventricular End Systolic Volume by Teichholz Method 50.09 mL    Left Ventricular End Diastolic Volume by Teichholz Method 119.74 mL    IVS 1.2 (A) 0.6 - 1.1 cm    LVOT diameter 2.1 cm    LVOT area 3.5 cm2    FS 30.0 28 - 44 %    Left Ventricle Relative Wall Thickness 0.36 cm    PW 0.9 0.6 - 1.1 cm    LV mass 194.4 g    LV Mass Index 102.3 g/m2    MV Peak E Keyshawn 0.76 m/s    TDI LATERAL 0.07 m/s    TDI SEPTAL 0.06 m/s    E/E' ratio 11.69 m/s    MV Peak A Keyshawn 1.09 m/s    TR Max Keyshawn 2.24 m/s    E/A ratio 0.70     E wave deceleration time 172.21 msec    LV SEPTAL E/E' RATIO 12.67 m/s    TANIA 27.8 mL/m2    LV LATERAL E/E' RATIO 10.86 m/s    LA Vol 52.86 cm3    LVOT peak keyshawn 1.1 m/s    Left Ventricular Outflow Tract Mean Velocity 0.74 cm/s    Left Ventricular Outflow Tract Mean Gradient 2.45 mmHg    RV- patel basal diam 4.1 cm    RV S' 13.43 cm/s    RV/LV Ratio 0.82 cm    LA size 3.58 cm    Left Atrium Minor Axis 5.21 cm    Left Atrium Major Axis 5.32 cm    LA Vol (MOD) 58.44 mL    TANIA (MOD) 30.8 mL/m2    RA Major [place] 4.40 cm    AV regurgitation pressure 1/2 time [id number] ms    AR Max Keyshawn 4.26 m/s    AV mean gradient 46.5 mmHg    AV peak gradient 74.0 mmHg    Ao peak keyshawn 4.3 m/s    Ao .0 cm    LVOT peak VTI 23.9 cm    AV valve area 0.8 cm²    AV Velocity Ratio 0.26     AV index (prosthetic) 0.23     RUDOLPH by Velocity Ratio 0.9 cm²    Mr max keyshawn 3.72 m/s    MV stenosis pressure 1/2 time 49.94 ms    MV valve area p 1/2 method 4.41 cm2    Triscuspid Valve Regurgitation Peak Gradient 20 mmHg    PV PEAK VELOCITY 1.11 m/s    PV peak gradient 5 mmHg    RVOT peak keyshawn 0.92 m/s    STJ 3.24 cm    Ascending aorta 4.00 cm    IVC diameter 1.72 cm    Mean e' 0.07 m/s    ZLVIDS 0.50     ZLVIDD -0.64     LA area A4C 17.81 cm2    LA area A2C 21.72 cm2    TV resting pulmonary artery pressure 23 mmHg    RV TB RVSP 5 mmHg    Est. RA pres 3 mmHg    Narrative      Left Ventricle: The left ventricle is normal in  size. Normal wall   thickness. There is normal systolic function with a visually estimated   ejection fraction of 55 - 60%. Grade I diastolic dysfunction.    Right Ventricle: Normal right ventricular cavity size. Wall thickness   is normal. Systolic function is normal.    Aortic Valve: The aortic valve is a trileaflet valve. Moderately   calcified cusps. There is severe stenosis. Aortic valve area by VTI is 0.8   cm². Aortic valve peak velocity is 4.3 m/s. Mean gradient is 46.5 mmHg.   The dimensionless index is 0.23. There is mild aortic regurgitation.    Mitral Valve: There is mild regurgitation.    Aorta: Aortic root is normal in size measuring 3.6 cm. Ascending aorta   is normal measuring 4.00 cm.    Pulmonary Artery: The estimated pulmonary artery systolic pressure is   23 mmHg.    IVC/SVC: Normal venous pressure at 3 mmHg.         JAVON:  No results found for this or any previous visit.     Imaging     Active Cardiac Conditions: None      Revised Cardiac Risk Index   High -Risk Surgery  Intraperitoneal; Intrathoracic; suprainguinal vascular Yes- + 1 No- 0   History of Ischemic Heart Disease   (Hx of MI/positive exercise test/current chest pain due to ischemia/use of nitrate therapy/EKG with pathological Q waves) Yes- + 1 No- 0   History of CHF  (Pulmonary edema/bilateral rales or S3 gallop/PND/CXR showing pulmonary vascular redistribution) Yes- + 1 No- 0   History of CVA   (Prior stroke or TIA) Yes- + 1 No- 0   Pre-operative treatment with insulin Yes- + 1 No- 0   Pre-operative creatinine > 2mg/dl Yes- + 1 No- 0   Total:    Risk Status:  Estimated risk of cardiac complications after non-cardiac surgery using the Revised Cardiac Risk Index for Preoperative risk is 3.9      ARISCAT ([place]) risk index: 13.3    American Society of Anesthesiologists Physical Status classification (ASA): 3         Outpatient Subjective & Objective

## 2024-12-02 NOTE — ASSESSMENT & PLAN NOTE
BMI 36.63    Patient would benefit from weight loss   Lifestyle changes should be made by eating healthy, exercising at least 150 minutes weekly, and avoiding sedentary behavior.   
Current BP  130/77 today.    Taking: Procardia  Patient reports home BP readings of: 130s/70s      Lifestyle changes to reduce systolic BP:  exercise 30 minutes per day,  5 days per week or 150 minutes weekly; sodium reduction and avoidance of high salt foods such as processed meats, frozen meals and  fast foods.   Keeping a healthy weight/BMI can help with better BP control    BP acceptable for surgery. I recommend monitoring BP during perioperative period as uncontrolled pain can elevate blood pressure.       
ECHO (Date 10/11/24): RUDOLPH= 0.8 cm2, MG= 47mmHg, Peak Keyshawn= 4.3 m/s, EF= 55-60%.   Recent Valvuloplasty 11/6/24   Repeat Echo is scheduled 12/9/24 prior to surgery    TAVR planned for after Kidney surgery    Denies any active cardiac conditions  
Hgb  11 HCT  36  
Right eye  States she had twitching of eye  Was treated with Botox  
Surgery scheduled 12/12/24  
78

## 2024-12-02 NOTE — DISCHARGE INSTRUCTIONS
Your surgery has been scheduled for:________12/12/24__________________________________    You should report to:  ____[facility], located on the [place] side of the first floor of the           [facility] ([phone])  __X__[facility], located on the [facility] side of the            Second floor of the [facility] ([phone])  ____3rd Floor [facility] located on the [facility] side of the [facility] [phone]  ____[facility]: located at [address] [facility] [place] [address]. [address].     Please Note   Tell your doctor if you take Aspirin, products containing Aspirin, herbal medications  or blood thinners, such as Coumadin, Ticlid, or Plavix.  (Consult your provider regarding holding or stopping before surgery).  Arrange for someone to drive you home following surgery.  You will not be allowed to leave the surgical facility alone or drive yourself home following sedation and anesthesia.    Before Surgery  Stop taking all herbal medications, vitamins, and supplements 7 days prior to surgery  No Motrin/Advil (Ibuprofen) 7 days before surgery  No Aleve (Naproxen) 7 days before surgery   No Goody's/BC Powder 7 days before surgery  Refrain from drinking alcoholic beverages for 24 hours before and after surgery  Stop or limit smoking at least 24 hours prior to surgery  You may take Tylenol for pain    Night before Surgery  Do not eat or drink after midnight  Take a shower or bath (shower is recommended).  Bathe with Hibiclens soap or an antibacterial soap from the neck down.  If not supplied by your surgeon, hibiclens soap will need to be purchased over the counter in pharmacy.  Rinse soap off thoroughly.  Shampoo your hair with your regular shampoo    The Day of Surgery  Take another bath or shower with hibiclens or any antibacterial soap, to reduce the chance of infection.  Take heart  and blood pressure medications with a small sip of water, as advised by the perioperative team.  Do not take fluid pills  You may brush your teeth and rinse your mouth, but do not swallow any additional water.   Do not apply perfumes, powder, body lotions or deodorant on the day of surgery.  Nail polish should be removed.  Do not wear makeup or moisturizer  Wear comfortable clothes, such as a button front shirt and loose fitting pants.  Leave all jewelry, including body piercings, and valuables at home.    Bring any devices you will need after surgery such as crutches or canes.  If you have sleep apnea, please bring your CPAP machine  In the event that your physical condition changes including the onset of a cold or respiratory illness, or if you have to delay or cancel your surgery, please notify your surgeon.

## 2024-12-02 NOTE — Clinical Note
Patient is pending optimization- Check Echo results 12/9/24 for Aortic valve status prior to surgery  CAITLYN AGUIRRE

## 2024-12-02 NOTE — HPI
This is a 69 y.o. female  who presents today for a preoperative evaluation in preparation for left partial Nephrectomy  Surgery is indicated for left kidney mass.   Patient is new to me.    The history has been obtained by speaking with the patient and reviewing the electronic medical record and/or outside health information. Significant health conditions for the perioperative period are discussed below in assessment and plan.     Patient reports current health status to be Good.  Denies any new symptoms before surgery.

## 2024-12-02 NOTE — PROGRESS NOTES
[facility] 2nd [facility]  Progress Note    Patient Name: Kandace Rapp  MRN: [id number]  Date of Evaluation- 12/02/2024  PCP- Ishmael Jacobson MD    Future cases for Kandace aRpp [[id number]]       Case ID Status Date Time Sancho Procedure Provider Location    [id number] [facility] 12/12/2024  7:00  DV5 ROBOTIC NEPHRECTOMY, PARTIAL Adam Sawyer MD [[id number]] [facility] OR 2ND FLR            HPI:  This is a 69 y.o. female  who presents today for a preoperative evaluation in preparation for left partial Nephrectomy  Surgery is indicated for left kidney mass.   Patient is new to me.    The history has been obtained by speaking with the patient and reviewing the electronic medical record and/or outside health information. Significant health conditions for the perioperative period are discussed below in assessment and plan.     Patient reports current health status to be Good.  Denies any new symptoms before surgery.       Subjective/ Objective:     Chief Complaint: Preoperative evaulation, perioperative medical management, and complication reduction plan.     Functional Capacity:  Able to climb a flight of stairs without CP SOB or Syncope.  Able to meet 4 METs      Anesthesia issues: None    Difficulty mouth opening: none    Steroid use in the last 12 months: none     Dental Issues: none    Family anesthesia difficulty: None     Family Hx of Thrombosis none    Past Medical History:   Diagnosis Date    Anxiety     Aortic stenosis     Arthritis     CHF (congestive heart failure)     Heart murmur     Hypertension     Mitral valve prolapse      Past Surgical History:   Procedure Laterality Date    AORTIC VALVULOPLASTY N/A 11/6/2024    Procedure: REPAIR, AORTIC VALVE;  Surgeon: Rohit Quinonez MD;  Location: [facility] CATH LAB;  Service: Cardiology;  Laterality: N/A;    AORTOGRAPHY N/A 11/6/2024    Procedure: AORTOGRAM;  Surgeon: Rohit Quinonez MD;  Location: [facility] CATH LAB;  Service: Cardiology;   "Laterality: N/A;    BREAST CYST EXCISION Left 80s    BREAST SURGERY      Benign lump on left.    CATARACT EXTRACTION       SECTION      x1    COLONOSCOPY N/A 2023    Procedure: COLONOSCOPY;  Surgeon: Pattie Stephenson MD;  Location: [facility] ENDO (4TH FLR);  Service: Endoscopy;  Laterality: N/A;   ref by Ishmael Jacobson MD, PEG, instr. to portal-st    CORONARY ANGIOGRAPHY N/A 10/28/2024    Procedure: ANGIOGRAM, CORONARY ARTERY;  Surgeon: Yordy Ng MD;  Location: [facility] CATH LAB;  Service: Cardiology;  Laterality: N/A;  radial access    VALVE STUDY-AORTIC  2024    Procedure: Valve study-aortic;  Surgeon: Rohit Quinonez MD;  Location: [facility] CATH LAB;  Service: Cardiology;;       Review of Systems   Constitutional:  Negative for chills, fatigue and fever.   HENT:  Negative for trouble swallowing and voice change.    Eyes:  Negative for photophobia and visual disturbance.        No acute visual changes   Respiratory:  Negative for apnea, cough, chest tightness, shortness of breath and wheezing.         STOP bang  Score 3  BP, HTN , over 50    High risk GERALDINE   Cardiovascular:  Negative for chest pain, palpitations and leg swelling.   Gastrointestinal:  Negative for abdominal distention, abdominal pain, blood in stool, constipation, diarrhea, nausea and vomiting.        NO FLD, hepatitis, cirrhosis  No BRB or black tarry stool     Genitourinary:  Positive for frequency. Negative for difficulty urinating, dysuria, flank pain, hematuria and urgency.   Musculoskeletal:  Negative for arthralgias, back pain, gait problem, joint swelling, myalgias, neck pain and neck stiffness.   Neurological:  Negative for dizziness, tremors, seizures, syncope, weakness, light-headedness, numbness and headaches.   Psychiatric/Behavioral:  Negative for suicidal ideas.         VITALS  Visit Vitals  /77 (BP Location: Right arm, Patient Position: Sitting)   Pulse 92   Temp 98.3 °F (36.8 °C) (Oral)   Ht 5' 2" " (1.575 m)   Wt 91.1 kg (200 lb 13.4 oz)   SpO2 97%   BMI 36.73 kg/m²          Physical Exam  Constitutional:       General: She is not in acute distress.     Appearance: Normal appearance. She is well-developed. She is not diaphoretic.   HENT:      Head: Normocephalic.      Right Ear: Hearing normal.      Left Ear: Hearing normal.      Nose: Nose normal.      Mouth/Throat:      Lips: Pink.      Mouth: Mucous membranes are moist.      Pharynx: No oropharyngeal exudate.   Eyes:      General: Lids are normal.         Right eye: No discharge.         Left eye: No discharge.      Conjunctiva/sclera: Conjunctivae normal.      Pupils: Pupils are equal, round, and reactive to light.   Neck:      Thyroid: No thyromegaly.      Vascular: No carotid bruit or JVD.      Trachea: Trachea and phonation normal. No tracheal deviation.   Cardiovascular:      Rate and Rhythm: Normal rate and regular rhythm.      Pulses: Normal pulses.           Carotid pulses are 2+ on the right side and 2+ on the left side.       Radial pulses are 2+ on the right side and 2+ on the left side.        Posterior tibial pulses are 2+ on the right side and 2+ on the left side.      Heart sounds: Normal heart sounds. No murmur heard.     No friction rub. No gallop.   Pulmonary:      Effort: Pulmonary effort is normal. No respiratory distress.      Breath sounds: Normal breath sounds. No stridor. No wheezing or rales.      Comments: Clear Anterior and Posterior BBS  Abdominal:      General: Abdomen is protuberant. Bowel sounds are normal. There is no distension.      Palpations: Abdomen is soft.      Tenderness: There is no abdominal tenderness. There is no guarding.   Musculoskeletal:         General: No tenderness or deformity. Normal range of motion.      Cervical back: Normal range of motion and neck supple. No rigidity.      Right lower leg: No edema.      Left lower leg: No edema.   Lymphadenopathy:      Head:      Right side of head: No submental,  submandibular, tonsillar, preauricular, posterior auricular or occipital adenopathy.      Left side of head: No submental, submandibular, tonsillar, preauricular, posterior auricular or occipital adenopathy.      Cervical: No cervical adenopathy.      Right cervical: No superficial cervical adenopathy.     Left cervical: No superficial cervical adenopathy.   Skin:     General: Skin is warm and dry.      Capillary Refill: Capillary refill takes 2 to 3 seconds.      Coloration: Skin is not pale.      Findings: No erythema or rash.   Neurological:      Mental Status: She is alert and oriented to person, place, and time. Mental status is at baseline.      GCS: GCS eye subscore is 4. GCS verbal subscore is 5. GCS motor subscore is 6.      Motor: No abnormal muscle tone.      Coordination: Coordination normal.   Psychiatric:         Attention and Perception: Attention and perception normal.         Mood and Affect: Mood and affect normal.         Speech: Speech normal.         Behavior: Behavior normal. Behavior is cooperative.         Thought Content: Thought content normal.         Cognition and Memory: Cognition and memory normal.         Judgment: Judgment normal.        Significant Labs:  Lab Results   Component Value Date    WBC 7.42 11/06/2024    HGB 11.0 (L) 11/06/2024    HCT 36.0 (L) 11/06/2024     11/06/2024    CHOL 122 09/13/2022    TRIG 68 09/13/2022    HDL 45 09/13/2022    ALT 15 10/15/2024    AST 13 10/15/2024     11/06/2024    K 4.3 11/06/2024     11/06/2024    CREATININE 0.8 11/06/2024    BUN 19 11/06/2024    CO2 28 11/06/2024    TSH 1.655 06/21/2021    HGBA1C 5.2 01/27/2023       Diagnostic Studies: No relevant studies.    EKG:   Results for orders placed or performed during the hospital encounter of 11/06/24   EKG 12-lead    Collection Time: 11/06/24  6:08 AM   Result Value Ref Range    QRS Duration 82 ms    [facility] QTC Calculation 433 ms    Narrative    Test Reason : I35.0,    Vent. Rate  : 080 BPM     Atrial Rate : 080 BPM     P-R Int : 166 ms          QRS Dur : 082 ms      QT Int : 376 ms       P-R-T Axes : 056 036 -11 degrees     QTc Int : 433 ms    Normal sinus rhythm  Normal ECG  When compared with ECG of 07-OCT-2024 14:29,  Nonspecific T wave abnormality no longer evident in Anterior-lateral leads  Confirmed by Adam Carrion MD ([id number]) on 11/6/2024 4:15:29 PM    Referred By: JAMEY GONCALVES           Confirmed By:Adam Carrion MD       2D ECHO:  TTE:  Results for orders placed or performed during the hospital encounter of 10/11/24   Echo   Result Value Ref Range    BSA 1.97 m2    TAPSE 2.60 cm    LA WIDTH 3.3 cm    RA Width 3.8 cm    Sinus 3.6 cm    LVOT stroke volume 82.7 cm3    LVIDd 5.0 3.5 - 6.0 cm    LV Systolic Volume 50.09 mL    LV Systolic Volume Index 26.4 mL/m2    LVIDs 3.5 2.1 - 4.0 cm    LV ESV A2C 72.49 mL    LV Diastolic Volume 119.74 mL    LV ESV A4C 47.18 mL    LV Diastolic Volume Index 63.02 mL/m2    Left Ventricular End Systolic Volume by Teichholz Method 50.09 mL    Left Ventricular End Diastolic Volume by Teichholz Method 119.74 mL    IVS 1.2 (A) 0.6 - 1.1 cm    LVOT diameter 2.1 cm    LVOT area 3.5 cm2    FS 30.0 28 - 44 %    Left Ventricle Relative Wall Thickness 0.36 cm    PW 0.9 0.6 - 1.1 cm    LV mass 194.4 g    LV Mass Index 102.3 g/m2    MV Peak E Keyshawn 0.76 m/s    TDI LATERAL 0.07 m/s    TDI SEPTAL 0.06 m/s    E/E' ratio 11.69 m/s    MV Peak A Keyshawn 1.09 m/s    TR Max Keyshawn 2.24 m/s    E/A ratio 0.70     E wave deceleration time 172.21 msec    LV SEPTAL E/E' RATIO 12.67 m/s    TANIA 27.8 mL/m2    LV LATERAL E/E' RATIO 10.86 m/s    LA Vol 52.86 cm3    LVOT peak keyshawn 1.1 m/s    Left Ventricular Outflow Tract Mean Velocity 0.74 cm/s    Left Ventricular Outflow Tract Mean Gradient 2.45 mmHg    RV- patel basal diam 4.1 cm    RV S' 13.43 cm/s    RV/LV Ratio 0.82 cm    LA size 3.58 cm    Left Atrium Minor Axis 5.21 cm    Left Atrium Major Axis 5.32 cm    LA Vol (MOD) 58.44 mL     TANIA (MOD) 30.8 mL/m2    RA Major [place] 4.40 cm    AV regurgitation pressure 1/2 time [id number] ms    AR Max Keyshawn 4.26 m/s    AV mean gradient 46.5 mmHg    AV peak gradient 74.0 mmHg    Ao peak keyshawn 4.3 m/s    Ao .0 cm    LVOT peak VTI 23.9 cm    AV valve area 0.8 cm²    AV Velocity Ratio 0.26     AV index (prosthetic) 0.23     RUDOLPH by Velocity Ratio 0.9 cm²    Mr max keyshawn 3.72 m/s    MV stenosis pressure 1/2 time 49.94 ms    MV valve area p 1/2 method 4.41 cm2    Triscuspid Valve Regurgitation Peak Gradient 20 mmHg    PV PEAK VELOCITY 1.11 m/s    PV peak gradient 5 mmHg    RVOT peak keyshawn 0.92 m/s    STJ 3.24 cm    Ascending aorta 4.00 cm    IVC diameter 1.72 cm    Mean e' 0.07 m/s    ZLVIDS 0.50     ZLVIDD -0.64     LA area A4C 17.81 cm2    LA area A2C 21.72 cm2    TV resting pulmonary artery pressure 23 mmHg    RV TB RVSP 5 mmHg    Est. RA pres 3 mmHg    Narrative      Left Ventricle: The left ventricle is normal in size. Normal wall   thickness. There is normal systolic function with a visually estimated   ejection fraction of 55 - 60%. Grade I diastolic dysfunction.    Right Ventricle: Normal right ventricular cavity size. Wall thickness   is normal. Systolic function is normal.    Aortic Valve: The aortic valve is a trileaflet valve. Moderately   calcified cusps. There is severe stenosis. Aortic valve area by VTI is 0.8   cm². Aortic valve peak velocity is 4.3 m/s. Mean gradient is 46.5 mmHg.   The dimensionless index is 0.23. There is mild aortic regurgitation.    Mitral Valve: There is mild regurgitation.    Aorta: Aortic root is normal in size measuring 3.6 cm. Ascending aorta   is normal measuring 4.00 cm.    Pulmonary Artery: The estimated pulmonary artery systolic pressure is   23 mmHg.    IVC/SVC: Normal venous pressure at 3 mmHg.         JAVON:  No results found for this or any previous visit.     Imaging     Active Cardiac Conditions: None      Revised Cardiac Risk Index   High -Risk  Surgery  Intraperitoneal; Intrathoracic; suprainguinal vascular Yes- + 1 No- 0   History of Ischemic Heart Disease   (Hx of MI/positive exercise test/current chest pain due to ischemia/use of nitrate therapy/EKG with pathological Q waves) Yes- + 1 No- 0   History of CHF  (Pulmonary edema/bilateral rales or S3 gallop/PND/CXR showing pulmonary vascular redistribution) Yes- + 1 No- 0   History of CVA   (Prior stroke or TIA) Yes- + 1 No- 0   Pre-operative treatment with insulin Yes- + 1 No- 0   Pre-operative creatinine > 2mg/dl Yes- + 1 No- 0   Total:    Risk Status:  Estimated risk of cardiac complications after non-cardiac surgery using the Revised Cardiac Risk Index for Preoperative risk is 6.0 %      ARISCAT (Canet) risk index: 13.3    American Society of Anesthesiologists Physical Status classification (ASA): 3             Preoperative cardiac risk assessment-  The patient does not have any active cardiac conditions . Revised cardiac risk index predictors- ---.Functional capacity is more than 4 Mets. She will be undergoing a Urological procedure that carries a Moderate Risk risk     The estimated risk of the rate of adverse cardiac outcomes  3.9    No further cardiac work up is indicated prior to proceeding with the surgery          American Society of Anesthesiologists Physical status classification ( ASA ) class: 3   Postoperative pulmonary complication risk assessment: 1.6     ARISCAT ( Canet) risk index- risk class -  Low, if duration of surgery is under 3 hours, intermediate, if duration of surgery is over 3 hours      Assessment/Plan:     Ptosis of eyelid  Right eye  States she had twitching of eye  Was treated with Botox    HTN (hypertension)  Current BP  130/77 today.    Taking: Procardia  Patient reports home BP readings of: 130s/70s      Lifestyle changes to reduce systolic BP:  exercise 30 minutes per day,  5 days per week or 150 minutes weekly; sodium reduction and avoidance of high salt foods such as  processed meats, frozen meals and  fast foods.   Keeping a healthy weight/BMI can help with better BP control    BP acceptable for surgery. I recommend monitoring BP during perioperative period as uncontrolled pain can elevate blood pressure.         Nonrheumatic aortic valve stenosis  Recent Valvuloplasty as bridge to TAVR prior to kidney surgery    Renal mass  Surgery scheduled 12/12/24    BMI 36.0-36.9,adult  BMI 36.63        Preventive perioperative care    Thromboembolic prophylaxis:  Her risk factors for thrombosis include morbid obesity, surgical procedure, age, and reduced mobility.I suggest  thromboembolic prophylaxis ( mechanical/pharmacological, weighing the risk benefits of pharmacological agent use considering sheila procedural bleeding )  during the perioperative period.I suggested being active in the post operative period.      Postoperative pulmonary complication prophylaxis-Risk factors for post operative pulmonary complications include age over 65 years, surgery lasting over 3 hours, and ASA class >2- I suggest incentive spirometry use, early ambulation, and pain control so as to avoid diaphragmatic splinting  Brush teeth twice per day, oral rinses, sleep with the head of the bed up 30 degrees     Renal complication prophylaxis-Risk factors for renal complications include age, hypertension, and congestive heart failure . I suggest keeping her well hydrated and avoidance/ minimizing the use of  NSAID's,MCGARRY 2 Inhibitors ,IV contrast if possible in the perioperative period.I suggested drinking 2 litre's of water a day      Surgical site Infection Prophylaxis-I  suggest appropriate antibiotic for Prophylaxis against Surgical site infections Shower with Hibiclens in the night before surgery and the morning of surgery        In view of urological procedure the patient  is at risk of postoperative urinary retention.  I suggest avoidance / minimizing the of  Benzodiazepines,Anticholinergic  medication,antihistamines ( Benadryl) , if possible in the perioperative period. I suggest using the minimum possible use of opioids for the minimum period of time in the perioperative period. Benadryl avoidance suggested      This visit was focused on Preoperative evaluation, Perioperative Medical management, complication reduction plans. I suggest that the patient follows up with primary care or relevant sub specialists for ongoing health care.    I appreciate the opportunity to be involved in this patients care. Please feel free to contact me if there were any questions about this consultation.    Patient is pending optimization  Check Echo results 12/9/24    I spent a total of 40 minutes on the day of the visit.This includes face to face time and non-face to face time preparing to see the patient (eg, review of tests), obtaining and/or reviewing separately obtained history, documenting clinical information in the electronic or other health record, independently interpreting results and communicating results to the patient/family/caregiver, or care coordinator.      Andres Camp NP  Perioperative Medicine  [facility]   Pager [phone]

## 2024-12-03 LAB — BACTERIA UR CULT: NORMAL

## 2024-12-11 ENCOUNTER — TELEPHONE (OUTPATIENT)
Dept: UROLOGY | Facility: CLINIC | Age: 69
End: 2024-12-11
Payer: MEDICARE

## 2024-12-11 ENCOUNTER — ANESTHESIA EVENT (OUTPATIENT)
Dept: SURGERY | Facility: HOSPITAL | Age: 69
End: 2024-12-11
Payer: MEDICARE

## 2024-12-11 NOTE — ANESTHESIA PREPROCEDURE EVALUATION
[facility]  Anesthesia Pre-Operative Evaluation         Patient Name: Kandace Rapp  YOB: 1955  MRN: [id number]    SUBJECTIVE:     Pre-operative evaluation for Procedure(s) (LRB):  DV5 ROBOTIC NEPHRECTOMY, PARTIAL (Left)     12/11/2024    Kandace Rapp is a 69 y.o. female w/ a significant PMHx of HTN, OA, anemia, MV prolapse, non rheumatic AV stenosis s/p balloon aortic calvuloplasty 11/6/2024, asymptomatic 4cm L renal mass of the lower pole.    Now presenting for the above procedure(s).      *Of note, cleared by Dr Knutson for surgery and optimized in perioperative clinic  **Does have history of post anesthesia urinary retention and difficulty tolerating ferguson catheter.     2D ECHO:  TTE:  Results for orders placed during the hospital encounter of 10/11/24    Echo    Interpretation Summary    Left Ventricle: The left ventricle is normal in size. Normal wall thickness. There is normal systolic function with a visually estimated ejection fraction of 55 - 60%. Grade I diastolic dysfunction.    Right Ventricle: Normal right ventricular cavity size. Wall thickness is normal. Systolic function is normal.    Aortic Valve: The aortic valve is a trileaflet valve. Moderately calcified cusps. There is severe stenosis. Aortic valve area by VTI is 0.8 cm². Aortic valve peak velocity is 4.3 m/s. Mean gradient is 46.5 mmHg. The dimensionless index is 0.23. There is mild aortic regurgitation.    Mitral Valve: There is mild regurgitation.    Aorta: Aortic root is normal in size measuring 3.6 cm. Ascending aorta is normal measuring 4.00 cm.    Pulmonary Artery: The estimated pulmonary artery systolic pressure is 23 mmHg.    IVC/SVC: Normal venous pressure at 3 mmHg.      JAVON:  No results found for this or any previous visit.    Prev airway: none documented    Patient Active Problem List   Diagnosis    Mitral valve prolapse    Vitamin D deficiency    Morbid obesity, unspecified  obesity type    Nonrheumatic aortic valve stenosis    Moderate episode of recurrent major depressive disorder    BMI 36.0-36.9,adult    Osteoarthritis of knee    Arthritis of both knees    HTN (hypertension)    Renal mass    Ptosis of eyelid    Anemia       Review of patient's allergies indicates:   Allergen Reactions    Protamine sulfate Other (See Comments)     Bradycardia, Hypotension    Skyline-Ganipa Rash       Current Medications:  Current Outpatient Medications   Medication Instructions    carBAMazepine (TEGRETOL XR) 200 mg, Oral, 2 times daily    cholecalciferol, vitamin D3, 1,250 mcg (50,000 unit) capsule Take 1 capsule every week by oral route.    NIFEdipine (PROCARDIA-XL) 30 mg, Oral, Daily       Past Surgical History:   Procedure Laterality Date    AORTIC VALVULOPLASTY N/A 2024    Procedure: REPAIR, AORTIC VALVE;  Surgeon: Rohit Quinonez MD;  Location: [facility] CATH LAB;  Service: Cardiology;  Laterality: N/A;    AORTOGRAPHY N/A 2024    Procedure: AORTOGRAM;  Surgeon: Rohit Quinonez MD;  Location: [facility] CATH LAB;  Service: Cardiology;  Laterality: N/A;    BREAST CYST EXCISION Left 80s    BREAST SURGERY      Benign lump on left.    CATARACT EXTRACTION       SECTION      x1    COLONOSCOPY N/A 2023    Procedure: COLONOSCOPY;  Surgeon: Pattie Stephenson MD;  [facility]  Service: Endoscopy;  Laterality: N/A;   ref by Ishmael Jacobson MD, PEG, instr. to portal-st    CORONARY ANGIOGRAPHY N/A 10/28/2024    Procedure: ANGIOGRAM, CORONARY ARTERY;  Surgeon: Yordy Ng MD;  Location: [facility] CATH LAB;  Service: Cardiology;  Laterality: N/A;  radial access    VALVE STUDY-AORTIC  2024    Procedure: Valve study-aortic;  Surgeon: Rohit Quinonez MD;  Location: [facility] CATH LAB;  Service: Cardiology;;         OBJECTIVE:     Vital Signs Range (Last 24H):         Significant Labs:  Lab Results   Component Value Date    WBC 7.42 2024    HGB 11.0 (L) 2024     HCT 36.0 (L) 11/06/2024     11/06/2024       Lab Results   Component Value Date     11/06/2024    K 4.3 11/06/2024     11/06/2024    CREATININE 0.8 11/06/2024    BUN 19 11/06/2024    CO2 28 11/06/2024       Lab Results   Component Value Date    TSH 1.655 06/21/2021    HGBA1C 5.2 01/27/2023       EKG:   Results for orders placed or performed during the hospital encounter of 11/06/24   EKG 12-lead    Collection Time: 11/06/24  6:08 AM   Result Value Ref Range    QRS Duration 82 ms    [facility] QTC Calculation 433 ms    Narrative    Test Reason : I35.0,    Vent. Rate : 080 BPM     Atrial Rate : 080 BPM     P-R Int : 166 ms          QRS Dur : 082 ms      QT Int : 376 ms       P-R-T Axes : 056 036 -11 degrees     QTc Int : 433 ms    Normal sinus rhythm  Normal ECG  When compared with ECG of 07-OCT-2024 14:29,  Nonspecific T wave abnormality no longer evident in Anterior-lateral leads  Confirmed by Adam Carrion MD ([id number]) on 11/6/2024 4:15:29 PM    Referred By: JAMEY GONCALVES           Confirmed By:Adam Carrion MD       ASSESSMENT/PLAN:          Pre-op Assessment    I have reviewed the Patient Summary Reports.     I have reviewed the Nursing Notes. I have reviewed the NPO Status.   I have reviewed the Medications.     Review of Systems  Anesthesia Hx:  No problems with previous Anesthesia               Denies Personal Hx of Anesthesia complications.                    Social:  Non-Smoker       Hematology/Oncology:       -- Anemia:                                  EENT/Dental:  EENT/Dental Normal           Cardiovascular:     Hypertension Valvular problems/Murmurs, AS              S/p balloon calvuloplasty 11/                           Pulmonary:  Pulmonary Normal                       Renal/:     Asx L lower pole renal mass, 4cm              Hepatic/GI:  Hepatic/GI Normal                    Musculoskeletal:  Arthritis               Neurological:  Neurology Normal                                       Endocrine:  Endocrine Normal            Dermatological:  Skin Normal    Psych:  Psychiatric History                  Physical Exam  General: Well nourished, Cooperative, Alert and Oriented    Airway:  Mallampati: II / II  Mouth Opening: Normal  TM Distance: Normal  Tongue: Normal  Neck ROM: Normal ROM    Dental:  Intact        Anesthesia Plan  Type of Anesthesia, risks & benefits discussed:    Anesthesia Type: Gen ETT  Intra-op Monitoring Plan: Standard ASA Monitors and Art Line  Post Op Pain Control Plan: multimodal analgesia and IV/PO Opioids PRN  Induction:  IV  Airway Plan: Direct and Video, Post-Induction  Informed Consent: Informed consent signed with the Patient and all parties understand the risks and agree with anesthesia plan.  All questions answered.   ASA Score: 3  Day of Surgery Review of History & Physical: H&P Update referred to the surgeon/provider.    Ready For Surgery From Anesthesia Perspective.     .

## 2024-12-11 NOTE — TELEPHONE ENCOUNTER
Instructions for Day of Surgery      Report To:    DOS, 2nd floor of the [facility] hospital    Arrival time: 5am    Please note:     If you are allergic to any medication please let your care team know the day of surgery.  If you have ever had adverse reaction to anesthesia please let your care team know the day of surgery   Please arrange transportation from the hospital, you will not be allowed to drive yourself home from surgery since you have been under sedation or anesthesia   Notify your doctor or care team of any diabetic medications that you take as these may need to be held or adjusted prior to surgery     Before Surgery     You should stop taking any blood thinners for up to 7 days depending on the blood thinner, please let care team know what you are taking so you can be directed when to stop certain medications.    You should hold any aspirin containing products for 7 days    Stop taking any herbal supplements 14 days prior to surgery     Night Before Surgery     Nothing to eat or drink after midnight the night before your surgery  Take medications as instructed the morning of surgery  No alcoholic beverages 24 hours prior to surgery

## 2024-12-12 ENCOUNTER — HOSPITAL ENCOUNTER (OUTPATIENT)
Facility: HOSPITAL | Age: 69
LOS: 1 days | Discharge: HOME | End: 2024-12-13
Attending: UROLOGY | Admitting: UROLOGY
Payer: MEDICARE

## 2024-12-12 ENCOUNTER — ANESTHESIA (OUTPATIENT)
Dept: SURGERY | Facility: HOSPITAL | Age: 69
End: 2024-12-12
Payer: MEDICARE

## 2024-12-12 DIAGNOSIS — Z01.818 PREOP TESTING: Primary | ICD-10-CM

## 2024-12-12 DIAGNOSIS — N28.89 RENAL MASS: ICD-10-CM

## 2024-12-12 LAB
ABO + RH BLD: NORMAL
ANION GAP SERPL CALC-SCNC: 7 MMOL/L (ref 8–16)
BASOPHILS # BLD AUTO: 0 K/UL (ref 0–0.2)
BASOPHILS NFR BLD: 0 % (ref 0–1.9)
BLD GP AB SCN CELLS X3 SERPL QL: NORMAL
BUN SERPL-MCNC: 15 MG/DL (ref 8–23)
CALCIUM SERPL-MCNC: 8.6 MG/DL (ref 8.7–10.5)
CHLORIDE SERPL-SCNC: 109 MMOL/L (ref 95–110)
CO2 SERPL-SCNC: 24 MMOL/L (ref 23–29)
CREAT SERPL-MCNC: 0.7 MG/DL (ref 0.5–1.4)
DIFFERENTIAL METHOD BLD: ABNORMAL
EOSINOPHIL # BLD AUTO: 0 K/UL (ref 0–0.5)
EOSINOPHIL NFR BLD: 0 % (ref 0–8)
ERYTHROCYTE [DISTWIDTH] IN BLOOD BY AUTOMATED COUNT: 12.2 % (ref 11.5–14.5)
EST. GFR  (NO RACE VARIABLE): >60 ML/MIN/1.73 M^2
GLUCOSE SERPL-MCNC: 131 MG/DL (ref 70–110)
HCT VFR BLD AUTO: 33.6 % (ref 37–48.5)
HGB BLD-MCNC: 10.2 G/DL (ref 12–16)
IMM GRANULOCYTES # BLD AUTO: 0.03 K/UL (ref 0–0.04)
IMM GRANULOCYTES NFR BLD AUTO: 0.4 % (ref 0–0.5)
LYMPHOCYTES # BLD AUTO: 0.9 K/UL (ref 1–4.8)
LYMPHOCYTES NFR BLD: 11.3 % (ref 18–48)
MCH RBC QN AUTO: 28.5 PG (ref 27–31)
MCHC RBC AUTO-ENTMCNC: 30.4 G/DL (ref 32–36)
MCV RBC AUTO: 94 FL (ref 82–98)
MONOCYTES # BLD AUTO: 0.3 K/UL (ref 0.3–1)
MONOCYTES NFR BLD: 3.1 % (ref 4–15)
NEUTROPHILS # BLD AUTO: 7.1 K/UL (ref 1.8–7.7)
NEUTROPHILS NFR BLD: 85.2 % (ref 38–73)
NRBC BLD-RTO: 0 /100 WBC
PLATELET # BLD AUTO: 197 K/UL (ref 150–450)
PMV BLD AUTO: 9.6 FL (ref 9.2–12.9)
POTASSIUM SERPL-SCNC: 4.6 MMOL/L (ref 3.5–5.1)
RBC # BLD AUTO: 3.58 M/UL (ref 4–5.4)
SODIUM SERPL-SCNC: 140 MMOL/L (ref 136–145)
SPECIMEN OUTDATE: NORMAL
WBC # BLD AUTO: 8.29 K/UL (ref 3.9–12.7)

## 2024-12-12 PROCEDURE — 27201423 OPTIME MED/SURG SUP & DEVICES STERILE SUPPLY: Performed by: UROLOGY

## 2024-12-12 PROCEDURE — 80048 BASIC METABOLIC PNL TOTAL CA: CPT

## 2024-12-12 PROCEDURE — 86850 RBC ANTIBODY SCREEN: CPT

## 2024-12-12 PROCEDURE — 25000003 PHARM REV CODE 250

## 2024-12-12 PROCEDURE — 85025 COMPLETE CBC W/AUTO DIFF WBC: CPT

## 2024-12-12 PROCEDURE — 63600175 PHARM REV CODE 636 W HCPCS: Performed by: STUDENT IN AN ORGANIZED HEALTH CARE EDUCATION/TRAINING PROGRAM

## 2024-12-12 PROCEDURE — 71000016 HC POSTOP RECOV ADDL HR: Performed by: UROLOGY

## 2024-12-12 PROCEDURE — 88342 IMHCHEM/IMCYTCHM 1ST ANTB: CPT | Performed by: PATHOLOGY

## 2024-12-12 PROCEDURE — 88307 TISSUE EXAM BY PATHOLOGIST: CPT | Performed by: PATHOLOGY

## 2024-12-12 PROCEDURE — 25000003 PHARM REV CODE 250: Performed by: STUDENT IN AN ORGANIZED HEALTH CARE EDUCATION/TRAINING PROGRAM

## 2024-12-12 PROCEDURE — 36000712 HC OR TIME LEV V 1ST 15 MIN: Performed by: UROLOGY

## 2024-12-12 PROCEDURE — 36000713 HC OR TIME LEV V EA ADD 15 MIN: Performed by: UROLOGY

## 2024-12-12 PROCEDURE — 50543 LAPARO PARTIAL NEPHRECTOMY: CPT | Mod: LT,,, | Performed by: UROLOGY

## 2024-12-12 PROCEDURE — 36415 COLL VENOUS BLD VENIPUNCTURE: CPT

## 2024-12-12 PROCEDURE — 71000033 HC RECOVERY, INTIAL HOUR: Performed by: UROLOGY

## 2024-12-12 PROCEDURE — 37000008 HC ANESTHESIA 1ST 15 MINUTES: Performed by: UROLOGY

## 2024-12-12 PROCEDURE — 37000009 HC ANESTHESIA EA ADD 15 MINS: Performed by: UROLOGY

## 2024-12-12 PROCEDURE — 63600175 PHARM REV CODE 636 W HCPCS

## 2024-12-12 PROCEDURE — 88341 IMHCHEM/IMCYTCHM EA ADD ANTB: CPT | Performed by: PATHOLOGY

## 2024-12-12 PROCEDURE — 27201037 HC PRESSURE MONITORING SET UP

## 2024-12-12 PROCEDURE — 71000015 HC POSTOP RECOV 1ST HR: Performed by: UROLOGY

## 2024-12-12 PROCEDURE — C1729 CATH, DRAINAGE: HCPCS | Performed by: UROLOGY

## 2024-12-12 RX ORDER — OXYCODONE HYDROCHLORIDE 5 MG/1
5 TABLET ORAL EVERY 4 HOURS PRN
Status: DISCONTINUED | OUTPATIENT
Start: 2024-12-12 | End: 2024-12-13 | Stop reason: HOSPADM

## 2024-12-12 RX ORDER — CEFAZOLIN 2 G/1
INJECTION, POWDER, FOR SOLUTION INTRAMUSCULAR; INTRAVENOUS
Status: DISCONTINUED | OUTPATIENT
Start: 2024-12-12 | End: 2024-12-12

## 2024-12-12 RX ORDER — HYDROMORPHONE HYDROCHLORIDE 1 MG/ML
1 INJECTION, SOLUTION INTRAMUSCULAR; INTRAVENOUS; SUBCUTANEOUS
Status: DISCONTINUED | OUTPATIENT
Start: 2024-12-12 | End: 2024-12-13 | Stop reason: HOSPADM

## 2024-12-12 RX ORDER — FENTANYL CITRATE 50 UG/ML
INJECTION, SOLUTION INTRAMUSCULAR; INTRAVENOUS
Status: DISCONTINUED | OUTPATIENT
Start: 2024-12-12 | End: 2024-12-12

## 2024-12-12 RX ORDER — OXYCODONE HYDROCHLORIDE 5 MG/1
5 TABLET ORAL
Status: DISCONTINUED | OUTPATIENT
Start: 2024-12-12 | End: 2024-12-12 | Stop reason: HOSPADM

## 2024-12-12 RX ORDER — OXYCODONE HYDROCHLORIDE 10 MG/1
10 TABLET ORAL EVERY 4 HOURS PRN
Status: DISCONTINUED | OUTPATIENT
Start: 2024-12-12 | End: 2024-12-13 | Stop reason: HOSPADM

## 2024-12-12 RX ORDER — FENTANYL CITRATE 50 UG/ML
25-200 INJECTION, SOLUTION INTRAMUSCULAR; INTRAVENOUS
Status: DISCONTINUED | OUTPATIENT
Start: 2024-12-12 | End: 2024-12-12

## 2024-12-12 RX ORDER — PHENYLEPHRINE HYDROCHLORIDE 10 MG/ML
INJECTION INTRAVENOUS
Status: DISCONTINUED | OUTPATIENT
Start: 2024-12-12 | End: 2024-12-12

## 2024-12-12 RX ORDER — PREGABALIN 150 MG/1
150 CAPSULE ORAL NIGHTLY
Status: DISCONTINUED | OUTPATIENT
Start: 2024-12-12 | End: 2024-12-13 | Stop reason: HOSPADM

## 2024-12-12 RX ORDER — NIFEDIPINE 30 MG/1
30 TABLET, EXTENDED RELEASE ORAL DAILY
Status: DISCONTINUED | OUTPATIENT
Start: 2024-12-13 | End: 2024-12-13 | Stop reason: HOSPADM

## 2024-12-12 RX ORDER — LIDOCAINE HYDROCHLORIDE 20 MG/ML
INJECTION INTRAVENOUS
Status: DISCONTINUED | OUTPATIENT
Start: 2024-12-12 | End: 2024-12-12

## 2024-12-12 RX ORDER — GLUCAGON 1 MG
1 KIT INJECTION
Status: DISCONTINUED | OUTPATIENT
Start: 2024-12-12 | End: 2024-12-12 | Stop reason: HOSPADM

## 2024-12-12 RX ORDER — SODIUM CHLORIDE 9 MG/ML
INJECTION, SOLUTION INTRAVENOUS CONTINUOUS
Status: DISCONTINUED | OUTPATIENT
Start: 2024-12-12 | End: 2024-12-13 | Stop reason: HOSPADM

## 2024-12-12 RX ORDER — HYDROMORPHONE HYDROCHLORIDE 1 MG/ML
0.2 INJECTION, SOLUTION INTRAMUSCULAR; INTRAVENOUS; SUBCUTANEOUS EVERY 5 MIN PRN
Status: DISCONTINUED | OUTPATIENT
Start: 2024-12-12 | End: 2024-12-12 | Stop reason: HOSPADM

## 2024-12-12 RX ORDER — DEXAMETHASONE SODIUM PHOSPHATE 4 MG/ML
INJECTION, SOLUTION INTRA-ARTICULAR; INTRALESIONAL; INTRAMUSCULAR; INTRAVENOUS; SOFT TISSUE
Status: DISCONTINUED | OUTPATIENT
Start: 2024-12-12 | End: 2024-12-12

## 2024-12-12 RX ORDER — MIDAZOLAM HYDROCHLORIDE 1 MG/ML
.5-4 INJECTION, SOLUTION INTRAMUSCULAR; INTRAVENOUS
Status: DISCONTINUED | OUTPATIENT
Start: 2024-12-12 | End: 2024-12-12

## 2024-12-12 RX ORDER — SODIUM CHLORIDE 0.9 % (FLUSH) 0.9 %
10 SYRINGE (ML) INJECTION
Status: DISCONTINUED | OUTPATIENT
Start: 2024-12-12 | End: 2024-12-12 | Stop reason: HOSPADM

## 2024-12-12 RX ORDER — ONDANSETRON HYDROCHLORIDE 2 MG/ML
INJECTION, SOLUTION INTRAVENOUS
Status: DISCONTINUED | OUTPATIENT
Start: 2024-12-12 | End: 2024-12-12

## 2024-12-12 RX ORDER — METHOCARBAMOL 500 MG/1
1000 TABLET, FILM COATED ORAL EVERY 6 HOURS PRN
Status: DISCONTINUED | OUTPATIENT
Start: 2024-12-12 | End: 2024-12-13 | Stop reason: HOSPADM

## 2024-12-12 RX ORDER — FAMOTIDINE 20 MG/1
20 TABLET, FILM COATED ORAL 2 TIMES DAILY
Status: DISCONTINUED | OUTPATIENT
Start: 2024-12-12 | End: 2024-12-13 | Stop reason: HOSPADM

## 2024-12-12 RX ORDER — PREGABALIN 75 MG/1
150 CAPSULE ORAL
Status: COMPLETED | OUTPATIENT
Start: 2024-12-12 | End: 2024-12-12

## 2024-12-12 RX ORDER — ONDANSETRON HYDROCHLORIDE 2 MG/ML
4 INJECTION, SOLUTION INTRAVENOUS EVERY 6 HOURS PRN
Status: DISCONTINUED | OUTPATIENT
Start: 2024-12-12 | End: 2024-12-13 | Stop reason: HOSPADM

## 2024-12-12 RX ORDER — SODIUM CHLORIDE 9 MG/ML
INJECTION, SOLUTION INTRAVENOUS CONTINUOUS
Status: DISCONTINUED | OUTPATIENT
Start: 2024-12-12 | End: 2024-12-12

## 2024-12-12 RX ORDER — PROCHLORPERAZINE EDISYLATE 5 MG/ML
5 INJECTION INTRAMUSCULAR; INTRAVENOUS EVERY 30 MIN PRN
Status: DISCONTINUED | OUTPATIENT
Start: 2024-12-12 | End: 2024-12-12 | Stop reason: HOSPADM

## 2024-12-12 RX ORDER — PROPOFOL 10 MG/ML
VIAL (ML) INTRAVENOUS
Status: DISCONTINUED | OUTPATIENT
Start: 2024-12-12 | End: 2024-12-12

## 2024-12-12 RX ORDER — KETAMINE HCL IN 0.9 % NACL 50 MG/5 ML
SYRINGE (ML) INTRAVENOUS
Status: DISCONTINUED | OUTPATIENT
Start: 2024-12-12 | End: 2024-12-12

## 2024-12-12 RX ORDER — MIDAZOLAM HYDROCHLORIDE 1 MG/ML
INJECTION INTRAMUSCULAR; INTRAVENOUS
Status: DISCONTINUED | OUTPATIENT
Start: 2024-12-12 | End: 2024-12-12

## 2024-12-12 RX ORDER — TALC
6 POWDER (GRAM) TOPICAL NIGHTLY PRN
Status: DISCONTINUED | OUTPATIENT
Start: 2024-12-12 | End: 2024-12-13 | Stop reason: HOSPADM

## 2024-12-12 RX ORDER — ROCURONIUM BROMIDE 10 MG/ML
INJECTION, SOLUTION INTRAVENOUS
Status: DISCONTINUED | OUTPATIENT
Start: 2024-12-12 | End: 2024-12-12

## 2024-12-12 RX ORDER — ACETAMINOPHEN 500 MG
1000 TABLET ORAL
Status: COMPLETED | OUTPATIENT
Start: 2024-12-12 | End: 2024-12-12

## 2024-12-12 RX ORDER — HEPARIN SODIUM 5000 [USP'U]/ML
5000 INJECTION, SOLUTION INTRAVENOUS; SUBCUTANEOUS EVERY 8 HOURS
Status: DISCONTINUED | OUTPATIENT
Start: 2024-12-12 | End: 2024-12-13 | Stop reason: HOSPADM

## 2024-12-12 RX ORDER — KETOROLAC TROMETHAMINE 15 MG/ML
15 INJECTION, SOLUTION INTRAMUSCULAR; INTRAVENOUS
Status: DISCONTINUED | OUTPATIENT
Start: 2024-12-12 | End: 2024-12-12

## 2024-12-12 RX ORDER — CEFAZOLIN 2 G/1
2 INJECTION, POWDER, FOR SOLUTION INTRAMUSCULAR; INTRAVENOUS
Status: DISCONTINUED | OUTPATIENT
Start: 2024-12-12 | End: 2024-12-12

## 2024-12-12 RX ORDER — HEPARIN SODIUM 5000 [USP'U]/ML
5000 INJECTION, SOLUTION INTRAVENOUS; SUBCUTANEOUS EVERY 8 HOURS
Status: DISCONTINUED | OUTPATIENT
Start: 2024-12-12 | End: 2024-12-12

## 2024-12-12 RX ORDER — DIPHENHYDRAMINE HCL 25 MG
25 CAPSULE ORAL EVERY 12 HOURS PRN
Status: DISCONTINUED | OUTPATIENT
Start: 2024-12-12 | End: 2024-12-13 | Stop reason: HOSPADM

## 2024-12-12 RX ORDER — ACETAMINOPHEN 500 MG
1000 TABLET ORAL EVERY 6 HOURS
Status: DISCONTINUED | OUTPATIENT
Start: 2024-12-12 | End: 2024-12-13 | Stop reason: HOSPADM

## 2024-12-12 RX ORDER — ETOMIDATE 2 MG/ML
INJECTION INTRAVENOUS
Status: DISCONTINUED | OUTPATIENT
Start: 2024-12-12 | End: 2024-12-12

## 2024-12-12 RX ORDER — KETOROLAC TROMETHAMINE 30 MG/ML
15 INJECTION, SOLUTION INTRAMUSCULAR; INTRAVENOUS EVERY 6 HOURS
Status: DISCONTINUED | OUTPATIENT
Start: 2024-12-12 | End: 2024-12-13 | Stop reason: HOSPADM

## 2024-12-12 RX ADMIN — LIDOCAINE HYDROCHLORIDE 100 MG: 20 INJECTION INTRAVENOUS at 07:12

## 2024-12-12 RX ADMIN — PHENYLEPHRINE HYDROCHLORIDE 50 MCG: 10 INJECTION INTRAVENOUS at 08:12

## 2024-12-12 RX ADMIN — ONDANSETRON 4 MG: 2 INJECTION INTRAMUSCULAR; INTRAVENOUS at 10:12

## 2024-12-12 RX ADMIN — HEPARIN SODIUM 5000 UNITS: 5000 INJECTION INTRAVENOUS; SUBCUTANEOUS at 08:12

## 2024-12-12 RX ADMIN — ROCURONIUM BROMIDE 20 MG: 10 INJECTION, SOLUTION INTRAVENOUS at 09:12

## 2024-12-12 RX ADMIN — SODIUM CHLORIDE: 9 INJECTION, SOLUTION INTRAVENOUS at 08:12

## 2024-12-12 RX ADMIN — PHENYLEPHRINE HYDROCHLORIDE 50 MCG: 10 INJECTION INTRAVENOUS at 10:12

## 2024-12-12 RX ADMIN — FAMOTIDINE 20 MG: 20 TABLET ORAL at 08:12

## 2024-12-12 RX ADMIN — KETOROLAC TROMETHAMINE 15 MG: 15 INJECTION, SOLUTION INTRAMUSCULAR; INTRAVENOUS at 06:12

## 2024-12-12 RX ADMIN — HEPARIN SODIUM 5000 UNITS: 5000 INJECTION, SOLUTION INTRAVENOUS; SUBCUTANEOUS at 06:12

## 2024-12-12 RX ADMIN — HEPARIN SODIUM 5000 UNITS: 5000 INJECTION INTRAVENOUS; SUBCUTANEOUS at 03:12

## 2024-12-12 RX ADMIN — ETOMIDATE 10 MG: 2 INJECTION INTRAVENOUS at 07:12

## 2024-12-12 RX ADMIN — ACETAMINOPHEN 1000 MG: 500 TABLET ORAL at 12:12

## 2024-12-12 RX ADMIN — MIDAZOLAM HYDROCHLORIDE 1 MG: 2 INJECTION, SOLUTION INTRAMUSCULAR; INTRAVENOUS at 07:12

## 2024-12-12 RX ADMIN — ROCURONIUM BROMIDE 20 MG: 10 INJECTION, SOLUTION INTRAVENOUS at 08:12

## 2024-12-12 RX ADMIN — PROPOFOL 10 MG: 10 INJECTION, EMULSION INTRAVENOUS at 10:12

## 2024-12-12 RX ADMIN — FAMOTIDINE 20 MG: 20 TABLET ORAL at 12:12

## 2024-12-12 RX ADMIN — PHENYLEPHRINE HYDROCHLORIDE 100 MCG: 10 INJECTION INTRAVENOUS at 07:12

## 2024-12-12 RX ADMIN — DEXAMETHASONE SODIUM PHOSPHATE 4 MG: 4 INJECTION, SOLUTION INTRAMUSCULAR; INTRAVENOUS at 07:12

## 2024-12-12 RX ADMIN — SODIUM CHLORIDE, SODIUM GLUCONATE, SODIUM ACETATE, POTASSIUM CHLORIDE, MAGNESIUM CHLORIDE, SODIUM PHOSPHATE, DIBASIC, AND POTASSIUM PHOSPHATE: .53; .5; .37; .037; .03; .012; .00082 INJECTION, SOLUTION INTRAVENOUS at 07:12

## 2024-12-12 RX ADMIN — PHENYLEPHRINE HYDROCHLORIDE 50 MCG: 10 INJECTION INTRAVENOUS at 07:12

## 2024-12-12 RX ADMIN — Medication 20 MG: at 08:12

## 2024-12-12 RX ADMIN — ACETAMINOPHEN 1000 MG: 500 TABLET ORAL at 11:12

## 2024-12-12 RX ADMIN — PROPOFOL 50 MG: 10 INJECTION, EMULSION INTRAVENOUS at 07:12

## 2024-12-12 RX ADMIN — SUGAMMADEX 200 MG: 100 INJECTION, SOLUTION INTRAVENOUS at 10:12

## 2024-12-12 RX ADMIN — SODIUM CHLORIDE: 9 INJECTION, SOLUTION INTRAVENOUS at 07:12

## 2024-12-12 RX ADMIN — LIDOCAINE HYDROCHLORIDE 25 MG: 20 INJECTION INTRAVENOUS at 10:12

## 2024-12-12 RX ADMIN — CEFAZOLIN 2 G: 2 INJECTION, POWDER, FOR SOLUTION INTRAMUSCULAR; INTRAVENOUS at 07:12

## 2024-12-12 RX ADMIN — Medication 5 MG: at 09:12

## 2024-12-12 RX ADMIN — PHENYLEPHRINE HYDROCHLORIDE 0.2 MCG/KG/MIN: 10 INJECTION INTRAVENOUS at 08:12

## 2024-12-12 RX ADMIN — PREGABALIN 150 MG: 75 CAPSULE ORAL at 06:12

## 2024-12-12 RX ADMIN — Medication 25 MG: at 07:12

## 2024-12-12 RX ADMIN — FENTANYL CITRATE 50 MCG: 50 INJECTION, SOLUTION INTRAMUSCULAR; INTRAVENOUS at 07:12

## 2024-12-12 RX ADMIN — KETOROLAC TROMETHAMINE 15 MG: 30 INJECTION, SOLUTION INTRAMUSCULAR; INTRAVENOUS at 11:12

## 2024-12-12 RX ADMIN — PREGABALIN 150 MG: 150 CAPSULE ORAL at 08:12

## 2024-12-12 RX ADMIN — ACETAMINOPHEN 1000 MG: 500 TABLET ORAL at 06:12

## 2024-12-12 RX ADMIN — ROCURONIUM BROMIDE 60 MG: 10 INJECTION, SOLUTION INTRAVENOUS at 07:12

## 2024-12-12 RX ADMIN — KETOROLAC TROMETHAMINE 15 MG: 30 INJECTION, SOLUTION INTRAMUSCULAR; INTRAVENOUS at 05:12

## 2024-12-12 RX ADMIN — SODIUM CHLORIDE: 9 INJECTION, SOLUTION INTRAVENOUS at 11:12

## 2024-12-12 RX ADMIN — ACETAMINOPHEN 1000 MG: 500 TABLET ORAL at 05:12

## 2024-12-12 NOTE — OP NOTE
[facility]  Operative Note    Date: 12/12/2024    Pre-Op Diagnosis: left renal mass suspicious for renal cell carcinoma    Post-Op Diagnosis: same    Procedure(s) Performed:   1.  Robotic left partial nephrectomy  2.  Intraoperative US for localization of renal tumor    Specimen(s):   1.  Left renal mass    Surgeon: Adam Sawyer MD    Assistant: Carlee Davis MD    Bedside Assistant: Diego BONE    Anesthesia: General endotracheal anesthesia    Indications: Kandace Rapp is a 69 y.o. female with left renal mass suspicious for renal cell carcinoma.  After discussion of management options and risks and benefits associated with each the patient has elected to pursue surgical management.      Findings:   - Left Partial nephrectomy performed without immediate intra-operative complications.  - Tumor was entered during the excision, this was recognized and then dissection was corrected deeper to obtain negative margins. Grossly negative margins achieved.  - warm ischemia time 14 minutes    EBL: 80 mL    Drains:   1.  16 Fr ferguson catheter  2.  15 mm Derick drain to LLQ    Anesthesia: General endotracheal anesthesia    Complications:  none    Procedure in Detail: After discussion of risks and benefits of the procedure, informed consent was obtained.  The patient was brought to the operating room and placed supine on the operating table.  SCDs were applied and working prior to induction of anesthesia.  General anesthesia was administered.  A 16 Fr Ferguson catheter was placed in the standard fashion with 10 ml sterile water used to inflate the balloon.  An OG tube was placed.  The patient was then moved into the modified flank position with the left side up. The patient was appropriately padded and secured to the table.  He was then prepped and draped in the usual sterile fashion.  Timeout was performed and preoperative antibiotics were confirmed.      A Veress needle was  introduced into the abdomen.  Entry into the peritoneal cavity was confirmed with the drop test and an initial insufflation pressure of <10mmHg.  Aspiration during our drop test revealed no blood or succus.  The peritoneal cavity was insufflated up to 15 mmHg and the Veress was removed.  The location of the 8 mm camera port was marked with a marking pen and incised sharply using a 15 blade.  The 8 mm port was then introduced.  The camera was passed through the port and the abdomen was inspected and found to be free of bowel or vascular injury.  Using direct vision the remaining robotic ports were placed, just below the left costal margin and lower on the left abdomen.  A 12 mm assistant port was placed in the upper midline.  Each trocar was introduced under direct vision ensuring no injury to the underlying abdominal contents.      Dissection began along the white line of Toldt, reflecting the colon medially away from the kidney. The splenic reflection was released.  The psoas muscle was identified. Once the colon was reflected, dissection was carried out just below the kidney, and the ureter was identified.  The ureter was elevated and we continued our dissection toward the lower pole of the kidney proximally until we identified the renal hilum.  The ureter remained away from our dissection throughout the case.      Careful dissection of the hilum was performed.  The renal vein was easily identified anteriorly.  The renal artery was identified posterior and inferior to the renal vein.  The artery was isolated circumferentially.  Hemostasis was excellent.      The fat overlying the kidney was opened and freed from the underlying capsule along the kidney's length.  This revealed the lower pole tumor.  The kidney was freed from its surrounding attachments to allow adequate mobility to visualize the entire lower pole. The US probe was used to delineate the margins of the kidney, which were scored using hot scissors to  ensure the entire tumor was identified.      Two bulldog clamps were placed on the artery.  The tumor was then excised using cold scissors. The tumor was entered during the excision, this was recognized and then dissection was swung wide to obtain negative margins. Margins were grossly negative. There was no entry into the collecting system.  The tumor was passed into the LLQ.      A renorrhaphy was performed using 2-0 vlock to close the deep resection bed.  The capsule was then closed using 0 vicryl with the sliding weck clip technique. The bulldog clamps were released for a total warm ischemia time of 14 minutes.  Hemostasis of the renorrhaphy was excellent and the kidney was noted to have good color and turgor.  The ureter was again noted to be well away from the resection site and clamped renal artery.  FloSeal was placed into the renorrhaphy bed.  Gerota's was reapproximated over the kidney.     The tumor was placed into an EndoCatch bag via the 12mm upper midline assistant port.  A 15 mm Derick drain was placed intot he peritoneal cavity around the resection bed via the LLQ robotic port. This was placed under direct vision.      The robot was undocked and all trocars were removed.  The 12 mm upper midline incision was extended from skin down to fascia to allow removal of the specimen.  This was inspected and found to be intact.  This was passed off the field for pathologic analysis.      The extraction site fascia was closed using 1-0 PDS.  All skin incisions were closed using 4-0 monocryl. The drain was secured to the skin using a 2-0 nylon. Dermabond was applied to the incisions.     The patient tolerated the procedure well and was transferred to the recovery room in stable condition.    Disposition:  The patient will be admitted to the Urology service for post-operative monitoring, pain control, and observation due to risk of bleeding post-operatively.    Carlee Davis MD    I have reviewed the  operative note performed by Dr. Davis, and I concur with her/his documentation of Kandace Rapp. I was present for the critical or key portions of the procedure.

## 2024-12-12 NOTE — BRIEF OP NOTE
[facility] ([facility])  Brief Operative Note    SUMMARY     Surgery Date: 12/12/2024     Surgeons and Role:     * Adam Sawyer MD - Primary     * Carlee Davis MD - Fellow    Assisting Surgeon: None    Pre-op Diagnosis:  Renal mass [N28.89]    Post-op Diagnosis:  Post-Op Diagnosis Codes:     * Renal mass [N28.89]    Procedure(s) (LRB):  DV5 ROBOTIC NEPHRECTOMY, PARTIAL (Left)    Anesthesia: General    Implants:  * No implants in log *    Operative Findings:   - Left Partial nephrectomy performed without immediate intra-operative complications.  - Tumor was entered during the excision, this was recognized and then dissection was swung wide to obtain negative margins. Grossly negative margins achieved.   - 14 minute warm ischemia time.    Estimated Blood Loss: * No values recorded between 12/12/2024  8:01 AM and 12/12/2024 10:49 AM *    Estimated Blood Loss has not been documented. EBL = 80 mL.         Specimens:   Specimen (24h ago, onward)       Start     Ordered    12/12/24 1004  Specimen to Pathology, Surgery Urology  Once        Comments: Pre-op Diagnosis: Renal mass [N28.89]Procedure(s):DV5 ROBOTIC NEPHRECTOMY, PARTIAL Number of specimens: 1Name of specimens: 1-Left renal mass - Permanent     References:    Click here for ordering Quick Tip   Question Answer Comment   Procedure Type: Urology    Specimen Class: Known or suspected malignancy    Release to patient Immediate        12/12/24 1016                    [id number]

## 2024-12-12 NOTE — NURSING TRANSFER
Nursing Transfer Note      12/12/2024   12:22 PM    Transfer To: Room 501    Transfer via bed    Transfer with 2L NC to O2    Transported by PCTs    Telemetry: Rate 94  Order for Tele Monitor? No    Additional Lines: Oxygen and Alvarado Catheter    4eyes on Skin: yes    Medicines sent: IVF    Any special needs or follow-up needed: None    Patient belongings transferred with patient: No    Chart send with patient: Yes    Notified: daughter    Patient reassessed at: 12/12/2024  12:21 (date, time)

## 2024-12-12 NOTE — ANESTHESIA PROCEDURE NOTES
Arterial    Diagnosis: L renal mass    Patient location during procedure: done in OR    Staffing  Authorizing Provider: Jv Marcelo MD  Performing Provider: Stacie Sharpe MD    Staffing  Performed by: Stacie Sharpe MD  Authorized by: Jv Marcelo MD    Anesthesiologist was present at the time of the procedure.    Preanesthetic Checklist  Completed: patient identified, IV checked, site marked, risks and benefits discussed, surgical consent, monitors and equipment checked, pre-op evaluation, timeout performed and anesthesia consent givenArterial  Skin Prep: chlorhexidine gluconate  Local Infiltration: none  Orientation: right  Location: radial    Catheter Size: 20 G  Catheter placement by Ultrasound guidance. Heme positive aspiration all ports.   Vessel Caliber: patent, compressibility normal  Needle advanced into vessel with real time Ultrasound guidance.Insertion Attempts: 1  Assessment  Dressing: secured with tape and tegaderm  Patient: Tolerated well

## 2024-12-12 NOTE — ANESTHESIA PROCEDURE NOTES
Intubation    Date/Time: 12/12/2024 7:28 AM    Performed by: Stacie Sharpe MD  Authorized by: Jv Marcelo MD    Intubation:     Induction:  Intravenous    Intubated:  Postinduction    Mask Ventilation:  Easy with oral airway    Attempts:  2    Attempted By:  Resident anesthesiologist    Method of Intubation:  Direct    Blade:  Jaiden 3    Laryngeal View Grade: Grade III - only epiglottis visible      Attempted By (2nd Attempt):  Resident anesthesiologist    Method of Intubation (2nd Attempt):  Video laryngoscopy    Blade (2nd Attempt):  Xiong 3    Laryngeal View Grade (2nd Attempt): Grade I - full view of cords      Difficult Airway Encountered?: No      Complications:  None    Airway Device:  Oral endotracheal tube    Airway Device Size:  7.0    Style/Cuff Inflation:  Cuffed    Tube secured:  22    Secured at:  The lips    Placement Verified By:  Capnometry    Complicating Factors:  None, anterior larynx and obesity    Findings Post-Intubation:  BS equal bilateral

## 2024-12-12 NOTE — PLAN OF CARE
[facility]  Discharge Assessment    Primary Care Provider: Ishmael Jacobson MD     Discharge Assessment (most recent)       BRIEF DISCHARGE ASSESSMENT - 12/12/24 1432          Discharge Planning    Assessment Type Discharge Planning Brief Assessment     Resource/Environmental Concerns none     Support Systems Children     Equipment Currently Used at Home none     Patient/Family Anticipates Transition to home with family     Discharge Plan A Home with family                       Patient sleeping peacefully at this time. Spoke with daughter to complete d/c planning assessment. Patient lives with her daughterMaggie in a single story home with 5 steps to enter. Independent with ADL's. No DME in home. Verified PCP, Pharmacy and health insurance. Patient to d/c home in am if meeting post-op milestones. Will have help at home from family. Discharge Plan A and Plan B have been determined by review of patient's clinical status, future medical and therapeutic needs, and coverage/benefits for post-acute care in coordination with multidisciplinary team members.      Sheela Olmstead RNCM  Case Management  [facility]  [phone]

## 2024-12-12 NOTE — TRANSFER OF CARE
"Anesthesia Transfer of Care Note    Patient: Kandace Rapp    Procedure(s) Performed: Procedure(s) (LRB):  DV5 ROBOTIC NEPHRECTOMY, PARTIAL (Left)    Patient location: PACU    Anesthesia Type: general    Transport from OR: Transported from OR on 6-10 L/min O2 by face mask with adequate spontaneous ventilation    Post pain: adequate analgesia    Post assessment: no apparent anesthetic complications    Post vital signs: stable    Level of consciousness: awake    Nausea/Vomiting: no nausea/vomiting    Complications: none    Transfer of care protocol was followed      Last vitals: Visit Vitals  /60 (BP Location: Left arm, Patient Position: Lying)   Pulse 89   Temp 36.6 °C (97.9 °F) (Temporal)   Resp 19   Ht 5' 2" (1.575 m)   Wt 88.9 kg (196 lb)   SpO2 98%   Breastfeeding No   BMI 35.85 kg/m²     "

## 2024-12-13 VITALS
HEIGHT: 62 IN | RESPIRATION RATE: 17 BRPM | WEIGHT: 196 LBS | TEMPERATURE: 98 F | OXYGEN SATURATION: 95 % | HEART RATE: 79 BPM | SYSTOLIC BLOOD PRESSURE: 118 MMHG | DIASTOLIC BLOOD PRESSURE: 59 MMHG | BODY MASS INDEX: 36.07 KG/M2

## 2024-12-13 LAB
ANION GAP SERPL CALC-SCNC: 7 MMOL/L (ref 8–16)
BASOPHILS # BLD AUTO: 0.01 K/UL (ref 0–0.2)
BASOPHILS NFR BLD: 0.1 % (ref 0–1.9)
BODY FLUID SOURCE, CREATININE: NORMAL
BUN SERPL-MCNC: 20 MG/DL (ref 8–23)
CALCIUM SERPL-MCNC: 8.3 MG/DL (ref 8.7–10.5)
CHLORIDE SERPL-SCNC: 110 MMOL/L (ref 95–110)
CO2 SERPL-SCNC: 24 MMOL/L (ref 23–29)
CREAT FLD-MCNC: 0.8 MG/DL
CREAT SERPL-MCNC: 0.9 MG/DL (ref 0.5–1.4)
DIFFERENTIAL METHOD BLD: ABNORMAL
EOSINOPHIL # BLD AUTO: 0 K/UL (ref 0–0.5)
EOSINOPHIL NFR BLD: 0 % (ref 0–8)
ERYTHROCYTE [DISTWIDTH] IN BLOOD BY AUTOMATED COUNT: 12.1 % (ref 11.5–14.5)
EST. GFR  (NO RACE VARIABLE): >60 ML/MIN/1.73 M^2
GLUCOSE SERPL-MCNC: 133 MG/DL (ref 70–110)
HCT VFR BLD AUTO: 30.2 % (ref 37–48.5)
HGB BLD-MCNC: 9.2 G/DL (ref 12–16)
IMM GRANULOCYTES # BLD AUTO: 0.03 K/UL (ref 0–0.04)
IMM GRANULOCYTES NFR BLD AUTO: 0.4 % (ref 0–0.5)
LYMPHOCYTES # BLD AUTO: 1.8 K/UL (ref 1–4.8)
LYMPHOCYTES NFR BLD: 21.3 % (ref 18–48)
MAGNESIUM SERPL-MCNC: 1.8 MG/DL (ref 1.6–2.6)
MCH RBC QN AUTO: 28.2 PG (ref 27–31)
MCHC RBC AUTO-ENTMCNC: 30.5 G/DL (ref 32–36)
MCV RBC AUTO: 93 FL (ref 82–98)
MONOCYTES # BLD AUTO: 0.6 K/UL (ref 0.3–1)
MONOCYTES NFR BLD: 6.8 % (ref 4–15)
NEUTROPHILS # BLD AUTO: 6 K/UL (ref 1.8–7.7)
NEUTROPHILS NFR BLD: 71.4 % (ref 38–73)
NRBC BLD-RTO: 0 /100 WBC
PHOSPHATE SERPL-MCNC: 3.7 MG/DL (ref 2.7–4.5)
PLATELET # BLD AUTO: 201 K/UL (ref 150–450)
PMV BLD AUTO: 9.8 FL (ref 9.2–12.9)
POTASSIUM SERPL-SCNC: 4 MMOL/L (ref 3.5–5.1)
RBC # BLD AUTO: 3.26 M/UL (ref 4–5.4)
SODIUM SERPL-SCNC: 141 MMOL/L (ref 136–145)
WBC # BLD AUTO: 8.37 K/UL (ref 3.9–12.7)

## 2024-12-13 PROCEDURE — 83735 ASSAY OF MAGNESIUM: CPT | Performed by: STUDENT IN AN ORGANIZED HEALTH CARE EDUCATION/TRAINING PROGRAM

## 2024-12-13 PROCEDURE — 63600175 PHARM REV CODE 636 W HCPCS: Performed by: STUDENT IN AN ORGANIZED HEALTH CARE EDUCATION/TRAINING PROGRAM

## 2024-12-13 PROCEDURE — 80048 BASIC METABOLIC PNL TOTAL CA: CPT | Performed by: STUDENT IN AN ORGANIZED HEALTH CARE EDUCATION/TRAINING PROGRAM

## 2024-12-13 PROCEDURE — 36415 COLL VENOUS BLD VENIPUNCTURE: CPT | Performed by: STUDENT IN AN ORGANIZED HEALTH CARE EDUCATION/TRAINING PROGRAM

## 2024-12-13 PROCEDURE — 25000003 PHARM REV CODE 250: Performed by: STUDENT IN AN ORGANIZED HEALTH CARE EDUCATION/TRAINING PROGRAM

## 2024-12-13 PROCEDURE — 82570 ASSAY OF URINE CREATININE: CPT | Performed by: STUDENT IN AN ORGANIZED HEALTH CARE EDUCATION/TRAINING PROGRAM

## 2024-12-13 PROCEDURE — 84100 ASSAY OF PHOSPHORUS: CPT | Performed by: STUDENT IN AN ORGANIZED HEALTH CARE EDUCATION/TRAINING PROGRAM

## 2024-12-13 PROCEDURE — 85025 COMPLETE CBC W/AUTO DIFF WBC: CPT | Performed by: STUDENT IN AN ORGANIZED HEALTH CARE EDUCATION/TRAINING PROGRAM

## 2024-12-13 RX ORDER — IBUPROFEN 800 MG/1
800 TABLET ORAL EVERY 6 HOURS PRN
Qty: 56 TABLET | Refills: 0 | Status: SHIPPED | OUTPATIENT
Start: 2024-12-13 | End: 2024-12-27

## 2024-12-13 RX ORDER — ACETAMINOPHEN 500 MG
500 TABLET ORAL EVERY 6 HOURS PRN
Qty: 56 TABLET | Refills: 0 | Status: SHIPPED | OUTPATIENT
Start: 2024-12-13 | End: 2024-12-27

## 2024-12-13 RX ORDER — POLYETHYLENE GLYCOL 3350 17 G/17G
17 POWDER, FOR SOLUTION ORAL DAILY
Qty: 510 G | Refills: 0 | Status: SHIPPED | OUTPATIENT
Start: 2024-12-13

## 2024-12-13 RX ORDER — OXYCODONE HYDROCHLORIDE 5 MG/1
5 TABLET ORAL EVERY 4 HOURS PRN
Qty: 10 TABLET | Refills: 0 | Status: SHIPPED | OUTPATIENT
Start: 2024-12-13

## 2024-12-13 RX ADMIN — NIFEDIPINE 30 MG: 30 TABLET, FILM COATED, EXTENDED RELEASE ORAL at 08:12

## 2024-12-13 RX ADMIN — FAMOTIDINE 20 MG: 20 TABLET ORAL at 08:12

## 2024-12-13 RX ADMIN — SODIUM CHLORIDE: 9 INJECTION, SOLUTION INTRAVENOUS at 05:12

## 2024-12-13 RX ADMIN — KETOROLAC TROMETHAMINE 15 MG: 30 INJECTION, SOLUTION INTRAMUSCULAR; INTRAVENOUS at 05:12

## 2024-12-13 RX ADMIN — HEPARIN SODIUM 5000 UNITS: 5000 INJECTION INTRAVENOUS; SUBCUTANEOUS at 05:12

## 2024-12-13 NOTE — SUBJECTIVE & OBJECTIVE
"Interval History: No acute events overnight. Hemodynamically stable. The patient did not ambulate once getting to her room. Tolerated PO intake with no nausea or vomiting.     Review of Systems  Objective:     Temp:  [97.3 °F (36.3 °C)-98.5 °F (36.9 °C)] 98.4 °F (36.9 °C)  Pulse:  [86-97] 88  Resp:  [9-19] 15  SpO2:  [93 %-100 %] 98 %  BP: (103-135)/(58-75) 113/67     Body mass index is 35.85 kg/m².           Drains       Drain  Duration                  Closed/Suction Drain 12/12/24 1020 Tube - 1 Left LLQ Bulb 15 Fr. <1 day         Urethral Catheter 12/12/24 0728 Silicone 16 Fr. <1 day                     Physical Exam  Constitutional:       Appearance: Normal appearance.   HENT:      Head: Normocephalic and atraumatic.   Abdominal:      Comments: Appropriately tender to palpation.  Surgical incisions are clean dry and intact   JPSS    Genitourinary:     Comments: Alvarado catheter draining clear yellow urine  Skin:     General: Skin is warm and dry.   Neurological:      General: No focal deficit present.      Mental Status: She is alert and oriented to person, place, and time.   Psychiatric:         Mood and Affect: Mood normal.           Significant Labs:    BMP:  Recent Labs   Lab 12/12/24 1908 12/13/24  0241    141   K 4.6 4.0    110   CO2 24 24   BUN 15 20   CREATININE 0.7 0.9   CALCIUM 8.6* 8.3*       CBC:   Recent Labs   Lab 12/12/24 1908 12/13/24  0241   WBC 8.29 8.37   HGB 10.2* 9.2*   HCT 33.6* 30.2*    201       Blood Culture: No results for input(s): "LABBLOO" in the last 168 hours.  Urine Culture: No results for input(s): "LABURIN" in the last 168 hours.  Urine Studies: No results for input(s): "COLORU", "APPEARANCEUA", "PHUR", "SPECGRAV", "PROTEINUA", "GLUCUA", "KETONESU", "BILIRUBINUA", "OCCULTUA", "NITRITE", "UROBILINOGEN", "LEUKOCYTESUR", "RBCUA", "WBCUA", "BACTERIA", "SQUAMEPITHEL", "HYALINECASTS" in the last 168 hours.    Invalid input(s): "RHONA"    Significant " Imaging:  All pertinent imaging results/findings from the past 24 hours have been reviewed.

## 2024-12-13 NOTE — DISCHARGE SUMMARY
[facility] - [facility]  Urology  Discharge Summary      Patient Name: Kandace Rapp  MRN: [id number]  Admission Date: 2024  Hospital Length of Stay: 1 days  Discharge Date and Time: 2024 12:59 PM  Attending Physician: Adam Sawyer MD  Discharging Provider: Baljit Trammell MD  Primary Care Physician: Ishmael Jacobson MD    HPI:   Kandace Rapp is a 69 y.o. female with a left Bosniak 4 lesion.      The mass was found on workup for TAVR (plans for sometime in /Feb).  Most recent echo with EF 55-60%.  CTA from 24 obtained independently reviewed and shows 4 cm L lower pole Bosniak 4 lesion.       The patient denies gross hematuria and/or constitutional symptoms attributable to their recently discovered renal mass.     The patient denies a personal and family history of kidney cancer.     Does have history of post anesthesia urinary retention and difficulty tolerating ferguson catheter.      Patient reports no personal history of tobacco use.  Radiation therapy to pelvis: None  Exposure to harmful chemicals:  History of Urolithiasis: None.  Patient has not had recent urine culture.     History of DM: None. Last A1c 5.2 on 23.  No history of CKD: None. Baseline Cr 0.8 on 24. Baseline GFR >60. They have never previously been seen by nephrology.  Additional PMH: MVP.     Previous abdominal surgery:  x2  Blood thinners: None     Procedure(s) (LRB):  DV5 ROBOTIC NEPHRECTOMY, PARTIAL (Left)     Indwelling Lines/Drains at time of discharge:   Lines/Drains/Airways       Drain  Duration                  Closed/Suction Drain 24 1020 Tube - 1 Left LLQ Bulb 15 Fr. 1 day                  Hospital Course:  Please see the preoperative H&P and other available documentation for full details related to history prior to this admission.  Briefly, Kandace Rapp is a 69 y.o. female who was admitted following scheduled elective surgery for <principal problem not  specified>    Following a complete preoperative discussion of the risks and benefits of surgery with signed informed consent, the patient was taken to the operating room on 12/12/2024 and underwent the above stated procedures.  The patient tolerated surgery well and there were no complications.  Please see the operative report for full intraoperative findings and details.  Postoperatively, the patient did well and was transferred from the PACU to the floor in stable condition where they had a stable and uncomplicated hospital course.  Labs and vital signs remained stable and appropriate throughout course.  Diet was advanced as tolerated and the patient's pain was controlled on oral pain medications without problem.  Currently, the patient is doing well at 1 Day Post-Op and is stable and appropriate for discharge home at this time.       Medications and instructions as below.  For more thorough information, please refer to the hospital record and operative report.    Consults: None    Significant Diagnostic Studies:     Pending Diagnostic Studies:       Procedure Component Value Units Date/Time    Specimen to Pathology, Surgery Urology [[id number]] Collected: 12/12/24 1019    Order Status: Sent Lab Status: In process Updated: 12/12/24 1418    Specimen: Tissue             There are no hospital problems to display for this patient.      Discharged Condition: good    Disposition: Home    Follow Up: The patient will follow up in two weeks.     Patient Instructions:      Type & Screen   Standing Status: Future Number of Occurrences: 1 Standing Exp. Date: 01/07/26     Medications:  Reconciled Home Medications:      Medication List        START taking these medications      acetaminophen 500 MG tablet  Commonly known as: TYLENOL  Take 1 tablet (500 mg total) by mouth every 6 (six) hours as needed for Pain.     ibuprofen 800 MG tablet  Commonly known as: ADVIL,MOTRIN  Take 1 tablet (800 mg total) by mouth every 6 (six) hours  as needed for Pain.     oxyCODONE 5 MG immediate release tablet  Commonly known as: ROXICODONE  Take 1 tablet (5 mg total) by mouth every 4 (four) hours as needed for Pain.     polyethylene glycol 17 gram/dose powder  Commonly known as: GLYCOLAX  Use to cap to measure 17g, mix with liquid, and take by mouth once daily.            CONTINUE taking these medications      NIFEdipine 30 MG (OSM) 24 hr tablet  Commonly known as: PROCARDIA-XL  Take 1 tablet (30 mg total) by mouth once daily.            ASK your doctor about these medications      carBAMazepine 100 MG 12 hr tablet  Commonly known as: TEGRETOL XR  Take 2 tablets (200 mg total) by mouth 2 (two) times daily.     cholecalciferol (vitamin D3) 1,250 mcg (50,000 unit) capsule  Take 1 capsule every week by oral route.              Time spent on the discharge of patient: 15 minutes    Baljit Trammell MD  Urology  [facility] - [facility]

## 2024-12-13 NOTE — PLAN OF CARE
Patient is resting in bed, no apparent distress noted. Patient arouses to full orientation to voice/ name. Patient denies pain at present. Patient with 4 lap sites and CALI x1 in place, with dermabond. NS infusing at prescribed rated to LFA PIV. Patient is pending lab draw. Alvarado to gravity with clear yellow urine output. POC discussed at bedside with daughter, all questions asked and answered, no concerns identified. POC continues as ordered. Bed remains low, call light and personal items in reach. No distress.

## 2024-12-13 NOTE — PLAN OF CARE
Problem: Adult Inpatient Plan of Care  Goal: Plan of Care Review  12/13/2024 0252 by Krysta Horn LPN  Outcome: Progressing  12/13/2024 0251 by Krysta Horn LPN  Outcome: Progressing  Goal: Patient-Specific Goal (Individualized)  Outcome: Progressing  Goal: Absence of Hospital-Acquired Illness or Injury  12/13/2024 0252 by Krysta Horn LPN  Outcome: Progressing  12/13/2024 0251 by Krysta Horn LPN  Outcome: Progressing  Goal: Optimal Comfort and Wellbeing  12/13/2024 0252 by Krysta Horn LPN  Outcome: Progressing  12/13/2024 0251 by Krysta Horn LPN  Outcome: Progressing  Intervention: Monitor Pain and Promote Comfort  Flowsheets (Taken 12/13/2024 0252)  Pain Management Interventions: medication offered  Goal: Readiness for Transition of Care  Outcome: Progressing     Problem: Adult Inpatient Plan of Care  Goal: Plan of Care Review  12/13/2024 0252 by Krysta Horn LPN  Outcome: Progressing  12/13/2024 0251 by Krysta Horn LPN  Outcome: Progressing     Problem: Adult Inpatient Plan of Care  Goal: Patient-Specific Goal (Individualized)  Outcome: Progressing     Problem: Adult Inpatient Plan of Care  Goal: Absence of Hospital-Acquired Illness or Injury  12/13/2024 0252 by Krysta Horn LPN  Outcome: Progressing  12/13/2024 0251 by Krysta Horn LPN  Outcome: Progressing     Problem: Adult Inpatient Plan of Care  Goal: Optimal Comfort and Wellbeing  12/13/2024 0252 by Krysta Horn LPN  Outcome: Progressing  12/13/2024 0251 by Krysta Horn LPN  Outcome: Progressing  Intervention: Monitor Pain and Promote Comfort  Flowsheets (Taken 12/13/2024 0252)  Pain Management Interventions: medication offered     Problem: Adult Inpatient Plan of Care  Goal: Optimal Comfort and Wellbeing  Intervention: Monitor Pain and Promote Comfort  Flowsheets (Taken 12/13/2024 0252)  Pain Management Interventions: medication offered    PRN medication effective. For pain  Explained plan of care, verbalized  understanding. Educated pt .on new medicine . No injury during shift, Side rails up x 2, call light by bedside.

## 2024-12-13 NOTE — ANESTHESIA POSTPROCEDURE EVALUATION
Anesthesia Post Evaluation    Patient: Kandace Rapp    Procedure(s) Performed: Procedure(s) (LRB):  DV5 ROBOTIC NEPHRECTOMY, PARTIAL (Left)    Final Anesthesia Type: general      Patient location during evaluation: PACU  Patient participation: Yes- Able to Participate  Level of consciousness: awake  Post-procedure vital signs: reviewed and stable  Pain management: adequate  Airway patency: patent    PONV status at discharge: No PONV  Anesthetic complications: no      Cardiovascular status: blood pressure returned to baseline  Respiratory status: unassisted  Hydration status: euvolemic  Follow-up not needed.              Vitals Value Taken Time   /59 12/13/24 1135   Temp 36.4 °C (97.6 °F) 12/13/24 1135   Pulse 79 12/13/24 1135   Resp 17 12/13/24 1135   SpO2 95 % 12/13/24 1135         Event Time   Out of Recovery 11:45:00         Pain/Melony Score: Pain Rating Prior to Med Admin: 0 (12/13/2024  6:40 AM)  Pain Rating Post Med Admin: 0 (12/13/2024  6:40 AM)  Melony Score: 9 (12/12/2024  1:15 PM)

## 2024-12-13 NOTE — ASSESSMENT & PLAN NOTE
Della Rapp is a 69 year old women who is s/p robotic left partial nephrectomy.    - Regular Diet   - CALI Creatinine negative  - Voiding trial, ferguson catheter removed during morning rounds  - Hemoglobin stable   - Creatinine at baseline     Dipso: Likely home today.

## 2024-12-13 NOTE — PROGRESS NOTES
[facility]  Urology  Progress Note    Patient Name: Kandace Rapp  MRN: [id number]  Admission Date: 2024  Hospital Length of Stay: 1 days  Code Status: Prior   Attending Provider: Adam Sawyer MD   Primary Care Physician: Ishmael Jacobson MD    Subjective:     HPI:  Kandace Rapp is a 69 y.o. female with a left Bosniak 4 lesion.      The mass was found on workup for TAVR (plans for sometime in /Feb).  Most recent echo with EF 55-60%.  CTA from 24 obtained independently reviewed and shows 4 cm L lower pole Bosniak 4 lesion.       The patient denies gross hematuria and/or constitutional symptoms attributable to their recently discovered renal mass.     The patient denies a personal and family history of kidney cancer.     Does have history of post anesthesia urinary retention and difficulty tolerating ferguson catheter.      Patient reports no personal history of tobacco use.  Radiation therapy to pelvis: None  Exposure to harmful chemicals:  History of Urolithiasis: None.  Patient has not had recent urine culture.     History of DM: None. Last A1c 5.2 on 23.  No history of CKD: None. Baseline Cr 0.8 on 24. Baseline GFR >60. They have never previously been seen by nephrology.  Additional PMH: MVP.     Previous abdominal surgery:  x2  Blood thinners: None     Interval History: No acute events overnight. Hemodynamically stable. The patient did not ambulate once getting to her room. Tolerated PO intake with no nausea or vomiting.     Review of Systems  Objective:     Temp:  [97.3 °F (36.3 °C)-98.5 °F (36.9 °C)] 98.4 °F (36.9 °C)  Pulse:  [86-97] 88  Resp:  [9-19] 15  SpO2:  [93 %-100 %] 98 %  BP: (103-135)/(58-75) 113/67     Body mass index is 35.85 kg/m².           Drains       Drain  Duration                  Closed/Suction Drain 24 1020 Tube - 1 Left LLQ Bulb 15 Fr. <1 day         Urethral Catheter 24 0728 Silicone 16 Fr. <1 day               "       Physical Exam  Constitutional:       Appearance: Normal appearance.   HENT:      Head: Normocephalic and atraumatic.   Abdominal:      Comments: Appropriately tender to palpation.  Surgical incisions are clean dry and intact   [facility]    Genitourinary:     Comments: Ferguson catheter draining clear yellow urine  Skin:     General: Skin is warm and dry.   Neurological:      General: No focal deficit present.      Mental Status: She is alert and oriented to person, place, and time.   Psychiatric:         Mood and Affect: Mood normal.           Significant Labs:    BMP:  Recent Labs   Lab 12/12/24 1908 12/13/24  0241    141   K 4.6 4.0    110   CO2 24 24   BUN 15 20   CREATININE 0.7 0.9   CALCIUM 8.6* 8.3*       CBC:   Recent Labs   Lab 12/12/24 1908 12/13/24  0241   WBC 8.29 8.37   HGB 10.2* 9.2*   HCT 33.6* 30.2*    201       Blood Culture: No results for input(s): "LABBLOO" in the last 168 hours.  Urine Culture: No results for input(s): "LABURIN" in the last 168 hours.  Urine Studies: No results for input(s): "COLORU", "APPEARANCEUA", "PHUR", "SPECGRAV", "PROTEINUA", "GLUCUA", "KETONESU", "BILIRUBINUA", "OCCULTUA", "NITRITE", "UROBILINOGEN", "LEUKOCYTESUR", "RBCUA", "WBCUA", "BACTERIA", "SQUAMEPITHEL", "HYALINECASTS" in the last 168 hours.    Invalid input(s): "WRIGHTSUR"    Significant Imaging:  All pertinent imaging results/findings from the past 24 hours have been reviewed.                  Assessment/Plan:     Renal mass  Della Rapp is a 69 year old women who is s/p robotic left partial nephrectomy.    - Regular Diet   - CALI Creatinine negative  - Voiding trial, ferguson catheter removed during morning rounds  - Hemoglobin stable   - Creatinine at baseline   - Encourage ambulation     Dipso: Likely home today pending ambulation.         VTE Risk Mitigation (From admission, onward)           Ordered     heparin (porcine) injection 5,000 Units  Every 8 hours         12/12/24 1049         "            Baljit Trammell MD  Urology  [facility] - Surgery

## 2024-12-13 NOTE — PLAN OF CARE
Problem: Adult Inpatient Plan of Care  Goal: Plan of Care Review  Outcome: Met  Goal: Patient-Specific Goal (Individualized)  Outcome: Met  Goal: Absence of Hospital-Acquired Illness or Injury  Outcome: Met  Goal: Optimal Comfort and Wellbeing  Outcome: Met  Goal: Readiness for Transition of Care  Outcome: Met     Problem: Wound  Goal: Optimal Coping  Outcome: Met  Goal: Optimal Functional Ability  Outcome: Met  Goal: Absence of Infection Signs and Symptoms  Outcome: Met  Goal: Improved Oral Intake  Outcome: Met  Goal: Optimal Pain Control and Function  Outcome: Met  Goal: Skin Health and Integrity  Outcome: Met  Goal: Optimal Wound Healing  Outcome: Met     Problem: Infection  Goal: Absence of Infection Signs and Symptoms  Outcome: Met     Problem: Fall Injury Risk  Goal: Absence of Fall and Fall-Related Injury  Outcome: Met     Patient discharging home with daughter , discharge instructions reviewed in detail with patient and daughter, allowed time for questions, all questions answered, IV removed, gauze and tape applied, bleeding controlled, Daughter will  discharge medications,  awaiting transportation .

## 2024-12-16 NOTE — PLAN OF CARE
[facility]  Discharge Final Note    Primary Care Provider: Ishmael Jacobson MD    Expected Discharge Date: 12/13/2024    Final Discharge Note (most recent)       Final Note - 12/16/24 1356          Final Note    Assessment Type Final Discharge Note     Anticipated Discharge Disposition Home or Self Care     What phone number can be called within the next 1-3 days to see how you are doing after discharge? --   [phone]                    Important Message from Medicare           Future Appointments   Date Time Provider Department Center   1/29/2025  8:00 AM Adam Sawyer MD [facility] UROLOG [facility]   1/29/2025 11:00 AM ECHO, [facility] ECHOSTR [facility]   1/29/2025  1:20 PM Rohit Quinonez MD [facility]   2/10/2025 10:30 AM Poppy Armando MD [facility] KARM[facility]     Patient discharged home to care of family on 12/13/24.    Sheela Olmstead RNCM  Case Management  [facility]  [phone]

## 2024-12-18 LAB
COMMENT: NORMAL
FINAL PATHOLOGIC DIAGNOSIS: NORMAL
GROSS: NORMAL
Lab: NORMAL

## 2024-12-19 ENCOUNTER — TELEPHONE (OUTPATIENT)
Dept: UROLOGY | Facility: CLINIC | Age: 69
End: 2024-12-19
Payer: MEDICARE

## 2024-12-19 NOTE — TELEPHONE ENCOUNTER
Returned patient's call.  Patient requesting biopsy results.  Advised that I will send a message to Dr. Sawyer asking that he call her.

## 2024-12-20 ENCOUNTER — TELEPHONE (OUTPATIENT)
Dept: UROLOGY | Facility: CLINIC | Age: 69
End: 2024-12-20
Payer: MEDICARE

## 2024-12-20 NOTE — TELEPHONE ENCOUNTER
"Spoke with patient.  Given the following information per Dr. Sawyer:    "Pathology showed a low grade stage 1 renal cell carcinoma with negative margins.  Good news.  We will discuss further at your post-op appointment."    Encouraged her to reach out with any additional questions/concerns.      "

## 2025-01-02 ENCOUNTER — TELEPHONE (OUTPATIENT)
Dept: UROLOGY | Facility: CLINIC | Age: 70
End: 2025-01-02
Payer: MEDICARE

## 2025-01-29 ENCOUNTER — HOSPITAL ENCOUNTER (OUTPATIENT)
Dept: CARDIOLOGY | Facility: HOSPITAL | Age: 70
Discharge: HOME | End: 2025-01-29
Attending: INTERNAL MEDICINE
Payer: MEDICARE

## 2025-01-29 ENCOUNTER — OFFICE VISIT (OUTPATIENT)
Dept: UROLOGY | Facility: CLINIC | Age: 70
End: 2025-01-29
Payer: MEDICARE

## 2025-01-29 ENCOUNTER — OFFICE VISIT (OUTPATIENT)
Dept: CARDIOLOGY | Facility: CLINIC | Age: 70
End: 2025-01-29
Payer: MEDICARE

## 2025-01-29 ENCOUNTER — EDUCATION (OUTPATIENT)
Dept: CARDIOLOGY | Facility: CLINIC | Age: 70
End: 2025-01-29
Payer: MEDICARE

## 2025-01-29 VITALS
BODY MASS INDEX: 36.07 KG/M2 | SYSTOLIC BLOOD PRESSURE: 110 MMHG | HEIGHT: 62 IN | DIASTOLIC BLOOD PRESSURE: 80 MMHG | WEIGHT: 196 LBS | HEART RATE: 85 BPM

## 2025-01-29 VITALS
HEART RATE: 74 BPM | HEIGHT: 62 IN | WEIGHT: 196.88 LBS | SYSTOLIC BLOOD PRESSURE: 132 MMHG | BODY MASS INDEX: 36.23 KG/M2 | OXYGEN SATURATION: 98 % | DIASTOLIC BLOOD PRESSURE: 69 MMHG

## 2025-01-29 VITALS — DIASTOLIC BLOOD PRESSURE: 82 MMHG | HEART RATE: 80 BPM | SYSTOLIC BLOOD PRESSURE: 136 MMHG

## 2025-01-29 DIAGNOSIS — C64.2 RENAL CELL CARCINOMA OF LEFT KIDNEY: ICD-10-CM

## 2025-01-29 DIAGNOSIS — I35.0 NONRHEUMATIC AORTIC VALVE STENOSIS: Primary | ICD-10-CM

## 2025-01-29 DIAGNOSIS — N28.89 RENAL MASS: ICD-10-CM

## 2025-01-29 DIAGNOSIS — I10 PRIMARY HYPERTENSION: ICD-10-CM

## 2025-01-29 DIAGNOSIS — E66.01 SEVERE OBESITY (BMI 35.0-39.9) WITH COMORBIDITY: ICD-10-CM

## 2025-01-29 DIAGNOSIS — I35.0 NONRHEUMATIC AORTIC VALVE STENOSIS: ICD-10-CM

## 2025-01-29 DIAGNOSIS — N28.89 RENAL MASS: Primary | ICD-10-CM

## 2025-01-29 PROBLEM — C64.9 RENAL CELL CARCINOMA: Status: ACTIVE | Noted: 2025-01-29

## 2025-01-29 LAB
ASCENDING AORTA: 4.11 CM
AV AREA BY CONTINUOUS VTI: 0.8 CM2
AV INDEX (PROSTH): 0.23
AV LVOT MEAN GRADIENT: 2 MMHG
AV LVOT PEAK GRADIENT: 3 MMHG
AV MEAN GRADIENT: 49 MMHG
AV PEAK GRADIENT: 67 MMHG
AV REGURGITATION PRESSURE HALF TIME: 444 MS
AV VALVE AREA BY VELOCITY RATIO: 0.8 CM²
AV VALVE AREA: 0.8 CM2
AV VELOCITY RATIO: 0.22
BSA FOR ECHO PROCEDURE: 1.97 M2
CV ECHO LV RWT: 0.24 CM
DOP CALC AO PEAK VEL: 4.1 M/S
DOP CALC AO VTI: 98.2 CM
DOP CALC LVOT AREA: 3.5 CM2
DOP CALC LVOT DIAMETER: 2.1 CM
DOP CALC LVOT PEAK VEL: 0.9 M/S
DOP CALC LVOT STROKE VOLUME: 78.2 CM3
DOP CALCLVOT PEAK VEL VTI: 22.6 CM
E WAVE DECELERATION TIME: 151 MS
E/A RATIO: 0.63
E/E' RATIO: 10 M/S
ECHO EF ESTIMATED: 58 %
ECHO LV POSTERIOR WALL: 0.6 CM (ref 0.6–1.1)
FRACTIONAL SHORTENING: 30 % (ref 28–44)
INTERVENTRICULAR SEPTUM: 1.1 CM (ref 0.6–1.1)
IVC DIAMETER: 1.68 CM
LA MAJOR: 5.6 CM
LA MINOR: 5.4 CM
LA WIDTH: 3.5 CM
LEFT ATRIUM SIZE: 3.5 CM
LEFT ATRIUM VOLUME INDEX MOD: 28 ML/M2
LEFT ATRIUM VOLUME INDEX: 30 ML/M2
LEFT ATRIUM VOLUME MOD: 54 ML
LEFT ATRIUM VOLUME: 57 CM3
LEFT INTERNAL DIMENSION IN SYSTOLE: 3.5 CM (ref 2.1–4)
LEFT VENTRICLE DIASTOLIC VOLUME INDEX: 60.82 ML/M2
LEFT VENTRICLE DIASTOLIC VOLUME: 115.55 ML
LEFT VENTRICLE MASS INDEX: 77.3 G/M2
LEFT VENTRICLE SYSTOLIC VOLUME INDEX: 25.8 ML/M2
LEFT VENTRICLE SYSTOLIC VOLUME: 49.02 ML
LEFT VENTRICULAR INTERNAL DIMENSION IN DIASTOLE: 5 CM (ref 3.5–6)
LEFT VENTRICULAR MASS: 146.8 G
LV LATERAL E/E' RATIO: 7.7
LV SEPTAL E/E' RATIO: 13.8
MV A" WAVE DURATION": 94.2 MS
MV PEAK A VEL: 1.1 M/S
MV PEAK E VEL: 0.69 M/S
OHS CV RV/LV RATIO: 0.82 CM
PISA TR MAX VEL: 2.1 M/S
PULM VEIN A" WAVE DURATION": 94.2 MS
PULM VEIN S/D RATIO: 1.55
PULMONIC VEIN PEAK A VELOCITY: 0.3 M/S
PV PEAK D VEL: 0.33 M/S
PV PEAK S VEL: 0.51 M/S
RA MAJOR: 4.65 CM
RA PRESSURE ESTIMATED: 8 MMHG
RA WIDTH: 3.08 CM
RIGHT ATRIAL AREA: 12.3 CM2
RIGHT VENTRICLE DIASTOLIC BASEL DIMENSION: 4.1 CM
RV TB RVSP: 10 MMHG
RV TISSUE DOPPLER FREE WALL SYSTOLIC VELOCITY 1 (APICAL 4 CHAMBER VIEW): 13.89 CM/S
SINUS: 3.37 CM
STJ: 3.44 CM
TDI LATERAL: 0.09 M/S
TDI SEPTAL: 0.05 M/S
TDI: 0.07 M/S
TR MAX PG: 18 MMHG
TRICUSPID ANNULAR PLANE SYSTOLIC EXCURSION: 2.53 CM
TV PEAK GRADIENT: 17 MMHG
TV REST PULMONARY ARTERY PRESSURE: 26 MMHG
Z-SCORE OF LEFT VENTRICULAR DIMENSION IN END DIASTOLE: -0.64
Z-SCORE OF LEFT VENTRICULAR DIMENSION IN END SYSTOLE: 0.5

## 2025-01-29 PROCEDURE — 1159F MED LIST DOCD IN RCRD: CPT | Mod: CPTII,S$GLB,, | Performed by: UROLOGY

## 2025-01-29 PROCEDURE — 1160F RVW MEDS BY RX/DR IN RCRD: CPT | Mod: CPTII,S$GLB,, | Performed by: UROLOGY

## 2025-01-29 PROCEDURE — 1101F PT FALLS ASSESS-DOCD LE1/YR: CPT | Mod: CPTII,S$GLB,, | Performed by: UROLOGY

## 2025-01-29 PROCEDURE — 3075F SYST BP GE 130 - 139MM HG: CPT | Mod: CPTII,S$GLB,, | Performed by: UROLOGY

## 2025-01-29 PROCEDURE — 99999 PR PBB SHADOW E&M-EST. PATIENT-LVL III: CPT | Mod: PBBFAC,,, | Performed by: INTERNAL MEDICINE

## 2025-01-29 PROCEDURE — 3288F FALL RISK ASSESSMENT DOCD: CPT | Mod: CPTII,S$GLB,, | Performed by: UROLOGY

## 2025-01-29 PROCEDURE — 93306 TTE W/DOPPLER COMPLETE: CPT | Mod: 26,,, | Performed by: INTERNAL MEDICINE

## 2025-01-29 PROCEDURE — 99024 POSTOP FOLLOW-UP VISIT: CPT | Mod: S$GLB,,, | Performed by: UROLOGY

## 2025-01-29 PROCEDURE — 99999 PR PBB SHADOW E&M-EST. PATIENT-LVL III: CPT | Mod: PBBFAC,,, | Performed by: UROLOGY

## 2025-01-29 PROCEDURE — 1126F AMNT PAIN NOTED NONE PRSNT: CPT | Mod: CPTII,S$GLB,, | Performed by: UROLOGY

## 2025-01-29 PROCEDURE — 93306 TTE W/DOPPLER COMPLETE: CPT

## 2025-01-29 PROCEDURE — 3079F DIAST BP 80-89 MM HG: CPT | Mod: CPTII,S$GLB,, | Performed by: UROLOGY

## 2025-01-29 NOTE — ASSESSMENT & PLAN NOTE
- lesion seen on CTA for TAVR workup  - now s/p robotic left partial nephrectomy and cancer free, stable

## 2025-01-29 NOTE — ASSESSMENT & PLAN NOTE
- Body mass index is 36.01 kg/m². Healthy lifestyle was discussed with the patient as well as the importance of physical activity to improve cardiovascular health.

## 2025-01-29 NOTE — PROGRESS NOTES
Interventional Cardiology Clinic Note    HPI:     Kandace Rapp is a 69 y.o. female who presents for follow-up.    Patient has been undergoing TAVR evaluation. Cardiac CTA was suspicious for left kidney lesion for which she followed with urology Dr. Sawyer. She underwent BAV on 11/6/2024 for cardiac clearance before undergoing left partial nephrectomy 12/12/2024. 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 She has been cleared from urology and is cancer free.    Today, she notes continued dyspnea on exertion. She is extremely short of breath with going up just five steps as well as walking around her house. Denies chest pain, palpitations, orthopnea, leg swelling, syncope. Patient is not on a daily aspirin or other blood thinner at this time.      Kandace Rapp is a 69 y.o. female referred by Dr Ng for evaluation of severe AS (NYHA Class III sx).     The patient has undergone the following TAVR work-up:   ECHO (Date 1/29/25): RUDOLPH= 0.8 cm2, MG= 49mmHg, Peak Keyshawn= 4.1 m/s, EF= 55-60%.   LHC (Date 1/28/24): normal coronaries    STS: 1.7%   Frailty: not frail    Iliacs are > on R and >  on L pending CTA review  LVOT area by CTA is  cm2 ( mm X  mm) and Avg Diameter is  per my independent review of images on [employer].  Incidental findings on CT: 4 cm L lower pole Bosniak 4 lesion; now s/p partial L nephrectomy and cleared from urology  CT Surgery risk assessment:  risk, per   due to needs  Rhythm issues: none  PFTs: N/A  KCCQ/5 meter walk: done  Comorbidities: HTN, obesity     Kandace Rapp is a TAVR candidate, pending CTA review.     ROS:      Review of Systems   Cardiovascular:  Positive for dyspnea on exertion. Negative for chest pain, irregular heartbeat, leg swelling, near-syncope, orthopnea, palpitations and syncope.   Respiratory:  Positive for shortness of breath.        PMH:     Past Medical History:   Diagnosis Date    Anxiety     Aortic stenosis      Arthritis     CHF (congestive heart failure)     Heart murmur     Hypertension     Mitral valve prolapse      Past Surgical History:   Procedure Laterality Date    AORTIC VALVULOPLASTY N/A 2024    Procedure: REPAIR, AORTIC VALVE;  Surgeon: Rohit Quinonez MD;  Location: [facility] CATH LAB;  Service: Cardiology;  Laterality: N/A;    AORTOGRAPHY N/A 2024    Procedure: AORTOGRAM;  Surgeon: Rohit Quinonez MD;  Location: [facility] CATH LAB;  Service: Cardiology;  Laterality: N/A;    BREAST CYST EXCISION Left 80s    BREAST SURGERY      Benign lump on left.    CATARACT EXTRACTION       SECTION      x1    COLONOSCOPY N/A 2023    Procedure: COLONOSCOPY;  Surgeon: Pattie Stephenson MD;  Location: [facility] ENDO (4TH FLR);  Service: Endoscopy;  Laterality: N/A;   ref by Ishmael Jacobson MD, PEG, instr. to portal-st    CORONARY ANGIOGRAPHY N/A 10/28/2024    Procedure: ANGIOGRAM, CORONARY ARTERY;  Surgeon: Yordy Ng MD;  Location: [facility] CATH LAB;  Service: Cardiology;  Laterality: N/A;  radial access    DV5 ROBOTIC NEPHRECTOMY, PARTIAL Left 2024    Procedure: DV5 ROBOTIC NEPHRECTOMY, PARTIAL;  Surgeon: Adam Sawyer MD;  Location: [facility] OR [facility] FLR;  Service: Urology;  Laterality: Left;    VALVE STUDY-AORTIC  2024    Procedure: Valve study-aortic;  Surgeon: Rohit Quinonez MD;  Location: [facility] CATH LAB;  Service: Cardiology;;     Allergies:     Review of patient's allergies indicates:   Allergen Reactions    Protamine sulfate Other (See Comments)     Bradycardia, Hypotension    Davion Rash     Medications:     Current Outpatient Medications on File Prior to Visit   Medication Sig Dispense Refill    cholecalciferol, vitamin D3, 1,250 mcg (50,000 unit) capsule       NIFEdipine (PROCARDIA-XL) 30 MG (OSM) 24 hr tablet Take 1 tablet (30 mg total) by mouth once daily. 90 tablet 3    carBAMazepine (TEGRETOL XR) 100 MG 12 hr tablet Take 2 tablets (200 mg total) by mouth 2 (two) times daily.  "(Patient not taking: Reported on 11/5/2024) 120 tablet 11    oxyCODONE (ROXICODONE) 5 MG immediate release tablet Take 1 tablet (5 mg total) by mouth every 4 (four) hours as needed for Pain. (Patient not taking: Reported on 1/29/2025) 10 tablet 0    polyethylene glycol (GLYCOLAX) 17 gram/dose powder Use to cap to measure 17g, mix with liquid, and take by mouth once daily. (Patient not taking: Reported on 1/29/2025) 510 g 0     Current Facility-Administered Medications on File Prior to Visit   Medication Dose Route Frequency Provider Last Rate Last Admin    sodium chloride 0.9% flush 10 mL  10 mL Intravenous PRN Rohit Quinonez MD         Social History:     Social History     Tobacco Use    Smoking status: Never    Smokeless tobacco: Never   Substance Use Topics    Alcohol use: Yes     Comment: Rare.      Family History:     Family History   Problem Relation Name Age of Onset    Breast cancer Mother 102     Lymphoma Mother 102     Heart disease Mother 102     Cancer Mother 102     Diabetes Sister      Heart disease Brother      Alcohol abuse Brother      Heart disease Maternal Uncle      Heart disease Maternal Grandfather      Cancer Father          panc ca    Breast cancer Paternal Aunt       Physical Exam:   /69 (BP Location: Left arm, Patient Position: Sitting)   Pulse 74   Ht 5' 2" (1.575 m)   Wt 89.3 kg (196 lb 13.9 oz)   SpO2 98%   BMI 36.01 kg/m²        Physical Exam  Vitals and nursing note reviewed.   Constitutional:       General: She is not in acute distress.     Appearance: Normal appearance. She is not ill-appearing or toxic-appearing.   HENT:      Head: Normocephalic and atraumatic.   Eyes:      Pupils: Pupils are equal, round, and reactive to light.   Cardiovascular:      Rate and Rhythm: Normal rate and regular rhythm.      Pulses: Normal pulses.      Heart sounds: Murmur heard.      No friction rub. No gallop.   Pulmonary:      Effort: Pulmonary effort is normal. No respiratory " "distress.   Musculoskeletal:         General: Normal range of motion.      Cervical back: Normal range of motion.      Right lower leg: No edema.      Left lower leg: No edema.   Skin:     General: Skin is warm and dry.      Capillary Refill: Capillary refill takes less than 2 seconds.   Neurological:      General: No focal deficit present.      Mental Status: She is alert.          Labs:     Lab Results   Component Value Date     12/13/2024    K 4.0 12/13/2024     12/13/2024    CO2 24 12/13/2024    BUN 20 12/13/2024    CREATININE 0.9 12/13/2024    ANIONGAP 7 (L) 12/13/2024     Lab Results   Component Value Date    HGBA1C 5.2 01/27/2023     No results found for: "BNP", "BNPTRIAGEBLO" Lab Results   Component Value Date    WBC 8.37 12/13/2024    HGB 9.2 (L) 12/13/2024    HCT 30.2 (L) 12/13/2024     12/13/2024    GRAN 6.0 12/13/2024    GRAN 71.4 12/13/2024     Lab Results   Component Value Date    CHOL 122 09/13/2022    HDL 45 09/13/2022    LDLCALC 63.4 09/13/2022    TRIG 68 09/13/2022          Lipids:  Recent Labs   Lab 09/13/22  0852   LDL Cholesterol 63.4   HDL 45      Renal:  Recent Labs   Lab 12/13/24  0241   Creatinine 0.9   Potassium 4.0   CO2 24   BUN 20     Liver:  Recent Labs   Lab 10/15/24  1424   AST 13   ALT 15       Imaging:     TTE (1/29/2025):     Left Ventricle: The left ventricle is normal in size. Normal wall thickness. There is normal systolic function with a visually estimated ejection fraction of 55 - 60%. There is normal diastolic function.    Right Ventricle: Normal right ventricular cavity size. Wall thickness is normal. Systolic function is normal.    Aortic Valve: There is severe aortic valve sclerosis. Moderately restricted motion. There is severe stenosis. Aortic valve area by VTI is 0.8 cm2. Aortic valve peak velocity is 4.1 m/s. Mean gradient is 49 mmHg. The dimensionless index is 0.23. There is mild to moderate aortic regurgitation.    Tricuspid Valve: There is mild " regurgitation.    Aorta: Aortic root is normal in size measuring 3.37 cm. Ascending aorta is mildly dilated measuring 4.11 cm.    Pulmonary Artery: The estimated pulmonary artery systolic pressure is 26 mmHg.    IVC/SVC: Intermediate venous pressure at 8 mmHg.    Assessment & Plan:     1. Nonrheumatic aortic valve stenosis    2. Renal mass    3. Severe obesity (BMI 35.0-39.9) with comorbidity        Nonrheumatic aortic valve stenosis  - patient s/p BAV for cardiac clearance for other surgery  - continues to endorse dyspnea on exertion and shortness of breath  - will continue with TAVR evaluation and CTS referral    Renal mass  - lesion seen on CTA for TAVR workup  - now s/p robotic left partial nephrectomy and cancer free, stable    Severe obesity (BMI 35.0-39.9) with comorbidity  - Body mass index is 36.01 kg/m². Healthy lifestyle was discussed with the patient as well as the importance of physical activity to improve cardiovascular health.      Signed:  Kenisha Alejandro PA-C  Interventional Cardiology

## 2025-01-29 NOTE — PROGRESS NOTES
Patient ID: Kandace Rapp is a 69 y.o. female.    Chief Complaint: post-op s/p left robotic partial nephrectomy      HPI: Kandace Rapp is a 69 y.o. Black or  female who presents today for evaluation and management of a renal mass.    The mass was found on workup for TAVR (plans for sometime in Jan/Feb).  Most recent echo with EF 55-60%.  CTA from 11/4/24 obtained independently reviewed and shows 4 cm L lower pole Bosniak 4 lesion.      The patient denies gross hematuria and/or constitutional symptoms attributable to their recently discovered renal mass.    The patient denies a personal and family history of kidney cancer.  Not on any blood thinners.  Normal kidney function.   Abdominal surgeries: C section x2    She is now s/p Left partial on 12/12/24. She is doing well and is without complication. No f/c/n/v. No abdominal pain, dysuria or hematuria. She continues to be active. Final path Stage I ccRCC, negative margins.       SURGICAL PATHOLOGY 12/12/24:  Final Pathologic Diagnosis LEFT RENAL MASS, PARTIAL NEPHRECTOMY:  - Clear cell renal cell carcinoma, ISUP nuclear grade 2, 3.4 cm.  - See CAP synoptic report below.    SURGICAL PATHOLOGY CANCER CASE SUMMARY  Procedure: Partial nephrectomy.  Specimen laterality: Left.  Tumor size: 3.4 cm.  Tumor focality: Unifocal.  Histologic type: Clear cell renal cell carcinoma.  Sarcomatoid features: Not identified.  Rhabdoid features: Not identified.  Histologic Grade (ISUP Grade): 2.  Tumor necrosis: Not identified.  Tumor extension: Limited to kidney.  Margins: Uninvolved.  Lymphovascular invasion (excluding renal vein and its segmental branches): Not identified.  Regional lymph nodes: No lymph nodes submitted or found.  Distant sites: Not applicable.  Pathologic stage (pTNM, AJCC 8th edition): pT1a pN not assigned (no lymph nodes submitted or found).    Blocks for potential molecular or ancillary studies:  Tumor block: 1A.       Review  of patient's allergies indicates:   Allergen Reactions    Protamine sulfate Other (See Comments)     Bradycardia, Hypotension    [place] Rash       Current Outpatient Medications   Medication Sig Dispense Refill    cholecalciferol, vitamin D3, 1,250 mcg (50,000 unit) capsule       NIFEdipine (PROCARDIA-XL) 30 MG (OSM) 24 hr tablet Take 1 tablet (30 mg total) by mouth once daily. 90 tablet 3    carBAMazepine (TEGRETOL XR) 100 MG 12 hr tablet Take 2 tablets (200 mg total) by mouth 2 (two) times daily. (Patient not taking: Reported on 2024) 120 tablet 11    oxyCODONE (ROXICODONE) 5 MG immediate release tablet Take 1 tablet (5 mg total) by mouth every 4 (four) hours as needed for Pain. 10 tablet 0    polyethylene glycol (GLYCOLAX) 17 gram/dose powder Use to cap to measure 17g, mix with liquid, and take by mouth once daily. 510 g 0     Current Facility-Administered Medications   Medication Dose Route Frequency Provider Last Rate Last Admin    sodium chloride 0.9% flush 10 mL  10 mL Intravenous PRN Rohit Quinonez MD           Past Medical History:   Diagnosis Date    Anxiety     Aortic stenosis     Arthritis     CHF (congestive heart failure)     Heart murmur     Hypertension     Mitral valve prolapse        Past Surgical History:   Procedure Laterality Date    AORTIC VALVULOPLASTY N/A 2024    Procedure: REPAIR, AORTIC VALVE;  Surgeon: Rohit Quinonez MD;  Location: [facility] CATH LAB;  Service: Cardiology;  Laterality: N/A;    AORTOGRAPHY N/A 2024    Procedure: AORTOGRAM;  Surgeon: Rohit Quinonez MD;  Location: [facility] CATH LAB;  Service: Cardiology;  Laterality: N/A;    BREAST CYST EXCISION Left 80s    BREAST SURGERY      Benign lump on left.    CATARACT EXTRACTION       SECTION      x1    COLONOSCOPY N/A 2023    Procedure: COLONOSCOPY;  Surgeon: Pattie Stephenson MD;  Location: [facility] ENDO ([address] [place] [address]);  Service: Endoscopy;  Laterality: N/A;   ref by Ishmael Jacobson MD, PEG, instr. to  portal-st    CORONARY ANGIOGRAPHY N/A 10/28/2024    Procedure: ANGIOGRAM, CORONARY ARTERY;  Surgeon: Yordy Ng MD;  Location: [facility] CATH LAB;  Service: Cardiology;  Laterality: N/A;  radial access    DV5 ROBOTIC NEPHRECTOMY, PARTIAL Left 12/12/2024    Procedure: DV5 ROBOTIC NEPHRECTOMY, PARTIAL;  Surgeon: Adam Sawyer MD;  Location: [facility] OR [address] [place] [address];  Service: Urology;  Laterality: Left;    VALVE STUDY-AORTIC  11/6/2024    Procedure: Valve study-aortic;  Surgeon: Rohit Quinonez MD;  Location: [facility] CATH LAB;  Service: Cardiology;;       Family History   Problem Relation Name Age of Onset    Breast cancer Mother 102     Lymphoma Mother 102     Heart disease Mother 102     Cancer Mother 102     Diabetes Sister      Heart disease Brother      Alcohol abuse Brother      Heart disease Maternal Uncle      Heart disease Maternal Grandfather      Cancer Father          panc ca    Breast cancer Paternal Aunt           Review of Systems   Constitutional:  Negative for chills, fatigue, fever and unexpected weight change.   HENT:  Negative for congestion.    Eyes:  Negative for visual disturbance.   Respiratory:  Negative for chest tightness and shortness of breath.    Cardiovascular:  Negative for chest pain and palpitations.   Gastrointestinal:  Negative for abdominal distention, abdominal pain, blood in stool, constipation, diarrhea, nausea and vomiting.   Genitourinary:  Negative for dysuria and hematuria.   Musculoskeletal:  Negative for arthralgias and back pain.   Skin:  Negative for color change.   Neurological:  Negative for dizziness, syncope and numbness.   Psychiatric/Behavioral:  Negative for confusion.      Objective:     Physical Exam  Constitutional:       Appearance: Normal appearance.   HENT:      Head: Atraumatic.   Pulmonary:      Effort: Pulmonary effort is normal.   Abdominal:      Comments: Incisions c/d/I. No tenderness    Neurological:      General: No focal deficit present.       Mental Status: She is alert.       Assessment:       1. Renal mass    2. Renal cell carcinoma of left kidney    3. Primary hypertension        Plan:     1. Renal mass    2. Renal cell carcinoma of left kidney    3. Primary hypertension        Orders Placed This Encounter   Procedures    X-Ray Chest PA And Lateral     Standing Status:   Future     Standing Expiration Date:   1/29/2026     Order Specific Question:   May the Radiologist modify the order per protocol to meet the clinical needs of the patient?     Answer:   Yes     Order Specific Question:   Release to patient     Answer:   Immediate    CT Abdomen With Without Contrast     Standing Status:   Future     Standing Expiration Date:   1/29/2026     Order Specific Question:   Is the patient allergic to iodine contrast?     Answer:   No     Order Specific Question:   Does this patient have impaired renal function?     Answer:   No     Order Specific Question:   Diabetes?     Answer:   No     Order Specific Question:   May the Radiologist modify the order per protocol to meet the clinical needs of the patient?     Answer:   Yes     Order Specific Question:   Oral Contrast instructions:     Answer:   NO Oral Contrast    COMPREHENSIVE METABOLIC PANEL     Standing Status:   Future     Standing Expiration Date:   4/29/2026     Order Specific Question:   Send normal result to authorizing provider's In Basket if patient is active on MyChart:     Answer:   Yes     RCC  - We reviewed the final path with the patient from her left partial Nx - Stage I, grade 2, ccRCC, negative margins  - Plan for f/u in 1 year with CXR/CT/CMP    2. HTN:  -BP reviewed  -stable, continue meds and f/u with PCP      The patient was seen and examined with the resident physician.  All questions were answered.  The plan was discussed with the patient and I concur with the resident physician's documentation.

## 2025-01-29 NOTE — ASSESSMENT & PLAN NOTE
- patient s/p BAV for cardiac clearance for other surgery  - continues to endorse dyspnea on exertion and shortness of breath  - will continue with TAVR evaluation and CTS referral

## 2025-01-29 NOTE — PROGRESS NOTES
TRANSCATHETER AORTIC VALVE REPLACEMENT      PLEASE READ ALL INSTRUCTIONS CAREFULLY     You have been scheduled for your valve replacement on Thursday, February 27, 2025.     Please report to the Cardiology Waiting Area on the Third floor of the hospital and check in at 6 AM.   You will then be taken to the SSCU (Short Stay Cardiac Unit) and prepared for your procedure. Please be aware that this is not the time of your procedure but the time you are to arrive.     Preperations for your procedure:  Shower with Dial soap the night before and the morning of your procedure.  Nothing to eat or drink after MIDNIGHT the night before.                                            You may take your regular morning medications with as much water or clear liquid as necessary.   Bring your cane and/or walker with you to the hospital.  Bring CPAP/BiPAP, or oxygen if you are on oxygen at home.  Call the office for any signs of infection ( fever, cough, pneumonia, urinary tract, etc.) We cannot implant a device in an infected patient.    Medications:  You may take your regular scheduled medications the morning of your procedure with as much water as necessary.  If you are diabetic and on Metformin (Glucophage), do not take it the day before, the day of, and 2 days after your procedure.    If you take a weekly GLP-1 Inhibitor  You must be off that medication for one full week prior to your procedure.  Anesthesia will cancel your procedure if you do not hold this medication for a minimum of 7 days prior to procedure.       How long will the procedure take?  The procedure takes approximately three to four hours.  After the procedure, you will go to an ICU or the Cardiac Step Down Unit.  The decision will be made by the valve team.  You will be monitored closely for the next several hours and remain overnight.       When can I go home?  Your length of stay in the hospital, will be determined by your physician.  Be prepared to stay 1-3  days.  You will be discharged when your physician thinks it is safe for you to go home.  You must have someone to drive you home when you are discharged.      Follow Up After Valve Replacement:  You will be scheduled for follow up in one month and one year with an echo, lab work, and a clinic visit.    If you are in a research trial, your follow up will be slightly different and according to the trial requirements.  You can follow up with your regular cardiologist regarding any other heart issues.  DO NOT SCHEDULE ANY ELECTIVE PROCEDURES FOR 6 MONTHS AFTER TAVR.  THIS INCLUDES DENTAL WORK, COLONOSCOPY,ETC.         Please contact our office at [phone] for any questions.  .me            THE ABOVE INSTRUCTIONS WERE GIVEN TO THE PATIENT VERBALLY AND THEY VERBALIZED UNDERSTANDING.  THEY DO NOT REQUIRE ANY SPECIAL NEEDS AND DO NOT HAVE ANY LEARNING BARRIERS.          Directions for Reporting to Cardiology Waiting Area in the Hospital  If you park in the Parking Garage:  Take elevators to the1st floor of the parking garage.  Continue past the gift shop, coffee shop, and piano.  Take a right and go to the gold elevators. (Elevator B)  Take the elevator to the 3rd floor.  Follow the arrow on the sign on the wall that says Cath Lab Registration/EP Lab Registration.  Follow the long hallway all the way around until you come to a big open area.  This is the registration area.  Check in at Reception Desk.    OR    If family is dropping you off:  Have them drop you off at the front of the Hospital under the green overhang.  Enter through the doors and take a right.  Take the E elevators to the 3rd floor Cardiology Waiting Area.  Check in at the Reception Desk in the waiting room.

## 2025-01-30 DIAGNOSIS — I35.0 SEVERE AORTIC STENOSIS: Primary | ICD-10-CM

## 2025-01-30 RX ORDER — SODIUM CHLORIDE 0.9 % (FLUSH) 0.9 %
10 SYRINGE (ML) INJECTION
Status: SHIPPED | OUTPATIENT
Start: 2025-01-30

## 2025-01-30 RX ORDER — DIPHENHYDRAMINE HCL 50 MG
50 CAPSULE ORAL ONCE
OUTPATIENT
Start: 2025-01-30 | End: 2025-01-30

## 2025-01-30 RX ORDER — SODIUM CHLORIDE 9 MG/ML
INJECTION, SOLUTION INTRAVENOUS CONTINUOUS
OUTPATIENT
Start: 2025-01-30 | End: 2025-01-30

## 2025-02-03 ENCOUNTER — TELEPHONE (OUTPATIENT)
Dept: OPHTHALMOLOGY | Facility: CLINIC | Age: 70
End: 2025-02-03
Payer: MEDICARE

## 2025-02-10 ENCOUNTER — PROCEDURE VISIT (OUTPATIENT)
Dept: OPHTHALMOLOGY | Facility: CLINIC | Age: 70
End: 2025-02-10
Payer: MEDICARE

## 2025-02-10 DIAGNOSIS — H02.421 ACQUIRED MYOGENIC PTOSIS OF EYELID, RIGHT: ICD-10-CM

## 2025-02-10 DIAGNOSIS — G51.31 HEMIFACIAL SPASM OF RIGHT SIDE OF FACE: Primary | ICD-10-CM

## 2025-02-10 PROCEDURE — 99499 UNLISTED E&M SERVICE: CPT | Mod: S$GLB,,, | Performed by: OPHTHALMOLOGY

## 2025-02-10 PROCEDURE — 64612 DESTROY NERVE FACE MUSCLE: CPT | Mod: S$GLB,,, | Performed by: OPHTHALMOLOGY

## 2025-02-10 NOTE — PROGRESS NOTES
HPI    Patient here for botox injection #2    Eye gtts :  VISINE   SYSTANE     Last edited by Abdulkadir Arora on 2/10/2025 10:51 AM.            Assessment /Plan     For exam results, see Encounter Report.    Hemifacial spasm of right side of face  -     External Photography - OU - Both Eyes  -     onabotulinumtoxina injection 100 Units    Acquired myogenic ptosis of eyelid, right  -     External Photography - OU - Both Eyes      Here for botulinum toxin for right hemifacial spasm.      Informed consent obtained after extensive risks/benefits/alternatives were discussed with the patient including but not limited to pain, bleeding, infection, ocular injury, loss of the eye, asymmetry, need for revision in future, scarring.  Alternatives such as waiting were discussed.  All questions were answered.     Botox injections right face performed today.  pretarsal orbicularis upper eyelid and lower eyelid 2.5 units each  zygomaticus major (2.5 units X2) and minor 2.5 units each.      12.5 units injected. 87.5 units wasted. No complications. Patient tolerated procedure well.      Return in 3-4 months for repeat evaluation.

## 2025-02-17 ENCOUNTER — TELEPHONE (OUTPATIENT)
Dept: CARDIOLOGY | Facility: CLINIC | Age: 70
End: 2025-02-17
Payer: MEDICARE

## 2025-02-17 NOTE — TELEPHONE ENCOUNTER
Returned call.   No answer, but left message on VM that papers were signed this morning and returned to the disability office.    ----- Message from Dafne sent at 2/17/2025  2:43 PM CST -----  Contact: [phone]  Patient would like to know if her son, Adam Sanchez' [facility] paperwork has been received.

## 2025-02-18 ENCOUNTER — TELEPHONE (OUTPATIENT)
Dept: CARDIOTHORACIC SURGERY | Facility: CLINIC | Age: 70
End: 2025-02-18
Payer: MEDICARE

## 2025-02-18 NOTE — TELEPHONE ENCOUNTER
Spoke with pt and confirmed 2/19 appt with Dr. Martin. Pt verbalized understanding of appt time and location. Pt denies any questions at this time.

## 2025-02-19 ENCOUNTER — OFFICE VISIT (OUTPATIENT)
Dept: CARDIOTHORACIC SURGERY | Facility: CLINIC | Age: 70
End: 2025-02-19
Payer: MEDICARE

## 2025-02-19 ENCOUNTER — LAB VISIT (OUTPATIENT)
Dept: LAB | Facility: HOSPITAL | Age: 70
End: 2025-02-19
Attending: INTERNAL MEDICINE
Payer: MEDICARE

## 2025-02-19 VITALS
DIASTOLIC BLOOD PRESSURE: 61 MMHG | BODY MASS INDEX: 36.23 KG/M2 | SYSTOLIC BLOOD PRESSURE: 119 MMHG | OXYGEN SATURATION: 97 % | WEIGHT: 196.88 LBS | HEART RATE: 90 BPM | HEIGHT: 62 IN

## 2025-02-19 DIAGNOSIS — I35.0 NONRHEUMATIC AORTIC VALVE STENOSIS: Primary | ICD-10-CM

## 2025-02-19 DIAGNOSIS — I35.0 NONRHEUMATIC AORTIC VALVE STENOSIS: ICD-10-CM

## 2025-02-19 LAB
ABO + RH BLD: NORMAL
ANION GAP SERPL CALC-SCNC: 7 MMOL/L (ref 8–16)
BLD GP AB SCN CELLS X3 SERPL QL: NORMAL
BUN SERPL-MCNC: 15 MG/DL (ref 8–23)
CALCIUM SERPL-MCNC: 9.8 MG/DL (ref 8.7–10.5)
CHLORIDE SERPL-SCNC: 107 MMOL/L (ref 95–110)
CO2 SERPL-SCNC: 30 MMOL/L (ref 23–29)
CREAT SERPL-MCNC: 0.7 MG/DL (ref 0.5–1.4)
ERYTHROCYTE [DISTWIDTH] IN BLOOD BY AUTOMATED COUNT: 12.1 % (ref 11.5–14.5)
EST. GFR  (NO RACE VARIABLE): >60 ML/MIN/1.73 M^2
GLUCOSE SERPL-MCNC: 67 MG/DL (ref 70–110)
HCT VFR BLD AUTO: 37.7 % (ref 37–48.5)
HGB BLD-MCNC: 11.2 G/DL (ref 12–16)
MCH RBC QN AUTO: 27.5 PG (ref 27–31)
MCHC RBC AUTO-ENTMCNC: 29.7 G/DL (ref 32–36)
MCV RBC AUTO: 93 FL (ref 82–98)
PLATELET # BLD AUTO: 290 K/UL (ref 150–450)
PMV BLD AUTO: 9.8 FL (ref 9.2–12.9)
POTASSIUM SERPL-SCNC: 4.5 MMOL/L (ref 3.5–5.1)
RBC # BLD AUTO: 4.07 M/UL (ref 4–5.4)
SODIUM SERPL-SCNC: 144 MMOL/L (ref 136–145)
WBC # BLD AUTO: 5.76 K/UL (ref 3.9–12.7)

## 2025-02-19 PROCEDURE — 86900 BLOOD TYPING SEROLOGIC ABO: CPT | Performed by: INTERNAL MEDICINE

## 2025-02-19 PROCEDURE — 80048 BASIC METABOLIC PNL TOTAL CA: CPT | Performed by: INTERNAL MEDICINE

## 2025-02-19 PROCEDURE — 85027 COMPLETE CBC AUTOMATED: CPT | Performed by: INTERNAL MEDICINE

## 2025-02-19 NOTE — PROGRESS NOTES
Subjective:      Patient ID: Kandace Rapp is a 69 y.o. female.    Chief Complaint: No chief complaint on file.      HPI:  Kandace Rapp is a 69 y.o. female who presents for surgical evaluation of aortic stenosis. Medical conditions include CHF, arthritis, HTN, s/p left partial nephrectomy, anemia.     Patient has been undergoing TAVR evaluation. Cardiac CTA was suspicious for left kidney lesion for which she followed with urology Dr. Sawyer. She underwent BAV on 11/6/2024 for cardiac clearance before undergoing left partial nephrectomy 12/12/2024. 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 She has been cleared from urology and is cancer free.     She is extremely short of breath with going up just five steps. Reports she usually cardoso near the elevator in the garage here, but today had to park at the end, and was very short of breath when she reached the elevator. Denies chest pain, palpitations, orthopnea, leg swelling, syncope. No prior strokes, seizures, blood clots, stents, or sternotomies. No smoking or drinking history. Reports extensive family history of heart disease. Her mother had bypass operation, siblings have stents.        The patient has undergone the following TAVR work-up:   ECHO (Date 1/29/25): RUDOLPH= 0.8 cm2, MG= 49mmHg, Peak Keyshawn= 4.1 m/s, EF= 55-60%.   LHC (Date 1/28/24): normal coronaries    STS: 1.7%   Frailty: not frail    Iliacs are > on R and >  on L pending CTA review  LVOT area by CTA is  cm2 ( mm X  mm) and Avg Diameter is  per my independent review of images on [facility].  Incidental findings on CT: 4 cm L lower pole Bosniak 4 lesion; now s/p partial L nephrectomy and cleared from urology  CT Surgery risk assessment:  risk, per   due to needs  Rhythm issues: none  PFTs: N/A  KCCQ/5 meter walk: done  Comorbidities: HTN, obesity     Kandace Rapp is a TAVR candidate, pending CTA review.     Family and social history reviewed    Review of  patient's allergies indicates:   Allergen Reactions    Protamine sulfate Other (See Comments)     Bradycardia, Hypotension    [place] Rash     Past Medical History:   Diagnosis Date    Anxiety     Aortic stenosis     Arthritis     CHF (congestive heart failure)     Heart murmur     Hypertension     Mitral valve prolapse      Past Surgical History:   Procedure Laterality Date    AORTIC VALVULOPLASTY N/A 2024    Procedure: REPAIR, AORTIC VALVE;  Surgeon: Rohit Quinonez MD;  Location: [facility] CATH LAB;  Service: Cardiology;  Laterality: N/A;    AORTOGRAPHY N/A 2024    Procedure: AORTOGRAM;  Surgeon: Rohit Quinonez MD;  Location: [facility] CATH LAB;  Service: Cardiology;  Laterality: N/A;    BREAST CYST EXCISION Left 80s    BREAST SURGERY      Benign lump on left.    CATARACT EXTRACTION       SECTION      x1    COLONOSCOPY N/A 2023    Procedure: COLONOSCOPY;  Surgeon: Pattie Stephenson MD;  Location: [facility] ENDO (4TH FLR);  Service: Endoscopy;  Laterality: N/A;   ref by Ishmael Jacobson MD, PEG, instr. to portal-st    CORONARY ANGIOGRAPHY N/A 10/28/2024    Procedure: ANGIOGRAM, CORONARY ARTERY;  Surgeon: Yordy Ng MD;  Location: [facility] CATH LAB;  Service: Cardiology;  Laterality: N/A;  radial access    DV5 ROBOTIC NEPHRECTOMY, PARTIAL Left 2024    Procedure: DV5 ROBOTIC NEPHRECTOMY, PARTIAL;  Surgeon: Adam Sawyer MD;  [facility];  Service: Urology;  Laterality: Left;    VALVE STUDY-AORTIC  2024    Procedure: Valve study-aortic;  Surgeon: Rohit Quinonez MD;  Location: [facility] CATH LAB;  Service: Cardiology;;     Family History       Problem Relation (Age of Onset)    Alcohol abuse Brother    Breast cancer Mother, Paternal Aunt    Cancer Mother, Father    Diabetes Sister    Heart disease Mother, Brother, Maternal Uncle, Maternal Grandfather    Lymphoma Mother          Social History[1]    Current medications Reviewed    Review of Systems    Constitutional:  Positive for activity change and fatigue.   HENT:  Negative for nosebleeds.    Eyes:  Negative for visual disturbance.   Respiratory:  Positive for shortness of breath.    Cardiovascular:  Negative for chest pain and leg swelling.   Gastrointestinal:  Negative for nausea.   Musculoskeletal:  Negative for gait problem.   Skin:  Negative for color change.   Neurological:  Negative for dizziness and seizures.   Hematological:  Does not bruise/bleed easily.   Psychiatric/Behavioral:  Negative for sleep disturbance.      Objective:   Physical Exam  Constitutional:       General: She is not in acute distress.  HENT:      Head: Normocephalic and atraumatic.   Eyes:      Conjunctiva/sclera: Conjunctivae normal.   Cardiovascular:      Rate and Rhythm: Normal rate.   Pulmonary:      Effort: Pulmonary effort is normal. No respiratory distress.   Musculoskeletal:         General: Normal range of motion.      Cervical back: Normal range of motion.   Skin:     Coloration: Skin is not pale.   Neurological:      General: No focal deficit present.      Mental Status: She is alert.   Psychiatric:         Mood and Affect: Mood normal.         Behavior: Behavior normal.         Diagnostic Results: reviewed   TTE 1/29/25    Left Ventricle: The left ventricle is normal in size. Normal wall thickness. There is normal systolic function with a visually estimated ejection fraction of 55 - 60%. There is normal diastolic function.    Right Ventricle: Normal right ventricular cavity size. Wall thickness is normal. Systolic function is normal.    Aortic Valve: There is severe aortic valve sclerosis. Moderately restricted motion. There is severe stenosis. Aortic valve area by VTI is 0.8 cm2. Aortic valve peak velocity is 4.1 m/s. Mean gradient is 49 mmHg. The dimensionless index is 0.23. There is mild to moderate aortic regurgitation.    Tricuspid Valve: There is mild regurgitation.    Aorta: Aortic root is normal in size  measuring 3.37 cm. Ascending aorta is mildly dilated measuring 4.11 cm.    Pulmonary Artery: The estimated pulmonary artery systolic pressure is 26 mmHg.    IVC/SVC: Intermediate venous pressure at 8 mmHg.    CTA 24  Severe aortic valve stenosis with pre TAVR procedure measurements of as above.     4 cm fusiform ascending aortic aneurysm.     4.1 cm exophytic cystic lesion in the lower pole of the left kidney which contain eccentric enhancing nodule and enhancing internal septations which meets the criteria of Bosniak IV and is concerning for RCC.  Recommend referral to urology for management.     Additional findings as above.     [facility] 10/28/24    The coronary arteries were normal.  Proceed with TAVR evaluation.    Tortuous right innominate artery.    Assessment:   AS   Plan:   Continue with TAVR workup.        [1]   Social History  Socioeconomic History    Marital status:     Number of children: 2   Tobacco Use    Smoking status: Never    Smokeless tobacco: Never   Substance and Sexual Activity    Alcohol use: Yes     Comment: Rare.     Drug use: No    Sexual activity: Not Currently   Social History Narrative       , one child local dtr lives with her, one in [place] son in [place]/hx of AVR. 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      Social Drivers of Health     Financial Resource Strain: Low Risk  (2025)    Overall Financial Resource Strain (CARDIA)     Difficulty of Paying Living Expenses: Not very hard   Food Insecurity: No Food Insecurity (2025)    Hunger Vital Sign     Worried About Running Out of Food in the Last Year: Never true     Ran Out of Food in the Last Year: Never true   Transportation Needs: No Transportation Needs (2024)    TRANSPORTATION NEEDS     Transportation : No   Physical Activity: Sufficiently Active (2025)    Exercise Vital Sign     Days of Exercise per Week: 4 days     Minutes of Exercise per  Session: 40 min   Stress: No Stress Concern Present (1/23/2025)    [place] [employer] - Occupational Stress Questionnaire     Feeling of Stress : Only a little   Housing Stability: Unknown (1/23/2025)    Housing Stability Vital Sign     Unable to Pay for Housing in the Last Year: No     Homeless in the Last Year: No

## 2025-02-25 ENCOUNTER — EDUCATION (OUTPATIENT)
Dept: CARDIOLOGY | Facility: CLINIC | Age: 70
End: 2025-02-25
Payer: MEDICARE

## 2025-02-25 ENCOUNTER — DOCUMENTATION ONLY (OUTPATIENT)
Dept: CARDIAC CATH/INVASIVE PROCEDURES | Facility: HOSPITAL | Age: 70
End: 2025-02-25
Payer: MEDICARE

## 2025-02-25 DIAGNOSIS — I35.0 NONRHEUMATIC AORTIC VALVE STENOSIS: Primary | ICD-10-CM

## 2025-02-25 NOTE — PROGRESS NOTES
TAVR DISCHARGE INSTRUCTIONS      1. General Post Op Instructions:  -   No lifting greater than 5 pounds for 5 days  -   No driving or operating heavy machinery for 5 days  -   You may shower as soon as you get home but no bathing or submerging in water       (lakes, pools, tub etc) for 1 week.                -   You may remove the dressing and replace with a band-aid.      -   Keep incision dry and clean.  No lotions, oils, or creams.  You may change the band-aid daily                   until a scab has formed over              -   You may feel a small lump in your groin area due to a closure device used after the procedure.                     The site may be black and blue or swollen and pink for a few days. If the site enlarges,                    becomes painful and/or starts to bleed,please call.               -    You may return to your regular activities as tolerated.        If you develop any fever, urinary tract infection, or any other signs of infection, please call our office immediately.    2.  Preventing infection on Your heart Valve:  -   DO NOT have any dental work, surgery, or any other elective procedures for 6 months.  -   Provide a copy of this card to your Dentist or Gastroenterologist to keep in your chart.  -  You DO need to take antibiotics prior to any routine dental cleaning, GI procedure (colonoscopy, endoscopy), (cystoscopy), or respiratory procedures (bronchoscopy).                -  You DO need antibiotics if you are having a procedure that requires cutting any infected area.              -  Your Dentist or Gastroenterologist will prescribe these for you prior to your appointment.        3.  Managing your heart Failure:  -    Weigh yourself daily and keep track of your weight  -    If you are on Lasix, continue to take it as prescribed.  -    If you gain 3 pounds overnight or 5 pounds over 5 days, double Lasix for 3 days or begin taking  "Lasix once a day for 3 days                   4.  Follow Up:  -   TAVR follow up protocol requires you to return for a one month and a one-year visit. We will schedule an echo, blood work  and an appointment to see a    member of our team.      -   Please continue to see your regular Cardiologist for all other issues.  -   You will receive an ID card from the  of your valve in 4-6 weeks.  Make a copy of the card, and keep it with you at all times.    5.  Discharge:  - If you have any questions or concerns after discharge, please do not hesitate to call our office and speak with a nurse at [phone]              - If you have any problems or concerns related to a Pacemaker or Event monitor please call the Arrhythmia department at [phone].             -  Please address any other concerns with your primary Cardiologist.                   -  We answer messages sent via the "[employer]" portal Monday - Friday 8am - 5pm.               -  Please do not send emergency messages through the portal.                  After hours and weekends, please call [phone] to speak to a Registered Nurse.     "

## 2025-02-26 ENCOUNTER — DOCUMENTATION ONLY (OUTPATIENT)
Dept: CARDIAC CATH/INVASIVE PROCEDURES | Facility: HOSPITAL | Age: 70
End: 2025-02-26
Payer: MEDICARE

## 2025-02-26 ENCOUNTER — ANESTHESIA EVENT (OUTPATIENT)
Dept: MEDSURG UNIT | Facility: HOSPITAL | Age: 70
End: 2025-02-26
Payer: MEDICARE

## 2025-02-26 NOTE — ANESTHESIA PREPROCEDURE EVALUATION
[facility]  Anesthesia Pre-Operative Evaluation         Patient Name: Kandace Rapp  YOB: 1955  MRN: [id number]    SUBJECTIVE:     Pre-operative evaluation for Procedure(s) (LRB):  REPLACEMENT, AORTIC VALVE, TRANSCATHETER (TAVR) (N/A)     02/26/2025    Kandace Rapp is a 69 y.o. female with a significant medical history of CHF, arthritis, HTN, s/p left partial nephrectomy for RCC, and anemia who presents for the above procedure.    Results for orders placed during the hospital encounter of 01/29/25    Echo    Interpretation Summary    Left Ventricle: The left ventricle is normal in size. Normal wall thickness. There is normal systolic function with a visually estimated ejection fraction of 55 - 60%. There is normal diastolic function.    Right Ventricle: Normal right ventricular cavity size. Wall thickness is normal. Systolic function is normal.    Aortic Valve: There is severe aortic valve sclerosis. Moderately restricted motion. There is severe stenosis. Aortic valve area by VTI is 0.8 cm2. Aortic valve peak velocity is 4.1 m/s. Mean gradient is 49 mmHg. The dimensionless index is 0.23. There is mild to moderate aortic regurgitation.    Tricuspid Valve: There is mild regurgitation.    Aorta: Aortic root is normal in size measuring 3.37 cm. Ascending aorta is mildly dilated measuring 4.11 cm.    Pulmonary Artery: The estimated pulmonary artery systolic pressure is 26 mmHg.    IVC/SVC: Intermediate venous pressure at 8 mmHg.     Prev airway:    Mask Ventilation:  Easy with oral airway    Attempts:  2    Attempted By:  Resident anesthesiologist    Method of Intubation:  Direct    Blade:  Jaiden 3    Laryngeal View Grade: Grade III - only epiglottis visible      Attempted By (2nd Attempt):  Resident anesthesiologist    Method of Intubation (2nd Attempt):  Video laryngoscopy    Blade (2nd Attempt):  Xiong 3    Laryngeal View Grade (2nd Attempt): Grade I - full  "view of cords      Problem List[1]    Review of patient's allergies indicates:   Allergen Reactions    Protamine sulfate Other (See Comments)     Bradycardia, Hypotension    [place] Rash       Current Inpatient Medications:      Medications Ordered Prior to Encounter[2]    Past Surgical History:   Procedure Laterality Date    AORTIC VALVULOPLASTY N/A 2024    Procedure: REPAIR, AORTIC VALVE;  Surgeon: Rohit Quinonez MD;  Location: [facility] CATH LAB;  Service: Cardiology;  Laterality: N/A;    AORTOGRAPHY N/A 2024    Procedure: AORTOGRAM;  Surgeon: Rohit Quinonez MD;  Location: [facility] CATH LAB;  Service: Cardiology;  Laterality: N/A;    BREAST CYST EXCISION Left 80s    BREAST SURGERY      Benign lump on left.    CATARACT EXTRACTION       SECTION      x1    COLONOSCOPY N/A 2023    Procedure: COLONOSCOPY;  Surgeon: Pattie Stephenson MD;  Location: [facility] ENDO (4TH FLR);  Service: Endoscopy;  Laterality: N/A;   ref by Ishmael Jacobson MD, PEG, instr. to portal-st    CORONARY ANGIOGRAPHY N/A 10/28/2024    Procedure: ANGIOGRAM, CORONARY ARTERY;  Surgeon: Yordy Ng MD;  Location: [facility] CATH LAB;  Service: Cardiology;  Laterality: N/A;  radial access    DV5 ROBOTIC NEPHRECTOMY, PARTIAL Left 2024    Procedure: DV5 ROBOTIC NEPHRECTOMY, PARTIAL;  Surgeon: Adam Sawyer MD;  [facility];  Service: Urology;  Laterality: Left;    VALVE STUDY-AORTIC  2024    Procedure: Valve study-aortic;  Surgeon: Rohit Quinonez MD;  Location: [facility] CATH LAB;  Service: Cardiology;;       Social History[3]    OBJECTIVE:     Vital Signs Range:      2025    12:01 PM 2025     1:16 PM 2025    10:06 AM   Vitals - 1 value per visit   SYSTOLIC 110 139 119   DIASTOLIC 80 72 61   Pulse 85 75 90   SPO2  98 % 97 %   Weight (lb) 195.99 196.87 196.87   Weight (kg) 88.9 89.3 89.3   Height 5' 2" (1.575 m) 5' 2" (1.575 m) 5' 2" (1.575 m)   BMI (Calculated) 35.8 36 36   Pain Score  " Zero Zero         CBC:   Lab Results   Component Value Date    WBC 5.76 02/19/2025    HGB 11.2 (L) 02/19/2025    HCT 37.7 02/19/2025    MCV 93 02/19/2025     02/19/2025         CMP:   Sodium   Date Value Ref Range Status   02/19/2025 144 136 - 145 mmol/L Final     Potassium   Date Value Ref Range Status   02/19/2025 4.5 3.5 - 5.1 mmol/L Final     Chloride   Date Value Ref Range Status   02/19/2025 107 95 - 110 mmol/L Final     CO2   Date Value Ref Range Status   02/19/2025 30 (H) 23 - 29 mmol/L Final     Glucose   Date Value Ref Range Status   02/19/2025 67 (L) 70 - 110 mg/dL Final     BUN   Date Value Ref Range Status   02/19/2025 15 8 - 23 mg/dL Final     Creatinine   Date Value Ref Range Status   02/19/2025 0.7 0.5 - 1.4 mg/dL Final     Calcium   Date Value Ref Range Status   02/19/2025 9.8 8.7 - 10.5 mg/dL Final     Total Protein   Date Value Ref Range Status   10/15/2024 7.2 6.0 - 8.4 g/dL Final     Albumin   Date Value Ref Range Status   10/15/2024 3.9 3.5 - 5.2 g/dL Final     Total Bilirubin   Date Value Ref Range Status   10/15/2024 0.2 0.1 - 1.0 mg/dL Final     Comment:     For infants and newborns, interpretation of results should be based  on gestational age, weight and in agreement with clinical  observations.    Premature Infant recommended reference ranges:  Up to 24 hours.............<8.0 mg/dL  Up to 48 hours............<12.0 mg/dL  3-5 days..................<15.0 mg/dL  6-29 days.................<15.0 mg/dL       Alkaline Phosphatase   Date Value Ref Range Status   10/15/2024 126 55 - 135 U/L Final     AST   Date Value Ref Range Status   10/15/2024 13 10 - 40 U/L Final     ALT   Date Value Ref Range Status   10/15/2024 15 10 - 44 U/L Final     Anion Gap   Date Value Ref Range Status   02/19/2025 7 (L) 8 - 16 mmol/L Final     eGFR if    Date Value Ref Range Status   06/21/2021 >60.0 >60 mL/min/1.73 m^2 Final     eGFR if non    Date Value Ref Range Status  "  06/21/2021 >60.0 >60 mL/min/1.73 m^2 Final     Comment:     Calculation used to obtain the estimated glomerular filtration  rate (eGFR) is the CKD-EPI equation.          INR:  No results found for: "INR", "PROTIME"    EKG:   Results for orders placed or performed during the hospital encounter of 11/06/24   EKG 12-lead    Collection Time: 11/06/24  6:08 AM   Result Value Ref Range    QRS Duration 82 ms    [facility] QTC Calculation 433 ms    Narrative    Test Reason : I35.0,    Vent. Rate : 080 BPM     Atrial Rate : 080 BPM     P-R Int : 166 ms          QRS Dur : 082 ms      QT Int : 376 ms       P-R-T Axes : 056 036 -11 degrees     QTc Int : 433 ms    Normal sinus rhythm  Normal ECG  When compared with ECG of 07-OCT-2024 14:29,  Nonspecific T wave abnormality no longer evident in Anterior-lateral leads  Confirmed by Adam Carrion MD ([id number]) on 11/6/2024 4:15:29 PM    Referred By: JAMEY GONCALVES           Confirmed By:Adam Carrion MD        2D ECHO:   Results for orders placed during the hospital encounter of 01/29/25    Echo    Interpretation Summary    Left Ventricle: The left ventricle is normal in size. Normal wall thickness. There is normal systolic function with a visually estimated ejection fraction of 55 - 60%. There is normal diastolic function.    Right Ventricle: Normal right ventricular cavity size. Wall thickness is normal. Systolic function is normal.    Aortic Valve: There is severe aortic valve sclerosis. Moderately restricted motion. There is severe stenosis. Aortic valve area by VTI is 0.8 cm2. Aortic valve peak velocity is 4.1 m/s. Mean gradient is 49 mmHg. The dimensionless index is 0.23. There is mild to moderate aortic regurgitation.    Tricuspid Valve: There is mild regurgitation.    Aorta: Aortic root is normal in size measuring 3.37 cm. Ascending aorta is mildly dilated measuring 4.11 cm.    Pulmonary Artery: The estimated pulmonary artery systolic pressure is 26 mmHg.    IVC/SVC: Intermediate " venous pressure at 8 mmHg.         ASSESSMENT/PLAN:         Pre-op Assessment    I have reviewed the Patient Summary Reports.    I have reviewed the NPO Status.   I have reviewed the Medications.     Review of Systems  Anesthesia Hx:             Denies Family Hx of Anesthesia complications.    Denies Personal Hx of Anesthesia complications.                    Cardiovascular:     Hypertension Valvular problems/Murmurs      CHF                                   Renal/:  Chronic Renal Disease                Musculoskeletal:  Arthritis               Psych:  Psychiatric History                  Physical Exam  General: Well nourished, Cooperative, Alert and Oriented    Airway:  Mallampati: III   Mouth Opening: Normal  TM Distance: Normal  Tongue: Normal  Neck ROM: Normal ROM    Dental:  Periodontal disease        Anesthesia Plan  Type of Anesthesia, risks & benefits discussed:    Anesthesia Type: Gen Supraglottic Airway, Gen Natural Airway, MAC, Gen ETT  Intra-op Monitoring Plan: Standard ASA Monitors and Art Line  Post Op Pain Control Plan: multimodal analgesia and IV/PO Opioids PRN  Induction:  IV  Airway Plan: Direct and Video, Post-Induction  Informed Consent: Informed consent signed with the Patient and all parties understand the risks and agree with anesthesia plan.  All questions answered. Patient consented to blood products? Yes  ASA Score: 4  Day of Surgery Review of History & Physical: H&P Update referred to the surgeon/provider.    Ready For Surgery From Anesthesia Perspective.     .           [1]   Patient Active Problem List  Diagnosis    Mitral valve prolapse    Vitamin D deficiency    Severe obesity (BMI 35.0-39.9) with comorbidity    Nonrheumatic aortic valve stenosis    Moderate episode of recurrent major depressive disorder    BMI 36.0-36.9,adult    Osteoarthritis of knee    Arthritis of both knees    HTN (hypertension)    Renal mass    Ptosis of eyelid    Anemia    Renal cell carcinoma   [2]   Current  Facility-Administered Medications on File Prior to Encounter   Medication Dose Route Frequency Provider Last Rate Last Admin    sodium chloride 0.9% flush 10 mL  10 mL Intravenous PRN Rohit Quinonez MD        sodium chloride 0.9% flush 10 mL  10 mL Intravenous PRN Rohit Quinonez MD         Current Outpatient Medications on File Prior to Encounter   Medication Sig Dispense Refill    cholecalciferol, vitamin D3, 1,250 mcg (50,000 unit) capsule       NIFEdipine (PROCARDIA-XL) 30 MG (OSM) 24 hr tablet Take 1 tablet (30 mg total) by mouth once daily. 90 tablet 3   [3]   Social History  Socioeconomic History    Marital status:     Number of children: 2   Tobacco Use    Smoking status: Never    Smokeless tobacco: Never   Substance and Sexual Activity    Alcohol use: Yes     Comment: Rare.     Drug use: No    Sexual activity: Not Currently   Social History Narrative       , one child local dtr lives with her, one in [place] son in [place]. 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      Social Drivers of Health     Financial Resource Strain: Low Risk  (2025)    Overall Financial Resource Strain (CARDIA)     Difficulty of Paying Living Expenses: Not very hard   Food Insecurity: No Food Insecurity (2025)    Hunger Vital Sign     Worried About Running Out of Food in the Last Year: Never true     Ran Out of Food in the Last Year: Never true   Transportation Needs: No Transportation Needs (2024)    TRANSPORTATION NEEDS     Transportation : No   Physical Activity: Sufficiently Active (2025)    Exercise Vital Sign     Days of Exercise per Week: 4 days     Minutes of Exercise per Session: 40 min   Stress: No Stress Concern Present (2025)    [place] [employer] - Occupational Stress Questionnaire     Feeling of Stress : Only a little   Housing Stability: Unknown (2025)    Housing Stability Vital Sign      Unable to Pay for Housing in the Last Year: No     Homeless in the Last Year: No

## 2025-02-26 NOTE — PROGRESS NOTES
Kandace Rapp is a 69 y.o. female referred by Dr Ng for evaluation of severe AS (NYHA Class III sx).   The patient has undergone the following TAVR work-up:   ECHO (Date 1/29/25): RUDOLPH= 0.8 cm2, MG= 49mmHg, Peak Keyshawn= 4.1 m/s, EF= 55-60%.   LHC (Date 1/28/24): normal coronaries   STS: 1.7%   Frailty: 0/4   Iliacs are > 9.5 on R   LVOT area by CTA is 5.17 cm2 ( 28.1 mm X 25.1 mm) and Avg Diameter is 25.7 per my independent review of images on [email or web address].  Incidental findings on CT: 4 cm L lower pole Bosniak 4 lesion; now s/p partial L nephrectomy and cleared from urology  CT Surgery risk assessment: per Dr. Martin, continue TAVR workup due to nephrectomy  Rhythm issues: none  PFTs: N/A  KCCQ/5 meter walk: done  Comorbidities: HTN, obesity     Kandace Rapp is a 29mm [employer] candidate via RTF

## 2025-02-27 ENCOUNTER — HOSPITAL ENCOUNTER (INPATIENT)
Facility: HOSPITAL | Age: 70
LOS: 4 days | Discharge: HOME | DRG: 266 | End: 2025-03-03
Attending: INTERNAL MEDICINE | Admitting: INTERNAL MEDICINE
Payer: MEDICARE

## 2025-02-27 ENCOUNTER — ANESTHESIA (OUTPATIENT)
Dept: MEDSURG UNIT | Facility: HOSPITAL | Age: 70
End: 2025-02-27
Payer: MEDICARE

## 2025-02-27 DIAGNOSIS — I35.0 SEVERE AORTIC STENOSIS: ICD-10-CM

## 2025-02-27 DIAGNOSIS — Z95.3 S/P TAVR (TRANSCATHETER AORTIC VALVE REPLACEMENT): ICD-10-CM

## 2025-02-27 DIAGNOSIS — I44.7 LBBB (LEFT BUNDLE BRANCH BLOCK): ICD-10-CM

## 2025-02-27 DIAGNOSIS — I63.412 EMBOLIC STROKE INVOLVING LEFT MIDDLE CEREBRAL ARTERY: Primary | ICD-10-CM

## 2025-02-27 DIAGNOSIS — Z01.818 PRE-OP TESTING: ICD-10-CM

## 2025-02-27 LAB
ABO + RH BLD: NORMAL
ABO + RH BLD: NORMAL
BLD GP AB SCN CELLS X3 SERPL QL: NORMAL
BLD GP AB SCN CELLS X3 SERPL QL: NORMAL
BNP SERPL-MCNC: 25 PG/ML (ref 0–99)
CHOLEST SERPL-MCNC: 117 MG/DL (ref 120–199)
CHOLEST/HDLC SERPL: 2.8 {RATIO} (ref 2–5)
ESTIMATED AVG GLUCOSE: 103 MG/DL (ref 68–131)
HBA1C MFR BLD: 5.2 % (ref 4–5.6)
HDLC SERPL-MCNC: 42 MG/DL (ref 40–75)
HDLC SERPL: 35.9 % (ref 20–50)
LDLC SERPL CALC-MCNC: 63.4 MG/DL (ref 63–159)
NONHDLC SERPL-MCNC: 75 MG/DL
OHS QRS DURATION: 152 MS
OHS QRS DURATION: 82 MS
OHS QTC CALCULATION: 433 MS
OHS QTC CALCULATION: 532 MS
SPECIMEN OUTDATE: NORMAL
SPECIMEN OUTDATE: NORMAL
TRIGL SERPL-MCNC: 58 MG/DL (ref 30–150)

## 2025-02-27 PROCEDURE — 86901 BLOOD TYPING SEROLOGIC RH(D): CPT | Mod: 91 | Performed by: STUDENT IN AN ORGANIZED HEALTH CARE EDUCATION/TRAINING PROGRAM

## 2025-02-27 PROCEDURE — 27800903 OPTIME MED/SURG SUP & DEVICES OTHER IMPLANTS: Performed by: INTERNAL MEDICINE

## 2025-02-27 PROCEDURE — 25000003 PHARM REV CODE 250: Performed by: STUDENT IN AN ORGANIZED HEALTH CARE EDUCATION/TRAINING PROGRAM

## 2025-02-27 PROCEDURE — 5A1223Z PERFORMANCE OF CARDIAC PACING, CONTINUOUS: ICD-10-PCS | Performed by: INTERNAL MEDICINE

## 2025-02-27 PROCEDURE — C1760 CLOSURE DEV, VASC: HCPCS | Performed by: INTERNAL MEDICINE

## 2025-02-27 PROCEDURE — 33361 REPLACE AORTIC VALVE PERQ: CPT | Mod: Q0 | Performed by: INTERNAL MEDICINE

## 2025-02-27 PROCEDURE — 63600175 PHARM REV CODE 636 W HCPCS: Performed by: STUDENT IN AN ORGANIZED HEALTH CARE EDUCATION/TRAINING PROGRAM

## 2025-02-27 PROCEDURE — 33361 REPLACE AORTIC VALVE PERQ: CPT | Mod: 62,Q0,, | Performed by: INTERNAL MEDICINE

## 2025-02-27 PROCEDURE — 93005 ELECTROCARDIOGRAM TRACING: CPT

## 2025-02-27 PROCEDURE — 86850 RBC ANTIBODY SCREEN: CPT | Performed by: INTERNAL MEDICINE

## 2025-02-27 PROCEDURE — 25500020 PHARM REV CODE 255: Performed by: INTERNAL MEDICINE

## 2025-02-27 PROCEDURE — 37000009 HC ANESTHESIA EA ADD 15 MINS: Performed by: INTERNAL MEDICINE

## 2025-02-27 PROCEDURE — C1894 INTRO/SHEATH, NON-LASER: HCPCS | Performed by: INTERNAL MEDICINE

## 2025-02-27 PROCEDURE — 80061 LIPID PANEL: CPT

## 2025-02-27 PROCEDURE — 99223 1ST HOSP IP/OBS HIGH 75: CPT | Mod: GC,,, | Performed by: STUDENT IN AN ORGANIZED HEALTH CARE EDUCATION/TRAINING PROGRAM

## 2025-02-27 PROCEDURE — 99223 1ST HOSP IP/OBS HIGH 75: CPT | Mod: GC,,, | Performed by: PSYCHIATRY & NEUROLOGY

## 2025-02-27 PROCEDURE — 93010 ELECTROCARDIOGRAM REPORT: CPT | Mod: ,,, | Performed by: INTERNAL MEDICINE

## 2025-02-27 PROCEDURE — 27201423 OPTIME MED/SURG SUP & DEVICES STERILE SUPPLY: Performed by: INTERNAL MEDICINE

## 2025-02-27 PROCEDURE — 25000003 PHARM REV CODE 250: Performed by: INTERNAL MEDICINE

## 2025-02-27 PROCEDURE — 25000003 PHARM REV CODE 250

## 2025-02-27 PROCEDURE — 36620 INSERTION CATHETER ARTERY: CPT | Mod: 59,,, | Performed by: ANESTHESIOLOGY

## 2025-02-27 PROCEDURE — 37000008 HC ANESTHESIA 1ST 15 MINUTES: Performed by: INTERNAL MEDICINE

## 2025-02-27 PROCEDURE — 94761 N-INVAS EAR/PLS OXIMETRY MLT: CPT

## 2025-02-27 PROCEDURE — 83880 ASSAY OF NATRIURETIC PEPTIDE: CPT | Performed by: INTERNAL MEDICINE

## 2025-02-27 PROCEDURE — C1725 CATH, TRANSLUMIN NON-LASER: HCPCS | Performed by: INTERNAL MEDICINE

## 2025-02-27 PROCEDURE — C1779 LEAD, PMKR, TRANSVENOUS VDD: HCPCS | Performed by: INTERNAL MEDICINE

## 2025-02-27 PROCEDURE — 33361 REPLACE AORTIC VALVE PERQ: CPT | Mod: 62,Q0,, | Performed by: STUDENT IN AN ORGANIZED HEALTH CARE EDUCATION/TRAINING PROGRAM

## 2025-02-27 PROCEDURE — 63600175 PHARM REV CODE 636 W HCPCS: Performed by: INTERNAL MEDICINE

## 2025-02-27 PROCEDURE — 20000000 HC ICU ROOM

## 2025-02-27 PROCEDURE — 83036 HEMOGLOBIN GLYCOSYLATED A1C: CPT

## 2025-02-27 PROCEDURE — 02RF38Z REPLACEMENT OF AORTIC VALVE WITH ZOOPLASTIC TISSUE, PERCUTANEOUS APPROACH: ICD-10-PCS | Performed by: INTERNAL MEDICINE

## 2025-02-27 PROCEDURE — C1769 GUIDE WIRE: HCPCS | Performed by: INTERNAL MEDICINE

## 2025-02-27 PROCEDURE — 02HK3JZ INSERTION OF PACEMAKER LEAD INTO RIGHT VENTRICLE, PERCUTANEOUS APPROACH: ICD-10-PCS | Performed by: INTERNAL MEDICINE

## 2025-02-27 DEVICE — TAV EVFXPLUS-29 COMM US
Type: IMPLANTABLE DEVICE | Site: HEART | Status: FUNCTIONAL
Brand: EVOLUT™ FX+

## 2025-02-27 DEVICE — SYS EVOLUT FX LOADING 23-29MM: Type: IMPLANTABLE DEVICE | Site: HEART | Status: FUNCTIONAL

## 2025-02-27 DEVICE — SYS EVOLUT FX DELIVERY 23-29MM: Type: IMPLANTABLE DEVICE | Site: HEART | Status: FUNCTIONAL

## 2025-02-27 RX ORDER — HYDROMORPHONE HYDROCHLORIDE 1 MG/ML
0.2 INJECTION, SOLUTION INTRAMUSCULAR; INTRAVENOUS; SUBCUTANEOUS EVERY 5 MIN PRN
Status: DISCONTINUED | OUTPATIENT
Start: 2025-02-27 | End: 2025-02-27 | Stop reason: HOSPADM

## 2025-02-27 RX ORDER — HYDRALAZINE HYDROCHLORIDE 20 MG/ML
10 INJECTION INTRAMUSCULAR; INTRAVENOUS EVERY 4 HOURS PRN
Status: DISCONTINUED | OUTPATIENT
Start: 2025-02-27 | End: 2025-03-03 | Stop reason: HOSPADM

## 2025-02-27 RX ORDER — DIPHENHYDRAMINE HCL 50 MG
50 CAPSULE ORAL ONCE
Status: COMPLETED | OUTPATIENT
Start: 2025-02-27 | End: 2025-02-27

## 2025-02-27 RX ORDER — NOREPINEPHRINE BITARTRATE 1 MG/ML
INJECTION, SOLUTION INTRAVENOUS
Status: DISCONTINUED | OUTPATIENT
Start: 2025-02-27 | End: 2025-02-27

## 2025-02-27 RX ORDER — LIDOCAINE HYDROCHLORIDE 20 MG/ML
INJECTION, SOLUTION EPIDURAL; INFILTRATION; INTRACAUDAL; PERINEURAL
Status: DISCONTINUED | OUTPATIENT
Start: 2025-02-27 | End: 2025-02-27 | Stop reason: HOSPADM

## 2025-02-27 RX ORDER — SODIUM CHLORIDE 0.9 % (FLUSH) 0.9 %
10 SYRINGE (ML) INJECTION
Status: DISCONTINUED | OUTPATIENT
Start: 2025-02-27 | End: 2025-02-27 | Stop reason: HOSPADM

## 2025-02-27 RX ORDER — HALOPERIDOL LACTATE 5 MG/ML
0.5 INJECTION, SOLUTION INTRAMUSCULAR EVERY 10 MIN PRN
Status: DISCONTINUED | OUTPATIENT
Start: 2025-02-27 | End: 2025-02-27 | Stop reason: HOSPADM

## 2025-02-27 RX ORDER — GLUCAGON 1 MG
1 KIT INJECTION
Status: DISCONTINUED | OUTPATIENT
Start: 2025-02-27 | End: 2025-02-27 | Stop reason: HOSPADM

## 2025-02-27 RX ORDER — SODIUM CHLORIDE 9 MG/ML
INJECTION, SOLUTION INTRAVENOUS CONTINUOUS
Status: DISCONTINUED | OUTPATIENT
Start: 2025-02-27 | End: 2025-03-03

## 2025-02-27 RX ORDER — SODIUM CHLORIDE 9 MG/ML
INJECTION, SOLUTION INTRAVENOUS CONTINUOUS
Status: ACTIVE | OUTPATIENT
Start: 2025-02-27 | End: 2025-02-27

## 2025-02-27 RX ORDER — ATORVASTATIN CALCIUM 40 MG/1
40 TABLET, FILM COATED ORAL NIGHTLY
Status: DISCONTINUED | OUTPATIENT
Start: 2025-02-27 | End: 2025-03-03 | Stop reason: HOSPADM

## 2025-02-27 RX ORDER — CEFAZOLIN SODIUM 1 G/3ML
INJECTION, POWDER, FOR SOLUTION INTRAMUSCULAR; INTRAVENOUS
Status: DISCONTINUED | OUTPATIENT
Start: 2025-02-27 | End: 2025-02-27

## 2025-02-27 RX ORDER — HEPARIN SOD,PORCINE/0.9 % NACL 1000/500ML
INTRAVENOUS SOLUTION INTRAVENOUS
Status: DISCONTINUED | OUTPATIENT
Start: 2025-02-27 | End: 2025-02-27 | Stop reason: HOSPADM

## 2025-02-27 RX ORDER — NIFEDIPINE 30 MG/1
30 TABLET, EXTENDED RELEASE ORAL DAILY
Status: DISCONTINUED | OUTPATIENT
Start: 2025-02-28 | End: 2025-03-02

## 2025-02-27 RX ORDER — LIDOCAINE HYDROCHLORIDE 20 MG/ML
INJECTION, SOLUTION EPIDURAL; INFILTRATION; INTRACAUDAL; PERINEURAL
Status: DISCONTINUED | OUTPATIENT
Start: 2025-02-27 | End: 2025-02-27

## 2025-02-27 RX ORDER — FENTANYL CITRATE 50 UG/ML
INJECTION, SOLUTION INTRAMUSCULAR; INTRAVENOUS
Status: DISCONTINUED | OUTPATIENT
Start: 2025-02-27 | End: 2025-02-27

## 2025-02-27 RX ORDER — NAPROXEN SODIUM 220 MG/1
81 TABLET, FILM COATED ORAL DAILY
Status: DISCONTINUED | OUTPATIENT
Start: 2025-02-28 | End: 2025-03-03 | Stop reason: HOSPADM

## 2025-02-27 RX ORDER — ASPIRIN 325 MG
325 TABLET ORAL DAILY
Status: DISCONTINUED | OUTPATIENT
Start: 2025-02-27 | End: 2025-02-27

## 2025-02-27 RX ORDER — PROPOFOL 10 MG/ML
VIAL (ML) INTRAVENOUS CONTINUOUS PRN
Status: DISCONTINUED | OUTPATIENT
Start: 2025-02-27 | End: 2025-02-27

## 2025-02-27 RX ORDER — DEXMEDETOMIDINE HYDROCHLORIDE 100 UG/ML
INJECTION, SOLUTION INTRAVENOUS
Status: DISCONTINUED | OUTPATIENT
Start: 2025-02-27 | End: 2025-02-27

## 2025-02-27 RX ORDER — HEPARIN SODIUM 1000 [USP'U]/ML
INJECTION, SOLUTION INTRAVENOUS; SUBCUTANEOUS
Status: DISCONTINUED | OUTPATIENT
Start: 2025-02-27 | End: 2025-02-27

## 2025-02-27 RX ADMIN — NOREPINEPHRINE BITARTRATE 8 MCG: 1 INJECTION, SOLUTION, CONCENTRATE INTRAVENOUS at 08:02

## 2025-02-27 RX ADMIN — HEPARIN SODIUM 1000 UNITS: 1000 INJECTION, SOLUTION INTRAVENOUS; SUBCUTANEOUS at 09:02

## 2025-02-27 RX ADMIN — ATORVASTATIN CALCIUM 40 MG: 40 TABLET, FILM COATED ORAL at 09:02

## 2025-02-27 RX ADMIN — PROPOFOL 100 MCG/KG/MIN: 10 INJECTION, EMULSION INTRAVENOUS at 07:02

## 2025-02-27 RX ADMIN — DEXMEDETOMIDINE 12 MCG: 200 INJECTION, SOLUTION INTRAVENOUS at 07:02

## 2025-02-27 RX ADMIN — LIDOCAINE HYDROCHLORIDE 100 MG: 20 INJECTION, SOLUTION EPIDURAL; INFILTRATION; INTRACAUDAL at 07:02

## 2025-02-27 RX ADMIN — DEXMEDETOMIDINE 16 MCG: 200 INJECTION, SOLUTION INTRAVENOUS at 10:02

## 2025-02-27 RX ADMIN — SODIUM CHLORIDE: 9 INJECTION, SOLUTION INTRAVENOUS at 10:02

## 2025-02-27 RX ADMIN — SODIUM CHLORIDE: 0.9 INJECTION, SOLUTION INTRAVENOUS at 07:02

## 2025-02-27 RX ADMIN — DEXMEDETOMIDINE 8 MCG: 200 INJECTION, SOLUTION INTRAVENOUS at 09:02

## 2025-02-27 RX ADMIN — FENTANYL CITRATE 50 MCG: 50 INJECTION INTRAMUSCULAR; INTRAVENOUS at 07:02

## 2025-02-27 RX ADMIN — SODIUM CHLORIDE, SODIUM GLUCONATE, SODIUM ACETATE, POTASSIUM CHLORIDE, MAGNESIUM CHLORIDE, SODIUM PHOSPHATE, DIBASIC, AND POTASSIUM PHOSPHATE: .53; .5; .37; .037; .03; .012; .00082 INJECTION, SOLUTION INTRAVENOUS at 07:02

## 2025-02-27 RX ADMIN — HEPARIN SODIUM 9000 UNITS: 1000 INJECTION, SOLUTION INTRAVENOUS; SUBCUTANEOUS at 08:02

## 2025-02-27 RX ADMIN — DIPHENHYDRAMINE HYDROCHLORIDE 50 MG: 50 CAPSULE ORAL at 07:02

## 2025-02-27 RX ADMIN — GLYCOPYRROLATE 0.2 MG: 0.2 INJECTION, SOLUTION INTRAMUSCULAR; INTRAVENOUS at 07:02

## 2025-02-27 RX ADMIN — NOREPINEPHRINE BITARTRATE 0.02 MCG/KG/MIN: 1 INJECTION, SOLUTION, CONCENTRATE INTRAVENOUS at 07:02

## 2025-02-27 RX ADMIN — ASPIRIN 325 MG ORAL TABLET 325 MG: 325 PILL ORAL at 07:02

## 2025-02-27 RX ADMIN — CEFAZOLIN 2 G: 330 INJECTION, POWDER, FOR SOLUTION INTRAMUSCULAR; INTRAVENOUS at 08:02

## 2025-02-27 RX ADMIN — HEPARIN SODIUM 2000 UNITS: 1000 INJECTION, SOLUTION INTRAVENOUS; SUBCUTANEOUS at 09:02

## 2025-02-27 RX ADMIN — FENTANYL CITRATE 50 MCG: 50 INJECTION INTRAMUSCULAR; INTRAVENOUS at 08:02

## 2025-02-27 NOTE — ANESTHESIA PROCEDURE NOTES
Right Radial Arterial Line    Diagnosis: Aortic Stenosis  Doctor requesting consult: Dr. Way    Patient location during procedure: done in OR  Timeout: 2/27/2025 7:40 AM  Procedure end time: 2/27/2025 7:45 AM    Staffing  Authorizing Provider: Fredrick Do MD  Performing Provider: Geovanny Garcia MD    Staffing  Performed by: Geovanny Garcia MD  Authorized by: Fredrick Do MD    Anesthesiologist was present at the time of the procedure.    Preanesthetic Checklist  Completed: patient identified, IV checked, risks and benefits discussed, surgical consent, monitors and equipment checked, pre-op evaluation, timeout performed and anesthesia consent givenRight Radial Arterial Line  Skin Prep: chlorhexidine gluconate and isopropyl alcohol  Local Infiltration: lidocaine  Orientation: right  Location: radial    Catheter Size: 20 G  Catheter placement by Ultrasound guidance. Heme positive aspiration all ports.   Vessel Caliber: medium, patent  Needle advanced into vessel with real time Ultrasound guidance.Insertion Attempts: 1  Assessment  Dressing: secured with tape and tegaderm  Patient: Tolerated well

## 2025-02-27 NOTE — OP NOTE
DATE OF PROCEDURE: 02/27/2025    ATTENDING SURGEONS: Rohit Putnam MD and Naun Martin M.D.    PREOPERATIVE DIAGNOSES:  1. Severe aortic stenosis.    POSTOPERATIVE DIAGNOSES:  1. Severe aortic stenosis      OPERATION PERFORMED: Transcatheter aortic valve insertion via transfemoral approach using a 29mm Evolut.    ANESTHESIA: MAC    ESTIMATED BLOOD LOSS: 20 mL.    BRIEF HISTORY: This patient is a 69-year-old woman with severe aortic stenosis. The patient has had progressive dyspnea on exertion. This prompted a thoughtful and thorough evaluation, which demonstrated severe aortic stenosis. Given the comorbid conditions, and the patients preference, a transcatheter valve insertion was recommended. The patient now presents for transcatheter aortic valve insertion.    PROCEDURE: After obtaining informed and written consent, the patient was brought to the cath lab and placed on the cath table in supine position. All the pressure points were protected. After induction of adequate anesthesia, a transvenous pacing wire was placed by anesthesia. Femoral vascular access was obtained.  A wire was advanced across the aortic valve. It was exchanged for stiff wire, and a 20 mm true balloon was placed. Under rapid ventricular pacing, balloon valvuloplasty was performed. The balloon was then withdrawn, and a 29mm Evolut valve was advanced to the level of the aortic annulus. Once the team was satisfied that the valve was in proper position, it was deployed under rapid pacing. Excellent positioning was confirmed on echo and fluoro/angio.  Post-procedure echo demonstrated no aortic insufficiency. The femoral access sites were controlled using percutaneous techniques. The patient tolerated the procedure well, there were no complications.   At the conclusion of the case, sponge and instrument counts were correct.    @Naun Martin MD  Cardiac Surgery@

## 2025-02-27 NOTE — Clinical Note
The groin and left chest was prepped. The site was prepped with ChloraPrep. The site was clipped. The patient was draped.

## 2025-02-27 NOTE — Clinical Note
The catheter was inserted in the right ventricle. The transvenous pacing lead was inserted into the RV and was placed and capture was confirmed.

## 2025-02-27 NOTE — ASSESSMENT & PLAN NOTE
Kandace Rapp is a 69 y.o. female referred by Dr Ng for evaluation of severe AS (NYHA Class III sx).   The patient has undergone the following TAVR work-up:   ECHO (Date 1/29/25): RUDOLPH= 0.8 cm2, MG= 49mmHg, Peak Keyshawn= 4.1 m/s, EF= 55-60%.   LHC (Date 1/28/24): normal coronaries   STS: 1.7%   Frailty: 0/4   Iliacs are > 9.5 on R   LVOT area by CTA is 5.17 cm2 ( 28.1 mm X 25.1 mm) and Avg Diameter is 25.7 per my independent review of images on [employer].  Incidental findings on CT: 4 cm L lower pole Bosniak 4 lesion; now s/p partial L nephrectomy and cleared from urology  CT Surgery risk assessment: per Dr. Martin, continue TAVR workup due to nephrectomy  Rhythm issues: none  PFTs: N/A  KCCQ/5 meter walk: done  Comorbidities: HTN, obesity     Kandace Rapp is a 29mm Evolut candidate via RTF

## 2025-02-27 NOTE — HPI
Kandace Rapp is a 69 y.o. F with a history of severe AS, HTN, CHF, RCC s/p L partial nephrectomy (cancer free) presenting for scheduled outpatient TAVR. EP consulted for new LBBB.     Patient s/p TAVR with 29mm Evolut valve. Procedure complicated by new LBBB. TVP placed in the cath lab. She has no prior history of conduction abnormalities.     Patient seen in the PACU at which time she was somnolent, though arousable. Required levo in the procedure, though BP now stable off inotropes in recovery. Tele noting intrinsic sinus rhythm in 70-80s. EKG LBBB with .

## 2025-02-27 NOTE — SUBJECTIVE & OBJECTIVE
Past Medical History:   Diagnosis Date    Anxiety     Aortic stenosis     Arthritis     CHF (congestive heart failure)     Heart murmur     Hypertension     Mitral valve prolapse        Past Surgical History:   Procedure Laterality Date    AORTIC VALVULOPLASTY N/A 2024    Procedure: REPAIR, AORTIC VALVE;  Surgeon: Rohit Quinonez MD;  Location: [facility] CATH LAB;  Service: Cardiology;  Laterality: N/A;    AORTOGRAPHY N/A 2024    Procedure: AORTOGRAM;  Surgeon: Rohit Quinonez MD;  Location: [facility] CATH LAB;  Service: Cardiology;  Laterality: N/A;    BREAST CYST EXCISION Left 80s    BREAST SURGERY      Benign lump on left.    CATARACT EXTRACTION       SECTION      x1    COLONOSCOPY N/A 2023    Procedure: COLONOSCOPY;  Surgeon: Pattie Stephenson MD;  Location: [facility] ENDO (4TH FLR);  Service: Endoscopy;  Laterality: N/A;   ref by Ishmael Jacobson MD, PEG, instr. to portal-st    CORONARY ANGIOGRAPHY N/A 10/28/2024    Procedure: ANGIOGRAM, CORONARY ARTERY;  Surgeon: Yordy Ng MD;  Location: [facility] CATH LAB;  Service: Cardiology;  Laterality: N/A;  radial access    DV5 ROBOTIC NEPHRECTOMY, PARTIAL Left 2024    Procedure: DV5 ROBOTIC NEPHRECTOMY, PARTIAL;  Surgeon: Adam Sawyer MD;  [facility];  Service: Urology;  Laterality: Left;    EYE SURGERY      VALVE STUDY-AORTIC  2024    Procedure: Valve study-aortic;  Surgeon: Rohit Quinonez MD;  Location: [facility] CATH LAB;  Service: Cardiology;;       Review of patient's allergies indicates:   Allergen Reactions    Protamine sulfate Other (See Comments)     Bradycardia, Hypotension    [place] Rash       Facility-Administered Medications Prior to Admission   Medication    sodium chloride 0.9% flush 10 mL    sodium chloride 0.9% flush 10 mL     PTA Medications   Medication Sig    cholecalciferol, vitamin D3, 1,250 mcg (50,000 unit) capsule     NIFEdipine (PROCARDIA-XL) 30 MG (OSM) 24 hr tablet Take 1 tablet (30  mg total) by mouth once daily.     Family History       Problem Relation (Age of Onset)    Alcohol abuse Brother    Breast cancer Mother, Paternal Aunt    Cancer Mother, Father    Diabetes Sister    Heart disease Mother, Brother, Maternal Uncle, Maternal Grandfather    Lymphoma Mother          Tobacco Use    Smoking status: Never    Smokeless tobacco: Never   Substance and Sexual Activity    Alcohol use: Yes     Comment: Rare.     Drug use: No    Sexual activity: Not Currently     Review of Systems   Constitutional: Negative.   HENT: Negative.     Cardiovascular:  Positive for dyspnea on exertion. Negative for chest pain, claudication, cyanosis, irregular heartbeat, leg swelling, near-syncope, orthopnea, palpitations, paroxysmal nocturnal dyspnea and syncope.   Respiratory: Negative.     Endocrine: Negative.    Musculoskeletal: Negative.    Gastrointestinal: Negative.    Genitourinary: Negative.    Neurological: Negative.      Objective:     Vital Signs (Most Recent):    Vital Signs (24h Range):           There is no height or weight on file to calculate BMI.            No intake or output data in the 24 hours ending 02/27/25 0627    Lines/Drains/Airways       None                    Physical Exam  Vitals and nursing note reviewed.   Constitutional:       Appearance: Normal appearance.   HENT:      Head: Normocephalic and atraumatic.   Eyes:      Extraocular Movements: Extraocular movements intact.      Pupils: Pupils are equal, round, and reactive to light.   Neck:      Vascular: No carotid bruit.   Cardiovascular:      Rate and Rhythm: Normal rate and regular rhythm.      Pulses: Normal pulses.      Heart sounds: Murmur heard.      No friction rub. No gallop.   Pulmonary:      Effort: Pulmonary effort is normal.      Breath sounds: Normal breath sounds.   Abdominal:      General: Abdomen is flat. Bowel sounds are normal. There is no distension.      Palpations: Abdomen is soft. There is no mass.      Tenderness:  "There is no abdominal tenderness.   Musculoskeletal:         General: No swelling. Normal range of motion.      Cervical back: Normal range of motion.   Skin:     General: Skin is warm.      Capillary Refill: Capillary refill takes less than 2 seconds.   Neurological:      General: No focal deficit present.      Mental Status: She is alert and oriented to person, place, and time.            Significant Labs: BMP: No results for input(s): "GLU", "NA", "K", "CL", "CO2", "BUN", "CREATININE", "CALCIUM", "MG" in the last 48 hours., CMP No results for input(s): "NA", "K", "CL", "CO2", "GLU", "BUN", "CREATININE", "CALCIUM", "PROT", "ALBUMIN", "BILITOT", "ALKPHOS", "AST", "ALT", "ANIONGAP", "ESTGFRAFRICA", "EGFRNONAA" in the last 48 hours., CBC No results for input(s): "WBC", "HGB", "HCT", "PLT" in the last 48 hours., INR No results for input(s): "INR", "PROTIME" in the last 48 hours., and Lipid Panel No results for input(s): "CHOL", "HDL", "LDLCALC", "TRIG", "CHOLHDL" in the last 48 hours.    Significant Imaging:     TTE 01/29/25    Left Ventricle: The left ventricle is normal in size. Normal wall thickness. There is normal systolic function with a visually estimated ejection fraction of 55 - 60%. There is normal diastolic function.    Right Ventricle: Normal right ventricular cavity size. Wall thickness is normal. Systolic function is normal.    Aortic Valve: There is severe aortic valve sclerosis. Moderately restricted motion. There is severe stenosis. Aortic valve area by VTI is 0.8 cm2. Aortic valve peak velocity is 4.1 m/s. Mean gradient is 49 mmHg. The dimensionless index is 0.23. There is mild to moderate aortic regurgitation.    Tricuspid Valve: There is mild regurgitation.    Aorta: Aortic root is normal in size measuring 3.37 cm. Ascending aorta is mildly dilated measuring 4.11 cm.    Pulmonary Artery: The estimated pulmonary artery systolic pressure is 26 mmHg.    IVC/SVC: Intermediate venous pressure at 8 " mmHg.    [facility] 11/06/24    The estimated blood loss was <50 mL.    Successful balloon aortic calvuloplasty with 18 mm True balloon      [facility] 10/28/24    The coronary arteries were normal.  Proceed with TAVR evaluation.    Tortuous right innominate artery.

## 2025-02-27 NOTE — PROGRESS NOTES
Patient arrived to EP PACU in stable condition. Report received from procedural RN and anesthesia provider. Bilateral groin sites w/ dry gauze and clear tegaderm CDI. Pt educated on groin sites and importance of keeping BLE still and straight. Pt not following commands at this time. L wrist vascband WDL. R SC cordis for TVP. VVI/ V out 10 mAs/ V sensitivity 2.0/ low rate 35. Continuous cardiac monitoring in place. Safety measures verified. PACU BCGs maintained. RN at bedside. Will continue to monitor.

## 2025-02-27 NOTE — PROGRESS NOTES
Pt continues to be drowsy w/ delayed response and only oriented to self. Dr. Norris and Dr. Valdez at bedside to see pt. No new orders received at this time.

## 2025-02-27 NOTE — CARE UPDATE
Kandace Rapp is a 69 y.o. female referred by Dr Ng for evaluation of severe AS (NYHA Class III sx).   The patient has undergone the following TAVR work-up:   ECHO (Date 1/29/25): RUDOLPH= 0.8 cm2, MG= 49mmHg, Peak Keyshawn= 4.1 m/s, EF= 55-60%.   LHC (Date 1/28/24): normal coronaries   STS: 1.7%   Frailty: 0/4   Iliacs are > 9.5 on R   LVOT area by CTA is 5.17 cm2 ( 28.1 mm X 25.1 mm) and Avg Diameter is 25.7 per my independent review of images on [email or web address].  Incidental findings on CT: 4 cm L lower pole Bosniak 4 lesion; now s/p partial L nephrectomy and cleared from urology  CT Surgery risk assessment: per Dr. Martin, continue TAVR workup due to nephrectomy  Rhythm issues: none  PFTs: N/A  KCCQ/5 meter walk: done  Comorbidities: HTN, obesity     Kandace Rapp is a 29mm Evolut candidate via RTF        Transcatheter Aortic valve replacement   Valve: 29  ECMO:  On Call  TAVR access: RTF  Valvuloplasty balloon: 22 Jeff  Viabahn size if needed: 9x5  Antithrombotic therapy: load with ASA  Temporary pacing wire: No, Will pace from TAVR wire  Pacemaker risk factors: none   EP Consult ordered if post procedure arrhythmias develop  Post op destination: Stepdown  To be done during procedure:  Antibiotics given  CT Surgery present in lab for valve deployment per standard of care.  Surgeon :  Heart failure on admission?  CONSENTING:  The patient was told that the procedure carries around a 12.5% risk of permanent pacemaker requirement and that risk depends on the patients underlying conduction system as described in item 8.   Prior to the clinc visit, all available records from referring provider were reviewed.  I have personally reviewed all the lab tests available related to the patient's case  The patient's images were reviewed by myself and the procedural planning was done with my own interpretation of the iliac and aortic anatomy based on CTA, angiography or JAVON. I have reviewed all other imaging  studies relevant to the patient including echocardiography and coronary angiography.  Patient was educated abut the pathophysiology and natural history of severe aortic stenosis and was educated about the treatment options including surgical and transcatheter valve replacement. She agrees to be full code for the forseable future and to undergo workup for treatment of severe AS.   The risks, benefits, and alternatives of transcatheter aortic valve replacement were discussed with the patient. All questions were answered and informed consent was obtained. I had a detailed discussion with the patient regarding risk of stroke, MI, bleeding access site complications including limb loss, allergy, kidney failure including dialysis and death.  The patient understands the risks and benefits and wishes to go ahead with the procedure.  The referring Cardiologist was notified of the plan  All patient's questions were answered    Shared Decision Making:  This patient was presented to a multidisciplinary heart team involving both a Structural Heart Specialist (Interventional Cardiologist) and a Cardiothoracic Surgeon. The patient was involved in shared decision-making to define clearer goals for treatment and align health decisions with their current values.  The patient understands the risks and expected benefits of their treatment options.    Rafita Helm, PGY7  Interventional Cardiology  [facility]

## 2025-02-27 NOTE — H&P
[facility]  Interventional Cardiology  H&P    Patient Name: Kandace Rapp  MRN: [id number]  Admission Date: 2/27/2025  Code Status: Prior   Attending Provider: Rohit Quinnoez MD   Primary Care Physician: Ishmael Jacobson MD  Principal Problem:<principal problem not specified>    Patient information was obtained from patient and ER records.     Subjective:     Chief Complaint:  Shortness of breath     HPI:  Kandace Rapp is a 69 y.o. female who presents for follow-up.     Patient has been undergoing TAVR evaluation. Cardiac CTA was suspicious for left kidney lesion for which she followed with urology Dr. Sawyer. She underwent BAV on 11/6/2024 for cardiac clearance before undergoing left partial nephrectomy 12/12/2024. 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 She has been cleared from urology and is cancer free. Due to continuing  with dyspnea on exertion (Extremely short of breath with going up just five steps as well as walking around her house) she comes today for TAVR. Denies chest pain, palpitations, orthopnea, leg swelling, syncope. Patient is not on a daily aspirin or other blood thinner at this time.     Kandace Rapp is a 69 y.o. female referred by Dr Ng for evaluation of severe AS (NYHA Class III sx).     The patient has undergone the following TAVR work-up:   ECHO (Date 1/29/25): RUDOLPH= 0.8 cm2, MG= 49mmHg, Peak Keyshawn= 4.1 m/s, EF= 55-60%.   LHC (Date 1/28/24): normal coronaries   STS: 1.7%   Frailty: 0/4   Iliacs are > 9.5 on R   LVOT area by CTA is 5.17 cm2 ( 28.1 mm X 25.1 mm) and Avg Diameter is 25.7 per my independent review of images on [employer].  Incidental findings on CT: 4 cm L lower pole Bosniak 4 lesion; now s/p partial L nephrectomy and cleared from urology  CT Surgery risk assessment: per Dr. Martin, continue TAVR workup due to nephrectomy  Rhythm issues: none  PFTs: N/A  KCCQ/5 meter walk: done  Comorbidities: HTN,  obesity         Past Medical History:   Diagnosis Date    Anxiety     Aortic stenosis     Arthritis     CHF (congestive heart failure)     Heart murmur     Hypertension     Mitral valve prolapse        Past Surgical History:   Procedure Laterality Date    AORTIC VALVULOPLASTY N/A 2024    Procedure: REPAIR, AORTIC VALVE;  Surgeon: Rohit Quinonez MD;  Location: [facility] CATH LAB;  Service: Cardiology;  Laterality: N/A;    AORTOGRAPHY N/A 2024    Procedure: AORTOGRAM;  Surgeon: Rohit Quinonez MD;  Location: [facility] CATH LAB;  Service: Cardiology;  Laterality: N/A;    BREAST CYST EXCISION Left 80s    BREAST SURGERY      Benign lump on left.    CATARACT EXTRACTION       SECTION      x1    COLONOSCOPY N/A 2023    Procedure: COLONOSCOPY;  Surgeon: Pattie Stephenson MD;  Location: [facility] ENDO (4TH FLR);  Service: Endoscopy;  Laterality: N/A;   ref by Ishmael Jacobson MD, PEG, instr. to portal-st    CORONARY ANGIOGRAPHY N/A 10/28/2024    Procedure: ANGIOGRAM, CORONARY ARTERY;  Surgeon: Yordy Ng MD;  Location: [facility] CATH LAB;  Service: Cardiology;  Laterality: N/A;  radial access    DV5 ROBOTIC NEPHRECTOMY, PARTIAL Left 2024    Procedure: DV5 ROBOTIC NEPHRECTOMY, PARTIAL;  Surgeon: Adam Sawyer MD;  [facility];  Service: Urology;  Laterality: Left;    EYE SURGERY      VALVE STUDY-AORTIC  2024    Procedure: Valve study-aortic;  Surgeon: Rohit Quinonez MD;  Location: [facility] CATH LAB;  Service: Cardiology;;       Review of patient's allergies indicates:   Allergen Reactions    Protamine sulfate Other (See Comments)     Bradycardia, Hypotension    Ganado Rash       Facility-Administered Medications Prior to Admission   Medication    sodium chloride 0.9% flush 10 mL    sodium chloride 0.9% flush 10 mL     PTA Medications   Medication Sig    cholecalciferol, vitamin D3, 1,250 mcg (50,000 unit) capsule     NIFEdipine (PROCARDIA-XL) 30 MG (OSM) 24 hr tablet  Take 1 tablet (30 mg total) by mouth once daily.     Family History       Problem Relation (Age of Onset)    Alcohol abuse Brother    Breast cancer Mother, Paternal Aunt    Cancer Mother, Father    Diabetes Sister    Heart disease Mother, Brother, Maternal Uncle, Maternal Grandfather    Lymphoma Mother          Tobacco Use    Smoking status: Never    Smokeless tobacco: Never   Substance and Sexual Activity    Alcohol use: Yes     Comment: Rare.     Drug use: No    Sexual activity: Not Currently     Review of Systems   Constitutional: Negative.   HENT: Negative.     Cardiovascular:  Positive for dyspnea on exertion. Negative for chest pain, claudication, cyanosis, irregular heartbeat, leg swelling, near-syncope, orthopnea, palpitations, paroxysmal nocturnal dyspnea and syncope.   Respiratory: Negative.     Endocrine: Negative.    Musculoskeletal: Negative.    Gastrointestinal: Negative.    Genitourinary: Negative.    Neurological: Negative.      Objective:     Vital Signs (Most Recent):    Vital Signs (24h Range):           There is no height or weight on file to calculate BMI.            No intake or output data in the 24 hours ending 02/27/25 0627    Lines/Drains/Airways       None                    Physical Exam  Vitals and nursing note reviewed.   Constitutional:       Appearance: Normal appearance.   HENT:      Head: Normocephalic and atraumatic.   Eyes:      Extraocular Movements: Extraocular movements intact.      Pupils: Pupils are equal, round, and reactive to light.   Neck:      Vascular: No carotid bruit.   Cardiovascular:      Rate and Rhythm: Normal rate and regular rhythm.      Pulses: Normal pulses.      Heart sounds: Murmur heard.      No friction rub. No gallop.      Mild decrease in right sided leg pulses, normal left and in arms  Pulmonary:      Effort: Pulmonary effort is normal.      Breath sounds: Normal breath sounds.   Abdominal:      General: Abdomen is flat. Bowel sounds are normal. There  "is no distension.      Palpations: Abdomen is soft. There is no mass.      Tenderness: There is no abdominal tenderness.   Musculoskeletal:         General: No swelling. Normal range of motion.      Cervical back: Normal range of motion.   Skin:     General: Skin is warm.      Capillary Refill: Capillary refill takes less than 2 seconds.   Neurological:      General: No focal deficit present.      Mental Status: She is alert and oriented to person, place, and time.            Significant Labs: BMP: No results for input(s): "GLU", "NA", "K", "CL", "CO2", "BUN", "CREATININE", "CALCIUM", "MG" in the last 48 hours., CMP No results for input(s): "NA", "K", "CL", "CO2", "GLU", "BUN", "CREATININE", "CALCIUM", "PROT", "ALBUMIN", "BILITOT", "ALKPHOS", "AST", "ALT", "ANIONGAP", "ESTGFRAFRICA", "EGFRNONAA" in the last 48 hours., CBC No results for input(s): "WBC", "HGB", "HCT", "PLT" in the last 48 hours., INR No results for input(s): "INR", "PROTIME" in the last 48 hours., and Lipid Panel No results for input(s): "CHOL", "HDL", "LDLCALC", "TRIG", "CHOLHDL" in the last 48 hours.    Significant Imaging:     TTE 01/29/25    Left Ventricle: The left ventricle is normal in size. Normal wall thickness. There is normal systolic function with a visually estimated ejection fraction of 55 - 60%. There is normal diastolic function.    Right Ventricle: Normal right ventricular cavity size. Wall thickness is normal. Systolic function is normal.    Aortic Valve: There is severe aortic valve sclerosis. Moderately restricted motion. There is severe stenosis. Aortic valve area by VTI is 0.8 cm2. Aortic valve peak velocity is 4.1 m/s. Mean gradient is 49 mmHg. The dimensionless index is 0.23. There is mild to moderate aortic regurgitation.    Tricuspid Valve: There is mild regurgitation.    Aorta: Aortic root is normal in size measuring 3.37 cm. Ascending aorta is mildly dilated measuring 4.11 cm.    Pulmonary Artery: The estimated pulmonary " artery systolic pressure is 26 mmHg.    IVC/SVC: Intermediate venous pressure at 8 mmHg.    [facility] 11/06/24    The estimated blood loss was <50 mL.    Successful balloon aortic calvuloplasty with 18 mm True balloon      [facility] 10/28/24    The coronary arteries were normal.  Proceed with TAVR evaluation.    Tortuous right innominate artery.        Assessment and Plan:     Cardiac/Vascular  Nonrheumatic aortic valve stenosis  Kandace Rapp is a 69 y.o. female referred by Dr Ng for evaluation of severe AS (NYHA Class III sx).   The patient has undergone the following TAVR work-up:   ECHO (Date 1/29/25): RUDOLPH= 0.8 cm2, MG= 49mmHg, Peak Keyshawn= 4.1 m/s, EF= 55-60%.   LHC (Date 1/28/24): normal coronaries   STS: 1.7%   Frailty: 0/4   Iliacs are > 9.5 on R   LVOT area by CTA is 5.17 cm2 ( 28.1 mm X 25.1 mm) and Avg Diameter is 25.7 per my independent review of images on [employer].  Incidental findings on CT: 4 cm L lower pole Bosniak 4 lesion; now s/p partial L nephrectomy and cleared from urology  CT Surgery risk assessment: per Dr. Martin, continue TAVR workup due to nephrectomy  Rhythm issues: none  PFTs: N/A  KCCQ/5 meter walk: done  Comorbidities: HTN, obesity     Kandace Rapp is a 29mm [employer] candidate via RTF    Renal/  Renal mass  - lesion seen on CTA for TAVR workup  - Now s/p robotic left partial nephrectomy and cancer free, stable    Endocrine  BMI 36.0-36.9,adult  - No acute issues        VTE Risk Mitigation (From admission, onward)      None              Hieu Tran MD  Interventional Cardiology   [facility]

## 2025-02-27 NOTE — CONSULTS
[facility] - Cath Lab  Cardiac Electrophysiology  Consult Note    Admission Date: 2025  Code Status: Prior   Attending Provider: Rohit Quinonez MD  Consulting Provider: Evita Azevedo MD  Principal Problem:<principal problem not specified>    Inpatient consult to Electrophysiology  Consult performed by: Evita Azevedo MD  Consult ordered by: Rafita Helm MD  Reason for consult: LBBB        Subjective:     Chief Complaint:  Severe AS     HPI:   Kandace Rapp is a 69 y.o. F with a history of severe AS, HTN, CHF, RCC s/p L partial nephrectomy (cancer free) presenting for scheduled outpatient TAVR. EP consulted for new LBBB.     Patient s/p TAVR with 29mm Evolut valve. Procedure complicated by new LBBB. TVP placed in the cath lab. She has no prior history of conduction abnormalities.     Patient seen in the PACU at which time she was somnolent, though arousable. Required levo in the procedure, though BP now stable off inotropes in recovery. Tele noting intrinsic sinus rhythm in 70-80s. EKG LBBB with .    Past Medical History:   Diagnosis Date    Anxiety     Aortic stenosis     Arthritis     CHF (congestive heart failure)     Heart murmur     Hypertension     Mitral valve prolapse        Past Surgical History:   Procedure Laterality Date    AORTIC VALVULOPLASTY N/A 2024    Procedure: REPAIR, AORTIC VALVE;  Surgeon: Rohit Quinonez MD;  Location: [facility] CATH LAB;  Service: Cardiology;  Laterality: N/A;    AORTOGRAPHY N/A 2024    Procedure: AORTOGRAM;  Surgeon: Rohit Quinonez MD;  Location: [facility] CATH LAB;  Service: Cardiology;  Laterality: N/A;    BREAST CYST EXCISION Left 80s    BREAST SURGERY      Benign lump on left.    CATARACT EXTRACTION       SECTION      x1    COLONOSCOPY N/A 2023    Procedure: COLONOSCOPY;  Surgeon: Pattie Stephenson MD;  Location: [facility] ENDO ([address] [place] [address]);  Service: Endoscopy;  Laterality: N/A;   ref by Ishmael Jacobson MD, PEG, instr.  Intensive Care Follow-up     Hospital:  LOS: 3 days   Ms. Rzaia Sequeira, 58 y.o. female is followed for:   Acute idiopathic pericarditis            History of present illness:   Razia Sequeira is a 58 year-old female with PMH of HTN, asthma, hypothyroidism, allergies, endometriosis, and anxiety that is being admitted to the ICU on 11/18 with chest discomfort and shortness of air.     Ms. Sequeira has been feeling unwell for a couple of weeks. She had a respiratory illness a couple of weeks ago and this Monday, she started feeling worse again. Today, 11/18, she was able to see her PCP for evaluation who sent her to [facility] due to the severity of her SOA. While there, she began experiencing chest discomfort, worse when laying flat, and EKG was noted to have ST elevation in the inferolateral leads with elevated troponin, concerning for an MI. She was transferred to [facility] for a heart cath with Dr. Tran.      [facility] negative for obstructive disease and her CP improved afterwards. LVEDP was elevated at 23, however, left ventriculogram was not done due to her elevated creatinine.      Transthoracic echocardiogram revealed a moderate pericardial effusion with early evidence of impaired filling.  Patient was started on empiric broad-spectrum antibiotics as well as steroids and colchicine for presumed pericarditis.     Due to persistent hypotension the patient underwent a pericardiocentesis and drain placement yesterday.  Initially 500 mL returned.  High WBC with neutrophils on differential and cell count.  Cultures negative so far      Subjective   Interval History:  Patient doing much better this morning.  Off of pressors.  Tolerating p.o. diet.  On room air.             The patient's past medical, surgical and social history were reviewed and updated in Epic as appropriate.       Objective     Infusions:     Medications:  busPIRone, 5 mg, Oral, TID  cefTRIAXone, 1 g, Intravenous, Q24H  colchicine,  0.6 mg, Oral, Q12H  FLUoxetine, 20 mg, Oral, Daily  hydroCHLOROthiazide Oral, 12.5 mg, Oral, Daily  levothyroxine, 100 mcg, Oral, Q AM  [START ON 11/22/2022] lisinopril, 20 mg, Oral, Q24H  [START ON 11/22/2022] predniSONE, 30 mg, Oral, Daily With Breakfast      I reviewed the patient's medications.    Vital Sign Min/Max for last 24 hours  Temp  Min: 98 °F (36.7 °C)  Max: 98.7 °F (37.1 °C)   BP  Min: 122/81  Max: 160/96   Pulse  Min: 73  Max: 104   Resp  Min: 16  Max: 18   SpO2  Min: 85 %  Max: 96 %   No data recorded       Input/Output for last 24 hour shift  11/20 0701 - 11/21 0700  In: 1079.1 [P.O.:540; I.V.:439.1]  Out: 32 [Drains:32]      GENERAL : NAD, conversant  RESPIRATORY/THORAX : normal respiratory effort and no intercostal retractions, CTAB  CARDIOVASCULAR : Normal S1/S2, RRR. no lower ext edema.  GASTROINTESTINAL : Soft, NT/ND. BS x 4 normoactive. No hepatosplenomegaly.  MUSCULOSKELETAL : No cyanosis, clubbing, or ischemia  NEUROLOGICAL: alert and oriented to person, place and time  PSYCHOLOGICAL : Appropriate affect      Results from last 7 days   Lab Units 11/21/22  0514 11/20/22  0336 11/18/22  2355   WBC 10*3/mm3 11.35* 16.36* 25.05*   HEMOGLOBIN g/dL 12.6 12.8 14.7   PLATELETS 10*3/mm3 195 201 449     Results from last 7 days   Lab Units 11/21/22  0514 11/20/22  0336 11/19/22  0032 11/18/22  2100   SODIUM mmol/L 136 136 135* 131*   POTASSIUM mmol/L 3.7 4.1 4.7 4.0   CO2 mmol/L 21.0* 21.0* 18.0* 16.0*   BUN mg/dL 16 22* 21* 18   CREATININE mg/dL 0.78 0.89 1.50* 1.53*   MAGNESIUM mg/dL  --   --   --  2.0   GLUCOSE mg/dL 83 111* 147* 166*     Estimated Creatinine Clearance: 92.6 mL/min (by C-G formula based on SCr of 0.78 mg/dL).          I reviewed the patient's new clinical results.  I reviewed the patient's new imaging results/reports including actual images and agree with reports.       Imaging Results (Last 24 Hours)     Procedure Component Value Units Date/Time    XR Chest 1 View [[id number]]  to portal-st    CORONARY ANGIOGRAPHY N/A 10/28/2024    Procedure: ANGIOGRAM, CORONARY ARTERY;  Surgeon: Yordy Ng MD;  Location: [facility] CATH LAB;  Service: Cardiology;  Laterality: N/A;  radial access    DV5 ROBOTIC NEPHRECTOMY, PARTIAL Left 12/12/2024    Procedure: DV5 ROBOTIC NEPHRECTOMY, PARTIAL;  Surgeon: Adam Sawyer MD;  Location: [facility] OR [address] [place] [address];  Service: Urology;  Laterality: Left;    EYE SURGERY      VALVE STUDY-AORTIC  11/06/2024    Procedure: Valve study-aortic;  Surgeon: Rohit Quinonez MD;  Location: [facility] CATH LAB;  Service: Cardiology;;       Review of patient's allergies indicates:   Allergen Reactions    Protamine sulfate Other (See Comments)     Bradycardia, Hypotension    Davion Rash       No current facility-administered medications on file prior to encounter.     Current Outpatient Medications on File Prior to Encounter   Medication Sig    cholecalciferol, vitamin D3, 1,250 mcg (50,000 unit) capsule     NIFEdipine (PROCARDIA-XL) 30 MG (OSM) 24 hr tablet Take 1 tablet (30 mg total) by mouth once daily.     Family History       Problem Relation (Age of Onset)    Alcohol abuse Brother    Breast cancer Mother, Paternal Aunt    Cancer Mother, Father    Diabetes Sister    Heart disease Mother, Brother, Maternal Uncle, Maternal Grandfather    Lymphoma Mother          Tobacco Use    Smoking status: Never    Smokeless tobacco: Never   Substance and Sexual Activity    Alcohol use: Yes     Comment: Rare.     Drug use: No    Sexual activity: Not Currently     Review of Systems   Unable to perform ROS: Mental status change     Objective:     Vital Signs (Most Recent):  Temp: 97.7 °F (36.5 °C) (02/27/25 1010)  Pulse: 64 (02/27/25 1315)  Resp: 14 (02/27/25 1315)  BP: (!) 114/57 (02/27/25 1315)  SpO2: 98 % (02/27/25 1315) Vital Signs (24h Range):  Temp:  [97.5 °F (36.4 °C)-97.7 °F (36.5 °C)] 97.7 °F (36.5 °C)  Pulse:  [54-81] 64  Resp:  [10-21] 14  SpO2:  [94 %-100 %] 98 %  BP: ()/(55-91)  "114/57       Weight: 88.9 kg (196 lb)  Body mass index is 35.85 kg/m².    SpO2: 98 %        Physical Exam  Constitutional:       Appearance: Normal appearance.   Eyes:      Extraocular Movements: Extraocular movements intact.      Conjunctiva/sclera: Conjunctivae normal.   Cardiovascular:      Rate and Rhythm: Normal rate and regular rhythm.      Pulses: Normal pulses.   Pulmonary:      Effort: Pulmonary effort is normal.   Abdominal:      General: Abdomen is flat.      Palpations: Abdomen is soft.   Skin:     General: Skin is warm and dry.   Neurological:      General: No focal deficit present.            Significant Labs: CMP: No results for input(s): "NA", "K", "CL", "CO2", "GLU", "BUN", "CREATININE", "CALCIUM", "PROT", "ALBUMIN", "BILITOT", "ALKPHOS", "AST", "ALT", "ANIONGAP", "ESTGFRAFRICA", "EGFRNONAA" in the last 48 hours., CBC: No results for input(s): "WBC", "HGB", "HCT", "PLT" in the last 48 hours., and INR: No results for input(s): "INR", "PROTIME" in the last 48 hours.    Significant Imaging: Echocardiogram: Transthoracic echo (TTE) complete (Cupid Only):   Results for orders placed or performed during the hospital encounter of 01/29/25   Echo   Result Value Ref Range    LV Diastolic Volume 115.55 mL    Echo EF Estimated 58 %    LV Systolic Volume 49.02 mL    IVS 1.1 0.6 - 1.1 cm    LVIDd 5.0 3.5 - 6.0 cm    LVIDs 3.5 2.1 - 4.0 cm    LVOT diameter 2.1 cm    PW 0.6 0.6 - 1.1 cm    AV LVOT peak gradient 3 mmHg    LVOT mn grad 2 mmHg    LVOT peak keyshawn 0.9 m/s    LVOT peak VTI 22.6 cm    RV- patel basal diam 4.1 cm    RV S' 13.89 cm/s    LA size 3.5 cm    Left Atrium Major Axis 5.6 cm    Left Atrium Minor Axis 5.4 cm    LA Vol (MOD) 54 mL    RA Major Axis 4.65 cm    RA Area 12.3 cm2    AV valve area 0.8 cm2    AV area by cont VTI 0.8 cm2    AV regurgitation pressure 1/2 time 444 ms    AV peak gradient 67 mmHg    AV mean gradient 49 mmHg    Ao peak keyshawn 4.1 m/s    Ao VTI 98.2 cm    MV Peak A Keyshawn 1.10 m/s    E " Collected: 11/21/22 0740     Updated: 11/21/22 0744    Narrative:      Examination: XR CHEST 1 VW-     Date of Exam: 11/21/2022 3:39 AM     Indication: Resp Distress; I31.4-Cardiac tamponade; I31.39-Other  pericardial effusion (noninflammatory).     Comparison: 11/20/2022.     Technique: Single radiographic view of the chest was obtained.     Findings:  A catheter courses superiorly from the abdomen and into the heart.   There is no pneumothorax, pleural effusion or focal airspace  consolidation. Heart size and pulmonary vasculature appear within normal  limits. Regional bones appear intact.       Impression:      No acute cardiopulmonary abnormality.     This report was finalized on 11/21/2022 7:41 AM by Brenton Nguyễn MD.             Assessment & Plan   Impression        Acute idiopathic pericarditis    Hypothyroidism    Anxiety    Essential hypertension    Pericardial effusion    Elevated LFTs (R/O hepatic congestion)       Plan        Razia Sequeira is a 58 year-old female with PMH of HTN, asthma, hypothyroidism, allergies, endometriosis, and anxiety that was being admitted to the ICU on 11/18 secondary to hypotension related to acute decompensated heart failure from acute pericarditis with associated pericardial effusion     [facility] 11/18/2022 negative for obstructive disease and her CP improved afterwards. LVEDP was elevated at 23, however, left ventriculogram was not done due to her elevated creatinine.      Transthoracic echocardiogram revealed a moderate pericardial effusion with early evidence of impaired filling.  Patient was started on empiric broad-spectrum antibiotics as well as steroids and colchicine for presumed pericarditis.     Due to persistent hypotension the patient underwent a pericardiocentesis 11/19/2022 and drain placement yesterday.  Initially 500 mL returned.  High WBC with neutrophils on differential and cell count.  Cultures negative so far    -Pericardial drain per cardiology  -Continue  "wave deceleration time 151 ms    MV Peak E Keyshawn 0.69 m/s    E/A ratio 0.63     LV LATERAL E/E' RATIO 7.7     LV SEPTAL E/E' RATIO 13.8     TDI LATERAL 0.09 m/s    TDI SEPTAL 0.05 m/s    TV peak gradient 17 mmHg    TR Max Keyshawn 2.1 m/s    Ascending aorta 4.11 cm    STJ 3.44 cm    P vein A 0.3 m/s    MV "A" wave duration 94.20 ms    Pulm vein "A" wave 94.20 ms    PV Peak D Keyshawn 0.33 m/s    PV Peak S Keyshawn 0.51 m/s    IVC diameter 1.68 cm    Sinus 3.37 cm    LA WIDTH 3.5 cm    RA Width 3.08 cm    TAPSE 2.53 cm    BSA 1.97 m2    LVOT stroke volume 78.2 cm3    LV Systolic Volume Index 25.8 mL/m2    LV Diastolic Volume Index 60.82 mL/m2    LVOT area 3.5 cm2    FS 30.0 28 - 44 %    Left Ventricle Relative Wall Thickness 0.24 cm    LV mass 146.8 g    LV Mass Index 77.3 g/m2    E/E' ratio 10 m/s    TANIA 30 mL/m2    LA Vol 57 cm3    Pulm vein S/D ratio 1.55     RV/LV Ratio 0.82 cm    TANIA (MOD) 28 mL/m2    AV Velocity Ratio 0.22     AV index (prosthetic) 0.23     RUDOLPH by Velocity Ratio 0.8 cm²    Triscuspid Valve Regurgitation Peak Gradient 18 mmHg    Mean e' 0.07 m/s    ZLVIDS 0.50     ZLVIDD -0.64     TV resting pulmonary artery pressure 26 mmHg    RV TB RVSP 10 mmHg    Est. RA pres 8 mmHg    Narrative      Left Ventricle: The left ventricle is normal in size. Normal wall   thickness. There is normal systolic function with a visually estimated   ejection fraction of 55 - 60%. There is normal diastolic function.    Right Ventricle: Normal right ventricular cavity size. Wall thickness   is normal. Systolic function is normal.    Aortic Valve: There is severe aortic valve sclerosis. Moderately   restricted motion. There is severe stenosis. Aortic valve area by VTI is   0.8 cm2. Aortic valve peak velocity is 4.1 m/s. Mean gradient is 49 mmHg.   The dimensionless index is 0.23. There is mild to moderate aortic   regurgitation.    Tricuspid Valve: There is mild regurgitation.    Aorta: Aortic root is normal in size measuring 3.37 cm. " colchicine and prednisone for pericarditis  -Continue ceftriaxone for now, if no positive culture data in the next 24 hours would recommend is continuing  -Continue lisinopril and hydrochlorothiazide for hypertension  -Levothyroxine for hypothyroidism  -Continue home anxiety meds  -P.o. diet  -SCDs for DVT prophylaxis  -Patient is medically okay to be transferred to the floor    Plan of care and goals reviewed with multidisciplinary/antibiotic stewardship team during rounds.   I discussed the patient's findings and my recommendations with patient and nursing staff       Nova Tirado DO  Pulmonary, Critical care and Sleep Medicine        Ascending aorta   is mildly dilated measuring 4.11 cm.    Pulmonary Artery: The estimated pulmonary artery systolic pressure is   26 mmHg.    IVC/SVC: Intermediate venous pressure at 8 mmHg.           Assessment and Plan:     LBBB (left bundle branch block)  70 yo F with a history significant for severe AS s/p TAVR, c/b new LBBB. Patient without prior history of conduction abnormalities. EKG post procedure noting LBBB with . TVP placed in the cath lab. In the s/o new LBBB patient may require PPM implantation, though post operative inflammation could also be contributing to the new LBBB and resolve over the next 24hrs.     Recommendations:  - Repeat ECG early tomorrow AM  - TVP in place, backup rate 35 bpm, threshold 0.4 mA  - Will evaluate repeat ECG tomorrow morning and determine necessity of PPM  - NPO MN in anticipation of possible procedure tomorrow  - Tele      Thank you for your consult. I will follow-up with patient. Please contact us if you have any additional questions.    Evita Azevedo MD  Cardiac Electrophysiology  [facility] - Cath Lab

## 2025-02-27 NOTE — HPI
Kandace Rapp is a 69 y.o. female who presents for follow-up.     Patient has been undergoing TAVR evaluation. Cardiac CTA was suspicious for left kidney lesion for which she followed with urology Dr. Sawyer. She underwent BAV on 11/6/2024 for cardiac clearance before undergoing left partial nephrectomy 12/12/2024. 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 She has been cleared from urology and is cancer free. Due to continuing  with dyspnea on exertion (Extremely short of breath with going up just five steps as well as walking around her house) she comes today for TAVR. Denies chest pain, palpitations, orthopnea, leg swelling, syncope. Patient is not on a daily aspirin or other blood thinner at this time.     Kandace Rapp is a 69 y.o. female referred by Dr Ng for evaluation of severe AS (NYHA Class III sx).     The patient has undergone the following TAVR work-up:   ECHO (Date 1/29/25): RUDOLPH= 0.8 cm2, MG= 49mmHg, Peak Keyshawn= 4.1 m/s, EF= 55-60%.   LHC (Date 1/28/24): normal coronaries   STS: 1.7%   Frailty: 0/4   Iliacs are > 9.5 on R   LVOT area by CTA is 5.17 cm2 ( 28.1 mm X 25.1 mm) and Avg Diameter is 25.7 per my independent review of images on [employer].  Incidental findings on CT: 4 cm L lower pole Bosniak 4 lesion; now s/p partial L nephrectomy and cleared from urology  CT Surgery risk assessment: per Dr. Martin, continue TAVR workup due to nephrectomy  Rhythm issues: none  PFTs: N/A  KCCQ/5 meter walk: done  Comorbidities: HTN, obesity

## 2025-02-27 NOTE — SUBJECTIVE & OBJECTIVE
Past Medical History:   Diagnosis Date    Anxiety     Aortic stenosis     Arthritis     CHF (congestive heart failure)     Heart murmur     Hypertension     Mitral valve prolapse        Past Surgical History:   Procedure Laterality Date    AORTIC VALVULOPLASTY N/A 2024    Procedure: REPAIR, AORTIC VALVE;  Surgeon: Rohit Quinonez MD;  Location: [facility] CATH LAB;  Service: Cardiology;  Laterality: N/A;    AORTOGRAPHY N/A 2024    Procedure: AORTOGRAM;  Surgeon: Rohit Quinonez MD;  Location: [facility] CATH LAB;  Service: Cardiology;  Laterality: N/A;    BREAST CYST EXCISION Left 80s    BREAST SURGERY      Benign lump on left.    CATARACT EXTRACTION       SECTION      x1    COLONOSCOPY N/A 2023    Procedure: COLONOSCOPY;  Surgeon: Pattie Stephenson MD;  Location: [facility] ENDO (4TH FLR);  Service: Endoscopy;  Laterality: N/A;   ref by Ishmael Jacobson MD, PEG, instr. to portal-st    CORONARY ANGIOGRAPHY N/A 10/28/2024    Procedure: ANGIOGRAM, CORONARY ARTERY;  Surgeon: Yordy Ng MD;  Location: [facility] CATH LAB;  Service: Cardiology;  Laterality: N/A;  radial access    DV5 ROBOTIC NEPHRECTOMY, PARTIAL Left 2024    Procedure: DV5 ROBOTIC NEPHRECTOMY, PARTIAL;  Surgeon: Adam Sawyer MD;  [facility];  Service: Urology;  Laterality: Left;    EYE SURGERY      VALVE STUDY-AORTIC  2024    Procedure: Valve study-aortic;  Surgeon: Rohit Quinonez MD;  Location: [facility] CATH LAB;  Service: Cardiology;;       Review of patient's allergies indicates:   Allergen Reactions    Protamine sulfate Other (See Comments)     Bradycardia, Hypotension    [place] Rash       No current facility-administered medications on file prior to encounter.     Current Outpatient Medications on File Prior to Encounter   Medication Sig    cholecalciferol, vitamin D3, 1,250 mcg (50,000 unit) capsule     NIFEdipine (PROCARDIA-XL) 30 MG (OSM) 24 hr tablet Take 1 tablet (30 mg total) by mouth  "once daily.     Family History       Problem Relation (Age of Onset)    Alcohol abuse Brother    Breast cancer Mother, Paternal Aunt    Cancer Mother, Father    Diabetes Sister    Heart disease Mother, Brother, Maternal Uncle, Maternal Grandfather    Lymphoma Mother          Tobacco Use    Smoking status: Never    Smokeless tobacco: Never   Substance and Sexual Activity    Alcohol use: Yes     Comment: Rare.     Drug use: No    Sexual activity: Not Currently     Review of Systems   Unable to perform ROS: Mental status change     Objective:     Vital Signs (Most Recent):  Temp: 97.7 °F (36.5 °C) (02/27/25 1010)  Pulse: 64 (02/27/25 1315)  Resp: 14 (02/27/25 1315)  BP: (!) 114/57 (02/27/25 1315)  SpO2: 98 % (02/27/25 1315) Vital Signs (24h Range):  Temp:  [97.5 °F (36.4 °C)-97.7 °F (36.5 °C)] 97.7 °F (36.5 °C)  Pulse:  [54-81] 64  Resp:  [10-21] 14  SpO2:  [94 %-100 %] 98 %  BP: ()/(55-91) 114/57       Weight: 88.9 kg (196 lb)  Body mass index is 35.85 kg/m².    SpO2: 98 %        Physical Exam  Constitutional:       Appearance: Normal appearance.   Eyes:      Extraocular Movements: Extraocular movements intact.      Conjunctiva/sclera: Conjunctivae normal.   Cardiovascular:      Rate and Rhythm: Normal rate and regular rhythm.      Pulses: Normal pulses.   Pulmonary:      Effort: Pulmonary effort is normal.   Abdominal:      General: Abdomen is flat.      Palpations: Abdomen is soft.   Skin:     General: Skin is warm and dry.   Neurological:      General: No focal deficit present.            Significant Labs: CMP: No results for input(s): "NA", "K", "CL", "CO2", "GLU", "BUN", "CREATININE", "CALCIUM", "PROT", "ALBUMIN", "BILITOT", "ALKPHOS", "AST", "ALT", "ANIONGAP", "ESTGFRAFRICA", "EGFRNONAA" in the last 48 hours., CBC: No results for input(s): "WBC", "HGB", "HCT", "PLT" in the last 48 hours., and INR: No results for input(s): "INR", "PROTIME" in the last 48 hours.    Significant Imaging: Echocardiogram: " "Transthoracic echo (TTE) complete (Cupid Only):   Results for orders placed or performed during the hospital encounter of 01/29/25   Echo   Result Value Ref Range    LV Diastolic Volume 115.55 mL    Echo EF Estimated 58 %    LV Systolic Volume 49.02 mL    IVS 1.1 0.6 - 1.1 cm    LVIDd 5.0 3.5 - 6.0 cm    LVIDs 3.5 2.1 - 4.0 cm    LVOT diameter 2.1 cm    PW 0.6 0.6 - 1.1 cm    AV LVOT peak gradient 3 mmHg    LVOT mn grad 2 mmHg    LVOT peak keyshawn 0.9 m/s    LVOT peak VTI 22.6 cm    RV- patel basal diam 4.1 cm    RV S' 13.89 cm/s    LA size 3.5 cm    Left Atrium Major Axis 5.6 cm    Left Atrium Minor Axis 5.4 cm    LA Vol (MOD) 54 mL    RA Major Axis 4.65 cm    RA Area 12.3 cm2    AV valve area 0.8 cm2    AV area by cont VTI 0.8 cm2    AV regurgitation pressure 1/2 time 444 ms    AV peak gradient 67 mmHg    AV mean gradient 49 mmHg    Ao peak keyshawn 4.1 m/s    Ao VTI 98.2 cm    MV Peak A Keyshawn 1.10 m/s    E wave deceleration time 151 ms    MV Peak E Keyshawn 0.69 m/s    E/A ratio 0.63     LV LATERAL E/E' RATIO 7.7     LV SEPTAL E/E' RATIO 13.8     TDI LATERAL 0.09 m/s    TDI SEPTAL 0.05 m/s    TV peak gradient 17 mmHg    TR Max Keyshawn 2.1 m/s    Ascending aorta 4.11 cm    STJ 3.44 cm    P vein A 0.3 m/s    MV "A" wave duration 94.20 ms    Pulm vein "A" wave 94.20 ms    PV Peak D Keyshawn 0.33 m/s    PV Peak S Keyshawn 0.51 m/s    IVC diameter 1.68 cm    Sinus 3.37 cm    LA WIDTH 3.5 cm    RA Width 3.08 cm    TAPSE 2.53 cm    BSA 1.97 m2    LVOT stroke volume 78.2 cm3    LV Systolic Volume Index 25.8 mL/m2    LV Diastolic Volume Index 60.82 mL/m2    LVOT area 3.5 cm2    FS 30.0 28 - 44 %    Left Ventricle Relative Wall Thickness 0.24 cm    LV mass 146.8 g    LV Mass Index 77.3 g/m2    E/E' ratio 10 m/s    TANIA 30 mL/m2    LA Vol 57 cm3    Pulm vein S/D ratio 1.55     RV/LV Ratio 0.82 cm    TANIA (MOD) 28 mL/m2    AV Velocity Ratio 0.22     AV index (prosthetic) 0.23     RUDOLPH by Velocity Ratio 0.8 cm²    Triscuspid Valve Regurgitation Peak " Gradient 18 mmHg    Mean e' 0.07 m/s    ZLVIDS 0.50     ZLVIDD -0.64     TV resting pulmonary artery pressure 26 mmHg    RV TB RVSP 10 mmHg    Est. RA pres 8 mmHg    Narrative      Left Ventricle: The left ventricle is normal in size. Normal wall   thickness. There is normal systolic function with a visually estimated   ejection fraction of 55 - 60%. There is normal diastolic function.    Right Ventricle: Normal right ventricular cavity size. Wall thickness   is normal. Systolic function is normal.    Aortic Valve: There is severe aortic valve sclerosis. Moderately   restricted motion. There is severe stenosis. Aortic valve area by VTI is   0.8 cm2. Aortic valve peak velocity is 4.1 m/s. Mean gradient is 49 mmHg.   The dimensionless index is 0.23. There is mild to moderate aortic   regurgitation.    Tricuspid Valve: There is mild regurgitation.    Aorta: Aortic root is normal in size measuring 3.37 cm. Ascending aorta   is mildly dilated measuring 4.11 cm.    Pulmonary Artery: The estimated pulmonary artery systolic pressure is   26 mmHg.    IVC/SVC: Intermediate venous pressure at 8 mmHg.

## 2025-02-27 NOTE — ASSESSMENT & PLAN NOTE
68 yo F with a history significant for severe AS s/p TAVR, c/b new LBBB. Patient without prior history of conduction abnormalities. EKG post procedure noting LBBB with . TVP placed in the cath lab. In the s/o new LBBB patient may require PPM implantation, though post operative inflammation could also be contributing to the new LBBB and resolve over the next 24hrs.     Recommendations:  - Repeat ECG early tomorrow AM  - TVP in place, backup rate 35 bpm, threshold 0.4 mA  - Will evaluate repeat ECG tomorrow morning and determine necessity of PPM  - NPO MN in anticipation of possible procedure tomorrow  - Tele

## 2025-02-27 NOTE — PROGRESS NOTES
Dr. Hicks came to bedside to see pt r/t L wrist slight oozing under vascband.  No new orders receivied. Pt arousible to painful stimuli and will follow command to move extremities, but not able to conversate without falling back asleep. Pupils 2, brisk and equal. VSS. MD aware. Will continue to monitor.

## 2025-02-27 NOTE — Clinical Note
The catheter was repositioned into the aorta. The angiography was performed via power injection. The injected amount was 60 mL contrast at 20 mL/s. The PSI from the power injection was 700.

## 2025-02-27 NOTE — TRANSFER OF CARE
"Anesthesia Transfer of Care Note    Patient: Kandace Rapp    Procedure(s) Performed: Procedure(s) (LRB):  REPLACEMENT, AORTIC VALVE, TRANSCATHETER (TAVR) (N/A)  Cardiac Cath Cosurgeon (N/A)  REPLACEMENT, AORTIC VALVE, TRANSCATHETER (TAVR)    Patient location: PACU    Anesthesia Type: general    Transport from OR: Transported from OR on 6-10 L/min O2 by face mask with adequate spontaneous ventilation    Post pain: adequate analgesia    Post assessment: no apparent anesthetic complications    Post vital signs: stable    Level of consciousness: confused and awake    Nausea/Vomiting: no nausea/vomiting    Complications: none    Transfer of care protocol was followed      Last vitals: Visit Vitals  /77 (BP Location: Left arm, Patient Position: Lying)   Pulse 81   Temp 36.4 °C (97.5 °F) (Temporal)   Resp 18   Ht 5' 2" (1.575 m)   Wt 88.9 kg (196 lb)   SpO2 96%   Breastfeeding No   BMI 35.85 kg/m²     "

## 2025-02-27 NOTE — PROGRESS NOTES
Pt's family came to bedside to see pt. Pt still drowsy but able to recognize family members. Will continue to monitor.

## 2025-02-27 NOTE — PROGRESS NOTES
Pt continues to be drowsy and unable to stay awake to continue asking orientation questions. Checked etco2 which was 40. VSS. Dr. Hicks (cath lab) aware. Dr. Do (anesthesia) notified and said he is ok with pt going to room when assigned and he will release from anesthesia.

## 2025-02-27 NOTE — ANESTHESIA RELEASE NOTE
"Anesthesia Release from PACU Note    Patient: Kandace Rapp    Procedure(s) Performed: Procedure(s) (LRB):  REPLACEMENT, AORTIC VALVE, TRANSCATHETER (TAVR) (N/A)  Cardiac Cath Cosurgeon (N/A)  REPLACEMENT, AORTIC VALVE, TRANSCATHETER (TAVR)    Anesthesia type: general    Post pain: Adequate analgesia    Post assessment: no apparent anesthetic complications    Last Vitals: Visit Vitals  BP (!) 114/57   Pulse 64   Temp 36.5 °C (97.7 °F) (Temporal)   Resp 14   Ht 5' 2" (1.575 m)   Wt 88.9 kg (196 lb)   SpO2 98%   Breastfeeding No   BMI 35.85 kg/m²       Post vital signs: stable    Level of consciousness: awake    Nausea/Vomiting: no nausea/no vomiting    Complications: none    Airway Patency: patent    Respiratory: unassisted    Cardiovascular: stable and blood pressure at baseline    Hydration: euvolemic  "

## 2025-02-27 NOTE — SIGNIFICANT EVENT
"Was informed by nursing that post procedure pt was very groggy and lethargic.  My attending and I evaluated the pt at the bedside.  She has no gross neurological deficit however when she replies to questions, she sometimes has inappropriate responses.  Decision made to get stat CTH and was informed by radiology that there "are subtle ischemic changes in the L MCA distribution".  Code stroke called immediately and pt taken by stroke team to CTA.  Will await results.  Discussed plan with pt and family    Rafita Helm, PGY7  Interventional Cardiology  [facility]    "

## 2025-02-28 LAB
ALBUMIN SERPL BCP-MCNC: 3.4 G/DL (ref 3.5–5.2)
ALP SERPL-CCNC: 101 U/L (ref 40–150)
ALT SERPL W/O P-5'-P-CCNC: 9 U/L (ref 10–44)
ANION GAP SERPL CALC-SCNC: 11 MMOL/L (ref 8–16)
ANION GAP SERPL CALC-SCNC: 7 MMOL/L (ref 8–16)
ANION GAP SERPL CALC-SCNC: 7 MMOL/L (ref 8–16)
AST SERPL-CCNC: 28 U/L (ref 10–40)
BASOPHILS # BLD AUTO: 0.01 K/UL (ref 0–0.2)
BASOPHILS NFR BLD: 0.1 % (ref 0–1.9)
BILIRUB SERPL-MCNC: 0.7 MG/DL (ref 0.1–1)
BUN SERPL-MCNC: 10 MG/DL (ref 8–23)
CALCIUM SERPL-MCNC: 8.4 MG/DL (ref 8.7–10.5)
CALCIUM SERPL-MCNC: 8.6 MG/DL (ref 8.7–10.5)
CALCIUM SERPL-MCNC: 8.6 MG/DL (ref 8.7–10.5)
CHLORIDE SERPL-SCNC: 107 MMOL/L (ref 95–110)
CHLORIDE SERPL-SCNC: 108 MMOL/L (ref 95–110)
CHLORIDE SERPL-SCNC: 109 MMOL/L (ref 95–110)
CO2 SERPL-SCNC: 22 MMOL/L (ref 23–29)
CO2 SERPL-SCNC: 23 MMOL/L (ref 23–29)
CO2 SERPL-SCNC: 25 MMOL/L (ref 23–29)
CREAT SERPL-MCNC: 0.6 MG/DL (ref 0.5–1.4)
CREAT SERPL-MCNC: 0.6 MG/DL (ref 0.5–1.4)
CREAT SERPL-MCNC: 0.7 MG/DL (ref 0.5–1.4)
DIFFERENTIAL METHOD BLD: ABNORMAL
EOSINOPHIL # BLD AUTO: 0 K/UL (ref 0–0.5)
EOSINOPHIL NFR BLD: 0.1 % (ref 0–8)
ERYTHROCYTE [DISTWIDTH] IN BLOOD BY AUTOMATED COUNT: 11.9 % (ref 11.5–14.5)
EST. GFR  (NO RACE VARIABLE): >60 ML/MIN/1.73 M^2
GLUCOSE SERPL-MCNC: 100 MG/DL (ref 70–110)
GLUCOSE SERPL-MCNC: 114 MG/DL (ref 70–110)
GLUCOSE SERPL-MCNC: 99 MG/DL (ref 70–110)
HCT VFR BLD AUTO: 33.5 % (ref 37–48.5)
HGB BLD-MCNC: 10.6 G/DL (ref 12–16)
IMM GRANULOCYTES # BLD AUTO: 0.02 K/UL (ref 0–0.04)
IMM GRANULOCYTES NFR BLD AUTO: 0.2 % (ref 0–0.5)
LYMPHOCYTES # BLD AUTO: 1.3 K/UL (ref 1–4.8)
LYMPHOCYTES NFR BLD: 14.3 % (ref 18–48)
MAGNESIUM SERPL-MCNC: 1.7 MG/DL (ref 1.6–2.6)
MAGNESIUM SERPL-MCNC: 2.1 MG/DL (ref 1.6–2.6)
MCH RBC QN AUTO: 28.3 PG (ref 27–31)
MCHC RBC AUTO-ENTMCNC: 31.6 G/DL (ref 32–36)
MCV RBC AUTO: 89 FL (ref 82–98)
MONOCYTES # BLD AUTO: 0.4 K/UL (ref 0.3–1)
MONOCYTES NFR BLD: 4.6 % (ref 4–15)
NEUTROPHILS # BLD AUTO: 7.4 K/UL (ref 1.8–7.7)
NEUTROPHILS NFR BLD: 80.7 % (ref 38–73)
NRBC BLD-RTO: 0 /100 WBC
OHS QRS DURATION: 138 MS
OHS QRS DURATION: 86 MS
OHS QRS DURATION: 88 MS
OHS QTC CALCULATION: 474 MS
OHS QTC CALCULATION: 476 MS
OHS QTC CALCULATION: 503 MS
PHOSPHATE SERPL-MCNC: 3 MG/DL (ref 2.7–4.5)
PLATELET # BLD AUTO: 183 K/UL (ref 150–450)
PMV BLD AUTO: 9.7 FL (ref 9.2–12.9)
POCT GLUCOSE: 94 MG/DL (ref 70–110)
POTASSIUM SERPL-SCNC: 3.8 MMOL/L (ref 3.5–5.1)
PROT SERPL-MCNC: 6.6 G/DL (ref 6–8.4)
RBC # BLD AUTO: 3.75 M/UL (ref 4–5.4)
SODIUM SERPL-SCNC: 139 MMOL/L (ref 136–145)
SODIUM SERPL-SCNC: 140 MMOL/L (ref 136–145)
SODIUM SERPL-SCNC: 140 MMOL/L (ref 136–145)
WBC # BLD AUTO: 9.19 K/UL (ref 3.9–12.7)

## 2025-02-28 PROCEDURE — 20000000 HC ICU ROOM

## 2025-02-28 PROCEDURE — 25000003 PHARM REV CODE 250: Performed by: STUDENT IN AN ORGANIZED HEALTH CARE EDUCATION/TRAINING PROGRAM

## 2025-02-28 PROCEDURE — 80048 BASIC METABOLIC PNL TOTAL CA: CPT | Mod: XB | Performed by: INTERNAL MEDICINE

## 2025-02-28 PROCEDURE — 83735 ASSAY OF MAGNESIUM: CPT | Performed by: INTERNAL MEDICINE

## 2025-02-28 PROCEDURE — 84100 ASSAY OF PHOSPHORUS: CPT | Performed by: INTERNAL MEDICINE

## 2025-02-28 PROCEDURE — 94761 N-INVAS EAR/PLS OXIMETRY MLT: CPT

## 2025-02-28 PROCEDURE — 85025 COMPLETE CBC W/AUTO DIFF WBC: CPT | Performed by: INTERNAL MEDICINE

## 2025-02-28 PROCEDURE — 93010 ELECTROCARDIOGRAM REPORT: CPT | Mod: ,,, | Performed by: INTERNAL MEDICINE

## 2025-02-28 PROCEDURE — 99291 CRITICAL CARE FIRST HOUR: CPT | Mod: ,,, | Performed by: INTERNAL MEDICINE

## 2025-02-28 PROCEDURE — 99499 UNLISTED E&M SERVICE: CPT | Mod: ,,, | Performed by: INTERNAL MEDICINE

## 2025-02-28 PROCEDURE — 25000003 PHARM REV CODE 250

## 2025-02-28 PROCEDURE — 80048 BASIC METABOLIC PNL TOTAL CA: CPT | Mod: 91,XB | Performed by: INTERNAL MEDICINE

## 2025-02-28 PROCEDURE — 93005 ELECTROCARDIOGRAM TRACING: CPT

## 2025-02-28 PROCEDURE — 83735 ASSAY OF MAGNESIUM: CPT | Mod: 91 | Performed by: INTERNAL MEDICINE

## 2025-02-28 PROCEDURE — 02PA3MZ REMOVAL OF CARDIAC LEAD FROM HEART, PERCUTANEOUS APPROACH: ICD-10-PCS | Performed by: INTERNAL MEDICINE

## 2025-02-28 PROCEDURE — 99233 SBSQ HOSP IP/OBS HIGH 50: CPT | Mod: GC,,, | Performed by: PSYCHIATRY & NEUROLOGY

## 2025-02-28 PROCEDURE — 80053 COMPREHEN METABOLIC PANEL: CPT

## 2025-02-28 PROCEDURE — 63600175 PHARM REV CODE 636 W HCPCS

## 2025-02-28 PROCEDURE — 99231 SBSQ HOSP IP/OBS SF/LOW 25: CPT | Mod: GC,,, | Performed by: INTERNAL MEDICINE

## 2025-02-28 RX ORDER — CLOPIDOGREL BISULFATE 75 MG/1
75 TABLET ORAL DAILY
Status: DISCONTINUED | OUTPATIENT
Start: 2025-03-01 | End: 2025-03-03 | Stop reason: HOSPADM

## 2025-02-28 RX ORDER — MAGNESIUM SULFATE HEPTAHYDRATE 40 MG/ML
2 INJECTION, SOLUTION INTRAVENOUS ONCE
Status: COMPLETED | OUTPATIENT
Start: 2025-02-28 | End: 2025-02-28

## 2025-02-28 RX ORDER — CLOPIDOGREL BISULFATE 75 MG/1
300 TABLET ORAL ONCE
Status: COMPLETED | OUTPATIENT
Start: 2025-02-28 | End: 2025-02-28

## 2025-02-28 RX ADMIN — POTASSIUM BICARBONATE 20 MEQ: 391 TABLET, EFFERVESCENT ORAL at 05:02

## 2025-02-28 RX ADMIN — MAGNESIUM SULFATE HEPTAHYDRATE 2 G: 40 INJECTION, SOLUTION INTRAVENOUS at 05:02

## 2025-02-28 RX ADMIN — ATORVASTATIN CALCIUM 40 MG: 40 TABLET, FILM COATED ORAL at 09:02

## 2025-02-28 RX ADMIN — ASPIRIN 81 MG CHEWABLE TABLET 81 MG: 81 TABLET CHEWABLE at 08:02

## 2025-02-28 RX ADMIN — CLOPIDOGREL BISULFATE 300 MG: 75 TABLET ORAL at 08:02

## 2025-02-28 NOTE — ASSESSMENT & PLAN NOTE
Patient will be admitted to CICU for close monitoring and observation given recent TAVR and now new L MCA stroke. Recs as detailed below:        Antithrombotics for secondary stroke prevention: Antiplatelets: Aspirin: 81 mg daily     Statins for secondary stroke prevention and hyperlipidemia, if present: Statins: Atorvastatin- 40 mg daily     Aggressive risk factor modification: HTN, HLD, Diet, Exercise, Obesity     Rehab efforts: The patient has been evaluated by a stroke team provider and the therapy needs have been fully considered based off the presenting complaints and exam findings. The following therapy evaluations are needed: PT evaluate and treat, OT evaluate and treat, SLP evaluate and treat     Diagnostics ordered/pending: HgbA1C to assess blood glucose levels, Lipid Profile to assess cholesterol levels, MRI head without contrast to assess brain parenchyma, TTE to assess cardiac function/status      Mechanical prophylaxis: Place SCDs     BP parameters: Infarct: No intervention, SBP <220; HOB FLAT except for eating/taking meds

## 2025-02-28 NOTE — SUBJECTIVE & OBJECTIVE
Past Medical History:   Diagnosis Date    Anxiety     Aortic stenosis     Arthritis     CHF (congestive heart failure)     Embolic stroke involving left middle cerebral artery 2025    Heart murmur     Hypertension     Mitral valve prolapse        Past Surgical History:   Procedure Laterality Date    AORTIC VALVULOPLASTY N/A 2024    Procedure: REPAIR, AORTIC VALVE;  Surgeon: Rohit Quinonez MD;  Location: [facility] CATH LAB;  Service: Cardiology;  Laterality: N/A;    AORTOGRAPHY N/A 2024    Procedure: AORTOGRAM;  Surgeon: Rohit Quinonez MD;  Location: [facility] CATH LAB;  Service: Cardiology;  Laterality: N/A;    BREAST CYST EXCISION Left 80s    BREAST SURGERY      Benign lump on left.    CATARACT EXTRACTION       SECTION      x1    COLONOSCOPY N/A 2023    Procedure: COLONOSCOPY;  Surgeon: Pattie Stephenson MD;  Location: [facility] ENDO (4TH FLR);  Service: Endoscopy;  Laterality: N/A;   ref by Ishmael Jacobson MD, PEG, instr. to portal-st    CORONARY ANGIOGRAPHY N/A 10/28/2024    Procedure: ANGIOGRAM, CORONARY ARTERY;  Surgeon: Yordy Ng MD;  Location: [facility] CATH LAB;  Service: Cardiology;  Laterality: N/A;  radial access    DV5 ROBOTIC NEPHRECTOMY, PARTIAL Left 2024    Procedure: DV5 ROBOTIC NEPHRECTOMY, PARTIAL;  Surgeon: Adam Sawyer MD;  [facility];  Service: Urology;  Laterality: Left;    EYE SURGERY      VALVE STUDY-AORTIC  2024    Procedure: Valve study-aortic;  Surgeon: Rohit Quinonez MD;  Location: [facility] CATH LAB;  Service: Cardiology;;       Review of patient's allergies indicates:   Allergen Reactions    Protamine sulfate Other (See Comments)     Bradycardia, Hypotension    [place] Rash       No current facility-administered medications on file prior to encounter.     Current Outpatient Medications on File Prior to Encounter   Medication Sig    cholecalciferol, vitamin D3, 1,250 mcg (50,000 unit) capsule     NIFEdipine (PROCARDIA-XL)  30 MG (OSM) 24 hr tablet Take 1 tablet (30 mg total) by mouth once daily.     Family History       Problem Relation (Age of Onset)    Alcohol abuse Brother    Breast cancer Mother, Paternal Aunt    Cancer Mother, Father    Diabetes Sister    Heart disease Mother, Brother, Maternal Uncle, Maternal Grandfather    Lymphoma Mother          Tobacco Use    Smoking status: Never    Smokeless tobacco: Never   Substance and Sexual Activity    Alcohol use: Yes     Comment: Rare.     Drug use: No    Sexual activity: Not Currently     Review of Systems   All other systems reviewed and are negative.    Objective:     Vital Signs (Most Recent):  Temp: 97.7 °F (36.5 °C) (02/27/25 1010)  Pulse: 88 (02/27/25 2029)  Resp: 19 (02/27/25 2029)  BP: (!) 165/80 (02/27/25 1800)  SpO2: 96 % (02/27/25 2029) Vital Signs (24h Range):  Temp:  [97.5 °F (36.4 °C)-97.7 °F (36.5 °C)] 97.7 °F (36.5 °C)  Pulse:  [54-88] 88  Resp:  [10-21] 19  SpO2:  [94 %-100 %] 96 %  BP: ()/(55-91) 165/80     Weight: 88.9 kg (196 lb)  Body mass index is 35.85 kg/m².    SpO2: 96 %         Intake/Output Summary (Last 24 hours) at 2/27/2025 2136  Last data filed at 2/27/2025 1329  Gross per 24 hour   Intake 1200 ml   Output --   Net 1200 ml       Lines/Drains/Airways       Central Venous Catheter Line  Duration                  Percutaneous Central Line - Cordis 02/27/25 1005 Subclavian Right <1 day              Peripheral Intravenous Line  Duration                  Peripheral IV - Single Lumen 02/27/25 0632 20 G Left Antecubital <1 day         Peripheral IV - Single Lumen 02/27/25 0757 16 G Right Antecubital <1 day                     Physical Exam  Constitutional:       Appearance: Normal appearance. She is obese.   HENT:      Head: Normocephalic.      Mouth/Throat:      Mouth: Mucous membranes are moist.   Cardiovascular:      Rate and Rhythm: Normal rate and regular rhythm.      Pulses: Normal pulses.      Heart sounds: No murmur heard.     No friction rub.  No gallop.   Pulmonary:      Effort: Pulmonary effort is normal.   Musculoskeletal:      Cervical back: Neck supple.      Right lower leg: No edema.      Left lower leg: No edema.   Skin:     General: Skin is warm.      Capillary Refill: Capillary refill takes less than 2 seconds.   Neurological:      Mental Status: She is alert.      Comments: Difficulty with following commands/answering orientation questions, RFD, mild RUE/RLE weakness with subtle drift, decreased sensation on R side, and aphasia (receptive > expressive) and dysarthria.          Significant Labs:   Recent Lab Results         02/27/25  1031   02/27/25  0651   02/27/25  0636   02/27/25  0617        BNP       25  Comment: Values of less than 100 pg/ml are consistent with non-CHF populations.       Group & Rh     B POS         INDIRECT KAREEN     NEG         QRS Duration 152   82           [facility] QTC Calculation 532   433           Specimen Outdate     03/02/2025 23:59

## 2025-02-28 NOTE — PT/OT/SLP PROGRESS
Occupational Therapy      Patient Name:  Kandace Rapp   MRN:  [id number]    OT orders acknowledged and accepted. Patient not seen today secondary to medical hold. Pt s/p TAVR on 2/27. Pt with [facility] flat orders after L MCA. Pt awaiting a PPM placement.  OT will follow and monitor        2/28/2025

## 2025-02-28 NOTE — H&P
[facility] - Cardiac Intensive Care  Cardiology  History and Physical     Patient Name: Kandace Rapp  MRN: [id number]  Admission Date: 2025  Code Status: Full Code   Attending Provider: Rohit Quinonez MD   Primary Care Physician: Ishmael Jacobson MD  Principal Problem:LBBB (left bundle branch block)    Patient information was obtained from patient, past medical records, and ER records.     Subjective:        HPI:  aKndace Rapp is a 69 year-old-woman with PMH of HTN, CHF, RCC s/p L partial nephrectomy (cancer free) and severe AS that presented today for scheduled outpatient TAVR. She is now s/p TAVR by Dr. Way Stat CTH obtained for new confusion/speech difficulty while in PACU post-procedure which revealed early ischemic changes in L MCA territory,  at which time a stroke code was called. CTA concerning for distal L M3 occlusion (vs high grade stenosis as per radiology read). Discussed imaging with CAROLE, given infarct appears completed on CTH and distal location of lesion determined not amenable to intervention therefore not taken for thrombectomy.     Patient admitted to CICU for neurochecks and follow MRI in the morning.     Past Medical History:   Diagnosis Date    Anxiety     Aortic stenosis     Arthritis     CHF (congestive heart failure)     Embolic stroke involving left middle cerebral artery 2025    Heart murmur     Hypertension     Mitral valve prolapse        Past Surgical History:   Procedure Laterality Date    AORTIC VALVULOPLASTY N/A 2024    Procedure: REPAIR, AORTIC VALVE;  Surgeon: Rohit Quinonez MD;  Location: [facility] CATH LAB;  Service: Cardiology;  Laterality: N/A;    AORTOGRAPHY N/A 2024    Procedure: AORTOGRAM;  Surgeon: Rohit Quinonez MD;  Location: [facility] CATH LAB;  Service: Cardiology;  Laterality: N/A;    BREAST CYST EXCISION Left 80s    BREAST SURGERY      Benign lump on left.    CATARACT EXTRACTION       SECTION      x1     COLONOSCOPY N/A 11/27/2023    Procedure: COLONOSCOPY;  Surgeon: Pattie Stephenson MD;  Location: [facility] ENDO (4TH FLR);  Service: Endoscopy;  Laterality: N/A;  11/24 ref by Ishmael Jacobson MD, PEG, instr. to portal-st    CORONARY ANGIOGRAPHY N/A 10/28/2024    Procedure: ANGIOGRAM, CORONARY ARTERY;  Surgeon: Yordy Ng MD;  Location: [facility] CATH LAB;  Service: Cardiology;  Laterality: N/A;  radial access    DV5 ROBOTIC NEPHRECTOMY, PARTIAL Left 12/12/2024    Procedure: DV5 ROBOTIC NEPHRECTOMY, PARTIAL;  Surgeon: Adam Sawyer MD;  Location: [facility] OR 2ND FLR;  Service: Urology;  Laterality: Left;    EYE SURGERY      VALVE STUDY-AORTIC  11/06/2024    Procedure: Valve study-aortic;  Surgeon: Rohit Quinonez MD;  Location: [facility] CATH LAB;  Service: Cardiology;;       Review of patient's allergies indicates:   Allergen Reactions    Protamine sulfate Other (See Comments)     Bradycardia, Hypotension    Davion Rash       No current facility-administered medications on file prior to encounter.     Current Outpatient Medications on File Prior to Encounter   Medication Sig    cholecalciferol, vitamin D3, 1,250 mcg (50,000 unit) capsule     NIFEdipine (PROCARDIA-XL) 30 MG (OSM) 24 hr tablet Take 1 tablet (30 mg total) by mouth once daily.     Family History       Problem Relation (Age of Onset)    Alcohol abuse Brother    Breast cancer Mother, Paternal Aunt    Cancer Mother, Father    Diabetes Sister    Heart disease Mother, Brother, Maternal Uncle, Maternal Grandfather    Lymphoma Mother          Tobacco Use    Smoking status: Never    Smokeless tobacco: Never   Substance and Sexual Activity    Alcohol use: Yes     Comment: Rare.     Drug use: No    Sexual activity: Not Currently     Review of Systems   All other systems reviewed and are negative.    Objective:     Vital Signs (Most Recent):  Temp: 97.7 °F (36.5 °C) (02/27/25 1010)  Pulse: 88 (02/27/25 2029)  Resp: 19 (02/27/25 2029)  BP: (!) 165/80 (02/27/25  1800)  SpO2: 96 % (02/27/25 2029) Vital Signs (24h Range):  Temp:  [97.5 °F (36.4 °C)-97.7 °F (36.5 °C)] 97.7 °F (36.5 °C)  Pulse:  [54-88] 88  Resp:  [10-21] 19  SpO2:  [94 %-100 %] 96 %  BP: ()/(55-91) 165/80     Weight: 88.9 kg (196 lb)  Body mass index is 35.85 kg/m².    SpO2: 96 %         Intake/Output Summary (Last 24 hours) at 2/27/2025 2136  Last data filed at 2/27/2025 1329  Gross per 24 hour   Intake 1200 ml   Output --   Net 1200 ml       Lines/Drains/Airways       Central Venous Catheter Line  Duration                  Percutaneous Central Line - Cordis 02/27/25 1005 Subclavian Right <1 day              Peripheral Intravenous Line  Duration                  Peripheral IV - Single Lumen 02/27/25 0632 20 G Left Antecubital <1 day         Peripheral IV - Single Lumen 02/27/25 0757 16 G Right Antecubital <1 day                     Physical Exam  Constitutional:       Appearance: Normal appearance. She is obese.   HENT:      Head: Normocephalic.      Mouth/Throat:      Mouth: Mucous membranes are moist.   Cardiovascular:      Rate and Rhythm: Normal rate and regular rhythm.      Pulses: Normal pulses.      Heart sounds: No murmur heard.     No friction rub. No gallop.   Pulmonary:      Effort: Pulmonary effort is normal.   Musculoskeletal:      Cervical back: Neck supple.      Right lower leg: No edema.      Left lower leg: No edema.   Skin:     General: Skin is warm.      Capillary Refill: Capillary refill takes less than 2 seconds.   Neurological:      Mental Status: She is alert.      Comments: Difficulty with following commands/answering orientation questions, RFD, mild RUE/RLE weakness with subtle drift, decreased sensation on R side, and aphasia (receptive > expressive) and dysarthria.          Significant Labs:   Recent Lab Results         02/27/25  1031   02/27/25  0651   02/27/25  0636   02/27/25  0617        BNP       25  Comment: Values of less than 100 pg/ml are consistent with non-CHF  populations.       Group & Rh     B POS         INDIRECT KAREEN     NEG         QRS Duration 152   82           OHS QTC Calculation 532   433           Specimen Outdate     03/02/2025 23:59                   Assessment and Plan:     * LBBB (left bundle branch block)  Patient without prior history of conduction abnormalities. EKG post procedure noting LBBB with . TVP placed in the cath lab. In the s/o new LBBB patient may require PPM implantation, though post operative inflammation could also be contributing to the new LBBB and resolve over the next 24hrs.      Recommendations:  - Repeat ECG early tomorrow AM  - TVP in place, backup rate 35 bpm, threshold 0.4 mA  - Will evaluate repeat ECG tomorrow morning and determine necessity of PPM  - NPO MN in anticipation of possible procedure tomorrow  - Tele       Embolic stroke involving left middle cerebral artery  Patient will be admitted to CICU for close monitoring and observation given recent TAVR and now new L MCA stroke. Recs as detailed below:        Antithrombotics for secondary stroke prevention: Antiplatelets: Aspirin: 81 mg daily     Statins for secondary stroke prevention and hyperlipidemia, if present: Statins: Atorvastatin- 40 mg daily     Aggressive risk factor modification: HTN, HLD, Diet, Exercise, Obesity     Rehab efforts: The patient has been evaluated by a stroke team provider and the therapy needs have been fully considered based off the presenting complaints and exam findings. The following therapy evaluations are needed: PT evaluate and treat, OT evaluate and treat, SLP evaluate and treat     Diagnostics ordered/pending: HgbA1C to assess blood glucose levels, Lipid Profile to assess cholesterol levels, MRI head without contrast to assess brain parenchyma, TTE to assess cardiac function/status      Mechanical prophylaxis: Place SCDs     BP parameters: Infarct: No intervention, SBP <220; HOB FLAT except for eating/taking meds    HTN  (hypertension)  Patient's blood pressure range in the last 24 hours was: BP  Min: 90/55  Max: 165/80.The patient's inpatient anti-hypertensive regimen is listed below:  Current Antihypertensives  NIFEdipine 24 hr tablet 30 mg, Daily, Oral  hydrALAZINE injection 10 mg, Every 4 hours PRN, Intravenous    Plan  - Permissive hypertension (SBP < 220 mm Hg)   - Continue current medications as above         VTE Risk Mitigation (From admission, onward)           Ordered     Place sequential compression device  Until discontinued         02/27/25 1926                    Harry Rodriguez MD  Cardiology   [facility] - Cardiac Intensive Care

## 2025-02-28 NOTE — NURSING
Pt w/ decreased LOC, worsened dysarthria. Fern Tomas MD notified. MRI contacted, wait will be at least a few hours. STAT CT head ordered.

## 2025-02-28 NOTE — PLAN OF CARE
CT head and MRI Brain reviewed by vascular neurology. Some discrepancy on different imaging techniques with hemorrhagic transformation seen on SWI. No change in NIHSS score at this time so will continue to monitor.   -Q1hr neuro checks for 24 hrs   -STAT CT Head for any neuro changes.     Please do not hesitate to contact us with any questions.     Rachel Garcia PA-C  [facility]  Department of Vascular Neurology   [facility] [place]- [facility]

## 2025-02-28 NOTE — CODE/ RAPID DOCUMENTATION
"  RAPID RESPONSE NURSE STROKE CODE NOTE         Admit Date: 2025  LOS: 0  Code Status: Prior   Date of Consult: 2025  : 1955  Age: 69 y.o.  Weight:   Wt Readings from Last 1 Encounters:   25 88.9 kg (196 lb)     Sex: female  Race: Black or    Bed: [facility] Pool/NONE:   MRN: [id number]  Time Rapid Response Team page Received: 5:14 PM  Time Rapid Response Team at Bedside: 5:17 PM  Time Rapid Response Team left Bedside: 6:26 PM  Was the patient discharged from an ICU this admission? No   Was the patient discharged from a PACU within last 24 hours? No   Did the patient receive conscious sedation/general anesthesia in last 24 hours? No  Was the patient in the ED within the past 24 hours? No  Was the patient on NIPPV within the past 24 hours? No   Did this progress into an ARC or CPA: No  Attending Physician: Rohit Quinonez MD  Primary Service: Interventional Cardiology,Cardiology       SITUATION    Notified by Pager.  Called to evaluate the patient for Neuro    BACKGROUND    Why is the patient in the hospital?: <principal problem not specified>  Patient has a past medical history of Anxiety, Aortic stenosis, Arthritis, CHF (congestive heart failure), Heart murmur, Hypertension, and Mitral valve prolapse.    ASSESSMENT    Last VS: BP (!) 130/58   Pulse 67   Temp 97.7 °F (36.5 °C) (Temporal)   Resp 18   Ht 5' 2" (1.575 m)   Wt 88.9 kg (196 lb)   SpO2 95%   Breastfeeding No   BMI 35.85 kg/m²     24H Vital Sign Range:  Temp:  [97.5 °F (36.4 °C)-97.7 °F (36.5 °C)]   Pulse:  [54-81]   Resp:  [10-21]   BP: ()/(55-91)   SpO2:  [94 %-100 %]     Last know well time: 07:50 (time of 1st sedating medication given by anesthesia)    Glucose time: 6:09 PM   Glucose result: 94    Physical Exam  Constitutional:       General: She is awake.      Appearance: She is ill-appearing.   Eyes:      Pupils: Pupils are equal, round, and reactive to light.   Cardiovascular:      Rate " and Rhythm: Normal rate and regular rhythm.   Pulmonary:      Effort: Pulmonary effort is normal.   Abdominal:      Palpations: Abdomen is soft.      Tenderness: There is no abdominal tenderness.   Skin:     General: Skin is warm and dry.   Neurological:      General: No focal deficit present.      Mental Status: She is alert.      GCS: GCS eye subscore is 4. GCS verbal subscore is 4. GCS motor subscore is 6.      Cranial Nerves: Facial asymmetry present.      Motor: Motor function is intact.      Comments: Right sided facial droop   Psychiatric:         Behavior: Behavior is cooperative.         Time Stroke Code initiated: 5:14 PM  Stroke team Arrival time: 6:26 PM  Stroke Code activation triggers: Slurred speech, right sided facial droop, difficulty w/ word finding and frequently repeating herself, confused    Time arrived at CT: 5:36 PM  Time CT completed: 5:46 PM    VAN positive or negative: negative    Thrombolytic decision: No  Thrombolytic bolus (mg and time): N/A  Thrombolytic infusion (mg and time): N/A  Thrombolytic end time: N/A    IR decision: No  IR arrival: N/A  IR end time: N/A     RECOMMENDATIONS    We recommend: Critical care upgrade    FOLLOW-UP/PLAN    Call the Rapid Response Nurse, Ayesha Zhong RN at [phone] for additional questions or concerns.    PHYSICIAN ESCALATION    Orders received and case discussed with vascular neurology, Suzi Gracia PA-C.    Disposition: Remain in room EP PACU [place] [facility] until CICU transfer.

## 2025-02-28 NOTE — HOSPITAL COURSE
Patient admitted to CCU for management of newly discovered LBBB as well as post op TAVR c/b L MCA. Patient noted to have expressive aphasia. Loaded with plavix, continued on DAPT and HI statin. Vascular neurology following. Follow up MRI concerning for possible hemorrhagic conversion, however this was disconcordant with repeat head CT. Neurologic exam improving, vascular neurology not concerned. Signed off with recommended follow up 4-6 weeks post discharge.  EP following for LBBB, underwent EPS when neurologic status improved. EPS negative, PPM not indicated. Patient stable to discharge to home with home health per PT. Discharged with cardiac event monitor, DAPT and HI statin. Will follow up with EP outpatient.

## 2025-02-28 NOTE — ASSESSMENT & PLAN NOTE
-Stroke risk factor  -SBP goal <220 to allow for permissive HTN until 03/01, maintain MAP >65  -Keep HOB flat as possible to support cerebral perfusion  -Avoid hypotension  -BP range in the last 24 hrs: BP  Min: 103/56  Max: 180/79  -PRN labetalol/hydralazine

## 2025-02-28 NOTE — ASSESSMENT & PLAN NOTE
70 yo F with a history significant for severe AS s/p TAVR, c/b new LBBB. Patient without prior history of conduction abnormalities. EKG post procedure noting LBBB with . TVP placed in the cath lab. In the s/o new LBBB patient may require PPM implantation, though post operative inflammation could also be contributing to the new LBBB and resolve over the next 24hrs.     Overnight, QRS narrowed on ECGs x2 and TVP was removed. However, this AM, QRS widened again to 138. Notably, patient found to have L MCA stroke s/p TAVR, stroke code activated overnight; not amenable to intervention and admitted to the CICU.     Recommendations:  - Continue monitoring ECGs qd  - Hold on EPS in the s/o acute L MCA stroke  - TVP removed overnight by IC  - Tele

## 2025-02-28 NOTE — SIGNIFICANT EVENT
"RAPID RESPONSE RESPIRATORY THERAPY NOTE             Code Status: Prior   : 1955  Bed: [facility] Cath Pool/NONE:   MRN: [id number]  Time page Received: 1714  Time Rapid Response RT at Bedside: 8  Time Rapid Response RT left Bedside:     SITUATION    Evaluated patient for: Stroke    BACKGROUND    Why is the patient in the hospital?: LBBB (left bundle branch block)    Patient has a past medical history of Anxiety, Aortic stenosis, Arthritis, CHF (congestive heart failure), Heart murmur, Hypertension, and Mitral valve prolapse.    24 Hours Vitals Range:  Temp:  [97.5 °F (36.4 °C)-97.7 °F (36.5 °C)]   Pulse:  [54-87]   Resp:  [10-21]   BP: ()/(55-91)   SpO2:  [94 %-100 %]     Labs:    No results for input(s): "NA", "K", "CL", "CO2", "BUN", "CREATININE", "GLU", "PHOS", "MG" in the last 72 hours.    Invalid input(s): "CMP", "TBIL"     No results for input(s): "PH", "PCO2", "PO2", "HCO3", "POCSATURATED", "BE" in the last 72 hours.    ASSESSMENT/INTERVENTIONS  Following TAVR procedure, per RN, pt in cath lab recovery unit showed increased lethargy and grogginess, and inappropriate responses to questions. Pt taken for CT scan w/ contrast by Rapid team. Pt on RA throughout event, breathing unlabored, sat 100%. Following scan, pt was brought back to cath lab recovery. Plans have been made for pt to be placed in ICU overnight. Left pt bedside at .    Last Vitals: Temp: 97.7 °F (36.5 °C) ( 1010)  Pulse: 87 ( 1800)  Resp: 16 ( 1800)  BP: 165/80 ( 1800)  SpO2: 97 % ( 1800)  Level of Consciousness: Level of Consciousness (AVPU): responds to voice  Respiratory Effort: Respiratory Effort: Unlabored  Expansion/Accessory Muscle Usage: Expansion/Accessory Muscles/Retractions: no retractions, expansion symmetric  All Lung Field Breath Sounds: All Lung Fields Breath Sounds: Anterior:, Lateral:, clear, diminished, equal bilaterally  JOHNNY Breath Sounds: Anterior:, wheezes, expiratory  RUL Breath " Sounds: Anterior:, wheezes, expiratory  O2 Device/Concentration: RA, 21%.  NIPPV: No Surgical airway: No Vent: No  ETCO2 monitored: ETCO2 (mmHg): 36 mmHg  Ambu at bedside: Yes.    Active Orders   Respiratory Care    Oxygen Continuous     Frequency: Continuous     Number of Occurrences: Until Specified     Order Comments: Discontinue when Sp02 is greater than or equal to 95% of equal to Preop Sp02       Order Questions:      Device type: Low flow      Device: Simple Face Mask      Titrate O2 per Oxygen Titration Protocol: Yes      Notify MD of: Inability to achieve desired SpO2    Oxygen PRN     Frequency: PRN     Number of Occurrences: Until Specified     Order Questions:      Device type: Low flow      Device: Nasal Cannula (1- 5 Liters)      LPM: 2      Titrate O2 per Oxygen Titration Protocol: Yes      To maintain SpO2 goal of: >= 92%      Notify MD of: Inability to achieve desired SpO2    Pulse Oximetry Continuous     Frequency: Continuous     Number of Occurrences: Until Specified       RECOMMENDATIONS  ?  We recommend: RRT Recs: Continue POC per primary team.    ESCALATION        FOLLOW-UP    Disposition: Remain in room cath lab [place] [facility] - CICU later tonight.    Please call back the Rapid Response RTTato RRT at x [phone] for any questions or concerns.

## 2025-02-28 NOTE — PROGRESS NOTES
Nursing Transfer Note        Reason patient is being transferred: to ICU    Transfer To: 3082    Transfer via bed    Transfer with cardiac monitoring    Transported by RN x2    Telemetry: bedside monitor    Medicines sent: ns @ 10ml/hr to cordis    Any special needs or follow-up needed: hob <15 per stroke PA    Patient belongings transferred with patient: Yes    Chart send with patient: Yes    Notified: daughter    Patient reassessed at: to be done by receiving RN (date, time)

## 2025-02-28 NOTE — ASSESSMENT & PLAN NOTE
Patient will be admitted to CICU for close monitoring and observation given recent TAVR and now new L MCA stroke. No intervention recommended per vascular neurology team given distal location and completion of infarct on initial imaging.     - Vascular neuro following, appreciate assistance  - Asa/plavix (plavix load completed 2/28)  - HI statin  - F/u brain MRI   - Mechanical prophylaxis: Place SCDs  - BP parameters: SBP <220; HOB FLAT except for eating/taking meds

## 2025-02-28 NOTE — PLAN OF CARE
"Cardiac ICU Care Plan      POC reviewed with Kandace Rapp and family. Questions and concerns addressed. No acute events noted this shift. Pt progressing toward goals. See below and flowsheets for full assessment and VS info.     Neuro:  [place] Coma Scale  Best Eye Response: 4-->(E4) spontaneous  Best Motor Response: 6-->(M6) obeys commands  Best Verbal Response: 4-->(V4) confused  [place] Coma Scale Score: 14  Assessment Qualifiers: patient not sedated/intubated, no eye obstruction present  Pupil PERRLA: yes  24 hr Temp:  [96.6 °F (35.9 °C)-98.5 °F (36.9 °C)]      CV:  Rhythm: normal sinus rhythm   DVT prophylaxis: VTE Core Measure: Provider determined low risk VTE        Pulses  Right Radial Pulse: 2+ (normal), palpation  Left Radial Pulse: 2+ (normal), palpation  Right Dorsalis Pedis Pulse: 1+ (weak), palpation  Left Dorsalis Pedis Pulse: 1+ (weak), palpation  Right Posterior Tibial Pulse: 1+ (weak), palpation  Left Posterior Tibial Pulse: 1+ (weak), palpation    Resp:  Flow (L/min) (Oxygen Therapy): 6       GI/:  GI prophylaxis: no  Diet/Nutrition Received: NPO  Last Bowel Movement: 02/26/25  Voiding Characteristics: urethral catheter (bladder)   Intake/Output Summary (Last 24 hours) at 2/28/2025 0625  Last data filed at 2/28/2025 0601  Gross per 24 hour   Intake 2717.92 ml   Output 600 ml   Net 2117.92 ml        Nutritional Supplement Intake: Quantity 0, Type: Boost    Labs/Accuchecks:  Recent Labs   Lab 02/28/25  0324   WBC 9.19   RBC 3.75*   HGB 10.6*   HCT 33.5*       No results for input(s): "PT", "INR", "APTT" in the last 168 hours.   Recent Labs     02/28/25  0324     139   K 3.8  3.8   CO2 22*  23     109   BUN 10  10   CREATININE 0.7  0.6   ALKPHOS 101   ALT 9*   AST 28   BILITOT 0.7     No results for input(s): "CPK", "CPKMB", "MB", "TROPONINI" in the last 168 hours. No results for input(s): "PH", "PCO2", "PO2", "HCO3", "POCSATURATED", "BE" in the last 72 " hours.    Electrolytes: Electrolytes replaced  Accuchecks: none    Gtts/LDAs:   0.9% NaCl   Intravenous Continuous 10 mL/hr at 02/28/25 0601 Rate Verify at 02/28/25 0601       Lines/Drains/Airways       Central Venous Catheter Line  Duration                  Percutaneous Central Line - Cordis 02/27/25 1005 Subclavian Right <1 day              Peripheral Intravenous Line  Duration                  Peripheral IV - Single Lumen 02/27/25 0632 20 G Left Antecubital <1 day         Peripheral IV - Single Lumen 02/27/25 0757 16 G Right Antecubital <1 day                    Skin/Wounds  Bathing/Skin Care: bath, complete;dressed/undressed;electrode patches/site rotation;incontinence care;linen changed;moisturizer applied (02/27/25 2026)  Wounds: No  Wound care consulted: No

## 2025-02-28 NOTE — ASSESSMENT & PLAN NOTE
Kandace Rapp is a 69 y.o. female with PMH of HTN, CHF, RCC s/p L partial nephrectomy (cancer free) and severe AS that presented today for scheduled outpatient TAVR. Stat CTH obtained for new confusion/speech difficulty while in PACU post-procedure which revealed early ischemic changes in L MCA territory,  at which time a stroke code was called. LKW 745am 2/27, approx 10 hrs prior. OOW for TNK. Neuro exam significant for difficulty with following commands/answering orientation questions, RFD, mild RUE/RLE weakness with subtle drift, decreased sensation on R side, and aphasia (receptive > expressive) and dysarthria. NIHSS 8. CTH with early ischemic changes in inferior distal L MCA territory, CTA concerning for distal L M3 occlusion (vs high grade stenosis as per radiology read). Discussed imaging with CAROLE, given infarct appears completed on CTH and distal location of lesion determined not amenable to intervention therefore not taken for thrombectomy.  Patient will be admitted to CICU for close monitoring and observation given recent TAVR and now new L MCA stroke.     02/28/2025 Neuro exam improved. Continued receptive>expressive aphasia and RFD. Difficulty following complex commands. SBP goal <220 continue for 24 hrs, then can begin to normalize BP.  VN contacted regarding neuro change this afternoon, STAT CT head ordered. On assessment patient exam unchanged. Vascular Neurology will follow- up MRI when completed.        Antithrombotics for secondary stroke prevention: Antiplatelets: Aspirin: 81 mg daily    Statins for secondary stroke prevention and hyperlipidemia, if present: Statins: Atorvastatin- 40 mg daily    Aggressive risk factor modification: HTN, HLD, Diet, Exercise, Obesity     Rehab efforts: The patient has been evaluated by a stroke team provider and the therapy needs have been fully considered based off the presenting complaints and exam findings. The following therapy evaluations are needed:  PT evaluate and treat, OT evaluate and treat, SLP evaluate and treat    Diagnostics ordered/pending: MRI head without contrast to assess brain parenchyma, TTE to assess cardiac function/status     VTE prophylaxis: Heparin 5000 units SQ every 8 hours  Mechanical prophylaxis: Place SCDs    BP parameters: Infarct: No intervention, SBP <220 for 24 hours, then can begin to normalize BP.  HOB FLAT except for eating/taking meds

## 2025-02-28 NOTE — PROGRESS NOTES
[facility] - [facility]  Cardiology  Progress Note    Patient Name: Kandace Rapp  MRN: [id number]  Admission Date: 2/27/2025  Hospital Length of Stay: 1 days  Code Status: Full Code   Attending Physician: Anurag Way MD   Primary Care Physician: Ishmael Jacobson MD  Expected Discharge Date:   Principal Problem:LBBB (left bundle branch block)    Subjective:     Hospital Course:   Patient admitted to CCU for management of newly discovered LBBB as well as post op TAVR c/b L MCA. Patient noted to have expressive aphasia. Follow up MRI pending. Loaded with plavix, continued on DAPT and HI statin. EP following for LBBB, monitoring need for PPM placement.    Interval History: Patient with some expressive aphasia this morning, however notes overall feeling better. Awaiting MRI brain for further evaluation. Loaded with plavix per vascular neurology recommendation. EP following. TVP removed given need for MRI    ROS  Objective:     Vital Signs (Most Recent):  Temp: 98.6 °F (37 °C) (02/28/25 0705)  Pulse: 87 (02/28/25 1005)  Resp: (!) 24 (02/28/25 1005)  BP: 118/62 (02/28/25 1005)  SpO2: 98 % (02/28/25 1005) Vital Signs (24h Range):  Temp:  [96.6 °F (35.9 °C)-98.6 °F (37 °C)] 98.6 °F (37 °C)  Pulse:  [54-93] 87  Resp:  [10-30] 24  SpO2:  [94 %-100 %] 98 %  BP: ()/(55-82) 118/62     Weight: 88.9 kg (196 lb)  Body mass index is 35.85 kg/m².     SpO2: 98 %         Intake/Output Summary (Last 24 hours) at 2/28/2025 1049  Last data filed at 2/28/2025 0905  Gross per 24 hour   Intake 1775.81 ml   Output 800 ml   Net 975.81 ml       Lines/Drains/Airways       Central Venous Catheter Line  Duration                  Percutaneous Central Line - Cordis 02/27/25 1005 Subclavian Right 1 day              Peripheral Intravenous Line  Duration                  Peripheral IV - Single Lumen 02/27/25 0632 20 G Left Antecubital 1 day         Peripheral IV - Single Lumen 02/27/25 0757 16 G Right Antecubital 1 day                        Physical Exam  Vitals and nursing note reviewed.   Constitutional:       General: She is not in acute distress.     Appearance: Normal appearance. She is not ill-appearing.   HENT:      Head: Normocephalic and atraumatic.   Cardiovascular:      Rate and Rhythm: Normal rate and regular rhythm.   Pulmonary:      Effort: Pulmonary effort is normal. No respiratory distress.   Abdominal:      General: Abdomen is flat.   Musculoskeletal:      Cervical back: Normal range of motion. No rigidity.   Skin:     General: Skin is warm and dry.      Findings: No lesion.   Neurological:      Comments: Dysarthric speech    Psychiatric:         Mood and Affect: Mood normal.         Behavior: Behavior normal.            Significant Labs: All pertinent lab results from the last 24 hours have been reviewed.    Significant Imaging:  Reviewed  Assessment and Plan:       * LBBB (left bundle branch block)  70yo F w ho htn and severe AS, s/p TAVR 2/28. Post op course complicated by L MCA CVA. EKG post procedure noting LBBB with . Patient without prior history of conduction abnormalities. TVP placed in the cath lab. In the s/o new LBBB patient may require PPM implantation, though post operative inflammation could also be contributing to the new LBBB and resolve over the next 24hrs.   - TVP now removed given need for MRI brain      Recommendations:  - EP following, planning to evaluate repeat ECG to determine necessity of PPM  - Tele  - Avoid BB and AV fabienne blockers       Embolic stroke involving left middle cerebral artery  Patient will be admitted to CICU for close monitoring and observation given recent TAVR and now new L MCA stroke. No intervention recommended per vascular neurology team given distal location and completion of infarct on initial imaging.     - Vascular neuro following, appreciate assistance  - Asa/plavix (plavix load completed 2/28)  - HI statin  - F/u brain MRI   - Mechanical prophylaxis:  Place SCDs  - BP parameters: SBP <220; HOB FLAT except for eating/taking meds    Renal mass  History of RCC s/p left partial nephrectomy    HTN (hypertension)  Patient's blood pressure range in the last 24 hours was: BP  Min: 90/55  Max: 165/80.The patient's inpatient anti-hypertensive regimen is listed below:  Current Antihypertensives  NIFEdipine 24 hr tablet 30 mg, Daily, Oral  hydrALAZINE injection 10 mg, Every 4 hours PRN, Intravenous    Plan  - Permissive hypertension (SBP < 220 mm Hg)   - Continue current medications as above     BMI 36.0-36.9,adult  Body mass index is 35.85 kg/m². Morbid obesity complicates all aspects of disease management from diagnostic modalities to treatment. Weight loss encouraged and health benefits explained to patient.       Nonrheumatic aortic valve stenosis  S/p TAVR 2/27         VTE Risk Mitigation (From admission, onward)           Ordered     Place sequential compression device  Until discontinued         02/27/25 1926                    Alea Garcia DO  Cardiology  [facility] - Card[facility]    Staff Attestation:    I have personally evaluated the patient, reviewed the residents assessment and plan, and concur with the findings. I spent a total of 45 minutes providing critical care, driven by a high likelihood of clinically significant, life-threatening deterioration. My direct management included obtaining a comprehensive history, performing a detailed physical exam, ordering and interpreting diagnostic studies, and formulating an urgent treatment plan. I also evaluated the patients ongoing response to interventions through frequent reassessments and coordinated with other providers to ensure timely care.    This critical care time was necessary to address the imminent risk of life-threatening multi-organ failure and was medically appropriate. The time documented was solely devoted to critical care management and does not include separately billable procedures,  care of other patients, or teaching activities.    Anurag Way MD  [facility] - Cardiology

## 2025-02-28 NOTE — SUBJECTIVE & OBJECTIVE
Interval History: Patient with some expressive aphasia this morning, however notes overall feeling better. Awaiting MRI brain for further evaluation. Loaded with plavix per vascular neurology recommendation. EP following. TVP removed given need for MRI    ROS  Objective:     Vital Signs (Most Recent):  Temp: 98.6 °F (37 °C) (02/28/25 0705)  Pulse: 87 (02/28/25 1005)  Resp: (!) 24 (02/28/25 1005)  BP: 118/62 (02/28/25 1005)  SpO2: 98 % (02/28/25 1005) Vital Signs (24h Range):  Temp:  [96.6 °F (35.9 °C)-98.6 °F (37 °C)] 98.6 °F (37 °C)  Pulse:  [54-93] 87  Resp:  [10-30] 24  SpO2:  [94 %-100 %] 98 %  BP: ()/(55-82) 118/62     Weight: 88.9 kg (196 lb)  Body mass index is 35.85 kg/m².     SpO2: 98 %         Intake/Output Summary (Last 24 hours) at 2/28/2025 1049  Last data filed at 2/28/2025 0905  Gross per 24 hour   Intake 1775.81 ml   Output 800 ml   Net 975.81 ml       Lines/Drains/Airways       Central Venous Catheter Line  Duration                  Percutaneous Central Line - Cordis 02/27/25 1005 Subclavian Right 1 day              Peripheral Intravenous Line  Duration                  Peripheral IV - Single Lumen 02/27/25 0632 20 G Left Antecubital 1 day         Peripheral IV - Single Lumen 02/27/25 0757 16 G Right Antecubital 1 day                       Physical Exam  Vitals and nursing note reviewed.   Constitutional:       General: She is not in acute distress.     Appearance: Normal appearance. She is not ill-appearing.   HENT:      Head: Normocephalic and atraumatic.   Cardiovascular:      Rate and Rhythm: Normal rate and regular rhythm.   Pulmonary:      Effort: Pulmonary effort is normal. No respiratory distress.   Abdominal:      General: Abdomen is flat.   Musculoskeletal:      Cervical back: Normal range of motion. No rigidity.   Skin:     General: Skin is warm and dry.      Findings: No lesion.   Neurological:      Comments: Dysarthric speech    Psychiatric:         Mood and Affect: Mood normal.          Behavior: Behavior normal.            Significant Labs: All pertinent lab results from the last 24 hours have been reviewed.    Significant Imaging:  Reviewed

## 2025-02-28 NOTE — ASSESSMENT & PLAN NOTE
Patient without prior history of conduction abnormalities. EKG post procedure noting LBBB with . TVP placed in the cath lab. In the s/o new LBBB patient may require PPM implantation, though post operative inflammation could also be contributing to the new LBBB and resolve over the next 24hrs.      Recommendations:  - Repeat ECG early tomorrow AM  - TVP in place, backup rate 35 bpm, threshold 0.4 mA  - Will evaluate repeat ECG tomorrow morning and determine necessity of PPM  - NPO MN in anticipation of possible procedure tomorrow  - Tele

## 2025-02-28 NOTE — PROGRESS NOTES
[facility] - Cardiac Intensive [facility]  Vascular Neurology  Comprehensive Stroke Center  Progress Note    Assessment/Plan:     Embolic stroke involving left middle cerebral artery  Kandace Rapp is a 69 y.o. female with PMH of HTN, CHF, RCC s/p L partial nephrectomy (cancer free) and severe AS that presented today for scheduled outpatient TAVR. Stat CTH obtained for new confusion/speech difficulty while in PACU post-procedure which revealed early ischemic changes in L MCA territory,  at which time a stroke code was called. LKW 745am 2/27, approx 10 hrs prior. OOW for TNK. Neuro exam significant for difficulty with following commands/answering orientation questions, RFD, mild RUE/RLE weakness with subtle drift, decreased sensation on R side, and aphasia (receptive > expressive) and dysarthria. NIHSS 8. CTH with early ischemic changes in inferior distal L MCA territory, CTA concerning for distal L M3 occlusion (vs high grade stenosis as per radiology read). Discussed imaging with CAROLE, given infarct appears completed on CTH and distal location of lesion determined not amenable to intervention therefore not taken for thrombectomy.  Patient will be admitted to CICU for close monitoring and observation given recent TAVR and now new L MCA stroke.     02/28/2025 Neuro exam improved. Continued receptive>expressive aphasia and RFD. Difficulty following complex commands. SBP goal <220 continue for 24 hrs, then can begin to normalize BP.  VN contacted regarding neuro change this afternoon, STAT CT head ordered. On assessment patient exam unchanged. Vascular Neurology will follow- up MRI when completed.        Antithrombotics for secondary stroke prevention: Antiplatelets: Aspirin: 81 mg daily    Statins for secondary stroke prevention and hyperlipidemia, if present: Statins: Atorvastatin- 40 mg daily    Aggressive risk factor modification: HTN, HLD, Diet, Exercise, Obesity     Rehab efforts: The patient has been evaluated  by a stroke team provider and the therapy needs have been fully considered based off the presenting complaints and exam findings. The following therapy evaluations are needed: PT evaluate and treat, OT evaluate and treat, SLP evaluate and treat    Diagnostics ordered/pending: MRI head without contrast to assess brain parenchyma, TTE to assess cardiac function/status     VTE prophylaxis: Heparin 5000 units SQ every 8 hours  Mechanical prophylaxis: Place SCDs    BP parameters: Infarct: No intervention, SBP <220 for 24 hours, then can begin to normalize BP.  HOB FLAT except for eating/taking meds      HTN (hypertension)  -Stroke risk factor  -SBP goal <220 to allow for permissive HTN until 03/01, maintain MAP >65  -Keep HOB flat as possible to support cerebral perfusion  -Avoid hypotension  -BP range in the last 24 hrs: BP  Min: 103/56  Max: 180/79  -PRN labetalol/hydralazine         02/28/2025 Neuro exam improved. Continued receptive>expressive aphasia and RFD. Difficulty following complex commands. SBP goal <220 continue for 24 hrs, then can begin to normalize BP.  VN contacted regarding neuro change this afternoon, STAT CT head ordered. On assessment patient exam unchanged. Vascular Neurology will follow- up MRI when completed.      STROKE DOCUMENTATION   Acute Stroke Times   Last Known Normal Date: 02/27/25  Last Known Normal Time: 0745  Stroke Team Called Date: 02/27/25  Stroke Team Called Time: 1717  Stroke Team Arrival Date: 02/27/25  Stroke Team Arrival Time: 1723  CT Interpretation Time: 1745  Thrombolytic Therapy Recommended: No  CTA Interpretation Time: 1805  Thrombectomy Recommended: No    NIH Scale:  1a. Level of Consciousness: 0-->Alert, keenly responsive  1b. LOC Questions: 1-->Answers one question correctly  1c. LOC Commands: 1-->Performs one task correctly  2. Best Gaze: 0-->Normal  3. Visual: 0-->No visual loss  4. Facial Palsy: 1-->Minor paralysis (flattened nasolabial fold, asymmetry on  smiling)  5a. Motor Arm, Left: 0-->No drift, limb holds 90 (or 45) degrees for full 10 secs  5b. Motor Arm, Right: 0-->No drift, limb holds 90 (or 45) degrees for full 10 secs  6a. Motor Leg, Left: 0-->No drift, leg holds 30 degree position for full 5 secs  6b. Motor Leg, Right: 0-->No drift, leg holds 30 degree position for full 5 secs  7. Limb Ataxia: 0-->Absent  8. Sensory: 0-->Normal, no sensory loss  9. Best Language: 1-->Mild-to-moderate aphasia, some obvious loss of fluency or facility of comprehension, without significant limitation on ideas expressed or form of expression. Reduction of speech and/or comprehension, however, makes conversation. . . (see row details)  10. Dysarthria: 0-->Normal  11. Extinction and Inattention (formerly Neglect): 0-->No abnormality  Total (NIH Stroke Scale): 4       Modified [place] Score: 0  Carlo Coma Scale:    ABCD2 Score:    [id number] Score:   HAS -BLED Score:   ICH Score:   Hunt & Zheng Classification:      Hemorrhagic change of an Ischemic Stroke: Does this patient have an ischemic stroke with hemorrhagic changes? No     Neurologic Chief Complaint: L MCA stroke    Subjective:     Interval History: Patient is seen for follow-up neurological assessment and treatment recommendations: See hospital course.    HPI, Past Medical, Family, and Social History remains the same as documented in the initial encounter.     Review of Systems   Constitutional:  Negative for chills and fever.   Eyes:  Negative for visual disturbance.   Gastrointestinal:  Negative for nausea and vomiting.   Neurological:  Positive for facial asymmetry and speech difficulty.     Scheduled Meds:   aspirin  81 mg Oral Daily    atorvastatin  40 mg Oral QHS    [START ON 3/1/2025] clopidogreL  75 mg Oral Daily    NIFEdipine  30 mg Oral Daily     Continuous Infusions:   0.9% NaCl   Intravenous Continuous 10 mL/hr at 02/28/25 1105 Rate Verify at 02/28/25 1105     PRN Meds:  Current Facility-Administered  Medications:     hydrALAZINE, 10 mg, Intravenous, Q4H PRN    Objective:     Vital Signs (Most Recent):  Temp: 99 °F (37.2 °C) (02/28/25 1105)  Pulse: 85 (02/28/25 1105)  Resp: 20 (02/28/25 1105)  BP: 131/60 (02/28/25 1105)  SpO2: 97 % (02/28/25 1105)  BP Location: Right forearm    Vital Signs Range (Last 24H):  Temp:  [96.6 °F (35.9 °C)-99 °F (37.2 °C)]   Pulse:  [64-93]   Resp:  [11-30]   BP: (103-180)/(55-82)   SpO2:  [94 %-100 %]   BP Location: Right forearm       Physical Exam  HENT:      Head: Normocephalic and atraumatic.   Eyes:      Extraocular Movements: Extraocular movements intact.      Conjunctiva/sclera: Conjunctivae normal.   Cardiovascular:      Rate and Rhythm: Normal rate.   Pulmonary:      Effort: Pulmonary effort is normal.   Skin:     General: Skin is warm and dry.   Neurological:      Mental Status: She is alert.              Neurological Exam:   LOC: alert  Attention Span: Good   Language: Expressive aphasia, Receptive aphasia  Articulation: No dysarthria  Orientation: Oriented to self and place, difficulty with time 2/2 aphasia  EOM (CN III, IV, VI): Full/intact  Facial Movement (CN VII): Lower facial weakness on the Right  Motor: Arm left  Normal 5/5  Leg left  Normal 5/5  Arm right  Normal 5/5  Leg right Normal 5/5  Sensation: Intact to light touch, temperature and vibration    Laboratory:  CMP:   Recent Labs   Lab 02/28/25  0324 02/28/25  1119   CALCIUM 8.6*  8.6* 8.4*   ALBUMIN 3.4*  --    PROT 6.6  --      139 140   K 3.8  3.8 3.8   CO2 22*  23 25     109 108   BUN 10  10 10   CREATININE 0.7  0.6 0.6   ALKPHOS 101  --    ALT 9*  --    AST 28  --    BILITOT 0.7  --      BMP:   Recent Labs   Lab 02/28/25  1119      K 3.8      CO2 25   BUN 10   CREATININE 0.6   CALCIUM 8.4*     CBC:   Recent Labs   Lab 02/28/25  0324   WBC 9.19   RBC 3.75*   HGB 10.6*   HCT 33.5*      MCV 89   MCH 28.3   MCHC 31.6*     Lipid Panel:   Recent Labs   Lab 02/27/25  5990  "  CHOL 117*   LDLCALC 63.4   HDL 42   TRIG 58     Coagulation: No results for input(s): "PT", "INR", "APTT" in the last 168 hours.  Platelet Aggregation Study: No results for input(s): "PLTAGG", "PLTAGINTERP", "PLTAGREGLACO", "ADPPLTAGGREG" in the last 168 hours.  Hgb A1C:   Recent Labs   Lab 02/27/25 2115   HGBA1C 5.2     TSH: No results for input(s): "TSH" in the last 168 hours.    Diagnostic Results     Brain imaging:    MRI Brain pending     CTH without contrast 2/27/2025  Impression:  Subtle parenchymal hypoattenuation in keeping with an acute left MCA distribution infarct as above.        Vessel Imaging:  CTA stroke multiphase 2/27/2025  Images personally reviewed and discussed with VN staff. Concern for L M3 occlusion vs critical stenosis, early ischemic changes in inferior L MCA territory. Final radiology report pending     Cardiac Imaging   Echo pending    Rachel Garcia PA-C  [facility]  Department of Vascular Neurology   [facility] - [facility]        "

## 2025-02-28 NOTE — PROGRESS NOTES
Pt continues to be oriented to self only. Speech is easily understandable but not completely logical. Follows commands with repeated encouragement. Moves all 4 ext well. Pupils 2 and brisk. Dr. Hicks at bedside to see pt. New order for stat CT of head noted. Leaving for CT at this time. Continuous cardiac monitoring maintained.

## 2025-02-28 NOTE — PT/OT/SLP PROGRESS
Physical Therapy      Patient Name:  Kandace Rapp   MRN:  [id number]    Patient not seen today. Medical hold. Pt s/p TAVR on 2/27. Pt with [facility] flat orders after L [facility]. Pt awaiting a PPM placement. Will follow-up when medically appropriate.

## 2025-02-28 NOTE — ASSESSMENT & PLAN NOTE
-Stroke risk factor  -SBP goal <220 to allow for permissive HTN, maintain MAP >65  -Keep HOB flat as possible to support cerebral perfusion  -Avoid hypotension  -BP range in the last 24 hrs: BP  Min: 90/55  Max: 165/80  -PRN labetalol/hydralazine

## 2025-02-28 NOTE — HPI
Kandace Rapp is a 69 y.o. female with PMH of HTN, CHF, RCC s/p L partial nephrectomy (cancer free) and severe AS that presented today for scheduled outpatient TAVR. Stat CTH obtained for new confusion/speech difficulty while in PACU post-procedure which revealed early ischemic changes in L MCA territory,  at which time a stroke code was called. LKW 745am 2/27, approx 10 hrs prior. OOW for TNK. Neuro exam significant for difficulty with following commands/answering orientation questions, RFD, mild RUE/RLE weakness with subtle drift, decreased sensation on R side, and aphasia (receptive > expressive) and dysarthria. NIHSS 8. CTH with early ischemic changes in inferior distal L MCA territory, CTA concerning for distal L M3 occlusion (vs high grade stenosis as per radiology read). Discussed imaging with CAROLE, given infarct appears completed on CTH and distal location of lesion determined not amenable to intervention therefore not taken for thrombectomy. Patient will be admitted to CICU for close monitoring and observation given recent TAVR and now new L MCA stroke.

## 2025-02-28 NOTE — HOSPITAL COURSE
02/28/2025 Neuro exam improved. Continued receptive>expressive aphasia and RFD. Difficulty following complex commands. SBP goal <220 continue for 24 hrs, then can begin to normalize BP.  VN contacted regarding neuro change this afternoon, STAT CT head ordered. On assessment patient exam unchanged. Vascular Neurology will follow- up MRI when completed.    03/01/2025 Patient doing well this morning, aphasia improving.   03/01/2025 CT head and MRI Brain 02/28 reviewed by vascular neurology. Some discrepancy on different imaging techniques with hemorrhagic transformation seen on SWI. No change in neuro exam. Patient doing well this morning. Neuro exam stable, aphasia seems to be improving. Able to follow commands more consistently and answer LOC questions appropriately. Patient may sit up/work with therapy, Avoid hypotension, MAP>65. VN will sign off.

## 2025-02-28 NOTE — PHYSICIAN QUERY
Provider, please clarify the embolic stroke involving left middle cerebral artery as it relates to the TAVR.    Complication of the procedure

## 2025-02-28 NOTE — PROGRESS NOTES
[facility] - Cardiac [facility]  Cardiac Electrophysiology  Progress Note    Admission Date: 2/27/2025  Code Status: Full Code   Attending Physician: Anurag Way MD   Expected Discharge Date:   Principal Problem:LBBB (left bundle branch block)    Subjective:     Interval History:   - S/p TAVR yesterday afternoon. Remained somnolent and noted to be confused with altered speech. Strok cide activated. Found to have L MCA stroke. No amenable to intervention. Admitted to the CICU.   - This AM, patient remains somewhat confused with dysarthria  - Overnight, QRS narrowed on ECG x2 and TVP removed, though this AM noted again to be wide with   - Tele sinus 70-90s bpm    Objective:     Vital Signs (Most Recent):  Temp: 98.6 °F (37 °C) (02/28/25 0705)  Pulse: 92 (02/28/25 0905)  Resp: (!) 22 (02/28/25 0905)  BP: (!) 103/56 (02/28/25 0905)  SpO2: 97 % (02/28/25 0905) Vital Signs (24h Range):  Temp:  [96.6 °F (35.9 °C)-98.6 °F (37 °C)] 98.6 °F (37 °C)  Pulse:  [54-93] 92  Resp:  [10-30] 22  SpO2:  [94 %-100 %] 97 %  BP: ()/(55-91) 103/56     Weight: 88.9 kg (196 lb)  Body mass index is 35.85 kg/m².     SpO2: 97 %        Physical Exam  Constitutional:       Appearance: Normal appearance.   HENT:      Mouth/Throat:      Mouth: Mucous membranes are moist.   Eyes:      Extraocular Movements: Extraocular movements intact.      Conjunctiva/sclera: Conjunctivae normal.   Cardiovascular:      Rate and Rhythm: Normal rate and regular rhythm.   Pulmonary:      Effort: Pulmonary effort is normal.   Abdominal:      General: Abdomen is flat.      Palpations: Abdomen is soft.   Skin:     General: Skin is warm and dry.   Neurological:      Mental Status: She is alert. She is disoriented.      Comments: Dysarthria            Significant Labs: CMP:   Recent Labs   Lab 02/28/25  0324     139   K 3.8  3.8     109   CO2 22*  23   GLU 99  100   BUN 10  10   CREATININE 0.7  0.6   CALCIUM 8.6*  8.6*   PROT 6.6  "  ALBUMIN 3.4*   BILITOT 0.7   ALKPHOS 101   AST 28   ALT 9*   ANIONGAP 11  7*   , CBC:   Recent Labs   Lab 02/28/25  0324   WBC 9.19   HGB 10.6*   HCT 33.5*      , and INR: No results for input(s): "INR", "PROTIME" in the last 48 hours.    Significant Imaging: X-Ray: CXR: X-Ray Chest 1 View (CXR): No results found for this visit on 02/27/25.  Assessment and Plan:     * LBBB (left bundle branch block)  68 yo F with a history significant for severe AS s/p TAVR, c/b new LBBB. Patient without prior history of conduction abnormalities. EKG post procedure noting LBBB with . TVP placed in the cath lab. In the s/o new LBBB patient may require PPM implantation, though post operative inflammation could also be contributing to the new LBBB and resolve over the next 24hrs.     Overnight, QRS narrowed on ECGs x2 and TVP was removed. However, this AM, QRS widened again to 138. Notably, patient found to have L MCA stroke s/p TAVR, stroke code activated overnight; not amenable to intervention and admitted to the CICU.     Recommendations:  - Continue monitoring ECGs qd  - Hold on EPS in the s/o acute L MCA stroke  - TVP removed overnight by IC  - Tele      Evita Azevedo MD  Cardiac Electrophysiology  [facility] - Cardiac Intensive Care    "

## 2025-02-28 NOTE — SUBJECTIVE & OBJECTIVE
Neurologic Chief Complaint: L MCA stroke    Subjective:     Interval History: Patient is seen for follow-up neurological assessment and treatment recommendations: See hospital course.    HPI, Past Medical, Family, and Social History remains the same as documented in the initial encounter.     Review of Systems   Constitutional:  Negative for chills and fever.   Eyes:  Negative for visual disturbance.   Gastrointestinal:  Negative for nausea and vomiting.   Neurological:  Positive for facial asymmetry and speech difficulty.     Scheduled Meds:   aspirin  81 mg Oral Daily    atorvastatin  40 mg Oral QHS    [START ON 3/1/2025] clopidogreL  75 mg Oral Daily    NIFEdipine  30 mg Oral Daily     Continuous Infusions:   0.9% NaCl   Intravenous Continuous 10 mL/hr at 02/28/25 1105 Rate Verify at 02/28/25 1105     PRN Meds:  Current Facility-Administered Medications:     hydrALAZINE, 10 mg, Intravenous, Q4H PRN    Objective:     Vital Signs (Most Recent):  Temp: 99 °F (37.2 °C) (02/28/25 1105)  Pulse: 85 (02/28/25 1105)  Resp: 20 (02/28/25 1105)  BP: 131/60 (02/28/25 1105)  SpO2: 97 % (02/28/25 1105)  BP Location: Right forearm    Vital Signs Range (Last 24H):  Temp:  [96.6 °F (35.9 °C)-99 °F (37.2 °C)]   Pulse:  [64-93]   Resp:  [11-30]   BP: (103-180)/(55-82)   SpO2:  [94 %-100 %]   BP Location: Right forearm       Physical Exam  HENT:      Head: Normocephalic and atraumatic.   Eyes:      Extraocular Movements: Extraocular movements intact.      Conjunctiva/sclera: Conjunctivae normal.   Cardiovascular:      Rate and Rhythm: Normal rate.   Pulmonary:      Effort: Pulmonary effort is normal.   Skin:     General: Skin is warm and dry.   Neurological:      Mental Status: She is alert.              Neurological Exam:   LOC: alert  Attention Span: Good   Language: Expressive aphasia, Receptive aphasia  Articulation: No dysarthria  Orientation: Oriented to self and place, difficulty with time 2/2 aphasia  EOM (CN III, IV, VI):  "Full/intact  Facial Movement (CN VII): Lower facial weakness on the Right  Motor: Arm left  Normal 5/5  Leg left  Normal 5/5  Arm right  Normal 5/5  Leg right Normal 5/5  Sensation: Intact to light touch, temperature and vibration    Laboratory:  CMP:   Recent Labs   Lab 02/28/25  0324 02/28/25  1119   CALCIUM 8.6*  8.6* 8.4*   ALBUMIN 3.4*  --    PROT 6.6  --      139 140   K 3.8  3.8 3.8   CO2 22*  23 25     109 108   BUN 10  10 10   CREATININE 0.7  0.6 0.6   ALKPHOS 101  --    ALT 9*  --    AST 28  --    BILITOT 0.7  --      BMP:   Recent Labs   Lab 02/28/25  1119      K 3.8      CO2 25   BUN 10   CREATININE 0.6   CALCIUM 8.4*     CBC:   Recent Labs   Lab 02/28/25 0324   WBC 9.19   RBC 3.75*   HGB 10.6*   HCT 33.5*      MCV 89   MCH 28.3   MCHC 31.6*     Lipid Panel:   Recent Labs   Lab 02/27/25 2115   CHOL 117*   LDLCALC 63.4   HDL 42   TRIG 58     Coagulation: No results for input(s): "PT", "INR", "APTT" in the last 168 hours.  Platelet Aggregation Study: No results for input(s): "PLTAGG", "PLTAGINTERP", "PLTAGREGLACO", "ADPPLTAGGREG" in the last 168 hours.  Hgb A1C:   Recent Labs   Lab 02/27/25 2115   HGBA1C 5.2     TSH: No results for input(s): "TSH" in the last 168 hours.    Diagnostic Results     Brain imaging:    MRI Brain pending     CTH without contrast 2/27/2025  Impression:  Subtle parenchymal hypoattenuation in keeping with an acute left MCA distribution infarct as above.        Vessel Imaging:  CTA stroke multiphase 2/27/2025  Images personally reviewed and discussed with VN staff. Concern for L M3 occlusion vs critical stenosis, early ischemic changes in inferior L MCA territory. Final radiology report pending     Cardiac Imaging   Echo pending  "

## 2025-02-28 NOTE — ASSESSMENT & PLAN NOTE
Body mass index is 35.85 kg/m². Morbid obesity complicates all aspects of disease management from diagnostic modalities to treatment. Weight loss encouraged and health benefits explained to patient.

## 2025-02-28 NOTE — SUBJECTIVE & OBJECTIVE
"Interval History:   - S/p TAVR yesterday afternoon. Remained somnolent and noted to be confused with altered speech. Michael torres activated. Found to have L MCA stroke. No amenable to intervention. Admitted to the CICU.   - This AM, patient remains somewhat confused with dysarthria  - Overnight, QRS narrowed on ECG x2 and TVP removed, though this AM noted again to be wide with   - Tele sinus 70-90s bpm    Objective:     Vital Signs (Most Recent):  Temp: 98.6 °F (37 °C) (02/28/25 0705)  Pulse: 92 (02/28/25 0905)  Resp: (!) 22 (02/28/25 0905)  BP: (!) 103/56 (02/28/25 0905)  SpO2: 97 % (02/28/25 0905) Vital Signs (24h Range):  Temp:  [96.6 °F (35.9 °C)-98.6 °F (37 °C)] 98.6 °F (37 °C)  Pulse:  [54-93] 92  Resp:  [10-30] 22  SpO2:  [94 %-100 %] 97 %  BP: ()/(55-91) 103/56     Weight: 88.9 kg (196 lb)  Body mass index is 35.85 kg/m².     SpO2: 97 %        Physical Exam  Constitutional:       Appearance: Normal appearance.   HENT:      Mouth/Throat:      Mouth: Mucous membranes are moist.   Eyes:      Extraocular Movements: Extraocular movements intact.      Conjunctiva/sclera: Conjunctivae normal.   Cardiovascular:      Rate and Rhythm: Normal rate and regular rhythm.   Pulmonary:      Effort: Pulmonary effort is normal.   Abdominal:      General: Abdomen is flat.      Palpations: Abdomen is soft.   Skin:     General: Skin is warm and dry.   Neurological:      Mental Status: She is alert. She is disoriented.      Comments: Dysarthria            Significant Labs: CMP:   Recent Labs   Lab 02/28/25  0324     139   K 3.8  3.8     109   CO2 22*  23   GLU 99  100   BUN 10  10   CREATININE 0.7  0.6   CALCIUM 8.6*  8.6*   PROT 6.6   ALBUMIN 3.4*   BILITOT 0.7   ALKPHOS 101   AST 28   ALT 9*   ANIONGAP 11  7*   , CBC:   Recent Labs   Lab 02/28/25  0324   WBC 9.19   HGB 10.6*   HCT 33.5*      , and INR: No results for input(s): "INR", "PROTIME" in the last 48 hours.    Significant Imaging: " X-Ray: CXR: X-Ray Chest 1 View (CXR): No results found for this visit on 02/27/25.

## 2025-02-28 NOTE — PLAN OF CARE
[facility] - Cardiac Intensive Care  Initial Discharge Assessment       Primary Care Provider: Ishmael Jacobson MD    Admission Diagnosis: Severe aortic stenosis [I35.0]  LBBB (left bundle branch block) [I44.7]    Admission Date: 2/27/2025  Expected Discharge Date:     Transition of Care Barriers: None    Payor[facility] [employer] [facility] / Plan: [employer] CHOICES / Product Type: Medicare Advantage /     Extended Emergency Contact Information  Primary Emergency Contact: Uche Peraltaley  Address: [address]  Home Phone: [phone]  Mobile Phone: [phone]  Relation: Daughter  Secondary Emergency Contact: Adam Sheets  Mobile Phone: [phone]  Relation: Son    Discharge Plan A: Home with family  Discharge Plan B: [facility]      [facility] #[id number] - [place] - [address] [facility] [address]  [address] [address]  Phone: [phone] Fax: [phone]      Initial Assessment (most recent)       Adult Discharge Assessment - 02/28/25 1315          Discharge Assessment    Assessment Type Discharge Planning Assessment     Confirmed/corrected address, phone number and insurance Yes     Confirmed Demographics Correct on Facesheet     Source of Information family;health record     Communicated YADIRA with patient/caregiver Yes     Reason For Admission LBBB     People in Home child(fei), adult     Facility Arrived From: Home     Do you expect to return to your current living situation? Yes     Do you have help at home or someone to help you manage your care at home? Yes     Who are your caregiver(s) and their phone number(s)? Maggie Kathryn (daughter) [phone], Adam Sheets (son) [phone]     Prior to hospitilization cognitive status: Unable to Assess     Current cognitive status: Unable to Assess     Walking or Climbing Stairs Difficulty no     Dressing/Bathing Difficulty no     Home  Layout Able to live on 1st floor     Equipment Currently Used at Home none     Readmission within 30 days? No     Patient currently being followed by outpatient case management? No     Do you currently have service(s) that help you manage your care at home? No     Do you take prescription medications? Yes     Do you have prescription coverage? Yes     Do you have any problems affording any of your prescribed medications? No     Is the patient taking medications as prescribed? yes     Who is going to help you get home at discharge? Adam Sheets (son) [phone]     How do you get to doctors appointments? family or friend will provide     Are you on dialysis? No     Do you take coumadin? No     Discharge Plan A Home with family     Discharge Plan B Home Health     DME Needed Upon Discharge  none     Discharge Plan discussed with: Adult children     Transition of Care Barriers None                   SW met with pt's son Adam, daughter-in-law Vickie, and niece Liana at bedside to discuss discharge planning (pt was sleeping and did not participate).  Pt lives with her daughter Maggie in a one-story house and is independent with ambulation and ADLs.  No DME, HH, dialysis, or coumadin.  Pt will have transportation and assistance from family at discharge.  Discharge Plan A and Plan B have been determined by review of patient's clinical status, future medical and therapeutic needs, and coverage/benefits for post-acute care in coordination with multidisciplinary team members.  JHOANNY name and ext on whiteboard; discharge planning booklet provided.  Will continue to follow.      Mary Rosales, KODAK  [facility]  [id number]

## 2025-02-28 NOTE — ASSESSMENT & PLAN NOTE
70yo F w ho htn and severe AS, s/p TAVR 2/28. Post op course complicated by L MCA CVA. EKG post procedure noting LBBB with . Patient without prior history of conduction abnormalities. TVP placed in the cath lab. In the s/o new LBBB patient may require PPM implantation, though post operative inflammation could also be contributing to the new LBBB and resolve over the next 24hrs.   - TVP now removed given need for MRI brain      Recommendations:  - EP following, planning to evaluate repeat ECG to determine necessity of PPM  - Tele  - Avoid BB and AV fabienne blockers

## 2025-02-28 NOTE — ASSESSMENT & PLAN NOTE
Patient's blood pressure range in the last 24 hours was: BP  Min: 90/55  Max: 165/80.The patient's inpatient anti-hypertensive regimen is listed below:  Current Antihypertensives  NIFEdipine 24 hr tablet 30 mg, Daily, Oral  hydrALAZINE injection 10 mg, Every 4 hours PRN, Intravenous    Plan  - Permissive hypertension (SBP < 220 mm Hg)   - Continue current medications as above

## 2025-02-28 NOTE — ASSESSMENT & PLAN NOTE
Kandace Rapp is a 69 y.o. female with PMH of HTN, CHF, RCC s/p L partial nephrectomy (cancer free) and severe AS that presented today for scheduled outpatient TAVR. Stat CTH obtained for new confusion/speech difficulty while in PACU post-procedure which revealed early ischemic changes in L MCA territory,  at which time a stroke code was called. LKW 745am 2/27, approx 10 hrs prior. OOW for TNK. Neuro exam significant for difficulty with following commands/answering orientation questions, RFD, mild RUE/RLE weakness with subtle drift, decreased sensation on R side, and aphasia (receptive > expressive) and dysarthria. NIHSS 8. CTH with early ischemic changes in inferior distal L MCA territory, CTA concerning for distal L M3 occlusion (vs high grade stenosis as per radiology read). Discussed imaging with CAROLE, given infarct appears completed on CTH and distal location of lesion determined not amenable to intervention therefore not taken for thrombectomy.     Patient will be admitted to CICU for close monitoring and observation given recent TAVR and now new L MCA stroke. Recs as detailed below:      Antithrombotics for secondary stroke prevention: Antiplatelets: Aspirin: 81 mg daily    Statins for secondary stroke prevention and hyperlipidemia, if present: Statins: Atorvastatin- 40 mg daily    Aggressive risk factor modification: HTN, HLD, Diet, Exercise, Obesity     Rehab efforts: The patient has been evaluated by a stroke team provider and the therapy needs have been fully considered based off the presenting complaints and exam findings. The following therapy evaluations are needed: PT evaluate and treat, OT evaluate and treat, SLP evaluate and treat    Diagnostics ordered/pending: HgbA1C to assess blood glucose levels, Lipid Profile to assess cholesterol levels, MRI head without contrast to assess brain parenchyma, TTE to assess cardiac function/status     VTE prophylaxis: Heparin 5000 units SQ every 8  hours  Mechanical prophylaxis: Place SCDs    BP parameters: Infarct: No intervention, SBP <220; HOB FLAT except for eating/taking meds

## 2025-02-28 NOTE — CONSULTS
[facility] - Cardiac Intensive Care  Vascular Neurology  [facility]  Consult Note    Consults  Assessment/Plan:     Embolic stroke involving left middle cerebral artery  Kandace Rapp is a 69 y.o. female with PMH of HTN, CHF, RCC s/p L partial nephrectomy (cancer free) and severe AS that presented today for scheduled outpatient TAVR. Stat CTH obtained for new confusion/speech difficulty while in PACU post-procedure which revealed early ischemic changes in L MCA territory,  at which time a stroke code was called. LKW 745am 2/27, approx 10 hrs prior. OOW for TNK. Neuro exam significant for difficulty with following commands/answering orientation questions, RFD, mild RUE/RLE weakness with subtle drift, decreased sensation on R side, and aphasia (receptive > expressive) and dysarthria. NIHSS 8. CTH with early ischemic changes in inferior distal L MCA territory, CTA concerning for distal L M3 occlusion (vs high grade stenosis as per radiology read). Discussed imaging with CAROLE, given infarct appears completed on CTH and distal location of lesion determined not amenable to intervention therefore not taken for thrombectomy.     Patient will be admitted to CICU for close monitoring and observation given recent TAVR and now new L MCA stroke. Recs as detailed below:      Antithrombotics for secondary stroke prevention: Antiplatelets: Aspirin: 81 mg daily    Statins for secondary stroke prevention and hyperlipidemia, if present: Statins: Atorvastatin- 40 mg daily    Aggressive risk factor modification: HTN, HLD, Diet, Exercise, Obesity     Rehab efforts: The patient has been evaluated by a stroke team provider and the therapy needs have been fully considered based off the presenting complaints and exam findings. The following therapy evaluations are needed: PT evaluate and treat, OT evaluate and treat, SLP evaluate and treat    Diagnostics ordered/pending: HgbA1C to assess blood glucose levels, Lipid  Profile to assess cholesterol levels, MRI head without contrast to assess brain parenchyma, TTE to assess cardiac function/status     VTE prophylaxis: Heparin 5000 units SQ every 8 hours  Mechanical prophylaxis: Place SCDs    BP parameters: Infarct: No intervention, SBP <220; HOB FLAT except for eating/taking meds      HTN (hypertension)  -Stroke risk factor  -SBP goal <220 to allow for permissive HTN, maintain MAP >65  -Keep HOB flat as possible to support cerebral perfusion  -Avoid hypotension  -BP range in the last 24 hrs: BP  Min: 90/55  Max: 165/80  -PRN labetalol/hydralazine        STROKE DOCUMENTATION     Acute Stroke Times   Last Known Normal Date: 02/27/25  Last Known Normal Time: 0745  Stroke Team Called Date: 02/27/25  Stroke Team Called Time: 1717  Stroke Team Arrival Date: 02/27/25  Stroke Team Arrival Time: 1723  CT Interpretation Time: 1745  Thrombolytic Therapy Recommended: No  CTA Interpretation Time: 1805  Thrombectomy Recommended: No    NIH Scale:  1a. Level of Consciousness: 0-->Alert, keenly responsive  1b. LOC Questions: 1-->Answers one question correctly  1c. LOC Commands: 1-->Performs one task correctly  2. Best Gaze: 0-->Normal  3. Visual: 0-->No visual loss  4. Facial Palsy: 1-->Minor paralysis (flattened nasolabial fold, asymmetry on smiling)  5a. Motor Arm, Left: 0-->No drift, limb holds 90 (or 45) degrees for full 10 secs  5b. Motor Arm, Right: 1-->Drift, limb holds 90 (or 45) degrees, but drifts down before full 10 secs, does not hit bed or other support  6a. Motor Leg, Left: 0-->No drift, leg holds 30 degree position for full 5 secs  6b. Motor Leg, Right: 1-->Drift, leg falls by the end of the 5-sec period but does not hit bed  7. Limb Ataxia: 0-->Absent  8. Sensory: 1-->Mild-to-moderate sensory loss, patient feels pinprick is less sharp or is dull on the affected side, or there is a loss of superficial pain with pinprick, but patient is aware of being touched  9. Best Language:  1-->Mild-to-moderate aphasia, some obvious loss of fluency or facility of comprehension, without significant limitation on ideas expressed or form of expression. Reduction of speech and/or comprehension, however, makes conversation. . . (see row details)  10. Dysarthria: 1-->Mild-to-moderate dysarthria, patient slurs at least some words and, at worst, can be understood with some difficulty  11. Extinction and Inattention (formerly Neglect): 0-->No abnormality  Total (NIH Stroke Scale): 8    Modified Bibb Score: 0  Darling Coma Scale:    ABCD2 Score:    [id number] Score:   HAS -BLED Score:   ICH Score:   Hunt & Zheng Classification:       Thrombolysis Candidate? No, Out of window - Symptom onset > 4.5 hours    Delays to Thrombolysis?  Not Applicable    Interventional Revascularization Candidate?   Is the patient eligible for mechanical endovascular reperfusion (REINA)?  No; Large core infarct on imaging     Delays to Thrombectomy? Not Applicable    Hemorrhagic change of an Ischemic Stroke: Does this patient have an ischemic stroke with hemorrhagic changes? No     Subjective:     History of Present Illness:  Kandace Rapp is a 69 y.o. female with PMH of HTN, CHF, RCC s/p L partial nephrectomy (cancer free) and severe AS that presented today for scheduled outpatient TAVR. Stat CTH obtained for new confusion/speech difficulty while in PACU post-procedure which revealed early ischemic changes in L MCA territory,  at which time a stroke code was called. LKW 745am 2/27, approx 10 hrs prior. OOW for TNK. Neuro exam significant for difficulty with following commands/answering orientation questions, RFD, mild RUE/RLE weakness with subtle drift, decreased sensation on R side, and aphasia (receptive > expressive) and dysarthria. NIHSS 8. CTH with early ischemic changes in inferior distal L MCA territory, CTA concerning for distal L M3 occlusion (vs high grade stenosis as per radiology read). Discussed imaging with  CAROLE, given infarct appears completed on CTH and distal location of lesion determined not amenable to intervention therefore not taken for thrombectomy. Patient will be admitted to CICU for close monitoring and observation given recent TAVR and now new L MCA stroke.          Past Medical History:   Diagnosis Date    Anxiety     Aortic stenosis     Arthritis     CHF (congestive heart failure)     Heart murmur     Hypertension     Mitral valve prolapse      Past Surgical History:   Procedure Laterality Date    AORTIC VALVULOPLASTY N/A 2024    Procedure: REPAIR, AORTIC VALVE;  Surgeon: Rohit Quinonez MD;  Location: [facility] CATH LAB;  Service: Cardiology;  Laterality: N/A;    AORTOGRAPHY N/A 2024    Procedure: AORTOGRAM;  Surgeon: Rohit Quinonez MD;  Location: [facility] CATH LAB;  Service: Cardiology;  Laterality: N/A;    BREAST CYST EXCISION Left 80s    BREAST SURGERY      Benign lump on left.    CATARACT EXTRACTION       SECTION      x1    COLONOSCOPY N/A 2023    Procedure: COLONOSCOPY;  Surgeon: Pattie Stephenson MD;  Location: [facility] ENDO (4TH FLR);  Service: Endoscopy;  Laterality: N/A;   ref by Ishmael Jacobson MD, PEG, instr. to portal-st    CORONARY ANGIOGRAPHY N/A 10/28/2024    Procedure: ANGIOGRAM, CORONARY ARTERY;  Surgeon: Yordy Ng MD;  Location: [facility] CATH LAB;  Service: Cardiology;  Laterality: N/A;  radial access    DV5 ROBOTIC NEPHRECTOMY, PARTIAL Left 2024    Procedure: DV5 ROBOTIC NEPHRECTOMY, PARTIAL;  Surgeon: Adam Sawyer MD;  Location: [facility] OR [facility] FLR;  Service: Urology;  Laterality: Left;    EYE SURGERY      VALVE STUDY-AORTIC  2024    Procedure: Valve study-aortic;  Surgeon: Rohit Quinonez MD;  Location: [facility] CATH LAB;  Service: Cardiology;;     Social History[1]  Review of patient's allergies indicates:   Allergen Reactions    Protamine sulfate Other (See Comments)     Bradycardia, Hypotension    Davion Rash       Medications: I have  reviewed the current medication administration record.    Prescriptions Prior to Admission[2]    Review of Systems   Constitutional:  Negative for fatigue.   HENT:  Negative for drooling and trouble swallowing.    Eyes:  Negative for visual disturbance.   Respiratory:  Negative for choking.    Cardiovascular:  Negative for palpitations.   Gastrointestinal:  Negative for nausea and vomiting.   Musculoskeletal:  Negative for neck stiffness.   Skin:  Negative for color change.   Neurological:  Positive for facial asymmetry, speech difficulty and weakness. Negative for headaches.   Psychiatric/Behavioral:  Negative for confusion.    All other systems reviewed and are negative.    Objective:     Vital Signs (Most Recent):  Temp: 97.7 °F (36.5 °C) (02/27/25 1010)  Pulse: 77 (02/27/25 1845)  Resp: 16 (02/27/25 1800)  BP: (!) 165/80 (02/27/25 1800)  SpO2: 97 % (02/27/25 1800)    Vital Signs Range (Last 24H):  Temp:  [97.5 °F (36.4 °C)-97.7 °F (36.5 °C)]   Pulse:  [54-87]   Resp:  [10-21]   BP: ()/(55-91)   SpO2:  [94 %-100 %]        Physical Exam  Vitals and nursing note reviewed.   Constitutional:       General: She is not in acute distress.  HENT:      Head: Normocephalic.      Nose: Nose normal.   Eyes:      Extraocular Movements: Extraocular movements intact.      Conjunctiva/sclera: Conjunctivae normal.   Cardiovascular:      Rate and Rhythm: Normal rate.   Pulmonary:      Effort: Pulmonary effort is normal. No respiratory distress.   Skin:     General: Skin is warm.   Neurological:      Mental Status: She is alert.      Comments: See below for neuro exam   Psychiatric:         Behavior: Behavior normal. Behavior is cooperative.              Neurological Exam:   LOC: alert  Attention Span: Good   Language: Expressive aphasia, Receptive aphasia  Articulation: Dysarthria  Orientation: Not oriented to place  Visual Fields: Full  EOM (CN III, IV, VI): Full/intact  Facial Movement (CN VII): Lower facial weakness on  "the Right  Motor:      ---LUE Normal 5/5     ---LLE  Normal 5/5     ---RUE Paresis: 4/5     ---RLE Paresis: 4/5  Sensation: Ruddy-hypoesthesia right        Laboratory:  CMP: No results for input(s): "GLUCOSE", "CALCIUM", "ALBUMIN", "PROT", "NA", "K", "CO2", "CL", "BUN", "CREATININE", "ALKPHOS", "ALT", "AST", "BILITOT" in the last 24 hours.  CBC: No results for input(s): "WBC", "RBC", "HGB", "HCT", "PLT", "MCV", "MCH", "MCHC" in the last 168 hours.  Lipid Panel: No results for input(s): "CHOL", "LDLCALC", "HDL", "TRIG" in the last 168 hours.  Coagulation: No results for input(s): "PT", "INR", "APTT" in the last 168 hours.  Hgb A1C: No results for input(s): "HGBA1C" in the last 168 hours.  TSH: No results for input(s): "TSH" in the last 168 hours.    Diagnostic Results:      Brain imaging:  CTH without contrast 2/27/2025  Impression:  Subtle parenchymal hypoattenuation in keeping with an acute left MCA distribution infarct as above.      Vessel Imaging:  CTA stroke multiphase 2/27/2025  Images personally reviewed and discussed with VN staff. Concern for L M3 occlusion vs critical stenosis, early ischemic changes in inferior L MCA territory. Final radiology report pending        Suzi Gracia PA-C  [facility]  Department of Vascular Neurology   [facility] - Cardiac Intensive Care        [1]   Social History  Tobacco Use    Smoking status: Never    Smokeless tobacco: Never   Substance Use Topics    Alcohol use: Yes     Comment: Rare.     Drug use: No   [2]   Facility-Administered Medications Prior to Admission   Medication Dose Route Frequency Provider Last Rate Last Admin    sodium chloride 0.9% flush 10 mL  10 mL Intravenous PRN Rohit Quinonez MD        sodium chloride 0.9% flush 10 mL  10 mL Intravenous PRN Rohit Quinonez MD         Medications Prior to Admission   Medication Sig Dispense Refill Last Dose/Taking    cholecalciferol, vitamin D3, 1,250 mcg (50,000 unit) capsule    2/27/2025 at "  4:30 AM    NIFEdipine (PROCARDIA-XL) 30 MG (OSM) 24 hr tablet Take 1 tablet (30 mg total) by mouth once daily. 90 tablet 3 2/27/2025 at  4:30 AM

## 2025-02-28 NOTE — HPI
Kandace Rapp is a 69 year-old-woman with PMH of HTN, CHF, RCC s/p L partial nephrectomy (cancer free) and severe AS that presented today for scheduled outpatient TAVR. She is now s/p TAVR by Dr. Way Stat CTH obtained for new confusion/speech difficulty while in PACU post-procedure which revealed early ischemic changes in L MCA territory,  at which time a stroke code was called. CTA concerning for distal L M3 occlusion (vs high grade stenosis as per radiology read). Discussed imaging with CAROLE, given infarct appears completed on CTH and distal location of lesion determined not amenable to intervention therefore not taken for thrombectomy.     Patient admitted to CICU for neurochecks and follow MRI in the morning.

## 2025-02-28 NOTE — SUBJECTIVE & OBJECTIVE
Past Medical History:   Diagnosis Date    Anxiety     Aortic stenosis     Arthritis     CHF (congestive heart failure)     Heart murmur     Hypertension     Mitral valve prolapse      Past Surgical History:   Procedure Laterality Date    AORTIC VALVULOPLASTY N/A 2024    Procedure: REPAIR, AORTIC VALVE;  Surgeon: Rohit Quinonez MD;  Location: [facility] CATH LAB;  Service: Cardiology;  Laterality: N/A;    AORTOGRAPHY N/A 2024    Procedure: AORTOGRAM;  Surgeon: Rohit Quinonez MD;  Location: [facility] CATH LAB;  Service: Cardiology;  Laterality: N/A;    BREAST CYST EXCISION Left 80s    BREAST SURGERY      Benign lump on left.    CATARACT EXTRACTION       SECTION      x1    COLONOSCOPY N/A 2023    Procedure: COLONOSCOPY;  Surgeon: Pattie Stephenson MD;  Location: [facility] ENDO (4TH FLR);  Service: Endoscopy;  Laterality: N/A;   ref by Ishmael Jacobson MD, PEG, instr. to portal-st    CORONARY ANGIOGRAPHY N/A 10/28/2024    Procedure: ANGIOGRAM, CORONARY ARTERY;  Surgeon: Yordy Ng MD;  Location: [facility] CATH LAB;  Service: Cardiology;  Laterality: N/A;  radial access    DV5 ROBOTIC NEPHRECTOMY, PARTIAL Left 2024    Procedure: DV5 ROBOTIC NEPHRECTOMY, PARTIAL;  Surgeon: Adam Sawyer MD;  [facility];  Service: Urology;  Laterality: Left;    EYE SURGERY      VALVE STUDY-AORTIC  2024    Procedure: Valve study-aortic;  Surgeon: Rohit Quinonez MD;  Location: [facility] CATH LAB;  Service: Cardiology;;     Social History[1]  Review of patient's allergies indicates:   Allergen Reactions    Protamine sulfate Other (See Comments)     Bradycardia, Hypotension    Davion Rash       Medications: I have reviewed the current medication administration record.    Prescriptions Prior to Admission[2]    Review of Systems   Constitutional:  Negative for fatigue.   HENT:  Negative for drooling and trouble swallowing.    Eyes:  Negative for visual disturbance.   Respiratory:   "Negative for choking.    Cardiovascular:  Negative for palpitations.   Gastrointestinal:  Negative for nausea and vomiting.   Musculoskeletal:  Negative for neck stiffness.   Skin:  Negative for color change.   Neurological:  Positive for facial asymmetry, speech difficulty and weakness. Negative for headaches.   Psychiatric/Behavioral:  Negative for confusion.    All other systems reviewed and are negative.    Objective:     Vital Signs (Most Recent):  Temp: 97.7 °F (36.5 °C) (02/27/25 1010)  Pulse: 77 (02/27/25 1845)  Resp: 16 (02/27/25 1800)  BP: (!) 165/80 (02/27/25 1800)  SpO2: 97 % (02/27/25 1800)    Vital Signs Range (Last 24H):  Temp:  [97.5 °F (36.4 °C)-97.7 °F (36.5 °C)]   Pulse:  [54-87]   Resp:  [10-21]   BP: ()/(55-91)   SpO2:  [94 %-100 %]        Physical Exam  Vitals and nursing note reviewed.   Constitutional:       General: She is not in acute distress.  HENT:      Head: Normocephalic.      Nose: Nose normal.   Eyes:      Extraocular Movements: Extraocular movements intact.      Conjunctiva/sclera: Conjunctivae normal.   Cardiovascular:      Rate and Rhythm: Normal rate.   Pulmonary:      Effort: Pulmonary effort is normal. No respiratory distress.   Skin:     General: Skin is warm.   Neurological:      Mental Status: She is alert.      Comments: See below for neuro exam   Psychiatric:         Behavior: Behavior normal. Behavior is cooperative.              Neurological Exam:   LOC: alert  Attention Span: Good   Language: Expressive aphasia, Receptive aphasia  Articulation: Dysarthria  Orientation: Not oriented to place  Visual Fields: Full  EOM (CN III, IV, VI): Full/intact  Facial Movement (CN VII): Lower facial weakness on the Right  Motor:      ---LUE Normal 5/5     ---LLE  Normal 5/5     ---RUE Paresis: 4/5     ---RLE Paresis: 4/5  Sensation: Ruddy-hypoesthesia right        Laboratory:  CMP: No results for input(s): "GLUCOSE", "CALCIUM", "ALBUMIN", "PROT", "NA", "K", "CO2", "CL", "BUN", " ""CREATININE", "ALKPHOS", "ALT", "AST", "BILITOT" in the last 24 hours.  CBC: No results for input(s): "WBC", "RBC", "HGB", "HCT", "PLT", "MCV", "MCH", "MCHC" in the last 168 hours.  Lipid Panel: No results for input(s): "CHOL", "LDLCALC", "HDL", "TRIG" in the last 168 hours.  Coagulation: No results for input(s): "PT", "INR", "APTT" in the last 168 hours.  Hgb A1C: No results for input(s): "HGBA1C" in the last 168 hours.  TSH: No results for input(s): "TSH" in the last 168 hours.    Diagnostic Results:      Brain imaging:  CTH without contrast 2/27/2025  Impression:  Subtle parenchymal hypoattenuation in keeping with an acute left MCA distribution infarct as above.      Vessel Imaging:  CTA stroke multiphase 2/27/2025  Images personally reviewed and discussed with VN staff. Concern for L M3 occlusion vs critical stenosis, early ischemic changes in inferior L MCA territory. Final radiology report pending           [1]   Social History  Tobacco Use    Smoking status: Never    Smokeless tobacco: Never   Substance Use Topics    Alcohol use: Yes     Comment: Rare.     Drug use: No   [2]   Facility-Administered Medications Prior to Admission   Medication Dose Route Frequency Provider Last Rate Last Admin    sodium chloride 0.9% flush 10 mL  10 mL Intravenous PRN Rohit Quinonez MD        sodium chloride 0.9% flush 10 mL  10 mL Intravenous PRN Rohit Quinonez MD         Medications Prior to Admission   Medication Sig Dispense Refill Last Dose/Taking    cholecalciferol, vitamin D3, 1,250 mcg (50,000 unit) capsule    2/27/2025 at  4:30 AM    NIFEdipine (PROCARDIA-XL) 30 MG (OSM) 24 hr tablet Take 1 tablet (30 mg total) by mouth once daily. 90 tablet 3 2/27/2025 at  4:30 AM     "

## 2025-03-01 LAB
ANION GAP SERPL CALC-SCNC: 8 MMOL/L (ref 8–16)
ANION GAP SERPL CALC-SCNC: 9 MMOL/L (ref 8–16)
BASOPHILS # BLD AUTO: 0.02 K/UL (ref 0–0.2)
BASOPHILS NFR BLD: 0.2 % (ref 0–1.9)
BUN SERPL-MCNC: 11 MG/DL (ref 8–23)
BUN SERPL-MCNC: 13 MG/DL (ref 8–23)
CALCIUM SERPL-MCNC: 8.4 MG/DL (ref 8.7–10.5)
CALCIUM SERPL-MCNC: 9.2 MG/DL (ref 8.7–10.5)
CHLORIDE SERPL-SCNC: 105 MMOL/L (ref 95–110)
CHLORIDE SERPL-SCNC: 108 MMOL/L (ref 95–110)
CO2 SERPL-SCNC: 22 MMOL/L (ref 23–29)
CO2 SERPL-SCNC: 26 MMOL/L (ref 23–29)
CREAT SERPL-MCNC: 0.6 MG/DL (ref 0.5–1.4)
CREAT SERPL-MCNC: 0.7 MG/DL (ref 0.5–1.4)
DIFFERENTIAL METHOD BLD: ABNORMAL
EOSINOPHIL # BLD AUTO: 0 K/UL (ref 0–0.5)
EOSINOPHIL NFR BLD: 0.2 % (ref 0–8)
ERYTHROCYTE [DISTWIDTH] IN BLOOD BY AUTOMATED COUNT: 11.9 % (ref 11.5–14.5)
EST. GFR  (NO RACE VARIABLE): >60 ML/MIN/1.73 M^2
EST. GFR  (NO RACE VARIABLE): >60 ML/MIN/1.73 M^2
GLUCOSE SERPL-MCNC: 113 MG/DL (ref 70–110)
GLUCOSE SERPL-MCNC: 135 MG/DL (ref 70–110)
HCT VFR BLD AUTO: 31.9 % (ref 37–48.5)
HGB BLD-MCNC: 9.9 G/DL (ref 12–16)
IMM GRANULOCYTES # BLD AUTO: 0.02 K/UL (ref 0–0.04)
IMM GRANULOCYTES NFR BLD AUTO: 0.2 % (ref 0–0.5)
LYMPHOCYTES # BLD AUTO: 2 K/UL (ref 1–4.8)
LYMPHOCYTES NFR BLD: 23.1 % (ref 18–48)
MAGNESIUM SERPL-MCNC: 1.7 MG/DL (ref 1.6–2.6)
MAGNESIUM SERPL-MCNC: 2 MG/DL (ref 1.6–2.6)
MCH RBC QN AUTO: 27.4 PG (ref 27–31)
MCHC RBC AUTO-ENTMCNC: 31 G/DL (ref 32–36)
MCV RBC AUTO: 88 FL (ref 82–98)
MONOCYTES # BLD AUTO: 0.6 K/UL (ref 0.3–1)
MONOCYTES NFR BLD: 7.5 % (ref 4–15)
NEUTROPHILS # BLD AUTO: 5.8 K/UL (ref 1.8–7.7)
NEUTROPHILS NFR BLD: 68.8 % (ref 38–73)
NRBC BLD-RTO: 0 /100 WBC
PHOSPHATE SERPL-MCNC: 2.9 MG/DL (ref 2.7–4.5)
PLATELET # BLD AUTO: 149 K/UL (ref 150–450)
PMV BLD AUTO: 9.3 FL (ref 9.2–12.9)
POTASSIUM SERPL-SCNC: 3.6 MMOL/L (ref 3.5–5.1)
POTASSIUM SERPL-SCNC: 4.3 MMOL/L (ref 3.5–5.1)
RBC # BLD AUTO: 3.61 M/UL (ref 4–5.4)
SODIUM SERPL-SCNC: 139 MMOL/L (ref 136–145)
SODIUM SERPL-SCNC: 139 MMOL/L (ref 136–145)
WBC # BLD AUTO: 8.49 K/UL (ref 3.9–12.7)

## 2025-03-01 PROCEDURE — 80048 BASIC METABOLIC PNL TOTAL CA: CPT | Performed by: INTERNAL MEDICINE

## 2025-03-01 PROCEDURE — 80048 BASIC METABOLIC PNL TOTAL CA: CPT | Mod: 91 | Performed by: INTERNAL MEDICINE

## 2025-03-01 PROCEDURE — 25000003 PHARM REV CODE 250

## 2025-03-01 PROCEDURE — 84100 ASSAY OF PHOSPHORUS: CPT | Performed by: INTERNAL MEDICINE

## 2025-03-01 PROCEDURE — 20000000 HC ICU ROOM

## 2025-03-01 PROCEDURE — 63600175 PHARM REV CODE 636 W HCPCS

## 2025-03-01 PROCEDURE — 85025 COMPLETE CBC W/AUTO DIFF WBC: CPT | Performed by: INTERNAL MEDICINE

## 2025-03-01 PROCEDURE — 25000003 PHARM REV CODE 250: Performed by: STUDENT IN AN ORGANIZED HEALTH CARE EDUCATION/TRAINING PROGRAM

## 2025-03-01 PROCEDURE — 99233 SBSQ HOSP IP/OBS HIGH 50: CPT | Mod: GC,,, | Performed by: PSYCHIATRY & NEUROLOGY

## 2025-03-01 PROCEDURE — 93005 ELECTROCARDIOGRAM TRACING: CPT

## 2025-03-01 PROCEDURE — 83735 ASSAY OF MAGNESIUM: CPT | Performed by: INTERNAL MEDICINE

## 2025-03-01 PROCEDURE — 93010 ELECTROCARDIOGRAM REPORT: CPT | Mod: ,,, | Performed by: INTERNAL MEDICINE

## 2025-03-01 PROCEDURE — 83735 ASSAY OF MAGNESIUM: CPT | Mod: 91 | Performed by: INTERNAL MEDICINE

## 2025-03-01 PROCEDURE — 99232 SBSQ HOSP IP/OBS MODERATE 35: CPT | Mod: ,,, | Performed by: INTERNAL MEDICINE

## 2025-03-01 PROCEDURE — 94761 N-INVAS EAR/PLS OXIMETRY MLT: CPT

## 2025-03-01 RX ORDER — MAGNESIUM SULFATE HEPTAHYDRATE 40 MG/ML
2 INJECTION, SOLUTION INTRAVENOUS ONCE
Status: COMPLETED | OUTPATIENT
Start: 2025-03-01 | End: 2025-03-01

## 2025-03-01 RX ORDER — AMOXICILLIN 250 MG
1 CAPSULE ORAL DAILY PRN
Status: DISCONTINUED | OUTPATIENT
Start: 2025-03-01 | End: 2025-03-03 | Stop reason: HOSPADM

## 2025-03-01 RX ORDER — HYDROXYZINE HYDROCHLORIDE 25 MG/1
25 TABLET, FILM COATED ORAL 3 TIMES DAILY PRN
Status: DISCONTINUED | OUTPATIENT
Start: 2025-03-01 | End: 2025-03-01

## 2025-03-01 RX ADMIN — SENNOSIDES AND DOCUSATE SODIUM 1 TABLET: 50; 8.6 TABLET ORAL at 09:03

## 2025-03-01 RX ADMIN — SODIUM CHLORIDE: 9 INJECTION, SOLUTION INTRAVENOUS at 05:03

## 2025-03-01 RX ADMIN — ASPIRIN 81 MG CHEWABLE TABLET 81 MG: 81 TABLET CHEWABLE at 09:03

## 2025-03-01 RX ADMIN — MAGNESIUM SULFATE HEPTAHYDRATE 2 G: 40 INJECTION, SOLUTION INTRAVENOUS at 05:03

## 2025-03-01 RX ADMIN — POTASSIUM BICARBONATE 40 MEQ: 391 TABLET, EFFERVESCENT ORAL at 05:03

## 2025-03-01 RX ADMIN — CLOPIDOGREL BISULFATE 75 MG: 75 TABLET ORAL at 09:03

## 2025-03-01 RX ADMIN — NIFEDIPINE 30 MG: 30 TABLET, FILM COATED, EXTENDED RELEASE ORAL at 09:03

## 2025-03-01 RX ADMIN — ATORVASTATIN CALCIUM 40 MG: 40 TABLET, FILM COATED ORAL at 09:03

## 2025-03-01 NOTE — PROGRESS NOTES
[facility] - Cardiac Intensive Care  Vascular Neurology  [facility]  Progress Note    Assessment/Plan:     HTN (hypertension)  -Stroke risk factor  -Okay to begin normalizing BP, MAP>65  -Avoid hypotension  -BP range in the last 24 hrs: BP  Min: 97/57  Max: 133/76  -PRN labetalol/hydralazine    Embolic stroke involving left middle cerebral artery  Kandace Rapp is a 69 y.o. female with PMH of HTN, CHF, RCC s/p L partial nephrectomy (cancer free) and severe AS that presented today for scheduled outpatient TAVR. Stat CTH obtained for new confusion/speech difficulty while in PACU post-procedure which revealed early ischemic changes in L MCA territory,  at which time a stroke code was called. LKW 745am 2/27, approx 10 hrs prior. OOW for TNK. Neuro exam significant for difficulty with following commands/answering orientation questions, RFD, mild RUE/RLE weakness with subtle drift, decreased sensation on R side, and aphasia (receptive > expressive) and dysarthria. NIHSS 8. CTH with early ischemic changes in inferior distal L MCA territory, CTA concerning for distal L M3 occlusion (vs high grade stenosis as per radiology read). Discussed imaging with [facility], given infarct appears completed on CTH and distal location of lesion determined not amenable to intervention therefore not taken for thrombectomy.  Patient admitted to CICU for close monitoring and observation given recent TAVR and now new L MCA stroke.     CT head and MRI Brain 02/28 reviewed by vascular neurology. Some discrepancy on different imaging techniques with hemorrhagic transformation seen on SWI. No change in neuro exam. Patient doing well this morning. Neuro exam stable, aphasia seems to be improving. Able to follow commands more consistently and answer LOC questions appropriately. Patient may sit up/work with therapy, Avoid hypotension, MAP>65. Follow- up with Vascular Neurology outpatient 4-6 weeks post  discharge.    Antithrombotics for secondary stroke prevention: Antiplatelets: Aspirin: 81 mg daily  Clopidogrel: 75 mg daily    Statins for secondary stroke prevention and hyperlipidemia, if present: Statins: Atorvastatin- 40 mg daily    Aggressive risk factor modification: HTN, HLD, Diet, Exercise, Obesity     Rehab efforts: The patient has been evaluated by a stroke team provider and the therapy needs have been fully considered based off the presenting complaints and exam findings. The following therapy evaluations are needed: PT evaluate and treat, OT evaluate and treat, SLP evaluate and treat    Diagnostics ordered/pending: None     VTE prophylaxis: Heparin 5000 units SQ every 8 hours  Mechanical prophylaxis: Place SCDs    BP parameters:  Begin to normalize BP. Avoid Hypotension. MAP>65        No further recommendations. Vascular neurology will sign off at this time.    Please do not hesitate to contact us with any questions. Thank you for involving us in the care of this patient.            02/28/2025 Neuro exam improved. Continued receptive>expressive aphasia and RFD. Difficulty following complex commands. SBP goal <220 continue for 24 hrs, then can begin to normalize BP.  VN contacted regarding neuro change this afternoon, STAT CT head ordered. On assessment patient exam unchanged. Vascular Neurology will follow- up MRI when completed.    03/01/2025 Patient doing well this morning, aphasia improving.   03/01/2025 CT head and MRI Brain 02/28 reviewed by vascular neurology. Some discrepancy on different imaging techniques with hemorrhagic transformation seen on SWI. No change in neuro exam. Patient doing well this morning. Neuro exam stable, aphasia seems to be improving. Able to follow commands more consistently and answer LOC questions appropriately. Patient may sit up/work with therapy, Avoid hypotension, MAP>65. VN will sign off.    STROKE DOCUMENTATION   Acute Stroke Times   Last Known Normal Date:  02/27/25  Last Known Normal Time: 0745  Stroke Team Called Date: 02/27/25  Stroke Team Called Time: 1717  Stroke Team Arrival Date: 02/27/25  Stroke Team Arrival Time: 1723  CT Interpretation Time: 1745  Thrombolytic Therapy Recommended: No  CTA Interpretation Time: 1805  Thrombectomy Recommended: No    NIH Scale:  1a. Level of Consciousness: 0-->Alert, keenly responsive  1b. LOC Questions: 0-->Answers both questions correctly  1c. LOC Commands: 1-->Performs one task correctly  2. Best Gaze: 0-->Normal  3. Visual: 0-->No visual loss  4. Facial Palsy: 1-->Minor paralysis (flattened nasolabial fold, asymmetry on smiling)  5a. Motor Arm, Left: 0-->No drift, limb holds 90 (or 45) degrees for full 10 secs  5b. Motor Arm, Right: 0-->No drift, limb holds 90 (or 45) degrees for full 10 secs  6a. Motor Leg, Left: 0-->No drift, leg holds 30 degree position for full 5 secs  6b. Motor Leg, Right: 0-->No drift, leg holds 30 degree position for full 5 secs  7. Limb Ataxia: 0-->Absent  8. Sensory: 0-->Normal, no sensory loss  9. Best Language: 1-->Mild-to-moderate aphasia, some obvious loss of fluency or facility of comprehension, without significant limitation on ideas expressed or form of expression. Reduction of speech and/or comprehension, however, makes conversation. . . (see row details)  10. Dysarthria: 0-->Normal  11. Extinction and Inattention (formerly Neglect): 0-->No abnormality  Total (NIH Stroke Scale): 3       Modified Silsbee Score: 0  Hardy Coma Scale:    ABCD2 Score:    [id number] Score:   HAS -BLED Score:   ICH Score:   Hunt & Zheng Classification:      Hemorrhagic change of an Ischemic Stroke: Does this patient have an ischemic stroke with hemorrhagic changes? Yes, Grading Scale: HI Type 1 (HI-1) = small petechiae along the margins of the infarct. Is this a symptomatic change?  No - Hemorrhage is not clinically significant     Neurologic Chief Complaint: L MCA stroke    Subjective:     Interval History:  Patient is seen for follow-up neurological assessment and treatment recommendations: See hospital course.    HPI, Past Medical, Family, and Social History remains the same as documented in the initial encounter.     Review of Systems   Constitutional:  Negative for chills and fever.   Eyes:  Negative for visual disturbance.   Gastrointestinal:  Negative for nausea and vomiting.   Neurological:  Positive for facial asymmetry and speech difficulty.     Scheduled Meds:   aspirin  81 mg Oral Daily    atorvastatin  40 mg Oral QHS    clopidogreL  75 mg Oral Daily    NIFEdipine  30 mg Oral Daily     Continuous Infusions:   0.9% NaCl   Intravenous Continuous 10 mL/hr at 03/01/25 1139 Rate Verify at 03/01/25 1139     PRN Meds:  Current Facility-Administered Medications:     hydrALAZINE, 10 mg, Intravenous, Q4H PRN    Objective:     Vital Signs (Most Recent):  Temp: 98 °F (36.7 °C) (03/01/25 1105)  Pulse: 87 (03/01/25 1105)  Resp: 14 (03/01/25 1105)  BP: (!) 97/57 (03/01/25 1105)  SpO2: 97 % (03/01/25 1105)  BP Location: Right forearm    Vital Signs Range (Last 24H):  Temp:  [97.9 °F (36.6 °C)-99.7 °F (37.6 °C)]   Pulse:  [80-97]   Resp:  [14-34]   BP: ()/(56-76)   SpO2:  [95 %-99 %]   BP Location: Right forearm       Physical Exam  HENT:      Head: Normocephalic and atraumatic.   Eyes:      Extraocular Movements: Extraocular movements intact.      Conjunctiva/sclera: Conjunctivae normal.   Cardiovascular:      Rate and Rhythm: Normal rate.   Pulmonary:      Effort: Pulmonary effort is normal.   Skin:     General: Skin is warm and dry.   Neurological:      Mental Status: She is alert.              Neurological Exam:   LOC: alert  Attention Span: Good   Language: Expressive aphasia, Receptive aphasia, improved   Articulation: No dysarthria  Orientation: Person, Place, Time   EOM (CN III, IV, VI): Full/intact  Facial Movement (CN VII): Lower facial weakness on the Right  Motor: Arm left  Normal 5/5  Leg left  Normal  "5/5  Arm right  Normal 5/5  Leg right Normal 5/5  Sensation: Intact to light touch, temperature and vibration    Laboratory:  CMP:   Recent Labs   Lab 03/01/25 0311   CALCIUM 8.4*      K 3.6   CO2 22*      BUN 11   CREATININE 0.6     BMP:   Recent Labs   Lab 03/01/25 0311      K 3.6      CO2 22*   BUN 11   CREATININE 0.6   CALCIUM 8.4*     CBC:   Recent Labs   Lab 03/01/25 0311   WBC 8.49   RBC 3.61*   HGB 9.9*   HCT 31.9*   *   MCV 88   MCH 27.4   MCHC 31.0*     Lipid Panel:   Recent Labs   Lab 02/27/25 2115   CHOL 117*   LDLCALC 63.4   HDL 42   TRIG 58     Coagulation: No results for input(s): "PT", "INR", "APTT" in the last 168 hours.  Platelet Aggregation Study: No results for input(s): "PLTAGG", "PLTAGINTERP", "PLTAGREGLACO", "ADPPLTAGGREG" in the last 168 hours.  Hgb A1C:   Recent Labs   Lab 02/27/25 2115   HGBA1C 5.2     TSH: No results for input(s): "TSH" in the last 168 hours.    Diagnostic Results     Brain imaging:    MRI Brain without contrast 02/28/25  Evolving left MCA distribution infarct involving the left temporoparietal region and posterior insular cortex.  Associated susceptibility artifact likely representing sequela of petechial hemorrhage.  Mild localized sulcal effacement without evidence for significant midline shift.  Continued follow-up is recommended.     Additional acute lacunar type infarcts about the left cerebellar hemisphere    CT Head without contrast 02/28/25  Evolving hypoattenuation in the left temporoparietal lobe in keeping with recent left MCA distribution infarct.  Overall slightly increased localized edema and mass effect with effacement of the left temporoparietal sulci and 1-2 mm rightward midline shift.  No evidence of hemorrhagic conversion.  Continued follow-up is recommended.     No discrete new acute infarct elsewhere.    CTH without contrast 2/27/2025  Impression:  Subtle parenchymal hypoattenuation in keeping with an acute left MCA " distribution infarct as above.        Vessel Imaging:  CTA stroke multiphase 2/27/2025  Images personally reviewed and discussed with VN staff. Concern for L M3 occlusion vs critical stenosis, early ischemic changes in inferior L MCA territory. 1. Attenuated flow within some of the posterior left-sided M3 vessels.  No large vessel occlusion.  Recommend further evaluation with MRI of the brain.  2. Continued hypoattenuation in the left temporoparietal region in keeping with an acute left MCA distribution infarct.    Cardiac Evaluation   EKG 03/01/2025  Normal sinus rhythm, nonspecific intraventricular block  Cannot rule out anterior infarct  T wave abnormality, consider lateral ischemia           Rachel Garcia PA-C  [facility]  Department of Vascular Neurology   [facility] Card[facility]

## 2025-03-01 NOTE — ASSESSMENT & PLAN NOTE
-Stroke risk factor  -Can begin to normalize BP.  MAP >65  -Avoid hypotension  -BP range in the last 24 hrs: BP  Min: 97/57  Max: 135/70  -PRN labetalol/hydralazine

## 2025-03-01 NOTE — PT/OT/SLP PROGRESS
Occupational Therapy      Patient Name:  Kandace Rapp   MRN:  [id number]    Patient not seen today secondary to  (pt with HOB flat orders however these were lifted per JEANCARLOS Powell ok for EOB via secure chat.  OT unable to return to evaluate on this date therefore will attempt evaluation on 3/2/25 as appropriate.).     3/1/2025

## 2025-03-01 NOTE — PLAN OF CARE
CICU DAILY GOALS       A: Awake    RASS: Goal -    Actual - RASS (Watson Agitation-Sedation Scale): alert and calm   Restraint necessity:    B: Breath   SBT: Not intubated   C: Coordinate A & B, analgesics/sedatives   Pain: managed    SAT: Not intubated  D: Delirium   CAM-ICU:    E: Early(intubated/ Progressive (non-intubated) Mobility   Activity: Activity Management: Ankle pumps - L1, Arm raise - L1, Heel slide - L1, Leg kicks - L2, Rolling - L1, Up in chair - L3  FAS: Feeding/Nutrition   Diet order: Diet/Nutrition Received: 2 gram sodium, low saturated fat/low cholesterol,   Fluid restriction:    T: Thrombus   DVT prophylaxis: VTE Core Measure: Pharmacological prophylaxis initiated/maintained  H: HOB Elevation   Head of Bed (HOB) Positioning: HOB elevated, HOB at 30-45 degrees  U: Ulcer Prophylaxis   GI: no  G: Glucose control    S: Skin   Bathing/Skin Care: bath, complete;dressed/undressed;incontinence care;linen changed;moisturizer applied (02/28/25 2101)  Wounds: No  Wound care consulted: No  B: Bowel Function   no issues   I: Indwelling Catheters   Alvarado necessity:     CVC necessity: No  D: De-escalation Antibx   No  Plan for the day   Monitor Neuro status closely  Family/Goals of care/Code Status   Code Status: Full Code    Neuro status has improved.  Pt up in chair for a couple of hours once given the okay from Neuro team.   Hourly neuro checks not needed at this time per Neuro team.      No acute events throughout day, VS and assessment per flow sheet, patient progressing towards goals as tolerated, plan of care reviewed with Kandace Rapp and family, all concerns addressed, will continue to monitor.

## 2025-03-01 NOTE — ASSESSMENT & PLAN NOTE
Patient will be admitted to CICU for close monitoring and observation given recent TAVR and now new L MCA stroke. No intervention recommended per vascular neurology team given distal location and completion of infarct on initial imaging.   - Follow up MRI head with evolving left MCA infarct and possible petechial hemorrhage. CT Head without evidence of hemorrhagic conversion.    - Vascular neuro following, appreciate assistance  - Asa/plavix (plavix load completed 2/28)  - HI statin  - Mechanical prophylaxis: Place SCDs  - Q1 hour neuro checks

## 2025-03-01 NOTE — ASSESSMENT & PLAN NOTE
Kandace Rapp is a 69 y.o. female with PMH of HTN, CHF, RCC s/p L partial nephrectomy (cancer free) and severe AS that presented today for scheduled outpatient TAVR. Stat CTH obtained for new confusion/speech difficulty while in PACU post-procedure which revealed early ischemic changes in L MCA territory,  at which time a stroke code was called. LKW 745am 2/27, approx 10 hrs prior. OOW for TNK. Neuro exam significant for difficulty with following commands/answering orientation questions, RFD, mild RUE/RLE weakness with subtle drift, decreased sensation on R side, and aphasia (receptive > expressive) and dysarthria. NIHSS 8. CTH with early ischemic changes in inferior distal L MCA territory, CTA concerning for distal L M3 occlusion (vs high grade stenosis as per radiology read). Discussed imaging with [facility], given infarct appears completed on CTH and distal location of lesion determined not amenable to intervention therefore not taken for thrombectomy.  Patient admitted to CICU for close monitoring and observation given recent TAVR and now new L MCA stroke.     CT head and MRI Brain 02/28 reviewed by vascular neurology. Some discrepancy on different imaging techniques with hemorrhagic transformation seen on SWI. No change in neuro exam. Patient doing well this morning. Neuro exam stable, aphasia seems to be improving. Able to follow commands more consistently and answer LOC questions appropriately. Patient may sit up/work with therapy, Avoid hypotension, MAP>65.     Antithrombotics for secondary stroke prevention: Antiplatelets: Aspirin: 81 mg daily    Statins for secondary stroke prevention and hyperlipidemia, if present: Statins: Atorvastatin- 40 mg daily    Aggressive risk factor modification: HTN, HLD, Diet, Exercise, Obesity     Rehab efforts: The patient has been evaluated by a stroke team provider and the therapy needs have been fully considered based off the presenting complaints and exam findings.  The following therapy evaluations are needed: PT evaluate and treat, OT evaluate and treat, SLP evaluate and treat    Diagnostics ordered/pending: None     VTE prophylaxis: Heparin 5000 units SQ every 8 hours  Mechanical prophylaxis: Place SCDs    BP parameters:  Begin to normalize BP. Avoid Hypotension. MAP>65        No further recommendations. Vascular neurology will sign off at this time.    Please do not hesitate to contact us with any questions. Thank you for involving us in the care of this patient.

## 2025-03-01 NOTE — ASSESSMENT & PLAN NOTE
Patient's blood pressure range in the last 24 hours was: BP  Min: 97/57  Max: 135/70.The patient's inpatient anti-hypertensive regimen is listed below:  Current Antihypertensives  NIFEdipine 24 hr tablet 30 mg, Daily, Oral  hydrALAZINE injection 10 mg, Every 4 hours PRN, Intravenous    Plan  - Permissive hypertension (SBP < 220 mm Hg)   - Continue current medications as above

## 2025-03-01 NOTE — ASSESSMENT & PLAN NOTE
Body mass index is 36.73 kg/m². Morbid obesity complicates all aspects of disease management from diagnostic modalities to treatment. Weight loss encouraged and health benefits explained to patient.

## 2025-03-01 NOTE — ASSESSMENT & PLAN NOTE
Kandace Rapp is a 69 y.o. female with PMH of HTN, CHF, RCC s/p L partial nephrectomy (cancer free) and severe AS that presented today for scheduled outpatient TAVR. Stat CTH obtained for new confusion/speech difficulty while in PACU post-procedure which revealed early ischemic changes in L MCA territory,  at which time a stroke code was called. LKW 745am 2/27, approx 10 hrs prior. OOW for TNK. Neuro exam significant for difficulty with following commands/answering orientation questions, RFD, mild RUE/RLE weakness with subtle drift, decreased sensation on R side, and aphasia (receptive > expressive) and dysarthria. NIHSS 8. CTH with early ischemic changes in inferior distal L MCA territory, CTA concerning for distal L M3 occlusion (vs high grade stenosis as per radiology read). Discussed imaging with [facility], given infarct appears completed on CTH and distal location of lesion determined not amenable to intervention therefore not taken for thrombectomy.  Patient admitted to CICU for close monitoring and observation given recent TAVR and now new L MCA stroke.     CT head and MRI Brain 02/28 reviewed by vascular neurology. Some discrepancy on different imaging techniques with hemorrhagic transformation seen on SWI. No change in neuro exam. Patient doing well this morning. Neuro exam stable, aphasia seems to be improving. Able to follow commands more consistently and answer LOC questions appropriately. Patient may sit up/work with therapy, Avoid hypotension, MAP>65. Follow- up with Vascular Neurology outpatient 4-6 weeks post discharge.    Antithrombotics for secondary stroke prevention: Antiplatelets: Aspirin: 81 mg daily  Clopidogrel: 75 mg daily    Statins for secondary stroke prevention and hyperlipidemia, if present: Statins: Atorvastatin- 40 mg daily    Aggressive risk factor modification: HTN, HLD, Diet, Exercise, Obesity     Rehab efforts: The patient has been evaluated by a stroke team provider and the  therapy needs have been fully considered based off the presenting complaints and exam findings. The following therapy evaluations are needed: PT evaluate and treat, OT evaluate and treat, SLP evaluate and treat    Diagnostics ordered/pending: None     VTE prophylaxis: Heparin 5000 units SQ every 8 hours  Mechanical prophylaxis: Place SCDs    BP parameters:  Begin to normalize BP. Avoid Hypotension. MAP>65        No further recommendations. Vascular neurology will sign off at this time.    Please do not hesitate to contact us with any questions. Thank you for involving us in the care of this patient.

## 2025-03-01 NOTE — PROGRESS NOTES
[facility]  Cardiology  Progress Note    Patient Name: Kandace Rapp  MRN: [id number]  Admission Date: 2/27/2025  Hospital Length of Stay: 2 days  Code Status: Full Code   Attending Physician: Anurag Way MD   Primary Care Physician: Ishmael Jacobson MD  Expected Discharge Date: 3/4/2025  Principal Problem:LBBB (left bundle branch block)    Subjective:     Hospital Course:   Patient admitted to CCU for management of newly discovered LBBB as well as post op TAVR c/b L MCA. Patient noted to have expressive aphasia. Loaded with plavix, continued on DAPT and HI statin. Vascular neurology following. Follow up MRI concerning for possible hemorrhagic conversion, however this was disconcordant with repeat head CT. Neurologic exam improving. EP following for LBBB, monitoring need for PPM placement.    Interval History: No acute events overnight. Vascular neurology noted discrepancy between CTH and MRI, specifically that MRI was concerning for hemorrhagic conversion. Continuing to monitor. Pts memory improving. Continue telemetry monitoring     ROS  Objective:     Vital Signs (Most Recent):  Temp: 98 °F (36.7 °C) (03/01/25 1105)  Pulse: 87 (03/01/25 1105)  Resp: 14 (03/01/25 1105)  BP: (!) 97/57 (03/01/25 1105)  SpO2: 97 % (03/01/25 1105) Vital Signs (24h Range):  Temp:  [97.9 °F (36.6 °C)-99.7 °F (37.6 °C)] 98 °F (36.7 °C)  Pulse:  [80-97] 87  Resp:  [14-34] 14  SpO2:  [95 %-99 %] 97 %  BP: ()/(56-76) 97/57     Weight: 91.1 kg (200 lb 12.8 oz)  Body mass index is 36.73 kg/m².     SpO2: 97 %         Intake/Output Summary (Last 24 hours) at 3/1/2025 1233  Last data filed at 3/1/2025 1139  Gross per 24 hour   Intake 1602.19 ml   Output 1000 ml   Net 602.19 ml       Lines/Drains/Airways       Central Venous Catheter Line  Duration                  Percutaneous Central Line - Cordis 02/27/25 1005 Subclavian Right 2 days              Drain  Duration             Female External Urinary  Catheter w/ Suction 02/28/25 0705 1 day              Peripheral Intravenous Line  Duration                  Peripheral IV - Single Lumen 02/27/25 0632 20 G Left Antecubital 2 days         Peripheral IV - Single Lumen 02/27/25 0757 16 G Right Antecubital 2 days         Peripheral IV - Single Lumen 02/28/25 1105 20 G No Anterior;Distal;Left Forearm 1 day                       Physical Exam  Vitals and nursing note reviewed.   Constitutional:       General: She is not in acute distress.     Appearance: Normal appearance. She is not ill-appearing.   HENT:      Head: Normocephalic and atraumatic.   Cardiovascular:      Rate and Rhythm: Normal rate and regular rhythm.      Heart sounds: Normal heart sounds. No murmur heard.  Pulmonary:      Effort: Pulmonary effort is normal. No respiratory distress.      Breath sounds: Normal breath sounds. No wheezing.   Abdominal:      General: Abdomen is flat. There is no distension.   Musculoskeletal:      Cervical back: Normal range of motion. No rigidity.   Skin:     General: Skin is warm and dry.      Findings: No lesion.   Neurological:      Comments: Speech and memory improving   Psychiatric:         Mood and Affect: Mood normal.         Behavior: Behavior normal.            Significant Labs: All pertinent lab results from the last 24 hours have been reviewed.    Significant Imaging:  Reviewed  Assessment and Plan:     * LBBB (left bundle branch block)  70yo F w ho htn and severe AS, s/p TAVR 2/28. Post op course complicated by L MCA CVA. EKG post procedure noting LBBB with . Patient without prior history of conduction abnormalities. TVP placed in the cath lab. In the s/o new LBBB patient may require PPM implantation, though post operative inflammation could also be contributing to the new LBBB and resolve over the next 24hrs.   - TVP removed given need for MRI brain      Recommendations:  - Continuous Tele  - EP monitoring for possible EPS +/- PPM placement early in the  week  - Avoid BB and AV fabienne blockers       Embolic stroke involving left middle cerebral artery  Patient will be admitted to CICU for close monitoring and observation given recent TAVR and now new L MCA stroke. No intervention recommended per vascular neurology team given distal location and completion of infarct on initial imaging.   - Follow up MRI head with evolving left MCA infarct and possible petechial hemorrhage. CT Head without evidence of hemorrhagic conversion.    - Vascular neuro following, appreciate assistance  - Asa/plavix (plavix load completed 2/28)  - HI statin  - Mechanical prophylaxis: Place SCDs  - Q1 hour neuro checks    Renal mass  History of RCC s/p left partial nephrectomy    HTN (hypertension)  Patient's blood pressure range in the last 24 hours was: BP  Min: 97/57  Max: 135/70.The patient's inpatient anti-hypertensive regimen is listed below:  Current Antihypertensives  NIFEdipine 24 hr tablet 30 mg, Daily, Oral  hydrALAZINE injection 10 mg, Every 4 hours PRN, Intravenous    Plan  - Permissive hypertension (SBP < 220 mm Hg)   - Continue current medications as above     BMI 36.0-36.9,adult  Body mass index is 36.73 kg/m². Morbid obesity complicates all aspects of disease management from diagnostic modalities to treatment. Weight loss encouraged and health benefits explained to patient.       Nonrheumatic aortic valve stenosis  S/p TAVR 2/27         VTE Risk Mitigation (From admission, onward)           Ordered     Place sequential compression device  Until discontinued         02/27/25 1926                    Alea Garcia DO  Cardiology  [facility]

## 2025-03-01 NOTE — PT/OT/SLP PROGRESS
Physical Therapy      Patient Name:  Kandace Rapp   MRN:  [id number]    Patient not seen today secondary to patient with \[facility] [place] [facility]\"" flat orders. Will follow-up for initial evaluation at next appropriate date, pending \[facility] [place] [facility]\"" liberalized.

## 2025-03-01 NOTE — SUBJECTIVE & OBJECTIVE
Interval History: No acute events overnight. Vascular neurology noted discrepancy between CTH and MRI, specifically that MRI was concerning for hemorrhagic conversion. Continuing to monitor. Pts memory improving. Continue telemetry monitoring     ROS  Objective:     Vital Signs (Most Recent):  Temp: 98 °F (36.7 °C) (03/01/25 1105)  Pulse: 87 (03/01/25 1105)  Resp: 14 (03/01/25 1105)  BP: (!) 97/57 (03/01/25 1105)  SpO2: 97 % (03/01/25 1105) Vital Signs (24h Range):  Temp:  [97.9 °F (36.6 °C)-99.7 °F (37.6 °C)] 98 °F (36.7 °C)  Pulse:  [80-97] 87  Resp:  [14-34] 14  SpO2:  [95 %-99 %] 97 %  BP: ()/(56-76) 97/57     Weight: 91.1 kg (200 lb 12.8 oz)  Body mass index is 36.73 kg/m².     SpO2: 97 %         Intake/Output Summary (Last 24 hours) at 3/1/2025 1233  Last data filed at 3/1/2025 1139  Gross per 24 hour   Intake 1602.19 ml   Output 1000 ml   Net 602.19 ml       Lines/Drains/Airways       Central Venous Catheter Line  Duration                  Percutaneous Central Line - Cordis 02/27/25 1005 Subclavian Right 2 days              Drain  Duration             Female External Urinary Catheter w/ Suction 02/28/25 0705 1 day              Peripheral Intravenous Line  Duration                  Peripheral IV - Single Lumen 02/27/25 0632 20 G Left Antecubital 2 days         Peripheral IV - Single Lumen 02/27/25 0757 16 G Right Antecubital 2 days         Peripheral IV - Single Lumen 02/28/25 1105 20 G No Anterior;Distal;Left Forearm 1 day                       Physical Exam  Vitals and nursing note reviewed.   Constitutional:       General: She is not in acute distress.     Appearance: Normal appearance. She is not ill-appearing.   HENT:      Head: Normocephalic and atraumatic.   Cardiovascular:      Rate and Rhythm: Normal rate and regular rhythm.      Heart sounds: Normal heart sounds. No murmur heard.  Pulmonary:      Effort: Pulmonary effort is normal. No respiratory distress.      Breath sounds: Normal breath  sounds. No wheezing.   Abdominal:      General: Abdomen is flat. There is no distension.   Musculoskeletal:      Cervical back: Normal range of motion. No rigidity.   Skin:     General: Skin is warm and dry.      Findings: No lesion.   Neurological:      Comments: Speech and memory improving   Psychiatric:         Mood and Affect: Mood normal.         Behavior: Behavior normal.            Significant Labs: All pertinent lab results from the last 24 hours have been reviewed.    Significant Imaging:  Reviewed

## 2025-03-01 NOTE — CARE UPDATE
EP Care Update     Patient unfortunately with additional acute infarcts in cerebellum noted on repeat imaging, along with possible hemorrhagic transformation. Vascular neurology evaluated, no change in exam, still monitoring. Rhythm remains wide on tele, rate 80-90s with occasional PVCs. Will continue to hold on EPS until neurologically stable.    GASPER Azevedo  Cardiovascular Disease fellow, PGY-4  [facility]

## 2025-03-01 NOTE — ASSESSMENT & PLAN NOTE
68yo F w ho htn and severe AS, s/p TAVR 2/28. Post op course complicated by L MCA CVA. EKG post procedure noting LBBB with . Patient without prior history of conduction abnormalities. TVP placed in the cath lab. In the s/o new LBBB patient may require PPM implantation, though post operative inflammation could also be contributing to the new LBBB and resolve over the next 24hrs.   - TVP removed given need for MRI brain      Recommendations:  - Continuous Tele  - EP monitoring for possible EPS +/- PPM placement early in the week  - Avoid BB and AV fabienne blockers

## 2025-03-01 NOTE — SUBJECTIVE & OBJECTIVE
Neurologic Chief Complaint: L MCA stroke    Subjective:     Interval History: Patient is seen for follow-up neurological assessment and treatment recommendations: See hospital course.    HPI, Past Medical, Family, and Social History remains the same as documented in the initial encounter.     Review of Systems   Constitutional:  Negative for chills and fever.   Eyes:  Negative for visual disturbance.   Gastrointestinal:  Negative for nausea and vomiting.   Neurological:  Positive for facial asymmetry and speech difficulty.     Scheduled Meds:   aspirin  81 mg Oral Daily    atorvastatin  40 mg Oral QHS    clopidogreL  75 mg Oral Daily    NIFEdipine  30 mg Oral Daily     Continuous Infusions:   0.9% NaCl   Intravenous Continuous 10 mL/hr at 03/01/25 1139 Rate Verify at 03/01/25 1139     PRN Meds:  Current Facility-Administered Medications:     hydrALAZINE, 10 mg, Intravenous, Q4H PRN    Objective:     Vital Signs (Most Recent):  Temp: 98 °F (36.7 °C) (03/01/25 1105)  Pulse: 87 (03/01/25 1105)  Resp: 14 (03/01/25 1105)  BP: (!) 97/57 (03/01/25 1105)  SpO2: 97 % (03/01/25 1105)  BP Location: Right forearm    Vital Signs Range (Last 24H):  Temp:  [97.9 °F (36.6 °C)-99.7 °F (37.6 °C)]   Pulse:  [80-97]   Resp:  [14-34]   BP: ()/(56-76)   SpO2:  [95 %-99 %]   BP Location: Right forearm       Physical Exam  HENT:      Head: Normocephalic and atraumatic.   Eyes:      Extraocular Movements: Extraocular movements intact.      Conjunctiva/sclera: Conjunctivae normal.   Cardiovascular:      Rate and Rhythm: Normal rate.   Pulmonary:      Effort: Pulmonary effort is normal.   Skin:     General: Skin is warm and dry.   Neurological:      Mental Status: She is alert.              Neurological Exam:   LOC: alert  Attention Span: Good   Language: Expressive aphasia, Receptive aphasia, improved   Articulation: No dysarthria  Orientation: Person, Place, Time   EOM (CN III, IV, VI): Full/intact  Facial Movement (CN VII): Lower  "facial weakness on the Right  Motor: Arm left  Normal 5/5  Leg left  Normal 5/5  Arm right  Normal 5/5  Leg right Normal 5/5  Sensation: Intact to light touch, temperature and vibration    Laboratory:  CMP:   Recent Labs   Lab 03/01/25 0311   CALCIUM 8.4*      K 3.6   CO2 22*      BUN 11   CREATININE 0.6     BMP:   Recent Labs   Lab 03/01/25 0311      K 3.6      CO2 22*   BUN 11   CREATININE 0.6   CALCIUM 8.4*     CBC:   Recent Labs   Lab 03/01/25 0311   WBC 8.49   RBC 3.61*   HGB 9.9*   HCT 31.9*   *   MCV 88   MCH 27.4   MCHC 31.0*     Lipid Panel:   Recent Labs   Lab 02/27/25 2115   CHOL 117*   LDLCALC 63.4   HDL 42   TRIG 58     Coagulation: No results for input(s): "PT", "INR", "APTT" in the last 168 hours.  Platelet Aggregation Study: No results for input(s): "PLTAGG", "PLTAGINTERP", "PLTAGREGLACO", "ADPPLTAGGREG" in the last 168 hours.  Hgb A1C:   Recent Labs   Lab 02/27/25 2115   HGBA1C 5.2     TSH: No results for input(s): "TSH" in the last 168 hours.    Diagnostic Results     Brain imaging:    MRI Brain without contrast 02/28/25  Evolving left MCA distribution infarct involving the left temporoparietal region and posterior insular cortex.  Associated susceptibility artifact likely representing sequela of petechial hemorrhage.  Mild localized sulcal effacement without evidence for significant midline shift.  Continued follow-up is recommended.     Additional acute lacunar type infarcts about the left cerebellar hemisphere    CT Head without contrast 02/28/25  Evolving hypoattenuation in the left temporoparietal lobe in keeping with recent left MCA distribution infarct.  Overall slightly increased localized edema and mass effect with effacement of the left temporoparietal sulci and 1-2 mm rightward midline shift.  No evidence of hemorrhagic conversion.  Continued follow-up is recommended.     No discrete new acute infarct elsewhere.    CTH without contrast " 2/27/2025  Impression:  Subtle parenchymal hypoattenuation in keeping with an acute left MCA distribution infarct as above.        Vessel Imaging:  CTA stroke multiphase 2/27/2025  Images personally reviewed and discussed with VN staff. Concern for L M3 occlusion vs critical stenosis, early ischemic changes in inferior L MCA territory. 1. Attenuated flow within some of the posterior left-sided M3 vessels.  No large vessel occlusion.  Recommend further evaluation with MRI of the brain.  2. Continued hypoattenuation in the left temporoparietal region in keeping with an acute left MCA distribution infarct.    Cardiac Evaluation   EKG 03/01/2025  Normal sinus rhythm, nonspecific intraventricular block  Cannot rule out anterior infarct  T wave abnormality, consider lateral ischemia

## 2025-03-02 LAB
ANION GAP SERPL CALC-SCNC: 9 MMOL/L (ref 8–16)
BASOPHILS # BLD AUTO: 0.02 K/UL (ref 0–0.2)
BASOPHILS NFR BLD: 0.2 % (ref 0–1.9)
BUN SERPL-MCNC: 14 MG/DL (ref 8–23)
CALCIUM SERPL-MCNC: 8.9 MG/DL (ref 8.7–10.5)
CHLORIDE SERPL-SCNC: 105 MMOL/L (ref 95–110)
CO2 SERPL-SCNC: 23 MMOL/L (ref 23–29)
CREAT SERPL-MCNC: 0.7 MG/DL (ref 0.5–1.4)
DIFFERENTIAL METHOD BLD: ABNORMAL
EOSINOPHIL # BLD AUTO: 0.1 K/UL (ref 0–0.5)
EOSINOPHIL NFR BLD: 1.3 % (ref 0–8)
ERYTHROCYTE [DISTWIDTH] IN BLOOD BY AUTOMATED COUNT: 11.9 % (ref 11.5–14.5)
EST. GFR  (NO RACE VARIABLE): >60 ML/MIN/1.73 M^2
GLUCOSE SERPL-MCNC: 105 MG/DL (ref 70–110)
HCT VFR BLD AUTO: 31.4 % (ref 37–48.5)
HGB BLD-MCNC: 10.2 G/DL (ref 12–16)
IMM GRANULOCYTES # BLD AUTO: 0.02 K/UL (ref 0–0.04)
IMM GRANULOCYTES NFR BLD AUTO: 0.2 % (ref 0–0.5)
LYMPHOCYTES # BLD AUTO: 2.3 K/UL (ref 1–4.8)
LYMPHOCYTES NFR BLD: 27.4 % (ref 18–48)
MAGNESIUM SERPL-MCNC: 1.8 MG/DL (ref 1.6–2.6)
MCH RBC QN AUTO: 28.7 PG (ref 27–31)
MCHC RBC AUTO-ENTMCNC: 32.5 G/DL (ref 32–36)
MCV RBC AUTO: 88 FL (ref 82–98)
MONOCYTES # BLD AUTO: 0.7 K/UL (ref 0.3–1)
MONOCYTES NFR BLD: 7.9 % (ref 4–15)
NEUTROPHILS # BLD AUTO: 5.3 K/UL (ref 1.8–7.7)
NEUTROPHILS NFR BLD: 63 % (ref 38–73)
NRBC BLD-RTO: 0 /100 WBC
OHS QRS DURATION: 136 MS
OHS QTC CALCULATION: 491 MS
PHOSPHATE SERPL-MCNC: 3.6 MG/DL (ref 2.7–4.5)
PLATELET # BLD AUTO: 155 K/UL (ref 150–450)
PMV BLD AUTO: 9.9 FL (ref 9.2–12.9)
POTASSIUM SERPL-SCNC: 4.2 MMOL/L (ref 3.5–5.1)
RBC # BLD AUTO: 3.56 M/UL (ref 4–5.4)
SODIUM SERPL-SCNC: 137 MMOL/L (ref 136–145)
WBC # BLD AUTO: 8.48 K/UL (ref 3.9–12.7)

## 2025-03-02 PROCEDURE — 25000003 PHARM REV CODE 250: Performed by: STUDENT IN AN ORGANIZED HEALTH CARE EDUCATION/TRAINING PROGRAM

## 2025-03-02 PROCEDURE — 97162 PT EVAL MOD COMPLEX 30 MIN: CPT

## 2025-03-02 PROCEDURE — 20000000 HC ICU ROOM

## 2025-03-02 PROCEDURE — 80048 BASIC METABOLIC PNL TOTAL CA: CPT | Performed by: INTERNAL MEDICINE

## 2025-03-02 PROCEDURE — 94761 N-INVAS EAR/PLS OXIMETRY MLT: CPT

## 2025-03-02 PROCEDURE — 97530 THERAPEUTIC ACTIVITIES: CPT

## 2025-03-02 PROCEDURE — 25000003 PHARM REV CODE 250

## 2025-03-02 PROCEDURE — 85025 COMPLETE CBC W/AUTO DIFF WBC: CPT | Performed by: INTERNAL MEDICINE

## 2025-03-02 PROCEDURE — 97535 SELF CARE MNGMENT TRAINING: CPT

## 2025-03-02 PROCEDURE — 84100 ASSAY OF PHOSPHORUS: CPT | Performed by: INTERNAL MEDICINE

## 2025-03-02 PROCEDURE — 99291 CRITICAL CARE FIRST HOUR: CPT | Mod: ,,, | Performed by: INTERNAL MEDICINE

## 2025-03-02 PROCEDURE — 97165 OT EVAL LOW COMPLEX 30 MIN: CPT

## 2025-03-02 PROCEDURE — 99900035 HC TECH TIME PER 15 MIN (STAT)

## 2025-03-02 PROCEDURE — 83735 ASSAY OF MAGNESIUM: CPT | Performed by: INTERNAL MEDICINE

## 2025-03-02 PROCEDURE — 99231 SBSQ HOSP IP/OBS SF/LOW 25: CPT | Mod: GC,,, | Performed by: INTERNAL MEDICINE

## 2025-03-02 PROCEDURE — 63600175 PHARM REV CODE 636 W HCPCS: Performed by: STUDENT IN AN ORGANIZED HEALTH CARE EDUCATION/TRAINING PROGRAM

## 2025-03-02 RX ORDER — POLYETHYLENE GLYCOL 3350 17 G/17G
17 POWDER, FOR SOLUTION ORAL 2 TIMES DAILY
Status: DISCONTINUED | OUTPATIENT
Start: 2025-03-02 | End: 2025-03-03 | Stop reason: HOSPADM

## 2025-03-02 RX ORDER — MAGNESIUM SULFATE HEPTAHYDRATE 40 MG/ML
2 INJECTION, SOLUTION INTRAVENOUS ONCE
Status: COMPLETED | OUTPATIENT
Start: 2025-03-02 | End: 2025-03-02

## 2025-03-02 RX ADMIN — SENNOSIDES AND DOCUSATE SODIUM 1 TABLET: 50; 8.6 TABLET ORAL at 08:03

## 2025-03-02 RX ADMIN — ASPIRIN 81 MG CHEWABLE TABLET 81 MG: 81 TABLET CHEWABLE at 09:03

## 2025-03-02 RX ADMIN — CLOPIDOGREL BISULFATE 75 MG: 75 TABLET ORAL at 09:03

## 2025-03-02 RX ADMIN — MAGNESIUM SULFATE HEPTAHYDRATE 2 G: 40 INJECTION, SOLUTION INTRAVENOUS at 06:03

## 2025-03-02 RX ADMIN — POLYETHYLENE GLYCOL 3350 17 G: 17 POWDER, FOR SOLUTION ORAL at 09:03

## 2025-03-02 RX ADMIN — ATORVASTATIN CALCIUM 40 MG: 40 TABLET, FILM COATED ORAL at 08:03

## 2025-03-02 NOTE — PROGRESS NOTES
"[facility]  Cardiac Electrophysiology  Progress Note    Admission Date: 2/27/2025  Code Status: Full Code   Attending Physician: Anurag Way MD   Expected Discharge Date: 3/4/2025  Principal Problem:LBBB (left bundle branch block)    Subjective:     Interval History:   - NAEON ON, VSS  - Patient improving neurologically, memory and speech improved this AM  - Tele sinus in the 80s, QRS wide    Objective:     Vital Signs (Most Recent):  Temp: 98 °F (36.7 °C) (03/02/25 0705)  Pulse: 89 (03/02/25 1005)  Resp: 19 (03/02/25 1005)  BP: (!) 105/54 (03/02/25 1005)  SpO2: 97 % (03/02/25 1005) Vital Signs (24h Range):  Temp:  [98 °F (36.7 °C)-98.9 °F (37.2 °C)] 98 °F (36.7 °C)  Pulse:  [80-89] 89  Resp:  [13-34] 19  SpO2:  [96 %-99 %] 97 %  BP: ()/(51-76) 105/54     Weight: 89.3 kg (196 lb 12.8 oz)  Body mass index is 36 kg/m².     SpO2: 97 %        Physical Exam  Constitutional:       Appearance: Normal appearance.   HENT:      Mouth/Throat:      Mouth: Mucous membranes are moist.   Eyes:      Extraocular Movements: Extraocular movements intact.      Conjunctiva/sclera: Conjunctivae normal.   Cardiovascular:      Rate and Rhythm: Normal rate and regular rhythm.   Pulmonary:      Effort: Pulmonary effort is normal.   Abdominal:      General: Abdomen is flat.      Palpations: Abdomen is soft.   Skin:     General: Skin is warm and dry.   Neurological:      Mental Status: She is alert and oriented to person, place, and time.            Significant Labs: CMP:   Recent Labs   Lab 03/01/25  0311 03/01/25  1442 03/02/25  0235    139 137   K 3.6 4.3 4.2    105 105   CO2 22* 26 23   * 135* 105   BUN 11 13 14   CREATININE 0.6 0.7 0.7   CALCIUM 8.4* 9.2 8.9   ANIONGAP 9 8 9   , CBC:   Recent Labs   Lab 03/01/25  0311 03/02/25  0235   WBC 8.49 8.48   HGB 9.9* 10.2*   HCT 31.9* 31.4*   * 155   , and INR: No results for input(s): "INR", "PROTIME" in the last 48 " hours.    Significant Imaging: No new.   Assessment and Plan:     * LBBB (left bundle branch block)  68 yo F with a history significant for severe AS s/p TAVR, c/b new LBBB. Patient without prior history of conduction abnormalities. EKG post procedure noting LBBB with . TVP placed in the cath lab. Notably, patient found to have L MCA stroke 2/27; not amenable to intervention and admitted to the CICU. Recovering well neurologically post stroke. QRS remains wide, ECG 3/1 .     Recommendations:  - Tentatively plan for EPS +/- device implantation tomorrow, will evaluate today and in the morning   - Hold heparin products at [facility]  - Tele      Evita Azevedo MD  Cardiac Electrophysiology  [facility]

## 2025-03-02 NOTE — PLAN OF CARE
"Cardiac ICU Care Plan      POC reviewed with Kandace Rapp and family. Questions and concerns addressed. No acute events noted this shift. Pt progressing toward goals. See below and flowsheets for full assessment and VS info.     Neuro:  Stuart Coma Scale  Best Eye Response: 4-->(E4) spontaneous  Best Motor Response: 6-->(M6) obeys commands  Best Verbal Response: 4-->(V4) confused  Stuart Coma Scale Score: 14  Assessment Qualifiers: patient not sedated/intubated, no eye obstruction present  Pupil PERRLA: yes  24 hr Temp:  [97.9 °F (36.6 °C)-98.9 °F (37.2 °C)]     CV:  Rhythm: normal sinus rhythm   DVT prophylaxis: VTE Core Measure: Pharmacological prophylaxis initiated/maintained  Pulses  Right Radial Pulse: 2+ (normal), palpation  Left Radial Pulse: 2+ (normal), palpation  Right Dorsalis Pedis Pulse: 1+ (weak), palpation  Left Dorsalis Pedis Pulse: 1+ (weak), palpation  Right Posterior Tibial Pulse: 1+ (weak), palpation  Left Posterior Tibial Pulse: 1+ (weak), palpation    Resp:  Room air     GI/:  GI prophylaxis: no  Diet/Nutrition Received: 2 gram sodium, low saturated fat/low cholesterol  Last Bowel Movement: 02/26/25  Voiding Characteristics: urethral catheter (bladder)   Intake/Output Summary (Last 24 hours) at 3/2/2025 0600  Last data filed at 3/2/2025 0501  Gross per 24 hour   Intake 1002.5 ml   Output 525 ml   Net 477.5 ml        Nutritional Supplement Intake: Quantity 0, Type: Boost    Labs/Accuchecks:  Recent Labs   Lab 02/28/25  0324 03/01/25  0311 03/02/25  0235   WBC 9.19 8.49 8.48   RBC 3.75* 3.61* 3.56*   HGB 10.6* 9.9* 10.2*   HCT 33.5* 31.9* 31.4*    149* 155    No results for input(s): "PT", "INR", "APTT" in the last 168 hours.   Recent Labs     02/28/25  0324 02/28/25  1119 03/02/25  0235     139   < > 137   K 3.8  3.8   < > 4.2   CO2 22*  23   < > 23     109   < > 105   BUN 10  10   < > 14   CREATININE 0.7  0.6   < > 0.7   ALKPHOS 101  --   --    ALT 9*  " "--   --    AST 28  --   --    BILITOT 0.7  --   --     < > = values in this interval not displayed.     No results for input(s): "CPK", "CPKMB", "MB", "TROPONINI" in the last 168 hours. No results for input(s): "PH", "PCO2", "PO2", "HCO3", "POCSATURATED", "BE" in the last 72 hours.    Electrolytes: N/A - electrolytes WDL  Accuchecks: none    Gtts/LDAs:   0.9% NaCl   Intravenous Continuous 10 mL/hr at 03/02/25 0501 Rate Verify at 03/02/25 0501       Lines/Drains/Airways       Central Venous Catheter Line  Duration                  Percutaneous Central Line - Cordis 02/27/25 1005 Subclavian Right 2 days              Drain  Duration             Female External Urinary Catheter w/ Suction 02/28/25 0705 1 day              Peripheral Intravenous Line  Duration                  Peripheral IV - Single Lumen 02/27/25 0632 20 G Left Antecubital 2 days         Peripheral IV - Single Lumen 02/28/25 1105 20 G No Anterior;Distal;Left Forearm 1 day                    Skin/Wounds  Bathing/Skin Care: linen changed (03/01/25 1505)  Wounds: No  Wound care consulted: No   "

## 2025-03-02 NOTE — SUBJECTIVE & OBJECTIVE
"Interval History:   - NAEON ON, VSS  - Patient improving neurologically, memory and speech improved this AM  - Tele sinus in the 80s, QRS wide    Objective:     Vital Signs (Most Recent):  Temp: 98 °F (36.7 °C) (03/02/25 0705)  Pulse: 89 (03/02/25 1005)  Resp: 19 (03/02/25 1005)  BP: (!) 105/54 (03/02/25 1005)  SpO2: 97 % (03/02/25 1005) Vital Signs (24h Range):  Temp:  [98 °F (36.7 °C)-98.9 °F (37.2 °C)] 98 °F (36.7 °C)  Pulse:  [80-89] 89  Resp:  [13-34] 19  SpO2:  [96 %-99 %] 97 %  BP: ()/(51-76) 105/54     Weight: 89.3 kg (196 lb 12.8 oz)  Body mass index is 36 kg/m².     SpO2: 97 %        Physical Exam  Constitutional:       Appearance: Normal appearance.   HENT:      Mouth/Throat:      Mouth: Mucous membranes are moist.   Eyes:      Extraocular Movements: Extraocular movements intact.      Conjunctiva/sclera: Conjunctivae normal.   Cardiovascular:      Rate and Rhythm: Normal rate and regular rhythm.   Pulmonary:      Effort: Pulmonary effort is normal.   Abdominal:      General: Abdomen is flat.      Palpations: Abdomen is soft.   Skin:     General: Skin is warm and dry.   Neurological:      Mental Status: She is alert and oriented to person, place, and time.            Significant Labs: CMP:   Recent Labs   Lab 03/01/25  0311 03/01/25  1442 03/02/25  0235    139 137   K 3.6 4.3 4.2    105 105   CO2 22* 26 23   * 135* 105   BUN 11 13 14   CREATININE 0.6 0.7 0.7   CALCIUM 8.4* 9.2 8.9   ANIONGAP 9 8 9   , CBC:   Recent Labs   Lab 03/01/25  0311 03/02/25  0235   WBC 8.49 8.48   HGB 9.9* 10.2*   HCT 31.9* 31.4*   * 155   , and INR: No results for input(s): "INR", "PROTIME" in the last 48 hours.    Significant Imaging: No new.   "

## 2025-03-02 NOTE — ASSESSMENT & PLAN NOTE
Patient's blood pressure range in the last 24 hours was: BP  Min: 93/55  Max: 124/62.The patient's inpatient anti-hypertensive regimen is listed below:  Current Antihypertensives  hydrALAZINE injection 10 mg, Every 4 hours PRN, Intravenous    Plan  - Permissive hypertension (SBP < 220 mm Hg)   - Continue current medications as above; nifedipine discontinued given stable BP

## 2025-03-02 NOTE — PROGRESS NOTES
[facility]  Cardiology  Progress Note    Patient Name: Kandace Rapp  MRN: [id number]  Admission Date: 2/27/2025  Hospital Length of Stay: 3 days  Code Status: Full Code   Attending Physician: Anurag Way MD   Primary Care Physician: Ishmael Jacobson MD  Expected Discharge Date: 3/4/2025  Principal Problem:LBBB (left bundle branch block)    Subjective:     Hospital Course:   Patient admitted to CCU for management of newly discovered LBBB as well as post op TAVR c/b L MCA. Patient noted to have expressive aphasia. Loaded with plavix, continued on DAPT and HI statin. Vascular neurology following. Follow up MRI concerning for possible hemorrhagic conversion, however this was disconcordant with repeat head CT. Neurologic exam improving, vascular neurology not concerned. Signed off with recommended follow up 4-6 weeks post discharge.  EP following for LBBB, monitoring need for PPM placement.    Interval History: No acute events overnight. Patient and family continue to report improvement in her neurologic status - memory and speech improving. EP planning for EPS tomorrow. NPO at midnight     ROS  Objective:     Vital Signs (Most Recent):  Temp: 98.1 °F (36.7 °C) (03/02/25 1105)  Pulse: 99 (03/02/25 1305)  Resp: (!) 30 (03/02/25 1305)  BP: (!) 94/54 (03/02/25 1205)  SpO2: 98 % (03/02/25 1305) Vital Signs (24h Range):  Temp:  [98 °F (36.7 °C)-98.9 °F (37.2 °C)] 98.1 °F (36.7 °C)  Pulse:  [80-99] 99  Resp:  [13-34] 30  SpO2:  [96 %-99 %] 98 %  BP: ()/(53-71) 94/54     Weight: 89.3 kg (196 lb 12.8 oz)  Body mass index is 36 kg/m².     SpO2: 98 %         Intake/Output Summary (Last 24 hours) at 3/2/2025 1357  Last data filed at 3/2/2025 1346  Gross per 24 hour   Intake 1013.37 ml   Output 775 ml   Net 238.37 ml       Lines/Drains/Airways       Central Venous Catheter Line  Duration                  Percutaneous Central Line - Cord 02/27/25 1005 Subclavian Right 3 days               Drain  Duration             Female External Urinary Catheter w/ Suction 02/28/25 0705 2 days              Peripheral Intravenous Line  Duration                  Peripheral IV - Single Lumen 02/27/25 0632 20 G Left Antecubital 3 days         Peripheral IV - Single Lumen 02/28/25 1105 20 G No Anterior;Distal;Left Forearm 2 days                       Physical Exam  Vitals and nursing note reviewed.   Constitutional:       General: She is not in acute distress.     Appearance: Normal appearance. She is not ill-appearing.   HENT:      Head: Normocephalic and atraumatic.   Cardiovascular:      Rate and Rhythm: Normal rate and regular rhythm.      Heart sounds: Normal heart sounds. No murmur heard.  Pulmonary:      Effort: Pulmonary effort is normal. No respiratory distress.      Breath sounds: Normal breath sounds. No wheezing.   Abdominal:      General: Abdomen is flat. There is no distension.   Musculoskeletal:      Cervical back: Normal range of motion. No rigidity.   Skin:     General: Skin is warm and dry.      Findings: No lesion.   Neurological:      Comments: Speech and memory improving   Psychiatric:         Mood and Affect: Mood normal.         Behavior: Behavior normal.            Significant Labs: All pertinent lab results from the last 24 hours have been reviewed.    Significant Imaging:  Reviewed  Assessment and Plan:     * LBBB (left bundle branch block)  70yo F w ho htn and severe AS, s/p TAVR 2/28. Post op course complicated by L MCA CVA. EKG post procedure noting LBBB with . Patient without prior history of conduction abnormalities. TVP placed in the cath lab. In the s/o new LBBB patient may require PPM implantation, though post operative inflammation could also be contributing to the new LBBB and resolve over the next 24hrs.   - TVP removed given need for MRI brain      Recommendations:  - Continuous Tele  - EP monitoring for possible EPS +/- PPM placement tomorrow  - NPO midnight  - Avoid BB and  AV fabienne blockers       Embolic stroke involving left middle cerebral artery  Patient will be admitted to CICU for close monitoring and observation given recent TAVR and now new L MCA stroke. No intervention recommended per vascular neurology team given distal location and completion of infarct on initial imaging.   - Follow up MRI head with evolving left MCA infarct and possible petechial hemorrhage. CT Head without evidence of hemorrhagic conversion.    - Vascular neuro now signed off   - Asa/plavix (plavix load completed 2/28)  - HI statin  - Mechanical prophylaxis: Place SCDs  - Follow up outpatient in 4-6 weeks    Renal mass  History of RCC s/p left partial nephrectomy    HTN (hypertension)  Patient's blood pressure range in the last 24 hours was: BP  Min: 93/55  Max: 124/62.The patient's inpatient anti-hypertensive regimen is listed below:  Current Antihypertensives  hydrALAZINE injection 10 mg, Every 4 hours PRN, Intravenous    Plan  - Permissive hypertension (SBP < 220 mm Hg)   - Continue current medications as above; nifedipine discontinued given stable BP    BMI 36.0-36.9,adult  Body mass index is 36 kg/m². Morbid obesity complicates all aspects of disease management from diagnostic modalities to treatment. Weight loss encouraged and health benefits explained to patient.       Nonrheumatic aortic valve stenosis  S/p TAVR 2/27   - Continue asa        VTE Risk Mitigation (From admission, onward)           Ordered     Place sequential compression device  Until discontinued         02/27/25 1926                    Alea Garcia DO  Cardiology  [facility]    .[email or web address]

## 2025-03-02 NOTE — PT/OT/SLP EVAL
Occupational Therapy   Co-Evaluation and Treatment    Name: Kandace Rapp  MRN: [id number]  Admitting Diagnosis: LBBB (left bundle branch block)  Recent Surgery: Procedure(s) (LRB):  REPLACEMENT, AORTIC VALVE, TRANSCATHETER (TAVR) (N/A)  Cardiac Cath Cosurgeon (N/A)  REPLACEMENT, AORTIC VALVE, TRANSCATHETER (TAVR) 3 Days Post-Op    Recommendations:     Discharge Recommendations: Low Intensity Therapy  Discharge Equipment Recommendations:  none  Barriers to discharge:  None    Assessment:     Kandace Rapp is a 69 y.o. female with a medical diagnosis of LBBB (left bundle branch block), underwent TAVR, in PACU impaired speech/confusion noted, code stroke called, L MCA territory infarct.  She presents with [erformance deficits affecting function: impaired self care skills, impaired endurance, gait instability, impaired functional mobility, impaired balance, decreased safety awareness, impaired cardiopulmonary response to activity, impaired cognition.  Pt presents in bed, agreeable to tx. She is A&Ox4, however speech difficulty (word finding), and ability to follow certain cues/multistep directives impaired. Any noted R weakness documented was not identifiable on ROM/MMT assessment during evaluation today, however her balance in impaired from baseline, and safety awareness is reduced. She tolerates LBD, transition to EOB, ambulation >< toilet and sinkside for use and hygiene, and ended in chair per RN clear all given SB to CGA no AD required. She has frequent inability to find words with, at times, unintelligible sentences/responses. She follows basic one step commands, but difficutly carrying out more complex/multistep tasks/cues without direction. Per pt and family, she was winded by PETERSON home before admit, and endurance is not much diminished from baseline. Her vitals remain WFL t/o session. Given deficits from baseline, pt is a good candidate for low intensity tx at the post acute level to address  deficits impacting safety and IND with daily task in and around the home.      Rehab Prognosis: Good; patient would benefit from acute skilled OT services to address these deficits and reach maximum level of function.       Plan:     Patient to be seen 4 x/week to address the above listed problems via self-care/home management, therapeutic activities, therapeutic exercises, neuromuscular re-education, cognitive retraining  Plan of Care Expires: 04/01/25  Plan of Care Reviewed with: patient, family    Subjective     Chief Complaint: L MCA stroke post TAVR  Patient/Family Comments/goals: To get better    Occupational Profile:  Living Environment: Lives with dtr in [facility] with 5-6 [employer] R HR, t/s combo with shower chair  Previous level of function: IND no AD/DME, drives, shops, engaged  Equipment Used at Home: shower chair  Assistance upon Discharge: Dtr (works for [employer], flexible schedule but does need to leave home to work    Pain/Comfort:  Pain Rating 1: 0/10  Pain Rating Post-Intervention 1: 0/10    Patients cultural, spiritual, L[employer] day conflicts given the current situation: no    Objective:   Co-treatment performed due to patient's multiple deficits requiring two skilled therapists to appropriately and safely assess patient's strength and endurance while facilitating functional tasks in addition to accommodating for patient's activity tolerance.    Communicated with: nsmargret prior to session.  Patient found HOB elevated with blood pressure cuff, PureWick, telemetry, pulse ox (continuous) upon OT entry to room.    General Precautions: Standard, fall  Orthopedic Precautions: N/A  Braces: N/A  Respiratory Status: Room air    Occupational Performance:    Bed Mobility:    Patient completed Scooting/Bridging with stand by assistance  Patient completed Supine to Sit with stand by assistance  Sits EOB SBA static and dynamic for safety given reduced safety awareness    Functional Mobility/Transfers:  Patient completed Sit <>  Stand Transfer with contact guard assistance  with  no assistive device   Patient completed Toilet Transfer Step Transfer technique with contact guard assistance with  no AD  Functional Mobility: pt ambulates ~20ft in room Eob > toilet > sinkside > chair with CGA using no AD. See PT note for gait details, no overt LOB, decreased speed, some unsteadiness.    Activities of Daily Living:  Grooming: stand by assistance standing sinkside   Lower Body Dressing: setup to don  socks in long sit in bed  Toileting: moderate assistance to manage gown during descent to toilet, and for thoroughness with sheila-hygiene givne difficulty following cues not to throw wipes > toilet and avoid sacral patch     Cognitive/Visual Perceptual:  Cognitive/Psychosocial Skills:     -       Oriented to: Person, Place, Time, and Situation   -       Follows Commands/attention:Easily distracted and Follows one-step commands  -       Safety awareness/insight to disability: impaired     Physical Exam:  Sensation intact t/o B UE/hands  Dominant hand:    -       Right  Upper Extremity Range of Motion:     -       Right Upper Extremity: WFL  -       Left Upper Extremity: WFL  Upper Extremity Strength:    -       Right Upper Extremity: WFL  -       Left Upper Extremity: WFL  Fine Motor Coordination:    -       Intact    AMPAC 6 Click ADL:  AMPAC Total Score: 19    Treatment & Education: (family present, supportive, inquisitive to POC, pt performance, healing)  -Education on energy conservation and task modification to maximize safety and (I) during ADLs and mobility  -Education on importance of OOB activity to improve overall activity tolerance and promote recovery  -Pt educated to call for assistance and to transfer with hospital staff only (encouraged to ambulate >< toilet with nsg, remains UIC with LE positional adjustments as needed for comfort.   -Provided education regarding role of OT, POC, & discharge recommendations with pt and family  verbalizing understanding.      SLP orders not present with noted need for consult, evaluating PT Cecy contacts DO for orders which were placed for pt access to needed services*    Pt had no further questions & when asked whether there were any concerns pt reported none.     Patient left up in chair with all lines intact, call button in reach, nsg notified, and family present    GOALS:   Multidisciplinary Problems       Occupational Therapy Goals          Problem: Occupational Therapy    Goal Priority Disciplines Outcome Interventions   Occupational Therapy Goal     OT, PT/OT Progressing    Description: Goals to be met by: 04/01/25     Patient will increase functional independence with ADLs by performing:    UE Dressing with Modified [place].  LE Dressing with Modified [place].  Grooming while standing at sink with Modified [place].  Toileting from toilet with Modified [place] for hygiene and clothing management.   Toilet transfer to toilet with Modified [place].    DME: no anticipated needs                         DME Justifications:  No DME recommended requiring DME justifications    History:     Past Medical History:   Diagnosis Date    Anxiety     Aortic stenosis     Arthritis     CHF (congestive heart failure)     Embolic stroke involving left middle cerebral artery 2/27/2025    Heart murmur     Hypertension     Mitral valve prolapse          Past Surgical History:   Procedure Laterality Date    AORTIC VALVULOPLASTY N/A 11/06/2024    Procedure: REPAIR, AORTIC VALVE;  Surgeon: Rohit Quinonez MD;  Location: [facility] CATH LAB;  Service: Cardiology;  Laterality: N/A;    AORTOGRAPHY N/A 11/06/2024    Procedure: AORTOGRAM;  Surgeon: Rohit Quinonez MD;  Location: [facility] CATH LAB;  Service: Cardiology;  Laterality: N/A;    BREAST CYST EXCISION Left 80s    BREAST SURGERY      Benign lump on left.    CARDIAC CATH COSURGEON N/A 2/27/2025    Procedure: Cardiac Cath Cosurgeon;  Surgeon:  Naun Martin MD;  Location: [facility] CATH LAB;  Service: Cardiothoracic;  Laterality: N/A;    CATARACT EXTRACTION       SECTION      x1    COLONOSCOPY N/A 2023    Procedure: COLONOSCOPY;  Surgeon: Pattie Stephenson MD;  Location: [facility] ENDO (4TH FLR);  Service: Endoscopy;  Laterality: N/A;   ref by Ishmael Jacobson MD, PEG, instr. to portal-st    CORONARY ANGIOGRAPHY N/A 10/28/2024    Procedure: ANGIOGRAM, CORONARY ARTERY;  Surgeon: Yordy Ng MD;  Location: [facility] CATH LAB;  Service: Cardiology;  Laterality: N/A;  radial access    DV5 ROBOTIC NEPHRECTOMY, PARTIAL Left 2024    Procedure: DV5 ROBOTIC NEPHRECTOMY, PARTIAL;  Surgeon: Adam Sawyer MD;  Location: [facility] OR 2ND FLR;  Service: Urology;  Laterality: Left;    EYE SURGERY      TRANSCATHETER AORTIC VALVE REPLACEMENT (TAVR) N/A 2025    Procedure: REPLACEMENT, AORTIC VALVE, TRANSCATHETER (TAVR);  Surgeon: Rohit Quinonez MD;  Location: [facility] CATH LAB;  Service: Cardiology;  Laterality: N/A;    TRANSCATHETER AORTIC VALVE REPLACEMENT (TAVR)  2025    Procedure: REPLACEMENT, AORTIC VALVE, TRANSCATHETER (TAVR);  Surgeon: Naun Martin MD;  Location: [facility] CATH LAB;  Service: Cardiothoracic;;    VALVE STUDY-AORTIC  2024    Procedure: Valve study-aortic;  Surgeon: Rohit Quinonez MD;  Location: [facility] CATH LAB;  Service: Cardiology;;       Time Tracking:     OT Date of Treatment: 25  OT Start Time: 1242  OT Stop Time: 1312  OT Total Time (min): 30 min    Billable Minutes:Evaluation 7  Self Care/Home Management 23    3/2/2025

## 2025-03-02 NOTE — PLAN OF CARE
Goals to be met by: 04/01/25     Patient will increase functional independence with ADLs by performing:    UE Dressing with Modified [place].  LE Dressing with Modified [place].  Grooming while standing at sink with Modified [place].  Toileting from toilet with Modified [place] for hygiene and clothing management.   Toilet transfer to toilet with Modified [place].    DME: no anticipated needs

## 2025-03-02 NOTE — PLAN OF CARE
Problem: Physical Therapy  Goal: Physical Therapy Goal  Description: Goals to be met by: 25     Patient will increase functional independence with mobility by performin. Supine to sit with Set-up [place]  2. Sit to supine with Set-up [place]  3. Sit to stand transfer with Modified [place] using LRAD as appropriate  4. Gait  x 100 feet with Stand-by Assistance using LRAD as appropriate.   5. Ascend/descend 6 stair with right Handrails Contact Guard Assistance using LRAD as appropriate   6. Stand for 10 minutes with Modified [place] using LRAD as appropriate  7. Lower extremity exercise program x30 reps per handout, with assistance as needed    Outcome: Progressing    Evaluation Complete. Goals Appropriate.

## 2025-03-02 NOTE — ASSESSMENT & PLAN NOTE
Body mass index is 36 kg/m². Morbid obesity complicates all aspects of disease management from diagnostic modalities to treatment. Weight loss encouraged and health benefits explained to patient.

## 2025-03-02 NOTE — PT/OT/SLP EVAL
Physical Therapy Evaluation    Patient Name:  Kandace Rapp   MRN:  [id number]    Recommendations:     Discharge Recommendations: Low Intensity Therapy   Discharge Equipment Recommendations: none   Barriers to discharge: None    Assessment:   Co-evaluation performed due to multiple deficits anticipated requiring two skilled therapists to appropriately and safely assess patient's strength, endurance, functional mobility, and ADL performance while facilitating functional tasks in addition to accommodating for patient's activity tolerance and medical acuity.    Kandace Rapp is a 69 y.o. female admitted with a medical diagnosis of LBBB (left bundle branch block). Patient presented with increased motivation to participate in evaluation, with good response to completed activities and provided education. Overall, patient demonstrated good quality functional mobility, requiring CGA-SBA for all visualized activities. Patient noted to have slight increase in WOB, however endorsing feeling well. Gait assessment encompassing trial to/from bathroom, with prolonged seated rest break secondary to toileting. All VS remained stable throughout session. Based upon information gained, PT recommends low intensity skilled physical therapy services post-acutely. Provided recommendation based upon needed intensity to not only directly address patient's previously listed functional impairments, discrepancy between functional baseline & current mobility status, and increased falls risk, but to also positively impact patient's quality of life & intervene on high risk for caregiver burnout. . Performance deficits impacting function include impaired endurance, impaired functional mobility, gait instability, decreased lower extremity function, decreased upper extremity function, impaired cardiopulmonary response to activity, impaired coordination.    Rehab Prognosis: Good; patient would benefit from acute skilled PT services  "to address these deficits and reach maximum level of function.    Recent Surgery: Procedure(s) (LRB):  REPLACEMENT, AORTIC VALVE, TRANSCATHETER (TAVR) (N/A)  Cardiac Cath Cosurgeon (N/A)  REPLACEMENT, AORTIC VALVE, TRANSCATHETER (TAVR) 3 Days Post-Op    Plan:     During this hospitalization, patient to be seen 4 x/week to address the identified rehab impairments via gait training, therapeutic activities, therapeutic exercises, neuromuscular re-education and progress toward the following goals:    Plan of Care Expires:  04/02/25    Subjective     Chief Complaint: Word finding difficulty DO (Russell) notified and SLP consult requested  Patient/Family Comments/goals: To get better & discharge  Pain/Comfort:  Pain Rating 1: 0/10  Pain Rating Post-Intervention 1: 0/10    Patients cultural, spiritual, Sabianism conflicts given the current situation: no    Social History:  Residence: Patient lives with their daughter in a single story house with  5-6STE + RHR .  Equipment Owned: none  Prior level of function:  Prior to admission, patient was independent for ambulation and all other functional mobility.  Assistance Upon Discharge:  Daughter    Objective:     Communicated with RN prior to session.  Patient found HOB elevated with telemetry, pulse ox (continuous), blood pressure cuff, PureWick  upon PT entry to room.    General Precautions: Standard, fall   Orthopedic Precautions:N/A   Braces: N/A   Body mass index is 36 kg/m².  Oxygen Device: Room Air  Vitals: BP (!) 100/55   Pulse 86   Temp 98.1 °F (36.7 °C) (Oral)   Resp (!) 21   Ht 5' 2" (1.575 m)   Wt 89.3 kg (196 lb 12.8 oz)   SpO2 96%   Breastfeeding No   BMI 36.00 kg/m²     Exams:  Cognition:   Alert, Pleasant, and Cooperative   Patient is oriented to Person, Place, Time, Situation  Command following: Follows multistep verbal commands  Hearing: Intact  Vision:  Intact  Skin Integrity: Visible skin intact  Postural Assessment: rounded shoulders and forward " head  Physical Exam:    Left LE Right LE   Sensation intact to light touch intact to light touch   Coordination normal normal     LLE ROM: WFL  RLE ROM: WFL    LLE Strength (out of 5):   Hip Flexion:4+: Holds test position against MODERATE to STRONG pressure  Hip Abduction:5: Holds test position against STRONG pressure  Knee Extension:5: Holds test position against STRONG pressure  Ankle Dorsiflexion:5: Holds test position against STRONG pressure    RLE Strength (out of 5):   Hip Flexion:4+: Holds test position against MODERATE to STRONG pressure  Hip Abduction:5: Holds test position against STRONG pressure  Knee Extension:5: Holds test position against STRONG pressure  Ankle Dorsiflexion:5: Holds test position against STRONG pressure    Functional Mobility:    Bed Mobility:     EOB Scooting: Anterior: Stand-By Assistance  Supine>Sit: Stand-By Assistance with HOB Elevated    Transfers:     Sit<>Stand:   Contact Guard Assistance from Edge of Bed with No AD  Contact Guard Assistance from Toilet with No AD  Toilet Transfer: Contact Guard Assistance with No AD using step transfer technique    Gait:  2x10ft, Contact Guard Assistance with No AD  Gait Assessment: decreased step length, decreased step height, decreased gait speed, decreased heel strike, decreased toe push-off  Total Distance: 20ft    Balance:   Static Sitting: Stand-By Assistance  Dynamic Sitting: Stand-By Assistance    Static Standing: Contact Guard Assistance progressing to Stand-By Assistance  Dynamic Standing: Contact Guard Assistance progressing to Stand-By Assistance    Stairs: Not assessed    AM-PAC 6 CLICK MOBILITY  Total Score:17     Treatment & Education:  Patient Education Provided on:  The role of physical therapy and how the patient can benefit from skilled services  The negative effects of prolonged bed rest/sedentary behavior, along with the importance of OOB activity & patient participation with PT  Encouraged to sit UIC - Further encouraged  to alternate LE positioning, down vs. elevated  Encouraged to ambulate to/from toilet, with RN assistance  The importance of contacting RN, via call light, for mobility throughout the day  Pt white board updated with current therapists name and level of mobility assistance needed.     Patient Verbalized understanding of all topics touched on this date. All questions answered within the PT scope of practice    Patient left up in chair with all lines intact, call button in reach, RN notified, and family present.    GOALS:   Multidisciplinary Problems       Physical Therapy Goals          Problem: Physical Therapy    Goal Priority Disciplines Outcome Interventions   Physical Therapy Goal     PT, PT/OT Progressing    Description: Goals to be met by: 25     Patient will increase functional independence with mobility by performin. Supine to sit with Set-up [place]  2. Sit to supine with Set-up [place]  3. Sit to stand transfer with Modified [place] using LRAD as appropriate  4. Gait  x 100 feet with Stand-by Assistance using LRAD as appropriate.   5. Ascend/descend 6 stair with right Handrails Contact Guard Assistance using LRAD as appropriate   6. Stand for 10 minutes with Modified [place] using LRAD as appropriate  7. Lower extremity exercise program x30 reps per handout, with assistance as needed                         History:     Past Medical History:   Diagnosis Date    Anxiety     Aortic stenosis     Arthritis     CHF (congestive heart failure)     Embolic stroke involving left middle cerebral artery 2025    Heart murmur     Hypertension     Mitral valve prolapse        Past Surgical History:   Procedure Laterality Date    AORTIC VALVULOPLASTY N/A 2024    Procedure: REPAIR, AORTIC VALVE;  Surgeon: Rohit Quinonez MD;  Location: [facility] CATH LAB;  Service: Cardiology;  Laterality: N/A;    AORTOGRAPHY N/A 2024    Procedure: AORTOGRAM;  Surgeon: Rohit Quinonez,  MD;  Location: [facility] CATH LAB;  Service: Cardiology;  Laterality: N/A;    BREAST CYST EXCISION Left 80s    BREAST SURGERY      Benign lump on left.    CARDIAC CATH COSURGEON N/A 2025    Procedure: Cardiac Cath Cosurgeon;  Surgeon: Naun Martin MD;  Location: [facility] CATH LAB;  Service: Cardiothoracic;  Laterality: N/A;    CATARACT EXTRACTION       SECTION      x1    COLONOSCOPY N/A 2023    Procedure: COLONOSCOPY;  Surgeon: Pattie Stephenson MD;  Location: [facility] ENDO (4TH FLR);  Service: Endoscopy;  Laterality: N/A;   ref by Ishmael Jacobson MD, PEG, instr. to portal-st    CORONARY ANGIOGRAPHY N/A 10/28/2024    Procedure: ANGIOGRAM, CORONARY ARTERY;  Surgeon: Yordy Ng MD;  Location: [facility] CATH LAB;  Service: Cardiology;  Laterality: N/A;  radial access    DV5 ROBOTIC NEPHRECTOMY, PARTIAL Left 2024    Procedure: DV5 ROBOTIC NEPHRECTOMY, PARTIAL;  Surgeon: Adam Sawyer MD;  Location: [facility] OR 2ND FLR;  Service: Urology;  Laterality: Left;    EYE SURGERY      TRANSCATHETER AORTIC VALVE REPLACEMENT (TAVR) N/A 2025    Procedure: REPLACEMENT, AORTIC VALVE, TRANSCATHETER (TAVR);  Surgeon: Rohit Quinonez MD;  Location: [facility] CATH LAB;  Service: Cardiology;  Laterality: N/A;    TRANSCATHETER AORTIC VALVE REPLACEMENT (TAVR)  2025    Procedure: REPLACEMENT, AORTIC VALVE, TRANSCATHETER (TAVR);  Surgeon: Naun Martin MD;  Location: [facility] CATH LAB;  Service: Cardiothoracic;;    VALVE STUDY-AORTIC  2024    Procedure: Valve study-aortic;  Surgeon: Rohit Quinonez MD;  Location: [facility] CATH LAB;  Service: Cardiology;;       Time Tracking:     PT Received On: 25  PT Start Time: 1244     PT Stop Time: 1318  PT Total Time (min): 34 min     Billable Minutes: Evaluation 10 and Therapeutic Activity 24      2025

## 2025-03-02 NOTE — ASSESSMENT & PLAN NOTE
-Stroke risk factor  -Okay to begin normalizing BP, MAP>65  -Avoid hypotension  -BP range in the last 24 hrs: BP  Min: 97/57  Max: 133/76  -PRN labetalol/hydralazine

## 2025-03-02 NOTE — ASSESSMENT & PLAN NOTE
Patient will be admitted to CICU for close monitoring and observation given recent TAVR and now new L MCA stroke. No intervention recommended per vascular neurology team given distal location and completion of infarct on initial imaging.   - Follow up MRI head with evolving left MCA infarct and possible petechial hemorrhage. CT Head without evidence of hemorrhagic conversion.    - Vascular neuro now signed off   - Asa/plavix (plavix load completed 2/28)  - HI statin  - Mechanical prophylaxis: Place SCDs  - Follow up outpatient in 4-6 weeks

## 2025-03-02 NOTE — ASSESSMENT & PLAN NOTE
70yo F w ho htn and severe AS, s/p TAVR 2/28. Post op course complicated by L MCA CVA. EKG post procedure noting LBBB with . Patient without prior history of conduction abnormalities. TVP placed in the cath lab. In the s/o new LBBB patient may require PPM implantation, though post operative inflammation could also be contributing to the new LBBB and resolve over the next 24hrs.   - TVP removed given need for MRI brain      Recommendations:  - Continuous Tele  - EP monitoring for possible EPS +/- PPM placement tomorrow  - NPO midnight  - Avoid BB and AV fabienne blockers

## 2025-03-02 NOTE — SUBJECTIVE & OBJECTIVE
Interval History: No acute events overnight. Patient and family continue to report improvement in her neurologic status - memory and speech improving. EP planning for EPS tomorrow. NPO at midnight     ROS  Objective:     Vital Signs (Most Recent):  Temp: 98.1 °F (36.7 °C) (03/02/25 1105)  Pulse: 99 (03/02/25 1305)  Resp: (!) 30 (03/02/25 1305)  BP: (!) 94/54 (03/02/25 1205)  SpO2: 98 % (03/02/25 1305) Vital Signs (24h Range):  Temp:  [98 °F (36.7 °C)-98.9 °F (37.2 °C)] 98.1 °F (36.7 °C)  Pulse:  [80-99] 99  Resp:  [13-34] 30  SpO2:  [96 %-99 %] 98 %  BP: ()/(53-71) 94/54     Weight: 89.3 kg (196 lb 12.8 oz)  Body mass index is 36 kg/m².     SpO2: 98 %         Intake/Output Summary (Last 24 hours) at 3/2/2025 1357  Last data filed at 3/2/2025 1346  Gross per 24 hour   Intake 1013.37 ml   Output 775 ml   Net 238.37 ml       Lines/Drains/Airways       Central Venous Catheter Line  Duration                  Percutaneous Central Line - Cordis 02/27/25 1005 Subclavian Right 3 days              Drain  Duration             Female External Urinary Catheter w/ Suction 02/28/25 0705 2 days              Peripheral Intravenous Line  Duration                  Peripheral IV - Single Lumen 02/27/25 0632 20 G Left Antecubital 3 days         Peripheral IV - Single Lumen 02/28/25 1105 20 G No Anterior;Distal;Left Forearm 2 days                       Physical Exam  Vitals and nursing note reviewed.   Constitutional:       General: She is not in acute distress.     Appearance: Normal appearance. She is not ill-appearing.   HENT:      Head: Normocephalic and atraumatic.   Cardiovascular:      Rate and Rhythm: Normal rate and regular rhythm.      Heart sounds: Normal heart sounds. No murmur heard.  Pulmonary:      Effort: Pulmonary effort is normal. No respiratory distress.      Breath sounds: Normal breath sounds. No wheezing.   Abdominal:      General: Abdomen is flat. There is no distension.   Musculoskeletal:      Cervical back:  Normal range of motion. No rigidity.   Skin:     General: Skin is warm and dry.      Findings: No lesion.   Neurological:      Comments: Speech and memory improving   Psychiatric:         Mood and Affect: Mood normal.         Behavior: Behavior normal.            Significant Labs: All pertinent lab results from the last 24 hours have been reviewed.    Significant Imaging:  Reviewed

## 2025-03-02 NOTE — ASSESSMENT & PLAN NOTE
70 yo F with a history significant for severe AS s/p TAVR, c/b new LBBB. Patient without prior history of conduction abnormalities. EKG post procedure noting LBBB with . TVP placed in the cath lab. Notably, patient found to have L MCA stroke 2/27; not amenable to intervention and admitted to the CICU. Recovering well neurologically post stroke. QRS remains wide, ECG 3/1 .     Recommendations:  - Tentatively plan for EPS +/- device implantation tomorrow  - Hold heparin products at MN  - O MN  - Tele

## 2025-03-03 ENCOUNTER — ANESTHESIA (OUTPATIENT)
Dept: MEDSURG UNIT | Facility: HOSPITAL | Age: 70
DRG: 266 | End: 2025-03-03
Payer: MEDICARE

## 2025-03-03 ENCOUNTER — ANESTHESIA EVENT (OUTPATIENT)
Dept: MEDSURG UNIT | Facility: HOSPITAL | Age: 70
DRG: 266 | End: 2025-03-03
Payer: MEDICARE

## 2025-03-03 ENCOUNTER — CLINICAL SUPPORT (OUTPATIENT)
Dept: CARDIOLOGY | Facility: HOSPITAL | Age: 70
DRG: 266 | End: 2025-03-03
Attending: INTERNAL MEDICINE
Payer: MEDICARE

## 2025-03-03 VITALS
OXYGEN SATURATION: 98 % | HEIGHT: 62 IN | DIASTOLIC BLOOD PRESSURE: 56 MMHG | HEART RATE: 87 BPM | RESPIRATION RATE: 18 BRPM | TEMPERATURE: 98 F | SYSTOLIC BLOOD PRESSURE: 119 MMHG | BODY MASS INDEX: 36.22 KG/M2 | WEIGHT: 196.81 LBS

## 2025-03-03 LAB
ABO + RH BLD: NORMAL
ANION GAP SERPL CALC-SCNC: 7 MMOL/L (ref 8–16)
BASOPHILS # BLD AUTO: 0.01 K/UL (ref 0–0.2)
BASOPHILS NFR BLD: 0.1 % (ref 0–1.9)
BLD GP AB SCN CELLS X3 SERPL QL: NORMAL
BUN SERPL-MCNC: 23 MG/DL (ref 8–23)
CALCIUM SERPL-MCNC: 9 MG/DL (ref 8.7–10.5)
CHLORIDE SERPL-SCNC: 107 MMOL/L (ref 95–110)
CO2 SERPL-SCNC: 23 MMOL/L (ref 23–29)
CREAT SERPL-MCNC: 0.7 MG/DL (ref 0.5–1.4)
DIFFERENTIAL METHOD BLD: ABNORMAL
EOSINOPHIL # BLD AUTO: 0.2 K/UL (ref 0–0.5)
EOSINOPHIL NFR BLD: 2 % (ref 0–8)
ERYTHROCYTE [DISTWIDTH] IN BLOOD BY AUTOMATED COUNT: 12 % (ref 11.5–14.5)
EST. GFR  (NO RACE VARIABLE): >60 ML/MIN/1.73 M^2
GLUCOSE SERPL-MCNC: 106 MG/DL (ref 70–110)
HCT VFR BLD AUTO: 32.1 % (ref 37–48.5)
HGB BLD-MCNC: 10.2 G/DL (ref 12–16)
IMM GRANULOCYTES # BLD AUTO: 0.02 K/UL (ref 0–0.04)
IMM GRANULOCYTES NFR BLD AUTO: 0.2 % (ref 0–0.5)
LYMPHOCYTES # BLD AUTO: 2.5 K/UL (ref 1–4.8)
LYMPHOCYTES NFR BLD: 26.9 % (ref 18–48)
MAGNESIUM SERPL-MCNC: 1.7 MG/DL (ref 1.6–2.6)
MCH RBC QN AUTO: 28.1 PG (ref 27–31)
MCHC RBC AUTO-ENTMCNC: 31.8 G/DL (ref 32–36)
MCV RBC AUTO: 88 FL (ref 82–98)
MONOCYTES # BLD AUTO: 0.5 K/UL (ref 0.3–1)
MONOCYTES NFR BLD: 5.6 % (ref 4–15)
NEUTROPHILS # BLD AUTO: 6.1 K/UL (ref 1.8–7.7)
NEUTROPHILS NFR BLD: 65.2 % (ref 38–73)
NRBC BLD-RTO: 0 /100 WBC
OHS QRS DURATION: 144 MS
OHS QTC CALCULATION: 492 MS
PHOSPHATE SERPL-MCNC: 4.1 MG/DL (ref 2.7–4.5)
PLATELET # BLD AUTO: 146 K/UL (ref 150–450)
PMV BLD AUTO: 10.4 FL (ref 9.2–12.9)
POC ACTIVATED CLOTTING TIME K: 239 SEC (ref 74–137)
POC ACTIVATED CLOTTING TIME K: 245 SEC (ref 74–137)
POC ACTIVATED CLOTTING TIME K: 262 SEC (ref 74–137)
POTASSIUM SERPL-SCNC: 4.3 MMOL/L (ref 3.5–5.1)
RBC # BLD AUTO: 3.63 M/UL (ref 4–5.4)
SAMPLE: ABNORMAL
SODIUM SERPL-SCNC: 137 MMOL/L (ref 136–145)
SPECIMEN OUTDATE: NORMAL
WBC # BLD AUTO: 9.4 K/UL (ref 3.9–12.7)

## 2025-03-03 PROCEDURE — 83735 ASSAY OF MAGNESIUM: CPT | Performed by: INTERNAL MEDICINE

## 2025-03-03 PROCEDURE — 25000003 PHARM REV CODE 250

## 2025-03-03 PROCEDURE — 97535 SELF CARE MNGMENT TRAINING: CPT

## 2025-03-03 PROCEDURE — 37000009 HC ANESTHESIA EA ADD 15 MINS: Performed by: INTERNAL MEDICINE

## 2025-03-03 PROCEDURE — 93619 COMPREHENSIVE EP EVALUATION: CPT | Performed by: INTERNAL MEDICINE

## 2025-03-03 PROCEDURE — 93270 REMOTE 30 DAY ECG REV/REPORT: CPT

## 2025-03-03 PROCEDURE — 25000003 PHARM REV CODE 250: Performed by: INTERNAL MEDICINE

## 2025-03-03 PROCEDURE — 25000003 PHARM REV CODE 250: Performed by: STUDENT IN AN ORGANIZED HEALTH CARE EDUCATION/TRAINING PROGRAM

## 2025-03-03 PROCEDURE — 63600175 PHARM REV CODE 636 W HCPCS

## 2025-03-03 PROCEDURE — 85025 COMPLETE CBC W/AUTO DIFF WBC: CPT | Performed by: INTERNAL MEDICINE

## 2025-03-03 PROCEDURE — D9220A PRA ANESTHESIA: Mod: CRNA,,,

## 2025-03-03 PROCEDURE — 92523 SPEECH SOUND LANG COMPREHEN: CPT

## 2025-03-03 PROCEDURE — 84100 ASSAY OF PHOSPHORUS: CPT | Performed by: INTERNAL MEDICINE

## 2025-03-03 PROCEDURE — 63600175 PHARM REV CODE 636 W HCPCS: Performed by: STUDENT IN AN ORGANIZED HEALTH CARE EDUCATION/TRAINING PROGRAM

## 2025-03-03 PROCEDURE — C1894 INTRO/SHEATH, NON-LASER: HCPCS | Performed by: INTERNAL MEDICINE

## 2025-03-03 PROCEDURE — 80048 BASIC METABOLIC PNL TOTAL CA: CPT | Performed by: INTERNAL MEDICINE

## 2025-03-03 PROCEDURE — 94761 N-INVAS EAR/PLS OXIMETRY MLT: CPT

## 2025-03-03 PROCEDURE — 86901 BLOOD TYPING SEROLOGIC RH(D): CPT

## 2025-03-03 PROCEDURE — 4A023FZ MEASUREMENT OF CARDIAC RHYTHM, PERCUTANEOUS APPROACH: ICD-10-PCS | Performed by: INTERNAL MEDICINE

## 2025-03-03 PROCEDURE — 37000008 HC ANESTHESIA 1ST 15 MINUTES: Performed by: INTERNAL MEDICINE

## 2025-03-03 PROCEDURE — D9220A PRA ANESTHESIA: Mod: ANES,,, | Performed by: ANESTHESIOLOGY

## 2025-03-03 PROCEDURE — C1730 CATH, EP, 19 OR FEW ELECT: HCPCS | Performed by: INTERNAL MEDICINE

## 2025-03-03 PROCEDURE — 63600175 PHARM REV CODE 636 W HCPCS: Performed by: INTERNAL MEDICINE

## 2025-03-03 PROCEDURE — 99291 CRITICAL CARE FIRST HOUR: CPT | Mod: ,,, | Performed by: INTERNAL MEDICINE

## 2025-03-03 PROCEDURE — 93619 COMPREHENSIVE EP EVALUATION: CPT | Mod: 26,GC,, | Performed by: INTERNAL MEDICINE

## 2025-03-03 RX ORDER — CLOPIDOGREL BISULFATE 75 MG/1
75 TABLET ORAL DAILY
Qty: 90 TABLET | Refills: 3 | Status: SHIPPED | OUTPATIENT
Start: 2025-03-04 | End: 2026-03-04

## 2025-03-03 RX ORDER — NAPROXEN SODIUM 220 MG/1
81 TABLET, FILM COATED ORAL DAILY
Qty: 90 TABLET | Refills: 3 | Status: SHIPPED | OUTPATIENT
Start: 2025-03-04 | End: 2026-03-04

## 2025-03-03 RX ORDER — CEFAZOLIN 2 G/1
INJECTION, POWDER, FOR SOLUTION INTRAMUSCULAR; INTRAVENOUS
Status: DISCONTINUED | OUTPATIENT
Start: 2025-03-03 | End: 2025-03-03

## 2025-03-03 RX ORDER — LIDOCAINE HYDROCHLORIDE 20 MG/ML
INJECTION, SOLUTION INFILTRATION; PERINEURAL
Status: DISCONTINUED | OUTPATIENT
Start: 2025-03-03 | End: 2025-03-03 | Stop reason: HOSPADM

## 2025-03-03 RX ORDER — DEXMEDETOMIDINE HYDROCHLORIDE 100 UG/ML
INJECTION, SOLUTION INTRAVENOUS
Status: DISCONTINUED | OUTPATIENT
Start: 2025-03-03 | End: 2025-03-03

## 2025-03-03 RX ORDER — MAGNESIUM SULFATE HEPTAHYDRATE 40 MG/ML
2 INJECTION, SOLUTION INTRAVENOUS ONCE
Status: COMPLETED | OUTPATIENT
Start: 2025-03-03 | End: 2025-03-03

## 2025-03-03 RX ORDER — ATORVASTATIN CALCIUM 40 MG/1
40 TABLET, FILM COATED ORAL NIGHTLY
Qty: 90 TABLET | Refills: 3 | Status: SHIPPED | OUTPATIENT
Start: 2025-03-03 | End: 2026-03-03

## 2025-03-03 RX ORDER — LIDOCAINE HYDROCHLORIDE 20 MG/ML
INJECTION INTRAVENOUS
Status: DISCONTINUED | OUTPATIENT
Start: 2025-03-03 | End: 2025-03-03

## 2025-03-03 RX ORDER — PROPOFOL 10 MG/ML
VIAL (ML) INTRAVENOUS CONTINUOUS PRN
Status: DISCONTINUED | OUTPATIENT
Start: 2025-03-03 | End: 2025-03-03

## 2025-03-03 RX ORDER — HEPARIN SOD,PORCINE/0.9 % NACL 1000/500ML
INTRAVENOUS SOLUTION INTRAVENOUS
Status: DISCONTINUED | OUTPATIENT
Start: 2025-03-03 | End: 2025-03-03 | Stop reason: HOSPADM

## 2025-03-03 RX ORDER — PHENYLEPHRINE HYDROCHLORIDE 10 MG/ML
INJECTION INTRAVENOUS
Status: DISCONTINUED | OUTPATIENT
Start: 2025-03-03 | End: 2025-03-03

## 2025-03-03 RX ORDER — SODIUM CHLORIDE 9 MG/ML
INJECTION, SOLUTION INTRAVENOUS CONTINUOUS
Status: DISCONTINUED | OUTPATIENT
Start: 2025-03-03 | End: 2025-03-03 | Stop reason: HOSPADM

## 2025-03-03 RX ADMIN — PHENYLEPHRINE HYDROCHLORIDE 150 MCG: 10 INJECTION INTRAVENOUS at 01:03

## 2025-03-03 RX ADMIN — LIDOCAINE HYDROCHLORIDE 50 MG: 20 INJECTION INTRAVENOUS at 12:03

## 2025-03-03 RX ADMIN — ASPIRIN 81 MG CHEWABLE TABLET 81 MG: 81 TABLET CHEWABLE at 08:03

## 2025-03-03 RX ADMIN — SODIUM CHLORIDE: 0.9 INJECTION, SOLUTION INTRAVENOUS at 12:03

## 2025-03-03 RX ADMIN — CEFAZOLIN 2 G: 2 INJECTION, POWDER, FOR SOLUTION INTRAMUSCULAR; INTRAVENOUS at 01:03

## 2025-03-03 RX ADMIN — DEXMEDETOMIDINE 4 MCG: 200 INJECTION, SOLUTION INTRAVENOUS at 01:03

## 2025-03-03 RX ADMIN — CLOPIDOGREL BISULFATE 75 MG: 75 TABLET ORAL at 08:03

## 2025-03-03 RX ADMIN — MAGNESIUM SULFATE HEPTAHYDRATE 2 G: 40 INJECTION, SOLUTION INTRAVENOUS at 05:03

## 2025-03-03 RX ADMIN — SODIUM CHLORIDE: 9 INJECTION, SOLUTION INTRAVENOUS at 08:03

## 2025-03-03 RX ADMIN — PHENYLEPHRINE HYDROCHLORIDE 200 MCG: 10 INJECTION INTRAVENOUS at 01:03

## 2025-03-03 RX ADMIN — DEXMEDETOMIDINE 8 MCG: 200 INJECTION, SOLUTION INTRAVENOUS at 12:03

## 2025-03-03 RX ADMIN — PROPOFOL 75 MCG/KG/MIN: 10 INJECTION, EMULSION INTRAVENOUS at 12:03

## 2025-03-03 NOTE — PLAN OF CARE
"Cardiac ICU Care Plan    POC reviewed with Kandace Rapp and family. Questions and concerns addressed. No acute events noted this shift. Pt progressing toward goals. Patient NPO since midnight for possible pacemaker placement. See below and flowsheets for full assessment and VS info.     Neuro:  [place] Coma Scale  Best Eye Response: 4-->(E4) spontaneous  Best Motor Response: 6-->(M6) obeys commands  Best Verbal Response: 4-->(V4) confused  [place] Coma Scale Score: 14  Assessment Qualifiers: patient not sedated/intubated, no eye obstruction present  Pupil PERRLA: yes  24 hr Temp:  [98 °F (36.7 °C)-98.9 °F (37.2 °C)]      CV:  Rhythm: normal sinus rhythm w/ LBBB  DVT prophylaxis: VTE Core Measure: Pharmacological prophylaxis initiated/maintained  Pulses  Right Radial Pulse: 2+ (normal), palpation  Left Radial Pulse: 2+ (normal), palpation  Right Dorsalis Pedis Pulse: 1+ (weak), palpation  Left Dorsalis Pedis Pulse: 1+ (weak), palpation  Right Posterior Tibial Pulse: 1+ (weak), palpation  Left Posterior Tibial Pulse: 1+ (weak), palpation    Resp:  RA     GI/:  GI prophylaxis: no  Diet/Nutrition Received: NPO  Last Bowel Movement: 03/02/25  Voiding Characteristics: urethral catheter (bladder)   Intake/Output Summary (Last 24 hours) at 3/3/2025 0621  Last data filed at 3/3/2025 0601  Gross per 24 hour   Intake 920.21 ml   Output 925 ml   Net -4.79 ml        Nutritional Supplement Intake: Quantity 0, Type: Boost    Labs/Accuchecks:  Recent Labs   Lab 03/01/25  0311 03/02/25  0235 03/03/25  0219   WBC 8.49 8.48 9.40   RBC 3.61* 3.56* 3.63*   HGB 9.9* 10.2* 10.2*   HCT 31.9* 31.4* 32.1*   * 155 146*    No results for input(s): "PT", "INR", "APTT" in the last 168 hours.   Recent Labs     03/03/25  0219      K 4.3   CO2 23      BUN 23   CREATININE 0.7     No results for input(s): "CPK", "CPKMB", "MB", "TROPONINI" in the last 168 hours. No results for input(s): "PH", "PCO2", "PO2", "HCO3", " ""POCSATURATED", "BE" in the last 72 hours.    Electrolytes: Electrolytes replaced  Accuchecks: none    Gtts/LDAs:   0.9% NaCl   Intravenous Continuous 10 mL/hr at 03/03/25 0601 Rate Verify at 03/03/25 0601       Lines/Drains/Airways       Central Venous Catheter Line  Duration                  Percutaneous Central Line - Cordis 02/27/25 1005 Subclavian Right 3 days              Drain  Duration             Female External Urinary Catheter w/ Suction 02/28/25 0705 2 days              Peripheral Intravenous Line  Duration                  Peripheral IV - Single Lumen 02/28/25 1105 20 G No Anterior;Distal;Left Forearm 2 days         Peripheral IV - Single Lumen 03/03/25 0225 20 G Anterior;Right Forearm <1 day                    Skin/Wounds  Bathing/Skin Care: bath, complete;dressed/undressed;electrode patches/site rotation;incontinence care;moisturizer applied (03/02/25 2001)  Wounds: No  Wound care consulted: No    Problem: Adult Inpatient Plan of Care  Goal: Plan of Care Review  Outcome: Progressing  Goal: Patient-Specific Goal (Individualized)  Outcome: Progressing  Goal: Absence of Hospital-Acquired Illness or Injury  Outcome: Progressing  Goal: Optimal Comfort and Wellbeing  Outcome: Progressing  Goal: Readiness for Transition of Care  Outcome: Progressing     "

## 2025-03-03 NOTE — PROGRESS NOTES
[facility] - Cardiac Intensive Care  Cardiology  Progress Note    Patient Name: Kandace Rapp  MRN: [id number]  Admission Date: 2/27/2025  Hospital Length of Stay: 4 days  Code Status: Full Code   Attending Physician: Anurag Way MD   Primary Care Physician: Ishmael Jacobson MD  Expected Discharge Date: 3/4/2025  Principal Problem:LBBB (left bundle branch block)    Subjective:     Hospital Course:   Patient admitted to CCU for management of newly discovered LBBB as well as post op TAVR c/b L MCA. Patient noted to have expressive aphasia. Loaded with plavix, continued on DAPT and HI statin. Vascular neurology following. Follow up MRI concerning for possible hemorrhagic conversion, however this was disconcordant with repeat head CT. Neurologic exam improving, vascular neurology not concerned. Signed off with recommended follow up 4-6 weeks post discharge.  EP following for LBBB, pending EP study.    Interval History: NAEON. Pt reports feeling good today, memory and speech feeling near normal. EPS planned for today. PT/OT recommending low intensity therapy    ROS  Objective:     Vital Signs (Most Recent):  Temp: 98.1 °F (36.7 °C) (03/03/25 1105)  Pulse: 84 (03/03/25 1105)  Resp: 20 (03/03/25 1105)  BP: (!) 107/55 (03/03/25 1105)  SpO2: 95 % (03/03/25 1105) Vital Signs (24h Range):  Temp:  [98 °F (36.7 °C)-98.9 °F (37.2 °C)] 98.1 °F (36.7 °C)  Pulse:  [81-99] 84  Resp:  [14-30] 20  SpO2:  [94 %-100 %] 95 %  BP: ()/(52-73) 107/55     Weight: 89.3 kg (196 lb 12.8 oz)  Body mass index is 36 kg/m².     SpO2: 95 %         Intake/Output Summary (Last 24 hours) at 3/3/2025 1202  Last data filed at 3/3/2025 1112  Gross per 24 hour   Intake 791.81 ml   Output 955 ml   Net -163.19 ml       Lines/Drains/Airways       Central Venous Catheter Line  Duration                  Percutaneous Central Line - Cordis 02/27/25 1005 Subclavian Right 4 days              Drain  Duration             Female External Urinary  Catheter w/ Suction 02/28/25 0705 3 days              Peripheral Intravenous Line  Duration                  Peripheral IV - Single Lumen 02/28/25 1105 20 G No Anterior;Distal;Left Forearm 3 days         Peripheral IV - Single Lumen 03/03/25 0225 20 G Anterior;Right Forearm <1 day                       Physical Exam  Vitals and nursing note reviewed.   Constitutional:       General: She is not in acute distress.     Appearance: Normal appearance. She is not ill-appearing.   HENT:      Head: Normocephalic and atraumatic.   Cardiovascular:      Rate and Rhythm: Normal rate and regular rhythm.      Heart sounds: Normal heart sounds. No murmur heard.  Pulmonary:      Effort: Pulmonary effort is normal. No respiratory distress.      Breath sounds: Normal breath sounds. No wheezing.   Abdominal:      General: Abdomen is flat. There is no distension.   Musculoskeletal:      Cervical back: Normal range of motion. No rigidity.   Skin:     General: Skin is warm and dry.      Findings: No lesion.   Neurological:      Comments: Speech and memory improving   Psychiatric:         Mood and Affect: Mood normal.         Behavior: Behavior normal.            Significant Labs: All pertinent lab results from the last 24 hours have been reviewed.    Significant Imaging:  Reviewed  Assessment and Plan:     * LBBB (left bundle branch block)  68yo F w ho htn and severe AS, s/p TAVR 2/28. Post op course complicated by L MCA CVA. EKG post procedure noting LBBB with . Patient without prior history of conduction abnormalities. TVP placed in the cath lab. In the s/o new LBBB patient may require PPM implantation, though post operative inflammation could also be contributing to the new LBBB and resolve over the next 24hrs.   - TVP removed given need for MRI brain      Recommendations:  - Continuous Tele  - EPS +/- PPM placement today  - Avoid BB and AV fabienne blockers       Embolic stroke involving left middle cerebral artery  Patient will  be admitted to CICU for close monitoring and observation given recent TAVR and now new L MCA stroke. No intervention recommended per vascular neurology team given distal location and completion of infarct on initial imaging.   - Follow up MRI head with evolving left MCA infarct and possible petechial hemorrhage. CT Head without evidence of hemorrhagic conversion.    - Vascular neuro now signed off   - Asa/plavix (plavix load completed 2/28)  - HI statin  - Mechanical prophylaxis: Place SCDs  - Follow up outpatient in 4-6 weeks    Renal mass  History of RCC s/p left partial nephrectomy    HTN (hypertension)  Patient's blood pressure range in the last 24 hours was: BP  Min: 94/54  Max: 138/63.The patient's inpatient anti-hypertensive regimen is listed below:  Current Antihypertensives  hydrALAZINE injection 10 mg, Every 4 hours PRN, Intravenous    Plan  - Continue current medications as above; nifedipine discontinued given stable BP    BMI 36.0-36.9,adult  Body mass index is 36 kg/m². Morbid obesity complicates all aspects of disease management from diagnostic modalities to treatment. Weight loss encouraged and health benefits explained to patient.       Nonrheumatic aortic valve stenosis  S/p TAVR 2/27   - Continue asa        VTE Risk Mitigation (From admission, onward)           Ordered     Place sequential compression device  Until discontinued         02/27/25 1926                    Alea Garcia DO  Cardiology  [facility] - Cardiac Intensive Care      Staff Attestation:    I have personally evaluated the patient, reviewed the residents assessment and plan, and concur with the findings. I spent a total of 45 minutes providing critical care, driven by a high likelihood of clinically significant, life-threatening deterioration. My direct management included obtaining a comprehensive history, performing a detailed physical exam, ordering and interpreting diagnostic studies, and formulating an urgent treatment plan.  I also evaluated the patients ongoing response to interventions through frequent reassessments and coordinated with other providers to ensure timely care.    This critical care time was necessary to address the imminent risk of life-threatening multi-organ failure and was medically appropriate. The time documented was solely devoted to critical care management and does not include separately billable procedures, care of other patients, or teaching activities.    Anurag aWy MD  [facility] - Cardiology

## 2025-03-03 NOTE — TRANSFER OF CARE
"Anesthesia Transfer of Care Note    Patient: Kandace Rapp    Procedure(s) Performed: Procedure(s) (LRB):  EP - diagnostic (N/A)  INSERTION, CARDIAC PACEMAKER, DUAL CHAMBER (N/A)    Patient location: ICU    Anesthesia Type: general    Transport from OR: Transported from OR on 6-10 L/min O2 by face mask with adequate spontaneous ventilation    Post pain: adequate analgesia    Post assessment: no apparent anesthetic complications    Post vital signs: stable    Level of consciousness: awake    Nausea/Vomiting: no nausea/vomiting    Complications: none    Transfer of care protocol was followed      Last vitals: Visit Vitals  BP (!) 106/57   Pulse 80   Temp 36.7 °C (98.1 °F) (Oral)   Resp (!) 21   Ht 5' 2" (1.575 m)   Wt 89.3 kg (196 lb 12.8 oz)   SpO2 97%   Breastfeeding No   BMI 36.00 kg/m²     "

## 2025-03-03 NOTE — ASSESSMENT & PLAN NOTE
70yo F w ho htn and severe AS, s/p TAVR 2/28. Post op course complicated by L MCA CVA. EKG post procedure noting LBBB with . Patient without prior history of conduction abnormalities. TVP placed in the cath lab. In the s/o new LBBB patient may require PPM implantation, though post operative inflammation could also be contributing to the new LBBB and resolve over the next 24hrs.   - TVP removed given need for MRI brain      Recommendations:  - Continuous Tele  - EPS +/- PPM placement today  - Avoid BB and AV fabienne blockers

## 2025-03-03 NOTE — PLAN OF CARE
CICU DAILY GOALS       A: Awake    RASS: Goal -    Actual - RASS (Watson Agitation-Sedation Scale): alert and calm   Restraint necessity:    B: Breath   SBT: Not intubated   C: Coordinate A & B, analgesics/sedatives   Pain: managed    SAT: Not intubated  D: Delirium   CAM-ICU:    E: Early(intubated/ Progressive (non-intubated) Mobility   Activity: Activity Management: Ankle pumps - L1, Arm raise - L1, Heel slide - L1, Leg kicks - L2, Rolling - L1  FAS: Feeding/Nutrition   Diet order: Diet/Nutrition Received: 2 gram sodium, low saturated fat/low cholesterol,   Fluid restriction:    T: Thrombus   DVT prophylaxis: VTE Core Measure: Pharmacological prophylaxis initiated/maintained  H: HOB Elevation   Head of Bed (HOB) Positioning: HOB elevated, HOB at 30-45 degrees  U: Ulcer Prophylaxis   GI: no  G: Glucose control    S: Skin   Bathing/Skin Care: linen changed (03/02/25 1505)  Wounds: No  Wound care consulted: No  B: Bowel Function   no issues   I: Indwelling Catheters   Alvarado necessity:     CVC necessity: No  D: De-escalation Antibx   No  Plan for the day   Monitor neuro status and prepare for procedure tomorrow  Family/Goals of care/Code Status   Code Status: Full Code    Neuro status improving.  Pt up in chair for multiple hours today.  NPO at midnight for procedure.   BMx2     No acute events throughout day, VS and assessment per flow sheet, patient progressing towards goals as tolerated, plan of care reviewed with Kandace Rapp and family, all concerns addressed, will continue to monitor.

## 2025-03-03 NOTE — ANESTHESIA POSTPROCEDURE EVALUATION
Anesthesia Post Evaluation    Patient: Kandace Rapp    Procedure(s) Performed: Procedure(s) (LRB):  EP - diagnostic (N/A)  INSERTION, CARDIAC PACEMAKER, DUAL CHAMBER (N/A)    Final Anesthesia Type: general      Patient location during evaluation: ICU  Patient participation: Yes- Able to Participate  Level of consciousness: awake  Post-procedure vital signs: reviewed and stable  Pain management: adequate  Airway patency: patent      Anesthetic complications: no      Cardiovascular status: hemodynamically stable  Follow-up not needed.                No case tracking events are documented in the log.      Pain/Melony Score: No data recorded

## 2025-03-03 NOTE — DISCHARGE SUMMARY
[facility]  Cardiology  Discharge Summary      Patient Name: Kandace Rapp  MRN: [id number]  Admission Date: 2/27/2025  Hospital Length of Stay: 4 days  Discharge Date and Time:  03/03/2025 2:43 PM  Attending Physician: Anurag Way MD    Discharging Provider: Alea Garcia DO  Primary Care Physician: Ishmael Jacobson MD    HPI:   Kandace Rapp is a 69 year-old-woman with PMH of HTN, CHF, RCC s/p L partial nephrectomy (cancer free) and severe AS that presented today for scheduled outpatient TAVR. She is now s/p TAVR by Dr. Way Stat CTH obtained for new confusion/speech difficulty while in PACU post-procedure which revealed early ischemic changes in L MCA territory,  at which time a stroke code was called. CTA concerning for distal L M3 occlusion (vs high grade stenosis as per radiology read). Discussed imaging with CAROLE, given infarct appears completed on CTH and distal location of lesion determined not amenable to intervention therefore not taken for thrombectomy.     Patient admitted to CICU for neurochecks and follow MRI in the morning.     Procedure(s) (LRB):  EP - diagnostic (N/A)  INSERTION, CARDIAC PACEMAKER, DUAL CHAMBER (N/A)     Indwelling Lines/Drains at time of discharge:  Lines/Drains/Airways       Drain  Duration             Female External Urinary Catheter w/ Suction 02/28/25 0705 3 days                    Hospital Course:  Patient admitted to CCU for management of newly discovered LBBB as well as post op TAVR c/b L MCA. Patient noted to have expressive aphasia. Loaded with plavix, continued on DAPT and HI statin. Vascular neurology following. Follow up MRI concerning for possible hemorrhagic conversion, however this was disconcordant with repeat head CT. Neurologic exam improving, vascular neurology not concerned. Signed off with recommended follow up 4-6 weeks post discharge.  EP following for LBBB, underwent EPS when neurologic status improved. EPS  negative, PPM not indicated. Patient stable to discharge to home with home health per PT. Discharged with cardiac event monitor, DAPT and HI statin. Will follow up with EP outpatient.     Goals of Care Treatment Preferences:  Code Status: Full Code      Consults:   Consults (From admission, onward)          Status Ordering Provider     Inpatient consult to Electrophysiology  Once        Provider:  (Not yet assigned)    Completed GABBY LAMAS            Significant Diagnostic Studies: N/A    Pending Diagnostic Studies:       None            Final Active Diagnoses:    Diagnosis Date Noted POA    PRINCIPAL PROBLEM:  LBBB (left bundle branch block) [I44.7] 02/27/2025 No    Embolic stroke involving left middle cerebral artery [I63.412] 02/27/2025 No    Renal mass [N28.89] 11/05/2024 Yes    HTN (hypertension) [I10] 11/04/2024 Yes    BMI 36.0-36.9,adult [Z68.36] 07/18/2022 Not Applicable    Nonrheumatic aortic valve stenosis [I35.0] 03/31/2022 Yes      Problems Resolved During this Admission:     No new Assessment & Plan notes have been filed under this hospital service since the last note was generated.  Service: Cardiology      Discharged Condition: stable    Disposition: Home-Health Care Svc    Follow Up:    Patient Instructions:      Ambulatory referral/consult to Vascular Neurology   Standing Status: Future   Referral Priority: Routine Referral Type: Consultation   Referral Reason: Specialty Services Required   Requested Specialty: Vascular Neurology   Number of Visits Requested: 1     Ambulatory referral/consult to Cardiac Electrophysiology   Standing Status: Future   Referral Priority: Routine Referral Type: Consultation   Referral Reason: Specialty Services Required   Requested Specialty: Cardiology   Number of Visits Requested: 1     Medications:  Reconciled Home Medications:      Medication List        PAUSE taking these medications      NIFEdipine 30 MG (OSM) 24 hr tablet  Wait to take this until your  doctor or other care provider tells you to start again.  Commonly known as: PROCARDIA-XL  Take 1 tablet (30 mg total) by mouth once daily.            START taking these medications      aspirin 81 MG Chew  Chew and swallow 1 tablet (81 mg total) by mouth once daily.  Start taking on: March 4, 2025     atorvastatin 40 MG tablet  Commonly known as: LIPITOR  Take 1 tablet (40 mg total) by mouth every evening.     clopidogreL 75 mg tablet  Commonly known as: PLAVIX  Take 1 tablet (75 mg total) by mouth once daily.  Start taking on: March 4, 2025            CONTINUE taking these medications      cholecalciferol (vitamin D3) 1,250 mcg (50,000 unit) capsule              Time spent on the discharge of patient: 40 minutes    Alea Garcia DO  Cardiology  [facility]      IC STAFF  I have seen the patient, reviewed the Fellow's history and physical, assessment and plan. I have personally interviewed and examined the patient and agree with the findings.     REINA Way MD

## 2025-03-03 NOTE — PLAN OF CARE
[facility] - Cardiology      HOME HEALTH ORDERS  FACE TO FACE ENCOUNTER    Patient Name: Kandace Rapp  YOB: 1955    PCP: Ishmael Jacobson MD   [facility] Address[facility] [address] [facility] [address]  PCP Phone Number: [phone]  PCP Fax: [phone]    Encounter Date: 1/30/25    Admit to Home Health    Diagnoses:  Active Hospital Problems    Diagnosis  POA    *LBBB (left bundle branch block) [I44.7]  No    Embolic stroke involving left middle cerebral artery [I63.412]  No    Renal mass [N28.89]  Yes    HTN (hypertension) [I10]  Yes    BMI 36.0-36.9,adult [Z68.36]  Not Applicable    Nonrheumatic aortic valve stenosis [I35.0]  Yes      Resolved Hospital Problems   No resolved problems to display.       Follow Up Appointments:  Future Appointments   Date Time Provider Department Center   4/1/2025 12:10 PM LAB, APPOINTMENT [facility] LAB VNP [facility]   4/1/2025  1:00 PM ECHO, [facility] ECHOSTR [facility]   4/7/2025 10:00 AM Kenisha Alejandro, PA-C [facility] CARDVAL [facility]   5/12/2025  8:30 AM Poppy Armando MD [facility] OPHTHAL [facility]   1/20/2026  9:30 AM LAB, SPECIMEN [facility] INTMED [facility] SPLABIM [facility] PCW   1/20/2026  9:45 AM [facility]-CT1 500 LB LIMIT [facility] CTS [facility]   1/20/2026 10:00 AM [facility] OI-XRAY [facility] XRAY [facility]   1/27/2026 10:00 AM Adam Sawyer MD [facility] UROLOG Jordan Cance       Allergies:  Review of patient's allergies indicates:   Allergen Reactions    Protamine sulfate Other (See Comments)     Bradycardia, Hypotension    Oasis Rash       Medications: Review discharge medications with patient and family and provide education.    Current Medications[1]     Medication List        PAUSE taking these medications      NIFEdipine 30 MG (OSM) 24 hr tablet  Wait to take this until your doctor or other care provider tells you to start again.  Commonly known as: PROCARDIA-XL  Take 1 tablet (30 mg total) by mouth once daily.            START taking these  medications      aspirin 81 MG Chew  Take 1 tablet (81 mg total) by mouth once daily.  Start taking on: March 4, 2025     atorvastatin 40 MG tablet  Commonly known as: LIPITOR  Take 1 tablet (40 mg total) by mouth every evening.     clopidogreL 75 mg tablet  Commonly known as: PLAVIX  Take 1 tablet (75 mg total) by mouth once daily.  Start taking on: March 4, 2025            CONTINUE taking these medications      cholecalciferol (vitamin D3) 1,250 mcg (50,000 unit) capsule                I have seen and examined this patient within the last 30 days. My clinical findings that support the need for the home health skilled services and home bound status are the following:no   Weakness/numbness causing balance and gait disturbance due to Stroke and cardiac arrhythmia making it taxing to leave home.     Diet:   cardiac diet    Labs:  none    Referrals/ Consults  Physical Therapy to evaluate and treat. Evaluate for home safety and equipment needs; Establish/upgrade home exercise program. Perform / instruct on therapeutic exercises, gait training, transfer training, and Range of Motion.  Occupational Therapy to evaluate and treat. Evaluate home environment for safety and equipment needs. Perform/Instruct on transfers, ADL training, ROM, and therapeutic exercises.  Speech Therapy  to evaluate and treat for  Cognition.    Activities:   activity as tolerated    Nursing:   Agency to admit patient within 24 hours of hospital discharge unless specified on physician order or at patient request    SN to complete comprehensive assessment including routine vital signs. Instruct on disease process and s/s of complications to report to MD. Review/verify medication list sent home with the patient at time of discharge  and instruct patient/caregiver as needed. Frequency may be adjusted depending on start of care date.     Skilled nurse to perform up to 3 visits PRN for symptoms related to diagnosis    Notify MD if SBP > 160 or < 90; DBP  > 90 or < 50; HR > 120 or < 50; Temp > 101; O2 < 88%    Ok to schedule additional visits based on staff availability and patient request on consecutive days within the home health episode.    When multiple disciplines ordered:    Start of Care occurs on Sunday - Wednesday schedule remaining discipline evaluations as ordered on separate consecutive days following the start of care.    Thursday SOC -schedule subsequent evaluations Friday and Monday the following week.     Friday - Saturday SOC - schedule subsequent discipline evaluations on consecutive days starting Monday of the following week.    For all post-discharge communication and subsequent orders please contact patient's primary care physician.       Home Health Aide:  Physical Therapy Three times weekly, Occupational Therapy Three times weekly, and Speech Language Pathology Three times weekly    Wound Care Orders  no    I certify that this patient is confined to her home and needs physical therapy, speech therapy, and occupational therapy.    Alea Garcia DO PGY3             [1]   Current Facility-Administered Medications   Medication Dose Route Frequency Provider Last Rate Last Admin    0.9% NaCl infusion   Intravenous Continuous Anurag Way MD 10 mL/hr at 03/03/25 1216 Rate Verify at 03/03/25 1216    aspirin chewable tablet 81 mg  81 mg Oral Daily Rafita Helm MD   81 mg at 03/03/25 0834    atorvastatin tablet 40 mg  40 mg Oral QHS Harry Duncan MD   40 mg at 03/02/25 2025    clopidogreL tablet 75 mg  75 mg Oral Daily Alea Garcia DO   75 mg at 03/03/25 0834    heparin infusion 1,000 units/500 ml in 0.9% NaCl (on sterile field)    PRN Asim Mcghee MD   500 mL at 03/03/25 1243    hydrALAZINE injection 10 mg  10 mg Intravenous Q4H PRN Harry Duncan MD        polyethylene glycol packet 17 g  17 g Oral BID Alea Garcia DO   17 g at 03/02/25 0906    senna-docusate 8.6-50 mg per tablet 1 tablet  1 tablet Oral  Daily PRN Jose Lara MD   1 tablet at 03/02/25 2025     Facility-Administered Medications Ordered in Other Encounters   Medication Dose Route Frequency Provider Last Rate Last Admin    ceFAZolin   Intravenous PRN Kassandra Pineda CRNA   2 g at 03/03/25 1301    dexmedeTOMIDine injection   Intravenous PRN Kassandra Pineda CRNA   8 mcg at 03/03/25 1256    LIDOcaine (cardiac) injection   Intravenous PRN Kassandra Pineda CRNA   50 mg at 03/03/25 1253    propofol (DIPRIVAN) 10 mg/mL infusion   Intravenous Continuous PRN Kassandra Pineda CRNA 40.185 mL/hr at 03/03/25 1253 75 mcg/kg/min at 03/03/25 1253    sodium chloride 0.9% infusion   Intravenous Continuous PRN Kassandra Pineda CRNA   New Bag at 03/03/25 1238

## 2025-03-03 NOTE — PLAN OF CARE
[facility] - Cardiac Intensive Care  Discharge Final Note    Primary Care Provider: Ishmael Jacobson MD    Expected Discharge Date: 3/3/2025    Final Discharge Note (most recent)       Final Note - 03/03/25 1606          Final Note    Assessment Type Final Discharge Note     Anticipated Discharge Disposition Home or Self Care                 SW met with pt and daughter Maggie at bedside who stated they would rather do outpatient therapy than HH.  Per Maggie she is able to drive her mother to outpatient appointment.  SW relayed this to CCU team.    Per CCU team the only follow-up appts needed at discharge are with EP and vascular neurology, both of whom will schedule their own appt.      Mary Rosales, LMSW [facility]  [id number]      Future Appointments   Date Time Provider Department Center   4/1/2025 12:10 PM LAB, APPOINTMENT [facility] LAB VNP [facility]   4/1/2025  1:00 PM ECHO, [facility] ECHOSTR [facility]   4/7/2025 10:00 AM Kenisha Alejandro, PA-C [facility] CARDVAL [facility]   5/12/2025  8:30 AM Poppy Armando MD [facility] OPHTHAL [facility]   1/20/2026  9:30 AM LAB, SPECIMEN [facility] INTMED [facility] SPLABIM [facility] PCW   1/20/2026  9:45 AM [facility]-CT1 500 LB LIMIT [facility] IC Imaging Ctr   1/20/2026 10:00 AM [facility]-XRAY [facility] XRAY [facility]   1/27/2026 10:00 AM Adam Sawyer MD [facility] UROLOGC Julian Gambino

## 2025-03-03 NOTE — PT/OT/SLP EVAL
Speech Language Pathology Evaluation  Cognitive Communication    Patient Name:  Kandace Rapp   MRN:  [id number]  Admitting Diagnosis: LBBB (left bundle branch block)    Recommendations:     Recommendations:                General Recommendations:  Speech/language therapy and clinical swallow eval  Diet recommendations:  Unable to be determined / NPO status for testing  General Precautions: Standard, fall, aphasia  Communication strategies:  provide increased time to answer    Assessment:     Kandace Rapp is a 69 y.o. female with an SLP diagnosis of Aphasia. She presents with deficits in higher level language tasks. SLP to continue to follow.      History:     Past Medical History:   Diagnosis Date    Anxiety     Aortic stenosis     Arthritis     CHF (congestive heart failure)     Embolic stroke involving left middle cerebral artery 2025    Heart murmur     Hypertension     Mitral valve prolapse        Past Surgical History:   Procedure Laterality Date    AORTIC VALVULOPLASTY N/A 2024    Procedure: REPAIR, AORTIC VALVE;  Surgeon: Rohit Quinonez MD;  Location: [facility] CATH LAB;  Service: Cardiology;  Laterality: N/A;    AORTOGRAPHY N/A 2024    Procedure: AORTOGRAM;  Surgeon: Rohit Quinonez MD;  Location: [facility] CATH LAB;  Service: Cardiology;  Laterality: N/A;    BREAST CYST EXCISION Left 80s    BREAST SURGERY      Benign lump on left.    CARDIAC CATH COSURGEON N/A 2025    Procedure: Cardiac Cath Cosurgeon;  Surgeon: Naun Martin MD;  Location: [facility] CATH LAB;  Service: Cardiothoracic;  Laterality: N/A;    CATARACT EXTRACTION       SECTION      x1    COLONOSCOPY N/A 2023    Procedure: COLONOSCOPY;  Surgeon: Pattie Stehpenson MD;  [facility]  Service: Endoscopy;  Laterality: N/A;   ref by Ishmael Jacobson MD, PEG, instr. to portal-st    CORONARY ANGIOGRAPHY N/A 10/28/2024    Procedure: ANGIOGRAM, CORONARY ARTERY;  Surgeon: Berenice  Yordy HUNT MD;  Location: [facility] CATH LAB;  Service: Cardiology;  Laterality: N/A;  radial access    DV5 ROBOTIC NEPHRECTOMY, PARTIAL Left 12/12/2024    Procedure: DV5 ROBOTIC NEPHRECTOMY, PARTIAL;  Surgeon: Adam Sawyer MD;  Location: [facility] OR [address] [place] [address];  Service: Urology;  Laterality: Left;    EYE SURGERY      TRANSCATHETER AORTIC VALVE REPLACEMENT (TAVR) N/A 2/27/2025    Procedure: REPLACEMENT, AORTIC VALVE, TRANSCATHETER (TAVR);  Surgeon: Rohit Quinonez MD;  Location: [facility] CATH LAB;  Service: Cardiology;  Laterality: N/A;    TRANSCATHETER AORTIC VALVE REPLACEMENT (TAVR)  2/27/2025    Procedure: REPLACEMENT, AORTIC VALVE, TRANSCATHETER (TAVR);  Surgeon: Naun Martin MD;  Location: [facility] CATH LAB;  Service: Cardiothoracic;;    VALVE STUDY-AORTIC  11/06/2024    Procedure: Valve study-aortic;  Surgeon: Rohit Quinonez MD;  Location: [facility] CATH LAB;  Service: Cardiology;;       HPI: 2/27/25  Kandace Rapp is a 69 year-old-woman with PMH of HTN, CHF, RCC s/p L partial nephrectomy (cancer free) and severe AS that presented today for scheduled outpatient TAVR. She is now s/p TAVR by Dr. Way Stat CTH obtained for new confusion/speech difficulty while in PACU post-procedure which revealed early ischemic changes in L MCA territory,  at which time a stroke code was called. CTA concerning for distal L M3 occlusion (vs high grade stenosis as per radiology read). Discussed imaging with CAROLE, given infarct appears completed on CTH and distal location of lesion determined not amenable to intervention therefore not taken for thrombectomy.     Social History: Patient lives with her daughter.    Prior Intubation HX:  none this admit    Modified Barium Swallow: none on file    Prior diet: Regular/thin    Occupation/hobbies/homemaking: none stated    Subjective     Spoke with nursing prior to session. Pt found resting in bed with son and daughter at bedside upon SLP entry into room. Pt agreeable to  participate in all aspects of session.      Patient goals: to improve her speech     Pain/Comfort:  Pain Rating 1: 0/10    Respiratory Status: Room air    Objective:     Cognitive Status:    Arousal/Alertness - Appropriate response to stimuli  Attention - No obvious deficits observed      Pragmatics:    WFL    Expressive Language  Responsive naming - 3/3 Indep  Generative naming - Pt named x8 items within a category given 60 seconds (WFL 15-20)  Conversational speech tasks - Pt's conversational speech characterized by overall fluent speech with intermittent evidence of paraphasias and incorrect target words; communication overall functional and informative with pt able to state wants/needs/desires without significant need for support from communication partner. Brief periods of verbal paraphasias noted with decreased awareness of word errors in unstructured conversation with clinician and family       Receptive Language  Basic Y/N questions - 3/3 Indep  Complex Y/N questions - 3/5 Indep  2 step directions - 2/2 Indep  3 step directions - 0/2 Indep  Receptive identification of concrete objects - 8/8  Paragraph comprehension - 1/4 Indep    Motor Speech:  No dysarthria or apraxia noted    Voice:   WFL    Visual-Spatial:  TBA though pt able to identify items across all planes during informal assessment     Reading:   TBA    Written Expression:   TBA      Treatment: PO trials deferred this date 2/2 pt NPO for upcoming testing. SLP provided education regarding overall impressions, language deficits/aphasia, continued speech tasks to improve language function, and ongoing SLP POC. Pt and family provided time to ask questions which were answered to satisfaction. Pt and family verbalized understanding and had no additional questions or concerns upon SLP exit.        Goals:   Multidisciplinary Problems       SLP Goals          Problem: SLP    Goal Priority Disciplines Outcome   SLP Goal     SLP Progressing   Description:  Speech Pathology Goals  To be met by 3/17/25    1. Pt will participate in clinical swallow evaluation to determine least restrictive PO diet   2. Pt will follow 2 step directions across 90% of trials given MIN verbal cueing   3. Pt will identify x8 items within a concrete category given MIN verbal cueing  4. Pt will provide logical 2-3 word endings to sentences with 90% accuracy given MIN verbal cueing                          Plan:   Patient to be seen:  4 x/week   Plan of Care expires:  04/02/25  Plan of Care reviewed with:  patient, family   SLP Follow-Up:  Yes       Discharge recommendations:  Therapy Intensity Recommendations at Discharge: Low Intensity Therapy   Barriers to Discharge:  None    Time Tracking:     SLP Treatment Date:   03/03/25  Speech Start Time:  1015  Speech Stop Time:  1041     Speech Total Time (min):  26 min    Billable Minutes: Eval Swallow and Oral Function 15 and Self Care/Home Management Training 11      03/03/2025

## 2025-03-03 NOTE — SUBJECTIVE & OBJECTIVE
"Interval History:   - DOROTHY OSUNA  - Doing well neurologically, speech and memory back to baseline, per family  - Tele rate 80-90s, QRS wide    Objective:     Vital Signs (Most Recent):  Temp: 98.3 °F (36.8 °C) (03/03/25 0705)  Pulse: 83 (03/03/25 0705)  Resp: 18 (03/03/25 0705)  BP: 126/68 (03/03/25 0705)  SpO2: 97 % (03/03/25 0705) Vital Signs (24h Range):  Temp:  [98 °F (36.7 °C)-98.9 °F (37.2 °C)] 98.3 °F (36.8 °C)  Pulse:  [81-99] 83  Resp:  [14-30] 18  SpO2:  [94 %-100 %] 97 %  BP: ()/(52-73) 126/68     Weight: 89.3 kg (196 lb 12.8 oz)  Body mass index is 36 kg/m².     SpO2: 97 %        Physical Exam  Constitutional:       Appearance: Normal appearance.   HENT:      Mouth/Throat:      Mouth: Mucous membranes are moist.   Eyes:      Extraocular Movements: Extraocular movements intact.      Conjunctiva/sclera: Conjunctivae normal.   Cardiovascular:      Rate and Rhythm: Normal rate and regular rhythm.   Pulmonary:      Effort: Pulmonary effort is normal.   Abdominal:      General: Abdomen is flat.      Palpations: Abdomen is soft.   Musculoskeletal:      Right lower leg: No edema.      Left lower leg: No edema.   Skin:     General: Skin is warm and dry.   Neurological:      General: No focal deficit present.      Mental Status: She is alert and oriented to person, place, and time.            Significant Labs: CMP:   Recent Labs   Lab 03/01/25  1442 03/02/25  0235 03/03/25 0219    137 137   K 4.3 4.2 4.3    105 107   CO2 26 23 23   * 105 106   BUN 13 14 23   CREATININE 0.7 0.7 0.7   CALCIUM 9.2 8.9 9.0   ANIONGAP 8 9 7*   , CBC:   Recent Labs   Lab 03/02/25  0235 03/03/25 0219   WBC 8.48 9.40   HGB 10.2* 10.2*   HCT 31.4* 32.1*    146*   , and INR: No results for input(s): "INR", "PROTIME" in the last 48 hours.    Significant Imaging: No new  "

## 2025-03-03 NOTE — PROGRESS NOTES
[facility] - Cardiac Intensive Care  Cardiac Electrophysiology  Progress Note    Admission Date: 2/27/2025  Code Status: Full Code   Attending Physician: Anurag Way MD   Expected Discharge Date: 3/4/2025  Principal Problem:LBBB (left bundle branch block)    Subjective:     Interval History:   - DOROTHY OSUNA  - Doing well neurologically, speech and memory back to baseline, per family  - Tele rate 80-90s, QRS wide    Objective:     Vital Signs (Most Recent):  Temp: 98.3 °F (36.8 °C) (03/03/25 0705)  Pulse: 83 (03/03/25 0705)  Resp: 18 (03/03/25 0705)  BP: 126/68 (03/03/25 0705)  SpO2: 97 % (03/03/25 0705) Vital Signs (24h Range):  Temp:  [98 °F (36.7 °C)-98.9 °F (37.2 °C)] 98.3 °F (36.8 °C)  Pulse:  [81-99] 83  Resp:  [14-30] 18  SpO2:  [94 %-100 %] 97 %  BP: ()/(52-73) 126/68     Weight: 89.3 kg (196 lb 12.8 oz)  Body mass index is 36 kg/m².     SpO2: 97 %        Physical Exam  Constitutional:       Appearance: Normal appearance.   HENT:      Mouth/Throat:      Mouth: Mucous membranes are moist.   Eyes:      Extraocular Movements: Extraocular movements intact.      Conjunctiva/sclera: Conjunctivae normal.   Cardiovascular:      Rate and Rhythm: Normal rate and regular rhythm.   Pulmonary:      Effort: Pulmonary effort is normal.   Abdominal:      General: Abdomen is flat.      Palpations: Abdomen is soft.   Musculoskeletal:      Right lower leg: No edema.      Left lower leg: No edema.   Skin:     General: Skin is warm and dry.   Neurological:      General: No focal deficit present.      Mental Status: She is alert and oriented to person, place, and time.            Significant Labs: CMP:   Recent Labs   Lab 03/01/25  1442 03/02/25  0235 03/03/25  0219    137 137   K 4.3 4.2 4.3    105 107   CO2 26 23 23   * 105 106   BUN 13 14 23   CREATININE 0.7 0.7 0.7   CALCIUM 9.2 8.9 9.0   ANIONGAP 8 9 7*   , CBC:   Recent Labs   Lab 03/02/25  0235 03/03/25  0219   WBC 8.48 9.40   HGB 10.2* 10.2*  "  HCT 31.4* 32.1*    146*   , and INR: No results for input(s): "INR", "PROTIME" in the last 48 hours.    Significant Imaging: No new  Assessment and Plan:     * LBBB (left bundle branch block)  68 yo F with a history significant for severe AS s/p TAVR, c/b new LBBB. Patient without prior history of conduction abnormalities. EKG post procedure noting LBBB with . TVP placed in the cath lab. Notably, patient found to have L MCA stroke 2/27; not amenable to intervention and admitted to the CICU. Recovering well neurologically post stroke. QRS remains wide, ECG 3/1 .     Recommendations:  - EPS +/- dcPPM implantation today  - Hold heparin products  - NPO  - Tele      Evita Azevedo MD  Cardiac Electrophysiology  [facility] - Cardiac Intensive Care    "

## 2025-03-03 NOTE — SUBJECTIVE & OBJECTIVE
Interval History: NAEON. Pt reports feeling good today, memory and speech feeling near normal. EPS planned for today. PT/OT recommending low intensity therapy    ROS  Objective:     Vital Signs (Most Recent):  Temp: 98.1 °F (36.7 °C) (03/03/25 1105)  Pulse: 84 (03/03/25 1105)  Resp: 20 (03/03/25 1105)  BP: (!) 107/55 (03/03/25 1105)  SpO2: 95 % (03/03/25 1105) Vital Signs (24h Range):  Temp:  [98 °F (36.7 °C)-98.9 °F (37.2 °C)] 98.1 °F (36.7 °C)  Pulse:  [81-99] 84  Resp:  [14-30] 20  SpO2:  [94 %-100 %] 95 %  BP: ()/(52-73) 107/55     Weight: 89.3 kg (196 lb 12.8 oz)  Body mass index is 36 kg/m².     SpO2: 95 %         Intake/Output Summary (Last 24 hours) at 3/3/2025 1202  Last data filed at 3/3/2025 1112  Gross per 24 hour   Intake 791.81 ml   Output 955 ml   Net -163.19 ml       Lines/Drains/Airways       Central Venous Catheter Line  Duration                  Percutaneous Central Line - Cordis 02/27/25 1005 Subclavian Right 4 days              Drain  Duration             Female External Urinary Catheter w/ Suction 02/28/25 0705 3 days              Peripheral Intravenous Line  Duration                  Peripheral IV - Single Lumen 02/28/25 1105 20 G No Anterior;Distal;Left Forearm 3 days         Peripheral IV - Single Lumen 03/03/25 0225 20 G Anterior;Right Forearm <1 day                       Physical Exam  Vitals and nursing note reviewed.   Constitutional:       General: She is not in acute distress.     Appearance: Normal appearance. She is not ill-appearing.   HENT:      Head: Normocephalic and atraumatic.   Cardiovascular:      Rate and Rhythm: Normal rate and regular rhythm.      Heart sounds: Normal heart sounds. No murmur heard.  Pulmonary:      Effort: Pulmonary effort is normal. No respiratory distress.      Breath sounds: Normal breath sounds. No wheezing.   Abdominal:      General: Abdomen is flat. There is no distension.   Musculoskeletal:      Cervical back: Normal range of motion. No  rigidity.   Skin:     General: Skin is warm and dry.      Findings: No lesion.   Neurological:      Comments: Speech and memory improving   Psychiatric:         Mood and Affect: Mood normal.         Behavior: Behavior normal.            Significant Labs: All pertinent lab results from the last 24 hours have been reviewed.    Significant Imaging:  Reviewed

## 2025-03-03 NOTE — ANESTHESIA PREPROCEDURE EVALUATION
2025  Pre-operative evaluation for Procedure(s) (LRB):  EP - diagnostic (N/A)  INSERTION, CARDIAC PACEMAKER, DUAL CHAMBER (N/A)    Kandace Rapp is a 69 y.o. female s/p TAVR now with new heart block here for EP study and possible pacemaker. Recent stroke per patient, no residual symptoms    Problem List[1]    Review of patient's allergies indicates:   Allergen Reactions    Protamine sulfate Other (See Comments)     Bradycardia, Hypotension    Davion Rash       Medications Ordered Prior to Encounter[2]    Past Surgical History:   Procedure Laterality Date    AORTIC VALVULOPLASTY N/A 2024    Procedure: REPAIR, AORTIC VALVE;  Surgeon: Rohit Quinonez MD;  Location: [facility] CATH LAB;  Service: Cardiology;  Laterality: N/A;    AORTOGRAPHY N/A 2024    Procedure: AORTOGRAM;  Surgeon: Rohit Quinonez MD;  Location: [facility] CATH LAB;  Service: Cardiology;  Laterality: N/A;    BREAST CYST EXCISION Left 80s    BREAST SURGERY      Benign lump on left.    CARDIAC CATH COSURGEON N/A 2025    Procedure: Cardiac Cath Cosurgeon;  Surgeon: Naun Martin MD;  Location: [facility] CATH LAB;  Service: Cardiothoracic;  Laterality: N/A;    CATARACT EXTRACTION       SECTION      x1    COLONOSCOPY N/A 2023    Procedure: COLONOSCOPY;  Surgeon: Pattie Stephenson MD;  Location: [facility] ENDO (4TH FLR);  Service: Endoscopy;  Laterality: N/A;   ref by Ishmael Jacobson MD, PEG, instr. to portal-st    CORONARY ANGIOGRAPHY N/A 10/28/2024    Procedure: ANGIOGRAM, CORONARY ARTERY;  Surgeon: Yordy Ng MD;  Location: [facility] CATH LAB;  Service: Cardiology;  Laterality: N/A;  radial access    DV5 ROBOTIC NEPHRECTOMY, PARTIAL Left 2024    Procedure: DV5 ROBOTIC NEPHRECTOMY, PARTIAL;  Surgeon: Adam Saywer MD;  Location: [facility] OR 2ND FLR;  Service: Urology;  Laterality: Left;    EYE SURGERY       TRANSCATHETER AORTIC VALVE REPLACEMENT (TAVR) N/A 2/27/2025    Procedure: REPLACEMENT, AORTIC VALVE, TRANSCATHETER (TAVR);  Surgeon: Rohit Quinonez MD;  Location: [facility] CATH LAB;  Service: Cardiology;  Laterality: N/A;    TRANSCATHETER AORTIC VALVE REPLACEMENT (TAVR)  2/27/2025    Procedure: REPLACEMENT, AORTIC VALVE, TRANSCATHETER (TAVR);  Surgeon: Naun Martin MD;  Location: [facility] CATH LAB;  Service: Cardiothoracic;;    VALVE STUDY-AORTIC  11/06/2024    Procedure: Valve study-aortic;  Surgeon: Rohit Quinonez MD;  Location: [facility] CATH LAB;  Service: Cardiology;;       Social History[3]      CBC:   Recent Labs     03/02/25 0235 03/03/25 0219   WBC 8.48 9.40   RBC 3.56* 3.63*   HGB 10.2* 10.2*   HCT 31.4* 32.1*    146*   MCV 88 88   MCH 28.7 28.1   MCHC 32.5 31.8*       CMP:   Recent Labs     03/02/25 0235 03/03/25 0219    137   K 4.2 4.3    107   CO2 23 23   BUN 14 23   CREATININE 0.7 0.7    106   MG 1.8 1.7   PHOS 3.6 4.1   CALCIUM 8.9 9.0             Pre-op Assessment    I have reviewed the Patient Summary Reports.     I have reviewed the Nursing Notes. I have reviewed the NPO Status.      Review of Systems  Anesthesia Hx:  No problems with previous Anesthesia                Cardiovascular:     Hypertension    Dysrhythmias   CHF                                   Renal/:  Chronic Renal Disease                Musculoskeletal:  Arthritis               Neurological:   CVA                                    Psych:  Psychiatric History                  Physical Exam    Airway:  Mallampati: II   Mouth Opening: Normal  Tongue: Normal    Chest/Lungs:  Normal Respiratory Rate    Heart:  Rhythm: Regular Rhythm        Anesthesia Plan  Type of Anesthesia, risks & benefits discussed:    Anesthesia Type: Gen Natural Airway  Intra-op Monitoring Plan: Standard ASA Monitors  Induction:  IV  Informed Consent: Informed consent signed with the Patient and all parties understand the risks  and agree with anesthesia plan.  All questions answered.   ASA Score: 4    Ready For Surgery From Anesthesia Perspective.     .           [1]   Patient Active Problem List  Diagnosis    Mitral valve prolapse    Vitamin D deficiency    Severe obesity (BMI 35.0-39.9) with comorbidity    Nonrheumatic aortic valve stenosis    Moderate episode of recurrent major depressive disorder    BMI 36.0-36.9,adult    Osteoarthritis of knee    Arthritis of both knees    HTN (hypertension)    Renal mass    Ptosis of eyelid    Anemia    Renal cell carcinoma    LBBB (left bundle branch block)    Embolic stroke involving left middle cerebral artery   [2]   No current facility-administered medications on file prior to encounter.     Current Outpatient Medications on File Prior to Encounter   Medication Sig Dispense Refill    cholecalciferol, vitamin D3, 1,250 mcg (50,000 unit) capsule       NIFEdipine (PROCARDIA-XL) 30 MG (OSM) 24 hr tablet Take 1 tablet (30 mg total) by mouth once daily. 90 tablet 3   [3]   Social History  Socioeconomic History    Marital status:     Number of children: 2   Tobacco Use    Smoking status: Never    Smokeless tobacco: Never   Substance and Sexual Activity    Alcohol use: Yes     Comment: Rare.     Drug use: No    Sexual activity: Not Currently   Social History Narrative       , one child local dtr lives with her, one in [place] son in [place]/hx of [facility]. 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      Social Drivers of Health     Financial Resource Strain: Low Risk  (3/2/2025)    Overall Financial Resource Strain (CARDIA)     Difficulty of Paying Living Expenses: Not very hard   Food Insecurity: No Food Insecurity (3/2/2025)    Hunger Vital Sign     Worried About Running Out of Food in the Last Year: Never true     Ran Out of Food in the Last Year: Never true   Transportation Needs: No Transportation Needs (2024)    TRANSPORTATION NEEDS      Transportation : No   Physical Activity: Sufficiently Active (1/23/2025)    Exercise Vital Sign     Days of Exercise per Week: 4 days     Minutes of Exercise per Session: 40 min   Stress: No Stress Concern Present (3/2/2025)    [employer] - Occupational Stress Questionnaire     Feeling of Stress : Only a little   Housing Stability: Low Risk  (3/2/2025)    Housing Stability Vital Sign     Unable to Pay for Housing in the Last Year: No     Homeless in the Last Year: No

## 2025-03-03 NOTE — ASSESSMENT & PLAN NOTE
68 yo F with a history significant for severe AS s/p TAVR, c/b new LBBB. Patient without prior history of conduction abnormalities. EKG post procedure noting LBBB with . TVP placed in the cath lab. Notably, patient found to have L MCA stroke 2/27; not amenable to intervention and admitted to the CICU. Recovering well neurologically post stroke. QRS remains wide, ECG 3/1 .     Recommendations:  - EPS +/- dcPPM implantation today  - Hold heparin products  - NPO  - Tele

## 2025-03-03 NOTE — ASSESSMENT & PLAN NOTE
Patient's blood pressure range in the last 24 hours was: BP  Min: 94/54  Max: 138/63.The patient's inpatient anti-hypertensive regimen is listed below:  Current Antihypertensives  hydrALAZINE injection 10 mg, Every 4 hours PRN, Intravenous    Plan  - Continue current medications as above; nifedipine discontinued given stable BP

## 2025-03-03 NOTE — PLAN OF CARE
Problem: SLP  Goal: SLP Goal  Description: Speech Pathology Goals  To be met by 3/17/25    1. Pt will participate in clinical swallow evaluation to determine least restrictive PO diet   2. Pt will follow 2 step directions across 90% of trials given MIN verbal cueing   3. Pt will identify x8 items within a concrete category given MIN verbal cueing  4. Pt will provide logical 2-3 word endings to sentences with 90% accuracy given MIN verbal cueing     Outcome: Progressing     full speech/language assessment completed. See full report for additional details

## 2025-03-04 NOTE — PLAN OF CARE
[facility] - Cardiac Intensive Care  Critical Care - Medicine  Discharge Summary      Patient Name: Kandace Rapp  MRN: [id number]  Admission Date: 2/27/2025  Hospital Length of Stay: 4 days  Discharge Date and Time: 3/3/2025 1908  Attending Physician: Anurag Way MD   Discharging Provider: Paula Zhong RN  Primary Care Provider: Ishmael Jacobson MD  Reason for Admission: TAVR  Significant Labs:  None  Final Active Diagnoses:    Diagnosis Date Noted POA    PRINCIPAL PROBLEM:  LBBB (left bundle branch block) [I44.7] 02/27/2025 No    Embolic stroke involving left middle cerebral artery [I63.412] 02/27/2025 No    Renal mass [N28.89] 11/05/2024 Yes    HTN (hypertension) [I10] 11/04/2024 Yes    BMI 36.0-36.9,adult [Z68.36] 07/18/2022 Not Applicable    Nonrheumatic aortic valve stenosis [I35.0] 03/31/2022 Yes      Problems Resolved During this Admission:     Discharged Condition: good  Disposition: Home or Self Care    Patient Instructions:      Ambulatory referral/consult to Vascular Neurology   Standing Status: Future   Referral Priority: Routine Referral Type: Consultation   Referral Reason: Specialty Services Required   Requested Specialty: Vascular Neurology   Number of Visits Requested: 1     Ambulatory referral/consult to Cardiac Electrophysiology   Standing Status: Future   Referral Priority: Routine Referral Type: Consultation   Referral Reason: Specialty Services Required   Requested Specialty: Cardiology   Number of Visits Requested: 1     Ambulatory Referral/Consult to Physical/Occupational Therapy   Standing Status: Future   Referral Priority: Routine Referral Type: Physical Medicine   Referral Reason: Specialty Services Required   Number of Visits Requested: 1     Medications:  Given by bedside delivery per pharmacy.     Paula Zhong RN  Critical Care - Medicine  [facility]    Problem: Adult Inpatient Plan of Care  Goal: Plan of Care Review  3/3/2025 1937  by Paula Elder RN  Outcome: Met  3/3/2025 0621 by Paula Elder RN  Outcome: Progressing  Goal: Patient-Specific Goal (Individualized)  3/3/2025 1937 by Paula Elder RN  Outcome: Met  3/3/2025 0621 by Paula Elder RN  Outcome: Progressing  Goal: Absence of Hospital-Acquired Illness or Injury  3/3/2025 1937 by Paula Elder RN  Outcome: Met  3/3/2025 0621 by Paula Elder RN  Outcome: Progressing  Goal: Optimal Comfort and Wellbeing  3/3/2025 1937 by Paula Elder RN  Outcome: Met  3/3/2025 0621 by Paula Elder RN  Outcome: Progressing  Goal: Readiness for Transition of Care  3/3/2025 1937 by Paula Elder RN  Outcome: Met  3/3/2025 0621 by Paula Elder RN  Outcome: Progressing

## 2025-03-04 NOTE — PLAN OF CARE
CICU DAILY GOALS       A: Awake    RASS: Goal -    Actual - RASS (Watson Agitation-Sedation Scale): alert and calm   Restraint necessity:    B: Breath   SBT: Not intubated   C: Coordinate A & B, analgesics/sedatives   Pain: managed    SAT: Not intubated  D: Delirium   CAM-ICU:    E: Early(intubated/ Progressive (non-intubated) Mobility   Activity: Activity Management: Ankle pumps - L1, Arm raise - L1, Heel slide - L1, Rolling - L1  FAS: Feeding/Nutrition   Diet order: Diet/Nutrition Received: NPO,   Fluid restriction:    T: Thrombus   DVT prophylaxis: VTE Core Measure: Pharmacological prophylaxis initiated/maintained  H: HOB Elevation   Head of Bed (HOB) Positioning: HOB lowered  U: Ulcer Prophylaxis   GI: no  G: Glucose control    S: Skin   Bathing/Skin Care: bath, complete (03/03/25 0601)  Wounds: Yes  Wound care consulted: No  B: Bowel Function   no issues   I: Indwelling Catheters   Alvarado necessity:     CVC necessity: No  D: De-escalation Antibx   No  Plan for the day   Possible PPM  Family/Goals of care/Code Status   Code Status: Full Code    Pt went to EP for EPS, team determined pt does not need PPM at this moment. Pt to be DC.   Neuro status is improving.      No acute events throughout day, VS and assessment per flow sheet, patient progressing towards goals as tolerated, plan of care reviewed with Kandace Rapp and family, all concerns addressed, will continue to monitor.

## 2025-03-04 NOTE — DISCHARGE SUMMARY
[facility] Card[facility]  Critical Care - Medicine  Discharge Summary      Patient Name: Kandace Rapp  MRN: [id number]  Admission Date: 2/27/2025  Hospital Length of Stay: 4 days  Discharge Date and Time: 3/3/2025 1908  Attending Physician: Anurag Way MD   Discharging Provider: Paula Zhong RN  Primary Care Provider: Ishmael Jacobson MD  Reason for Admission: TAVR    Procedure(s) (LRB):  EP - diagnostic (N/A)  INSERTION, CARDIAC PACEMAKER, DUAL CHAMBER (N/A)    Indwelling Lines/Drains at Time of Discharge:   Lines/Drains/Airways       Drain  Duration             Female External Urinary Catheter w/ Suction 02/28/25 0705 3 days                    Consults (From admission, onward)          Status Ordering Provider     Inpatient consult to Electrophysiology  Once        Provider:  (Not yet assigned)    GABBY Mercado            Significant Labs:  None        Pending Diagnostic Studies:       None          Final Active Diagnoses:    Diagnosis Date Noted POA    PRINCIPAL PROBLEM:  LBBB (left bundle branch block) [I44.7] 02/27/2025 No    Embolic stroke involving left middle cerebral artery [I63.412] 02/27/2025 No    Renal mass [N28.89] 11/05/2024 Yes    HTN (hypertension) [I10] 11/04/2024 Yes    BMI 36.0-36.9,adult [Z68.36] 07/18/2022 Not Applicable    Nonrheumatic aortic valve stenosis [I35.0] 03/31/2022 Yes      Problems Resolved During this Admission:       Discharged Condition: good    Disposition: Home or Self Care    Follow Up:    Patient Instructions:      Ambulatory referral/consult to Vascular Neurology   Standing Status: Future   Referral Priority: Routine Referral Type: Consultation   Referral Reason: Specialty Services Required   Requested Specialty: Vascular Neurology   Number of Visits Requested: 1     Ambulatory referral/consult to Cardiac Electrophysiology   Standing Status: Future   Referral Priority: Routine Referral Type: Consultation   Referral Reason:  Specialty Services Required   Requested Specialty: Cardiology   Number of Visits Requested: 1     Ambulatory Referral/Consult to Physical/Occupational Therapy   Standing Status: Future   Referral Priority: Routine Referral Type: Physical Medicine   Referral Reason: Specialty Services Required   Number of Visits Requested: 1     Medications:  Given by bedside delivery per pharmacy.     Paula Zhong RN  Critical Care - Medicine  [facility]

## 2025-03-05 ENCOUNTER — PATIENT OUTREACH (OUTPATIENT)
Dept: ADMINISTRATIVE | Facility: CLINIC | Age: 70
End: 2025-03-05
Payer: MEDICARE

## 2025-03-05 NOTE — PROGRESS NOTES
C3 nurse spoke with Kandace Rapp for a TCC post hospital discharge follow up call. The patient reports does not have a scheduled HOSFU appointment. C3 nurse was unable to schedule HOSFU appointment for [facility] P[facility]. Patient advised to contact their PCP to schedule a HOSPFU within 5-7 days.       No PCP. Declined Home NP visit.  C3 nurse gave the pt. the phone number to call and [facility] [phone].  Stated understanding.

## 2025-03-06 ENCOUNTER — TELEPHONE (OUTPATIENT)
Dept: CARDIOLOGY | Facility: CLINIC | Age: 70
End: 2025-03-06
Payer: MEDICARE

## 2025-03-06 ENCOUNTER — TELEPHONE (OUTPATIENT)
Dept: INTERNAL MEDICINE | Facility: CLINIC | Age: 70
End: 2025-03-06
Payer: MEDICARE

## 2025-03-06 ENCOUNTER — TELEPHONE (OUTPATIENT)
Dept: SPEECH THERAPY | Facility: HOSPITAL | Age: 70
End: 2025-03-06
Payer: MEDICARE

## 2025-03-06 DIAGNOSIS — R47.01 APHASIA DUE TO ACUTE CEREBROVASCULAR ACCIDENT (CVA): ICD-10-CM

## 2025-03-06 DIAGNOSIS — I63.412 EMBOLIC STROKE INVOLVING LEFT MIDDLE CEREBRAL ARTERY: Primary | ICD-10-CM

## 2025-03-06 DIAGNOSIS — I63.9 APHASIA DUE TO ACUTE CEREBROVASCULAR ACCIDENT (CVA): ICD-10-CM

## 2025-03-06 NOTE — TELEPHONE ENCOUNTER
Sw pt daughter to inform pt must have referral to Speech therapy and can contact OT&W.  ----- Message from Cheryl sent at 3/6/2025 12:44 PM CST -----  Type:  Needs Medical AdviceWho Called: son of Ms Jeronimo Would the patient rather a call back or a response via [employer]? Call Best Call Back Number:  [phone] Information: he is calling to see about his mother getting an appt to see someone in the speech department please call

## 2025-03-06 NOTE — TELEPHONE ENCOUNTER
----- Message from Jose sent at 3/6/2025 12:09 PM CST -----  Contact: [phone]  Patient needs a Hosp follow up appt with their PCP only. When is the next available appointment:  April 7 2025Symptoms:  Heart Surgery Had A StrokeDischarge date:March 3 2025Needs to be seen by:7 days Would you prefer an answer via [employer]?: call back Comments:

## 2025-03-06 NOTE — TELEPHONE ENCOUNTER
Spoke to patient.  Order placed for speech therapy and message sent to MA for scheduling.       ----- Message from Swati sent at 3/6/2025  1:44 PM CST -----  .Type: Patient Call Back  Who called:  Lilia - patient friend What is the request in detail:  Called in concerning getting a referral for speech therapy due to patient having a stroke . Please call back Can the clinic reply by [facility]?     Would the patient rather a call back or a response via [facility]?  [facility] call back number:.[phone]

## 2025-03-06 NOTE — TELEPHONE ENCOUNTER
Spoke with pt regarding scheduling Hospital F/U with PCP only. Pt stated that she already have an appointment with Dr Burr her primary Care provider.

## 2025-03-11 ENCOUNTER — OFFICE VISIT (OUTPATIENT)
Dept: INTERNAL MEDICINE | Facility: CLINIC | Age: 70
End: 2025-03-11
Payer: MEDICARE

## 2025-03-11 VITALS
SYSTOLIC BLOOD PRESSURE: 122 MMHG | HEART RATE: 83 BPM | WEIGHT: 198 LBS | DIASTOLIC BLOOD PRESSURE: 60 MMHG | OXYGEN SATURATION: 94 % | BODY MASS INDEX: 36.21 KG/M2

## 2025-03-11 DIAGNOSIS — I10 HYPERTENSION, UNSPECIFIED TYPE: ICD-10-CM

## 2025-03-11 DIAGNOSIS — I44.7 LBBB (LEFT BUNDLE BRANCH BLOCK): ICD-10-CM

## 2025-03-11 DIAGNOSIS — I63.412 EMBOLIC STROKE INVOLVING LEFT MIDDLE CEREBRAL ARTERY: Primary | ICD-10-CM

## 2025-03-11 DIAGNOSIS — I35.0 NONRHEUMATIC AORTIC VALVE STENOSIS: ICD-10-CM

## 2025-03-11 DIAGNOSIS — Z95.3 S/P TAVR (TRANSCATHETER AORTIC VALVE REPLACEMENT): ICD-10-CM

## 2025-03-11 DIAGNOSIS — Z09 HOSPITAL DISCHARGE FOLLOW-UP: ICD-10-CM

## 2025-03-11 PROCEDURE — 99999 PR PBB SHADOW E&M-EST. PATIENT-LVL III: CPT | Mod: PBBFAC,,, | Performed by: STUDENT IN AN ORGANIZED HEALTH CARE EDUCATION/TRAINING PROGRAM

## 2025-03-11 NOTE — PROGRESS NOTES
[facility] FOLLOW-UP VISIT      CHIEF COMPLAINT:   Chief Complaint   Patient presents with    Hospital Follow Up       HISTORY OF PRESENT ILLNESS: Kandace Rapp is a 69 y.o. female who presents here today for hospital follow-up. Patient was admitted 2/27-3/3 for scheduled TAVR which she underwent for severe aortic stenosis. She underwent surgery 2/27 which was uncomplicated, however she experienced new onset confusion and dysarthria in post op with stat CTH revealing of early ischemic changes in left MCA. CTA concerning for distal L M3 occlusion (vs high grade stenosis as per radiology read). Discussed imaging with [facility], given infarct appears completed on CTH and distal location of lesion determined not amenable to intervention therefore not taken for thrombectomy. She was admitted to CICU for neurochecks. She was loaded with plavix and continued aspirin and statin. Follow up MRI concerning for possible hemorrhagic conversion, however this was disconcordant with repeat head CT. Neurologic exam improving, vascular neurology did not recommend further intervention. She was also found to have new onset LBBB for which electrophysiology was consulted. EPS was negative with no indication for PPM. She was discharged home with cardiac event monitor, DAPT and HI statin. Will follow up with EP and Vascular Neurology outpatient.     Since discharge, patient has been feeling well other than continued speech deficit and a little low energy. Overall these things are improving. Still a little confusion but nothing like before. Patient denies any chest pain, difficulty breathing, lightheadedness, dizziness, palpitations. Patient has been compliant with medications. She still needs to schedule with vascular neurology, will see cardiology 4/7/25. She will see speech therapy on Friday for the first time. Planning to schedule with PT as well. She has been holding her nifedipine as instructed. Has not been  checking her BP at home but it is within goal 122/60 today. Inquiring if she can eat a regular diet.        REVIEW OF SYSTEMS:    ROS as in HPI.       MEDICAL HISTORY:    Past Medical History:   Diagnosis Date    Anxiety     Aortic stenosis     Arthritis     CHF (congestive heart failure)     Embolic stroke involving left middle cerebral artery 2/27/2025    Heart murmur     Hypertension     Mitral valve prolapse        MEDICATIONS:    Medications Ordered Prior to Encounter[1]      PHYSICAL EXAM:    /60 (BP Location: Right arm, Patient Position: Sitting)   Pulse 83   Wt 89.8 kg (197 lb 15.6 oz)   SpO2 (!) 94%   BMI 36.21 kg/m²     Physical Exam  Vitals and nursing note reviewed.   Constitutional:       General: She is not in acute distress.     Appearance: Normal appearance. She is not ill-appearing, toxic-appearing or diaphoretic.   HENT:      Head: Normocephalic and atraumatic.      Nose: Nose normal.   Eyes:      Extraocular Movements: Extraocular movements intact.      Conjunctiva/sclera: Conjunctivae normal.      Pupils: Pupils are equal, round, and reactive to light.   Cardiovascular:      Rate and Rhythm: Normal rate.   Pulmonary:      Effort: Pulmonary effort is normal. No respiratory distress.   Musculoskeletal:         General: No deformity. Normal range of motion.   Skin:     Findings: No lesion or rash.   Neurological:      Mental Status: She is alert. Mental status is at baseline.      Motor: No weakness.      Gait: Gait normal.      Comments: Speech slightly slow but easily understandable, no slurring   Psychiatric:         Mood and Affect: Mood normal.         Behavior: Behavior normal.         Thought Content: Thought content normal.         Judgment: Judgment normal.               ASSESSMENT & PLAN:    Kandace was seen today for hospital follow up.    Diagnoses and all orders for this visit:    Embolic stroke involving left middle cerebral artery  Experienced post TAVR 2/27 - new onset  confusion which has basically resolved and speech deficit which has been improving   No abnormalities on exam today   Continue DAPT & statin   F/U with SLP Friday March 14 as scheduled  Advised to schedule F/U with Vascular Neurology    LBBB (left bundle branch block)  Experienced post TAVR 2/27  Denies any chest pain, lightheadedness, palpitations today  Continue to wear cardiac monitor as instructed  F/U with Cardiology 4/7 as scheduled    Nonrheumatic aortic valve stenosis  S/P TAVR (transcatheter aortic valve replacement)  Underwent TAVR 2/27  No acute concerns today  F/U with Cardiology 4/7 as scheduled    Hypertension, unspecified type  Has been holding Nifedipine since hospitalization   BP at goal today - 122/60  Continue to hold nifedipine for now  Recommended DASH diet  Monitor BP at home     Hospital discharge follow-up  Admitted 2/27-3/3 for scheduled TAVR which she underwent for severe aortic stenosis, complicated by post-procedure embolic stroke and new onset LBBB - details above                Renae Epperson MD  [facility]         [1]   Current Outpatient Medications on File Prior to Visit   Medication Sig Dispense Refill    aspirin 81 MG Chew Chew and swallow 1 tablet (81 mg total) by mouth once daily. 90 tablet 3    atorvastatin (LIPITOR) 40 MG tablet Take 1 tablet (40 mg total) by mouth every evening. 90 tablet 3    clopidogreL (PLAVIX) 75 mg tablet Take 1 tablet (75 mg total) by mouth once daily. 90 tablet 3    cholecalciferol, vitamin D3, 1,250 mcg (50,000 unit) capsule  (Patient not taking: Reported on 3/11/2025)      [Paused] NIFEdipine (PROCARDIA-XL) 30 MG (OSM) 24 hr tablet Take 1 tablet (30 mg total) by mouth once daily. (Patient not taking: Reported on 3/11/2025) 90 tablet 3     No current facility-administered medications on file prior to visit.

## 2025-03-11 NOTE — PATIENT INSTRUCTIONS
Follow-up with Cardiology, Speech Therapy as scheduled.     Schedule follow up with Vascular Neurology - schedule at checkout, online, or call [phone].    Continue medications as prescribed.     Monitor blood pressure at home. Goal blood pressure 110-130/60-80.     Resume regular diet slowly. Avoid overeating. Avoid excessive salt - try to season foods with things containing lower amounts of sodium. Read attached.

## 2025-03-21 PROBLEM — R47.01 ANOMIC APHASIA: Status: ACTIVE | Noted: 2025-03-21

## 2025-03-24 ENCOUNTER — TELEPHONE (OUTPATIENT)
Dept: NEUROLOGY | Facility: CLINIC | Age: 70
End: 2025-03-24
Payer: MEDICARE

## 2025-03-24 DIAGNOSIS — Z00.00 ENCOUNTER FOR MEDICARE ANNUAL WELLNESS EXAM: ICD-10-CM

## 2025-03-27 ENCOUNTER — TELEPHONE (OUTPATIENT)
Dept: ELECTROPHYSIOLOGY | Facility: CLINIC | Age: 70
End: 2025-03-27
Payer: MEDICARE

## 2025-03-27 DIAGNOSIS — I44.7 LBBB (LEFT BUNDLE BRANCH BLOCK): Primary | ICD-10-CM

## 2025-03-28 ENCOUNTER — PATIENT MESSAGE (OUTPATIENT)
Dept: CARDIOLOGY | Facility: CLINIC | Age: 70
End: 2025-03-28
Payer: MEDICARE

## 2025-04-01 ENCOUNTER — HOSPITAL ENCOUNTER (OUTPATIENT)
Dept: CARDIOLOGY | Facility: HOSPITAL | Age: 70
Discharge: HOME | End: 2025-04-01
Payer: MEDICARE

## 2025-04-01 VITALS
DIASTOLIC BLOOD PRESSURE: 59 MMHG | BODY MASS INDEX: 36.25 KG/M2 | WEIGHT: 197 LBS | HEART RATE: 76 BPM | SYSTOLIC BLOOD PRESSURE: 128 MMHG | HEIGHT: 62 IN

## 2025-04-01 DIAGNOSIS — I35.0 NONRHEUMATIC AORTIC VALVE STENOSIS: ICD-10-CM

## 2025-04-01 LAB
ASCENDING AORTA: 3.62 CM
AV AREA BY CONTINUOUS VTI: 2 CM2
AV INDEX (PROSTH): 0.64
AV LVOT MEAN GRADIENT: 6 MMHG
AV LVOT PEAK GRADIENT: 10 MMHG
AV MEAN GRADIENT: 14 MMHG
AV PEAK GRADIENT: 23 MMHG
AV VALVE AREA BY VELOCITY RATIO: 2.1 CM²
AV VALVE AREA: 2 CM2
AV VELOCITY RATIO: 0.67
BSA FOR ECHO PROCEDURE: 1.98 M2
CV ECHO LV RWT: 0.39 CM
DOP CALC AO PEAK VEL: 2.4 M/S
DOP CALC AO VTI: 55.9 CM
DOP CALC LVOT AREA: 3.1 CM2
DOP CALC LVOT DIAMETER: 2 CM
DOP CALC LVOT PEAK VEL: 1.6 M/S
DOP CALC LVOT STROKE VOLUME: 111.8 CM3
DOP CALCLVOT PEAK VEL VTI: 35.6 CM
E WAVE DECELERATION TIME: 237 MS
E/A RATIO: 1.16
E/E' RATIO: 23 M/S
ECHO EF ESTIMATED: 57 %
ECHO LV POSTERIOR WALL: 1.1 CM (ref 0.6–1.1)
FRACTIONAL SHORTENING: 33.3 % (ref 28–44)
INTERVENTRICULAR SEPTUM: 1.2 CM (ref 0.6–1.1)
IVC DIAMETER: 2.2 CM
IVRT: 80 MS
LA MAJOR: 5.1 CM
LA MINOR: 5.1 CM
LA WIDTH: 3.9 CM
LEFT ATRIUM SIZE: 3.8 CM
LEFT ATRIUM VOLUME INDEX MOD: 43 ML/M2
LEFT ATRIUM VOLUME INDEX: 34 ML/M2
LEFT ATRIUM VOLUME MOD: 82 ML
LEFT ATRIUM VOLUME: 64 CM3
LEFT INTERNAL DIMENSION IN SYSTOLE: 3.8 CM (ref 2.1–4)
LEFT VENTRICLE DIASTOLIC VOLUME INDEX: 75.79 ML/M2
LEFT VENTRICLE DIASTOLIC VOLUME: 144 ML
LEFT VENTRICLE MASS INDEX: 143.4 G/M2
LEFT VENTRICLE SYSTOLIC VOLUME INDEX: 32.1 ML/M2
LEFT VENTRICLE SYSTOLIC VOLUME: 61 ML
LEFT VENTRICULAR INTERNAL DIMENSION IN DIASTOLE: 5.7 CM (ref 3.5–6)
LEFT VENTRICULAR MASS: 272.5 G
LV LATERAL E/E' RATIO: 25
LV SEPTAL E/E' RATIO: 20.8
MV A" WAVE DURATION": 185.54 MS
MV PEAK A VEL: 1.08 M/S
MV PEAK E VEL: 1.25 M/S
OHS CV RV/LV RATIO: 0.53 CM
PISA TR MAX VEL: 2.7 M/S
PULM VEIN A" WAVE DURATION": 185.54 MS
PULM VEIN S/D RATIO: 1.09
PULMONIC VEIN PEAK A VELOCITY: 0.3 M/S
PV PEAK D VEL: 0.44 M/S
PV PEAK S VEL: 0.48 M/S
RA MAJOR: 4.32 CM
RA PRESSURE ESTIMATED: 3 MMHG
RA WIDTH: 3.83 CM
RIGHT ATRIAL AREA: 13.9 CM2
RIGHT VENTRICLE DIASTOLIC BASEL DIMENSION: 3 CM
RV TB RVSP: 6 MMHG
RV TISSUE DOPPLER FREE WALL SYSTOLIC VELOCITY 1 (APICAL 4 CHAMBER VIEW): 14.25 CM/S
SINUS: 3.97 CM
STJ: 3.34 CM
TDI LATERAL: 0.05 M/S
TDI SEPTAL: 0.06 M/S
TDI: 0.06 M/S
TRICUSPID ANNULAR PLANE SYSTOLIC EXCURSION: 2.66 CM
TV PEAK GRADIENT: 29 MMHG
TV REST PULMONARY ARTERY PRESSURE: 32 MMHG
Z-SCORE OF LEFT VENTRICULAR DIMENSION IN END DIASTOLE: 0.67
Z-SCORE OF LEFT VENTRICULAR DIMENSION IN END SYSTOLE: 1.15

## 2025-04-01 PROCEDURE — 93306 TTE W/DOPPLER COMPLETE: CPT | Mod: 26,,, | Performed by: INTERNAL MEDICINE

## 2025-04-01 PROCEDURE — 93306 TTE W/DOPPLER COMPLETE: CPT

## 2025-04-07 ENCOUNTER — LAB VISIT (OUTPATIENT)
Dept: LAB | Facility: HOSPITAL | Age: 70
End: 2025-04-07
Payer: MEDICARE

## 2025-04-07 ENCOUNTER — OFFICE VISIT (OUTPATIENT)
Dept: CARDIOLOGY | Facility: CLINIC | Age: 70
End: 2025-04-07
Payer: MEDICARE

## 2025-04-07 VITALS
SYSTOLIC BLOOD PRESSURE: 153 MMHG | HEIGHT: 62 IN | OXYGEN SATURATION: 98 % | DIASTOLIC BLOOD PRESSURE: 70 MMHG | HEART RATE: 77 BPM | BODY MASS INDEX: 35.83 KG/M2 | WEIGHT: 194.69 LBS

## 2025-04-07 DIAGNOSIS — Z95.3 S/P TAVR (TRANSCATHETER AORTIC VALVE REPLACEMENT): ICD-10-CM

## 2025-04-07 DIAGNOSIS — Z95.3 S/P TAVR (TRANSCATHETER AORTIC VALVE REPLACEMENT): Primary | ICD-10-CM

## 2025-04-07 DIAGNOSIS — I35.0 NONRHEUMATIC AORTIC VALVE STENOSIS: ICD-10-CM

## 2025-04-07 DIAGNOSIS — I44.7 LBBB (LEFT BUNDLE BRANCH BLOCK): ICD-10-CM

## 2025-04-07 DIAGNOSIS — I63.412 EMBOLIC STROKE INVOLVING LEFT MIDDLE CEREBRAL ARTERY: ICD-10-CM

## 2025-04-07 DIAGNOSIS — E66.01 SEVERE OBESITY (BMI 35.0-39.9) WITH COMORBIDITY: ICD-10-CM

## 2025-04-07 LAB
BILIRUB SERPL-MCNC: 0.8 MG/DL (ref 0.1–1)
HAPTOGLOB SERPL-MCNC: <10 MG/DL (ref 30–250)
LDH SERPL-CCNC: 436 U/L (ref 110–260)
RETICS/RBC NFR AUTO: 2.1 % (ref 0.5–2.5)

## 2025-04-07 PROCEDURE — 1101F PT FALLS ASSESS-DOCD LE1/YR: CPT | Mod: CPTII,S$GLB,,

## 2025-04-07 PROCEDURE — 3288F FALL RISK ASSESSMENT DOCD: CPT | Mod: CPTII,S$GLB,,

## 2025-04-07 PROCEDURE — 99214 OFFICE O/P EST MOD 30 MIN: CPT | Mod: S$GLB,,,

## 2025-04-07 PROCEDURE — 3044F HG A1C LEVEL LT 7.0%: CPT | Mod: CPTII,S$GLB,,

## 2025-04-07 PROCEDURE — 99999 PR PBB SHADOW E&M-EST. PATIENT-LVL III: CPT | Mod: PBBFAC,,,

## 2025-04-07 PROCEDURE — 1160F RVW MEDS BY RX/DR IN RCRD: CPT | Mod: CPTII,S$GLB,,

## 2025-04-07 PROCEDURE — 1126F AMNT PAIN NOTED NONE PRSNT: CPT | Mod: CPTII,S$GLB,,

## 2025-04-07 PROCEDURE — 83010 ASSAY OF HAPTOGLOBIN QUANT: CPT

## 2025-04-07 PROCEDURE — 3078F DIAST BP <80 MM HG: CPT | Mod: CPTII,S$GLB,,

## 2025-04-07 PROCEDURE — 85045 AUTOMATED RETICULOCYTE COUNT: CPT

## 2025-04-07 PROCEDURE — 36415 COLL VENOUS BLD VENIPUNCTURE: CPT

## 2025-04-07 PROCEDURE — 3008F BODY MASS INDEX DOCD: CPT | Mod: CPTII,S$GLB,,

## 2025-04-07 PROCEDURE — 3077F SYST BP >= 140 MM HG: CPT | Mod: CPTII,S$GLB,,

## 2025-04-07 PROCEDURE — 1159F MED LIST DOCD IN RCRD: CPT | Mod: CPTII,S$GLB,,

## 2025-04-07 PROCEDURE — 83615 LACTATE (LD) (LDH) ENZYME: CPT

## 2025-04-07 PROCEDURE — 82247 BILIRUBIN TOTAL: CPT

## 2025-04-07 NOTE — ASSESSMENT & PLAN NOTE
- LBBB following TAVR. EPS was negative, patient completed 30 day event monitor. Stable. Continue follow-up with EP.

## 2025-04-07 NOTE — ASSESSMENT & PLAN NOTE
- Body mass index is 35.6 kg/m². Healthy lifestyle was discussed with the patient as well as the importance of physical activity to improve cardiovascular health.

## 2025-04-07 NOTE — PROGRESS NOTES
Interventional Cardiology Clinic Note    General Cardiologist: Dr. Ng    HPI:     Kandace Rapp is a 69 y.o. female with HTN, AS s/p TAVR 2/2025, L MCA embolic stroke 2/2025, incidental finding of RCC s/p L partial nephrectomy (cancer free), who presents for 1 month TAVR follow-up.    While undergoing TAVR wokrup, CTA noted suspicious left kidney lesion. She underwent BAV on 11/6/2024 for cardiac clearance before undergoing left partial nephrectomy 12/12/2024. 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  Patient then had successful TAVR with 29 mm EvolutFX valve placed on 2/27/2025 by Dr. Knutson. No complications during procedure. However, patient developed confusion/speech difficulty following procedure. CT head showed L MCA ischemic changes and CTA head showed distal L M3 occlusion. Location not amenable to thrombectomy. Discharged with DAPT, high intensity statin and 30 day event monitor.    Today, patient endorses doing well overall. She has been following with physical therapy MW and speech therapy every Friday. Mobility has greatly improved. Slight continued speech deficit however much improving. Reports being able to do all ADLs without difficulty. She sees vascular Neurology tomorrow for initial follow-up. States that pre-TAVR symptoms have completely resolved. She no longer feels short of breath with exertion. Denies chest pains, leg swelling, palpitations, orthopnea.  Denies lightheadedness, dizziness, syncope. Compliant with DAPT and statin. Still withholding nifedipine due to blood pressures being at goal while inpatient. Reports blood pressures still remain at goal at home. Follows with general cardiology Dr. Ng. Denies acute concerns at this time.    NYHA I / CCS 0    ROS:      Review of Systems   Cardiovascular:  Negative for chest pain, dyspnea on exertion, irregular heartbeat, leg swelling, near-syncope, orthopnea, palpitations, paroxysmal nocturnal dyspnea  and syncope.   Respiratory:  Negative for shortness of breath.    Neurological:  Negative for dizziness, light-headedness and loss of balance.       PMH:     Past Medical History:   Diagnosis Date    Anxiety     Aortic stenosis     Arthritis     CHF (congestive heart failure)     Embolic stroke involving left middle cerebral artery 2025    Heart murmur     Hypertension     Mitral valve prolapse      Past Surgical History:   Procedure Laterality Date    A-V CARDIAC PACEMAKER INSERTION N/A 3/3/2025    Procedure: INSERTION, CARDIAC PACEMAKER, DUAL CHAMBER;  Surgeon: Asim Mcghee MD;  [place] [facility] [place] [facility];  Service: Cardiology;  Laterality: N/A;    AORTIC VALVULOPLASTY N/A 2024    Procedure: REPAIR, AORTIC VALVE;  Surgeon: Rohit Quinonez MD;  [place] [facility] [place] [facility];  Service: Cardiology;  Laterality: N/A;    AORTOGRAPHY N/A 2024    Procedure: AORTOGRAM;  Surgeon: Rohit Quinonez MD;  [place] [facility];  Service: Cardiology;  Laterality: N/A;    BREAST CYST EXCISION Left 80s    BREAST SURGERY      Benign lump on left.    CARDIAC CATH COSURGEON N/A 2025    Procedure: Cardiac Cath Cosurgeon;  Surgeon: Naun Martin MD;  [place] [facility];  Service: Cardiothoracic;  Laterality: N/A;    CATARACT EXTRACTION       SECTION      x1    COLONOSCOPY N/A 2023    Procedure: COLONOSCOPY;  Surgeon: Pattie Stephenson MD;  [place] [facility] [place] [facility] (4TH FLR);  Service: Endoscopy;  Laterality: N/A;   ref by Ishmael Jacobson MD, PEG, instr. to portal-st    CORONARY ANGIOGRAPHY N/A 10/28/2024    Procedure: ANGIOGRAM, CORONARY ARTERY;  Surgeon: Yordy Ng MD;  Location: [facility] CATH LAB;  Service: Cardiology;  Laterality: N/A;  radial access    DIAGNOSTIC CARDIAC ELECTROPHYSIOLOGY STUDY N/A 3/3/2025    Procedure: EP - diagnostic;  Surgeon: Asim Mcghee MD;  [place] [facility] [place];  Service: Cardiology;  Laterality: N/A;  LBB, EPS+/- dcPPM, MDT, ANES, CT, Rm 3082    DV5 ROBOTIC  "NEPHRECTOMY, PARTIAL Left 12/12/2024    Procedure: DV5 ROBOTIC NEPHRECTOMY, PARTIAL;  Surgeon: Adam Sawyer MD;  Location: [facility] OR [address] [place] [address];  Service: Urology;  Laterality: Left;    EYE SURGERY      TRANSCATHETER AORTIC VALVE REPLACEMENT (TAVR) N/A 2/27/2025    Procedure: REPLACEMENT, AORTIC VALVE, TRANSCATHETER (TAVR);  Surgeon: Rohit Quinonez MD;  Location: [facility] CATH LAB;  Service: Cardiology;  Laterality: N/A;    TRANSCATHETER AORTIC VALVE REPLACEMENT (TAVR)  2/27/2025    Procedure: REPLACEMENT, AORTIC VALVE, TRANSCATHETER (TAVR);  Surgeon: Naun Martin MD;  Location: [facility] CATH LAB;  Service: Cardiothoracic;;    VALVE STUDY-AORTIC  11/06/2024    Procedure: Valve study-aortic;  Surgeon: Rohit Quinonez MD;  Location: [facility] CATH LAB;  Service: Cardiology;;     Allergies:     Review of patient's allergies indicates:   Allergen Reactions    Protamine sulfate Other (See Comments)     Bradycardia, Hypotension    Goodell Rash     Medications:   Medications Ordered Prior to Encounter[1]  Social History:     Social History     Tobacco Use    Smoking status: Never    Smokeless tobacco: Never   Substance Use Topics    Alcohol use: Yes     Comment: Rare.      Family History:     Family History   Problem Relation Name Age of Onset    Breast cancer Mother 102     Lymphoma Mother 102     Heart disease Mother 102     Cancer Mother 102     Diabetes Sister      Heart disease Brother      Alcohol abuse Brother      Heart disease Maternal Uncle      Heart disease Maternal Grandfather      Cancer Father          panc ca    Breast cancer Paternal Aunt       Physical Exam:   BP (!) 153/70 (BP Location: Right arm, Patient Position: Sitting)   Pulse 77   Ht 5' 2" (1.575 m)   Wt 88.3 kg (194 lb 10.7 oz)   SpO2 98%   BMI 35.60 kg/m²        Physical Exam  Vitals and nursing note reviewed.   Constitutional:       General: She is not in acute distress.     Appearance: Normal appearance. She is not ill-appearing or " toxic-appearing.   HENT:      Head: Normocephalic and atraumatic.   Eyes:      Pupils: Pupils are equal, round, and reactive to light.   Cardiovascular:      Rate and Rhythm: Normal rate and regular rhythm.      Pulses: Normal pulses.      Heart sounds: Murmur heard.      No friction rub. No gallop.   Pulmonary:      Effort: Pulmonary effort is normal. No respiratory distress.   Musculoskeletal:         General: Normal range of motion.      Cervical back: Normal range of motion.      Right lower leg: No edema.      Left lower leg: No edema.   Skin:     General: Skin is warm and dry.      Capillary Refill: Capillary refill takes less than 2 seconds.   Neurological:      General: No focal deficit present.      Mental Status: She is alert.          Labs:     Lab Results   Component Value Date     03/03/2025    K 4.3 03/03/2025     03/03/2025    CO2 23 03/03/2025    BUN 23 03/03/2025    CREATININE 0.6 04/01/2025    ANIONGAP 7 (L) 03/03/2025     Lab Results   Component Value Date    HGBA1C 5.2 02/27/2025     Lab Results   Component Value Date    BNP 25 02/27/2025    Lab Results   Component Value Date    WBC 5.51 04/01/2025    HGB 9.6 (L) 04/01/2025    HGB 10.2 (L) 03/03/2025    HCT 31.8 (L) 04/01/2025    HCT 32.1 (L) 03/03/2025     04/01/2025     (L) 03/03/2025    GRAN 6.1 03/03/2025    GRAN 65.2 03/03/2025     Lab Results   Component Value Date    CHOL 117 (L) 02/27/2025    HDL 42 02/27/2025    LDLCALC 63.4 02/27/2025    TRIG 58 02/27/2025          Lipids:  Recent Labs   Lab 02/27/25 2115   LDL Cholesterol 63.4   HDL 42      Renal:  Recent Labs   Lab 03/03/25  0219 04/01/25  1155   Creatinine 0.7 0.6   Potassium 4.3  --    CO2 23  --    BUN 23  --      Liver:  Recent Labs   Lab 02/28/25  0324   AST 28   ALT 9 L       Imaging:     TTE (4/1/2025):    Interval increase in MR and AR compared to priors study - see below.    Left Ventricle: The left ventricle is normal in size. Normal wall  thickness. Normal wall motion. There is normal systolic function with a visually estimated ejection fraction of 55 - 60%. Grade II diastolic dysfunction. Elevated left ventricular filling pressure.    Right Ventricle: The right ventricle is normal in size. Wall thickness is normal. Systolic function is normal.    Left Atrium: Moderately dilated    Right Atrium: Normal right atrial size.    Aortic Valve: There is a transcatheter valve replacement in the aortic position. It is reported to be a 29 mm Evolut Fx valve. TAVR valve is low seated in the LVOT and abutting basal anterior MV leaflet., without MV stenosis.  There is normal leaflet mobility. Aortic valve area by VTI is 2.0 cm2. Aortic valve peak velocity is 2.4 m/s. Mean gradient is 14 mmHg. The dimensionless index is 0.64. Normally functioning prosthesus. There is at least moderate paravalvular regurgitation, but suspetc its is is moderate to severe to severe PVL regurgiation (best seen in # clip 37 loop). Increased E/e' compared to prior study suggests that LA pressure is elevated.n    Mitral Valve: There is moderate regurgitation with a centrally directed jet.    Pulmonic Valve: There is mild regurgitation with a centrally directed jet.    Aorta: Aortic root is normal in size measuring 3.97 cm. Aortic root at ST junction is normal. Ascending aorta is normal measuring 3.62 cm.    Pulmonary Artery: The estimated pulmonary artery systolic pressure is 32 mmHg.    IVC/SVC: Normal venous pressure at 3 mmHg.    Pericardium: There is no pericardial effusion.    Assessment & Plan:     1. S/P TAVR (transcatheter aortic valve replacement)    2. Nonrheumatic aortic valve stenosis    3. LBBB (left bundle branch block)    4. Embolic stroke involving left middle cerebral artery    5. Severe obesity (BMI 35.0-39.9) with comorbidity        S/P TAVR (transcatheter aortic valve replacement)  - One month TAVR follow-up today. Patient reports doing well overall from valve  perspective. No shortness of breath, dyspnea on exertion, chest pains.  - Echo shows normal functioning valve. PVL is noted. Labs reviewed, CBC is stable from previous. Plan to repeat echo and labs in 6 months.  - Continue at least daily ASA 81; discuss DAPT recommendations with vascular neurology.  - Follow up regularly with general cardiologist, Dr. Ng. Patient notes she will reach out to schedule follow-up.  - Wait 6 months post-TAVR before any dental and elective procedures. SBEP with antibiotics for life.    Nonrheumatic aortic valve stenosis  - s/p TAVR 2/2025  - asymptomatic, stable  - follow-up at 1 year from TAVR    LBBB (left bundle branch block)  - LBBB following TAVR. EPS was negative, patient completed 30 day event monitor. Stable. Continue follow-up with EP.    Embolic stroke involving left middle cerebral artery  - Noted post-TAVR. Patient with improving deficits since starting physical therapy and speech therapy. Continue DAPT with aspirin and Plavix is. Follow with vascular neurology for recommendations on length of continuing DAPT.     Severe obesity (BMI 35.0-39.9) with comorbidity  - Body mass index is 35.6 kg/m². Healthy lifestyle was discussed with the patient as well as the importance of physical activity to improve cardiovascular health.    Follow up with echo and labs in 6 months.    Signed:  Kenisha Alejandro PA-C  Interventional Cardiology         [1]   Current Outpatient Medications on File Prior to Visit   Medication Sig Dispense Refill    aspirin 81 MG Chew Chew and swallow 1 tablet (81 mg total) by mouth once daily. 90 tablet 3    atorvastatin (LIPITOR) 40 MG tablet Take 1 tablet (40 mg total) by mouth every evening. 90 tablet 3    clopidogreL (PLAVIX) 75 mg tablet Take 1 tablet (75 mg total) by mouth once daily. 90 tablet 3    cholecalciferol, vitamin D3, 1,250 mcg (50,000 unit) capsule  (Patient not taking: Reported on 3/5/2025)      [Paused] NIFEdipine (PROCARDIA-XL) 30 MG (OSM) 24  hr tablet Take 1 tablet (30 mg total) by mouth once daily. (Patient not taking: Reported on 3/5/2025) 90 tablet 3     No current facility-administered medications on file prior to visit.

## 2025-04-07 NOTE — ASSESSMENT & PLAN NOTE
- Noted post-TAVR. Patient with improving deficits since starting physical therapy and speech therapy. Continue DAPT with aspirin and Plavix is. Follow with vascular neurology for recommendations on length of continuing DAPT.

## 2025-04-07 NOTE — ASSESSMENT & PLAN NOTE
- One month TAVR follow-up today. Patient reports doing well overall from valve perspective. No shortness of breath, dyspnea on exertion, chest pains.  - Echo shows normal functioning valve. PVL is noted. Labs reviewed, CBC is stable from previous. Plan to repeat echo and labs in 6 months.  - Continue at least daily ASA 81; discuss DAPT recommendations with vascular neurology.  - Follow up regularly with general cardiologist, Dr. Ng. Patient notes she will reach out to schedule follow-up.  - Wait 6 months post-TAVR before any dental and elective procedures. SBEP with antibiotics for life.

## 2025-04-08 ENCOUNTER — OFFICE VISIT (OUTPATIENT)
Dept: NEUROLOGY | Facility: CLINIC | Age: 70
End: 2025-04-08
Payer: MEDICARE

## 2025-04-08 VITALS
WEIGHT: 194.69 LBS | HEART RATE: 85 BPM | HEIGHT: 62 IN | SYSTOLIC BLOOD PRESSURE: 132 MMHG | BODY MASS INDEX: 35.83 KG/M2 | DIASTOLIC BLOOD PRESSURE: 74 MMHG

## 2025-04-08 DIAGNOSIS — Z95.3 S/P TAVR (TRANSCATHETER AORTIC VALVE REPLACEMENT): ICD-10-CM

## 2025-04-08 DIAGNOSIS — I63.412 EMBOLIC STROKE INVOLVING LEFT MIDDLE CEREBRAL ARTERY: ICD-10-CM

## 2025-04-08 DIAGNOSIS — Z86.73 H/O ISCHEMIC LEFT MCA STROKE: Primary | ICD-10-CM

## 2025-04-08 DIAGNOSIS — R47.01 ANOMIC APHASIA: ICD-10-CM

## 2025-04-08 DIAGNOSIS — I10 HYPERTENSION, UNSPECIFIED TYPE: ICD-10-CM

## 2025-04-08 DIAGNOSIS — E78.5 HYPERLIPIDEMIA LDL GOAL <70: ICD-10-CM

## 2025-04-08 PROCEDURE — 99999 PR PBB SHADOW E&M-EST. PATIENT-LVL III: CPT | Mod: PBBFAC,,, | Performed by: NURSE PRACTITIONER

## 2025-04-08 NOTE — PROGRESS NOTES
[facility] VASCULAR NEUROLOGY CLINIC VISIT      SUBJECTIVE:    History for Present Illness: Kandace Rapp is a 69 y.o.  female  with PMHx of HTN, CHF, RCC (s/p left partial nephrectomy close )and status post TAVR that presents to me today for hospital follow-up.    Relevant HPI:  Per patient and chart review she [place] [facility] [place] for scheduled TAVR.  Patient with new onset confusion/speech difficulty while in PACU postprocedure.  CTH revealed early ischemic changes in left MCA territory, at which time of stroke code was called.  CTA head and neck concerning for distal left M3 occlusion versus high-grade stenosis.  MRI of the brain indicated a evolving left MCA distribution infarct      Interval history:    At the time of today's visit, the patient denies new or worsening focal neurologic symptoms concerning for new stroke or TIA. She is doing well overall with gradual improvements .  Patient reports continued aphasia.She denies associated vertigo, double vision, focal weakness or numbness, gait imbalance,   or visual disturbances.  She is independent with ADLs.  She is currently participating in outpatient speech.  She is adherent with home medications including aspirin and statin        Past Medical History:   Diagnosis Date    Anxiety     Aortic stenosis     Arthritis     CHF (congestive heart failure)     Embolic stroke involving left middle cerebral artery 2/27/2025    Heart murmur     Hyperlipidemia LDL goal <70 4/8/2025    Hypertension     Mitral valve prolapse      Past Surgical History:   Procedure Laterality Date    A-V CARDIAC PACEMAKER INSERTION N/A 3/3/2025    Procedure: INSERTION, CARDIAC PACEMAKER, DUAL CHAMBER;  Surgeon: Asim Mcghee MD;  Location: [facility] EP LAB;  Service: Cardiology;  Laterality: N/A;    AORTIC VALVULOPLASTY N/A 11/06/2024    Procedure: REPAIR, AORTIC VALVE;  Surgeon: Rohit Quinonez MD;  Location: [facility] CATH LAB;  Service: Cardiology;  Laterality:  N/A;    AORTOGRAPHY N/A 2024    Procedure: AORTOGRAM;  Surgeon: Rohit Quinonez MD;  Location: [facility] CATH LAB;  Service: Cardiology;  Laterality: N/A;    BREAST CYST EXCISION Left 80s    BREAST SURGERY      Benign lump on left.    CARDIAC CATH COSURGEON N/A 2025    Procedure: Cardiac Cath Cosurgeon;  Surgeon: Naun Martin MD;  Location: [facility] CATH LAB;  Service: Cardiothoracic;  Laterality: N/A;    CATARACT EXTRACTION       SECTION      x1    COLONOSCOPY N/A 2023    Procedure: COLONOSCOPY;  Surgeon: Pattie Stephenson MD;  Location: [facility] ENDO (4TH FLR);  Service: Endoscopy;  Laterality: N/A;   ref by Ishmael Jacobson MD, PEG, instr. to portal-st    CORONARY ANGIOGRAPHY N/A 10/28/2024    Procedure: ANGIOGRAM, CORONARY ARTERY;  Surgeon: Yordy Ng MD;  Location: [facility] CATH LAB;  Service: Cardiology;  Laterality: N/A;  radial access    DIAGNOSTIC CARDIAC ELECTROPHYSIOLOGY STUDY N/A 3/3/2025    Procedure: EP - diagnostic;  Surgeon: Asim Mcghee MD;  Location: [facility] EP LAB;  Service: Cardiology;  Laterality: N/A;  LBB, EPS+/- MD MelvinT, ANES, SC, Rm 3082    DV5 ROBOTIC NEPHRECTOMY, PARTIAL Left 2024    Procedure: DV5 ROBOTIC NEPHRECTOMY, PARTIAL;  Surgeon: dAam Sawyer MD;  [facility];  Service: Urology;  Laterality: Left;    EYE SURGERY      TRANSCATHETER AORTIC VALVE REPLACEMENT (TAVR) N/A 2025    Procedure: REPLACEMENT, AORTIC VALVE, TRANSCATHETER (TAVR);  Surgeon: Rohit Quinonez MD;  Location: [facility] CATH LAB;  Service: Cardiology;  Laterality: N/A;    TRANSCATHETER AORTIC VALVE REPLACEMENT (TAVR)  2025    Procedure: REPLACEMENT, AORTIC VALVE, TRANSCATHETER (TAVR);  Surgeon: Naun Martin MD;  Location: [facility] CATH LAB;  Service: Cardiothoracic;;    VALVE STUDY-AORTIC  2024    Procedure: Valve study-aortic;  Surgeon: Rohit Quinonez MD;  Location: [facility] CATH LAB;  Service: Cardiology;;     Family History   Problem Relation Name  "Age of Onset    Breast cancer Mother 102     Lymphoma Mother 102     Heart disease Mother 102     Cancer Mother 102     Diabetes Sister      Heart disease Brother      Alcohol abuse Brother      Heart disease Maternal Uncle      Heart disease Maternal Grandfather      Cancer Father          panc ca    Breast cancer Paternal Aunt        Current Medications[1]     Review of Systems:  Review of systems is negative except for the symptoms mentioned in HPI    Physical exam:    /74 (BP Location: Right arm, Patient Position: Sitting)   Pulse 85   Ht 5' 2" (1.575 m)   Wt 88.3 kg (194 lb 10.7 oz)   BMI 35.60 kg/m²     General: Well-developed, well-groomed. No apparent distress  HENT: Normocephalic, atraumatic.    Cardiovascular: Regular rate and rhythm  Chest: No audible wheezes, stridor, ronchi appreciated.  Musculoskeletal: No peripheral edema     Neurologic Exam: The patient is awake, alert and oriented to person, place, time and situation. Attentive, vigilant during exam.  Mild fluency difficulty. Naming & repetition intact, +2-step commands.  Fund of knowledge is appropriate. Well organized thoughts.     Cranial nerves:   CN II: Visual fields are full to confrontation. Pupils are 4 mm and briskly reactive to light.  CN III, IV, VI: EOMI, no nystagmus, no ptosis  CN V: Facial sensation is intact in all 3 divisions bilaterally.  CN VII: Face is symmetric with normal eye closure and smile.  CN VIII: Hearing is normal bilaterally  CN IX, X: Palate elevates symmetrically. Phonation is normal.  CN XI: Head turning and shoulder shrug are intact  CN XII: Tongue is midline with normal movements and no atrophy.     Motor examination of all extremities demonstrates normal bulk and tone in all four limbs. There are no atrophy or fasciculations.        Left Right     Left Right   Deltoid 5/5 5/5   Hip Flexion 5/5 5/5   Biceps 5/5 5/5   Hip Extension 5/5 5/5   Triceps 5/5 5/5   Knee Flexion 5/5 5/5   Wrist Ext 5/5 5/5   " Knee Extension 5/5 5/5   Finger Abd 5/5 5/5   Ankle dorsiflex 5/5 5/5           Ankle plantar flex 5/5 5/5     Sensory examination is normal light touch in BUE and BLE.  Romberg is negative.  Deep tendon reflexes No clonus. 2+ Bl     Gait: Posture is normal. Gait is steady with normal steps, base, arm swing, and turning. Heel and toe walking are normal. Tandem gait is normal.      Coordination:Rapid alternating movements and fine finger movements are intact.         Relevant Labwork:  Recent Labs   Lab 02/27/25 2115   Hemoglobin A1C 5.2   LDL Cholesterol 63.4   HDL 42   Triglycerides 58   Cholesterol 117 L       Diagnostic Results:  Imaging was independently reviewed by myself, along with the associated radiology report    Brain Imaging   MRI of the brain 02/28/2025:  Evolving left MCA distribution infarcts involving the left temporoparietal region and posterior insular cortex.    Associated susceptibility artifact likely representing sequela of petechial hemorrhage.    Mild localized sulcal effacement without evidence for significant midline shift.   Additional acute lacunar type infarcts about the left cerebellar hemisphere  Vessel Imaging   CTA stroke multi phase 02/27/2025:  Attenuated flow within some of the posterior left-sided M3 vessels.  No large vessel occlusion  Continued hypoattenuation in the left temporoparietal region in keeping with an acute left MCA distribution infarct  Cardiac Imaging     Assessment:  1. H/O ischemic left MCA stroke        2. Embolic stroke involving left middle cerebral artery  Ambulatory referral/consult to Vascular Neurology      3. Anomic aphasia        4. Hypertension, unspecified type        5. S/P TAVR (transcatheter aortic valve replacement)        6. Hyperlipidemia LDL goal <70          Kandace Rapp  is a 69 y.o.  female  seen today in clinic for follow-up assessment and recommendations. The following recommendations and plan were discussed in depth with the  patient who voiced understanding and was in agreement.  Plan:  History of left MCA stroke  --In-depth discussion with patient regarding diagnosis ,imaging findings  & stroke risk factors  -No current clinical indication for anticoagulation at this time.   -Plan for aggressive risk factor modification and maximum medical management  -- Antithrombotics/ Anticoagulation: Aspirin 81 mg daily, Clopidogrel 75 mg daily  x 21 days following discharge, followed by monotherapy with ASA 81 mg daily, indefinitely   - Stroke Risk Factors:  HTN,  diet, exercise  - Lipid Management: Target is LDL < 70mg/dL. Continue atorvastatin 40 mg daily. Monitoring for liver dysfunction and myopathy is suggested for statins. To be addressed by PCP  -Hypertension: Long term goal is normotension w/ target BP of less than 130/80 mmHg.  Continue home medications and follow up with PCP for surveillance and chronic management  - Rehab efforts: The patient would continue to benefit from outpatient speech language pathology given her  Aphasia resulting in  decreased word fluency, severely limiting functional communication with both familiar and unfamiliar communication partners, which can significantly contribute to decreased efficiency communicating medical and social wants and needs in the case of emergency.    - Diagnostics ordered/pending:  None    S/p TAVR  Continue to follow up outpatient with cardiology and EP for surveillance and chronic management    Patient/Family teaching provided during this visit  -Identifying the signs and symptoms of stroke; emergency action and ER attention  -Risk factor control  -Optimization for secondary stroke prevention including compliance with current medications   -We discussed the need to optimize lifestyle choices   -heart healthy diet (Mediterranean,MIND  or dash) to include a variety of brain friendly foods to help optimize cognitive health and longevity  -increased cardiovascular exercise; goal of 150  minutes of moderate-intense per week  -Discussed that medication/lifestyle modifications are preventative,and nothing is absolute.     Questions and concerns were answered to the patient's stated verbal satisfaction. The patient verbalizes understanding and agreement with the above stated treatment plan.      I will plan on having Kandace Rapp return to clinic as needed. The patient can contact my office with any questions or concerns they may have as they arise in the interim.       Collaborating Physician, Dr Suggs, was available during today's encounter. Any change to plan along with cosign to appear in the EMR    AUBRIE MaxwellC  Department of Vascular Neurology  [facility]  [phone]    This note is dictated on M*Modal Fluency Direct word recognition program. There are word recognition mistakes that are occasionally missed on review               [1]   Current Outpatient Medications:     aspirin 81 MG Chew, Chew and swallow 1 tablet (81 mg total) by mouth once daily., Disp: 90 tablet, Rfl: 3    atorvastatin (LIPITOR) 40 MG tablet, Take 1 tablet (40 mg total) by mouth every evening., Disp: 90 tablet, Rfl: 3    cholecalciferol, vitamin D3, 1,250 mcg (50,000 unit) capsule, , Disp: , Rfl:     clopidogreL (PLAVIX) 75 mg tablet, Take 1 tablet (75 mg total) by mouth once daily., Disp: 90 tablet, Rfl: 3    [Paused] NIFEdipine (PROCARDIA-XL) 30 MG (OSM) 24 hr tablet, Take 1 tablet (30 mg total) by mouth once daily. (Patient not taking: Reported on 4/8/2025), Disp: 90 tablet, Rfl: 3

## 2025-04-30 DIAGNOSIS — Z78.0 MENOPAUSE: ICD-10-CM

## 2025-05-02 ENCOUNTER — TELEPHONE (OUTPATIENT)
Dept: OPTOMETRY | Facility: CLINIC | Age: 70
End: 2025-05-02
Payer: MEDICARE

## 2025-05-02 NOTE — TELEPHONE ENCOUNTER
----- Message from Tech Mary sent at 5/2/2025  3:41 PM CDT -----  Regarding: FW: Consult/Advisory  Contact: Kandace Rapp    ----- Message -----  From: Milly Orantes  Sent: 5/2/2025   3:36 PM CDT  To: Prabha Mcwilliams Staff  Subject: Consult/Advisory                                  Consult/Advisory Name Of Caller:Kandace Rapp   Contact Preference:[phone] (home)   Nature of call:Patient is calling to speak to [facility] about keeping appt as is, she states she spoke with family appt on 05/12 is fine. Requesting a call back

## 2025-05-12 ENCOUNTER — PROCEDURE VISIT (OUTPATIENT)
Dept: OPHTHALMOLOGY | Facility: CLINIC | Age: 70
End: 2025-05-12
Payer: MEDICARE

## 2025-05-12 DIAGNOSIS — G51.31 HEMIFACIAL SPASM OF RIGHT SIDE OF FACE: Primary | ICD-10-CM

## 2025-05-12 PROCEDURE — 99499 UNLISTED E&M SERVICE: CPT | Mod: S$GLB,,, | Performed by: OPHTHALMOLOGY

## 2025-05-12 PROCEDURE — 64612 DESTROY NERVE FACE MUSCLE: CPT | Mod: RT,S$GLB,, | Performed by: OPHTHALMOLOGY

## 2025-05-12 NOTE — PROGRESS NOTES
HPI    Patient here for botox injection #3    Eye gtts :  VISINE   SYSTANE     Last edited by Irene Corea on 5/12/2025  8:57 AM.            Assessment /Plan     For exam results, see Encounter Report.    Hemifacial spasm of right side of face  -     onabotulinumtoxina injection 100 Units      Here for botulinum toxin for right hemifacial spasm.      Informed consent obtained after extensive risks/benefits/alternatives were discussed with the patient including but not limited to pain, bleeding, infection, ocular injury, loss of the eye, asymmetry, need for revision in future, scarring.  Alternatives such as waiting were discussed.  All questions were answered.     Botox injections right face performed today.  pretarsal orbicularis upper eyelid and lower eyelid 2.5 units each  zygomaticus major (2.5 units X2) and minor 2.5 units each.      12.5 units injected. 87.5 units wasted. No complications. Patient tolerated procedure well.      Return in 3-4 months for repeat evaluation.

## 2025-06-17 NOTE — PROGRESS NOTES
Ms. Rapp is a patient of Dr. Mcghee.      Subjective:   Patient ID:  Kandace Rapp is a 69 y.o. female who presents for follow up of LBBB  .     HPI:    Ms. Rapp is a 69 y.o. female with AS s/p TAVR (2/27/2025), LBBB post-procedure, left MCA stroke post-procedure here for follow up after EP study.    Background:    2/27/2025: Ms. Rapp has a past medical history significant for severe aortic stenosis s/p implantation of a 29mm Evolut TAVR with Dr. Knutson 2/27/2025 with subsequent development of a left bundle branch block following TAVR deployment. She is not presently requiring her temporary transvenous pacing wire, and remains with 1:1 intrinsic conduction with a narrow QRSd of 86ms. In the event her QRSd evidenced recurrent LBBB, or should she evidence high degree AV block overnight, we will discuss the possible need to undergo an EP study with consideration for pacemaker implantation.     New LBBB s/p TAVR complicated by left MCA stroke. Would observed on telemetry over the weekend. If LBBB persists then plan for EPS +/- dcPPM prior to discharge once stable from a neurologic perspective.     3/1/2025: Patient unfortunately with additional acute infarcts in cerebellum noted on repeat imaging, along with possible hemorrhagic transformation. Vascular neurology evaluated, no change in exam, still monitoring. Rhythm remains wide on tele, rate 80-90s with occasional PVCs. Will continue to hold on EPS until neurologically stable.     3/3/2025 EP study:    His bundle recording with an 60.4ms average HV interval (average of 10 measurements).    Recommend 30 day event monitor upon discharge.    Update (06/25/2025):    Event monitor 3/3/2025: Sinus rhythm with 2 episodes of nonsustained VT   The baseline rhythm was sinus with a rate of 78 bpm.  There were 2 patient activations corresponding to sinus rhythm with a rate of 84-92 bpm.  There were 5 auto-activations corresponding to sinus rhythm. PACs were  observed on 1 activation. Nonsustained VT (4 and 10 beats) were observed on 2 activations. The rate range for auto-activations was  bpm.  QRS was wide at the beggining of the study but narrowed on 3/25 activation and remained narrow through 3/31 activation (last activation).    4/8/2025: Neurology appt: plan was for DAPTx21 days, then ASA indefinitely.    Today she says she has been feeling well. Restarted walking. Plans for cardiac rehab. Breathing better. Losing some weight. Denies palps, CP. Denies LH, presyncope, or syncope.     I have personally reviewed the patient's EKG today, which shows sinus rhythm at 68bpm. IA interval is 160. QRS is 76. QTc is 421.    Relevant Cardiac Test Results:    2D Echo (4/1/2025):    Interval increase in MR and AR compared to priors study - see below.    Left Ventricle: The left ventricle is normal in size. Normal wall thickness. Normal wall motion. There is normal systolic function with a visually estimated ejection fraction of 55 - 60%. Grade II diastolic dysfunction. Elevated left ventricular filling pressure.    Right Ventricle: The right ventricle is normal in size. Wall thickness is normal. Systolic function is normal.    Left Atrium: Moderately dilated    Right Atrium: Normal right atrial size.    Aortic Valve: There is a transcatheter valve replacement in the aortic position. It is reported to be a 29 mm Evolut Fx valve. TAVR valve is low seated in the LVOT and abutting basal anterior MV leaflet., without MV stenosis.  There is normal leaflet mobility. Aortic valve area by VTI is 2.0 cm2. Aortic valve peak velocity is 2.4 m/s. Mean gradient is 14 mmHg. The dimensionless index is 0.64. Normally functioning prosthesus. There is at least moderate paravalvular regurgitation, but suspetc its is is moderate to severe to severe PVL regurgiation (best seen in # clip 37 loop). Increased E/e' compared to prior study suggests that LA pressure is elevated.n    Mitral Valve:  "There is moderate regurgitation with a centrally directed jet.    Pulmonic Valve: There is mild regurgitation with a centrally directed jet.    Aorta: Aortic root is normal in size measuring 3.97 cm. Aortic root at ST junction is normal. Ascending aorta is normal measuring 3.62 cm.    Pulmonary Artery: The estimated pulmonary artery systolic pressure is 32 mmHg.    IVC/SVC: Normal venous pressure at 3 mmHg.    Pericardium: There is no pericardial effusion.    Current Outpatient Medications   Medication Sig    aspirin 81 MG Chew Chew and swallow 1 tablet (81 mg total) by mouth once daily.    atorvastatin (LIPITOR) 40 MG tablet Take 1 tablet (40 mg total) by mouth every evening.    clopidogreL (PLAVIX) 75 mg tablet Take 1 tablet (75 mg total) by mouth once daily.    cholecalciferol, vitamin D3, 1,250 mcg (50,000 unit) capsule  (Patient not taking: Reported on 6/25/2025)    [Paused] NIFEdipine (PROCARDIA-XL) 30 MG (OSM) 24 hr tablet Take 1 tablet (30 mg total) by mouth once daily. (Patient not taking: Reported on 3/5/2025)     No current facility-administered medications for this visit.       Review of Systems   Constitutional: Negative for malaise/fatigue.   Cardiovascular:  Negative for chest pain, dyspnea on exertion, irregular heartbeat, leg swelling and palpitations.   Respiratory:  Negative for shortness of breath.    Hematologic/Lymphatic: Negative for bleeding problem.   Skin:  Negative for rash.   Musculoskeletal:  Negative for myalgias.   Gastrointestinal:  Negative for hematemesis, hematochezia and nausea.   Genitourinary:  Negative for hematuria.   Neurological:  Negative for light-headedness.   Psychiatric/Behavioral:  Negative for altered mental status.    Allergic/Immunologic: Negative for persistent infections.       Objective:          /76   Pulse 68   Ht 5' 2" (1.575 m)   Wt 86.7 kg (191 lb 2.2 oz)   BMI 34.96 kg/m²     Physical Exam  Vitals and nursing note reviewed.   Constitutional:       " Appearance: Normal appearance. She is well-developed.   HENT:      Head: Normocephalic.      Nose: Nose normal.   Eyes:      Pupils: Pupils are equal, round, and reactive to light.   Cardiovascular:      Rate and Rhythm: Normal rate and regular rhythm.   Pulmonary:      Effort: No respiratory distress.   Musculoskeletal:         General: Normal range of motion.   Skin:     General: Skin is warm and dry.      Findings: No erythema.   Neurological:      Mental Status: She is alert and oriented to person, place, and time.   Psychiatric:         Speech: Speech normal.         Behavior: Behavior normal.           Lab Results   Component Value Date     03/03/2025    K 4.3 03/03/2025    MG 1.7 03/03/2025    BUN 23 03/03/2025    CREATININE 0.6 04/01/2025    ALT 9 (L) 02/28/2025    AST 28 02/28/2025    HGB 9.6 (L) 04/01/2025    HGB 10.2 (L) 03/03/2025    HCT 31.8 (L) 04/01/2025    HCT 32.1 (L) 03/03/2025    TSH 1.655 06/21/2021    LDLCALC 63.4 02/27/2025             Assessment:     1. History of left bundle branch block (LBBB)    2. Primary hypertension    3. Obesity, Class I, BMI 30.0-34.9 (see actual BMI)    4. H/O ischemic left MCA stroke        Plan:     In summary, Ms. Rapp is a 69 y.o. female with AS s/p TAVR (2/27/2025), LBBB post-procedure, left MCA stroke post-procedure here for follow up after EP study.  Patient not seen before in EP clinic is s/p TAVR in Feb complicated by postop LBBB and stroke. EP study showed HV interval averaging 60 and she was discharged with 30 day event monitor. Event monitor showed no high grade AVB or sustained arrhythmias. Per monitor, wide QRS narrowed during final week of monitoring period. Today she feels great. No CHF symptoms. Denies LH, presyncope, or syncope. ECG showed NSR with narrow QRS. No indication for pacemaker at this time. Discussed symptoms to watch for in the future. Will have pt RTC 6 mo for recheck of ECG then can likely continue follow up with general  cardiology.     RTC 6 mo, sooner if needed    *A copy of this note has been sent to Dr. Mcghee*    Follow up in about 6 months (around 12/25/2025).      ------------------------------------------------------------------    ASHLYN Jimenez, NP-C  Cardiac Electrophysiology

## 2025-06-25 ENCOUNTER — HOSPITAL ENCOUNTER (OUTPATIENT)
Dept: CARDIOLOGY | Facility: CLINIC | Age: 70
Discharge: HOME | End: 2025-06-25
Payer: MEDICARE

## 2025-06-25 ENCOUNTER — OFFICE VISIT (OUTPATIENT)
Dept: ELECTROPHYSIOLOGY | Facility: CLINIC | Age: 70
End: 2025-06-25
Payer: MEDICARE

## 2025-06-25 VITALS
BODY MASS INDEX: 35.17 KG/M2 | SYSTOLIC BLOOD PRESSURE: 122 MMHG | HEIGHT: 62 IN | WEIGHT: 191.13 LBS | HEART RATE: 68 BPM | DIASTOLIC BLOOD PRESSURE: 76 MMHG

## 2025-06-25 DIAGNOSIS — I44.7 LBBB (LEFT BUNDLE BRANCH BLOCK): ICD-10-CM

## 2025-06-25 DIAGNOSIS — Z86.79 HISTORY OF LEFT BUNDLE BRANCH BLOCK (LBBB): Primary | ICD-10-CM

## 2025-06-25 DIAGNOSIS — E66.811 OBESITY, CLASS I, BMI 30.0-34.9 (SEE ACTUAL BMI): ICD-10-CM

## 2025-06-25 DIAGNOSIS — I10 PRIMARY HYPERTENSION: ICD-10-CM

## 2025-06-25 DIAGNOSIS — Z86.73 H/O ISCHEMIC LEFT MCA STROKE: ICD-10-CM

## 2025-06-25 LAB
OHS QRS DURATION: 76 MS
OHS QTC CALCULATION: 421 MS

## 2025-06-25 PROCEDURE — 99999 PR PBB SHADOW E&M-EST. PATIENT-LVL III: CPT | Mod: PBBFAC,,, | Performed by: NURSE PRACTITIONER

## 2025-06-25 PROCEDURE — 93010 ELECTROCARDIOGRAM REPORT: CPT | Mod: S$GLB,,, | Performed by: INTERNAL MEDICINE

## (undated) DEVICE — TRAY CATH LAB OMC

## (undated) DEVICE — VISE RADIFOCUS MULTI TORQUE

## (undated) DEVICE — SUT PERCLOSE PROSTYLE MEDIATE

## (undated) DEVICE — KIT PERCUTANEOUS SHEATH

## (undated) DEVICE — SUT 1 36IN PDS II VIO MONO

## (undated) DEVICE — EVACUATOR WOUND BULB 100CC

## (undated) DEVICE — WIRE GUIDE SAFE-T-J .035 260CM

## (undated) DEVICE — GOWN SURGICAL X-LARGE

## (undated) DEVICE — DRAPE ARM DAVINCI XI

## (undated) DEVICE — TRAY CATH 1-LYR URIMTR 16FR

## (undated) DEVICE — DRAPE SCOPE PILLOW WARMER

## (undated) DEVICE — KIT MICROINTRO 4F .018X40X7CM

## (undated) DEVICE — GUIDEWIRE SUPRA CORE 035 190CM

## (undated) DEVICE — PAD DEFIB CADENCE ADULT R2

## (undated) DEVICE — HEMOSTAT VASC BAND REG 24CM

## (undated) DEVICE — TUBING PRSS MON M/M LL 72IN

## (undated) DEVICE — IRRIGATOR ENDOSCOPY DISP.

## (undated) DEVICE — SET TRI-LUMEN FILTERED TUBE

## (undated) DEVICE — WIRE AMPLATZ X-STIFF 035X300

## (undated) DEVICE — KIT CUSTOM MANIFOLD

## (undated) DEVICE — TROCAR ENDOPATH XCEL 5X100MM

## (undated) DEVICE — GUIDEWIRE STF .035X180CM ANG

## (undated) DEVICE — GUIDEWIRE CONFIDA BECKER CURVE

## (undated) DEVICE — TUBING HPCIL ROT M/F ADPT 10IN

## (undated) DEVICE — SPIKE SHORT LG BORE 1-WAY 2IN

## (undated) DEVICE — DRAPE ANGIO BRACH 38X44IN

## (undated) DEVICE — CATH MPA2 INFINITI 4FR 100CM

## (undated) DEVICE — GUIDEWIRE X SPORT .014IN 190CM

## (undated) DEVICE — CATH DIAG IMPULSE 6FR FR4

## (undated) DEVICE — ELECTRODE REM PLYHSV RETURN 9

## (undated) DEVICE — DRAPE COLUMN DAVINCI XI

## (undated) DEVICE — PENCIL ROCKER SWITCH 10FT CORD

## (undated) DEVICE — TRAY ANGIO BAPTIST

## (undated) DEVICE — CATH OPTITORQUE RADIAL 5FR

## (undated) DEVICE — SNAP CAP 18 DOME COVERS

## (undated) DEVICE — TAPE SILK 3IN

## (undated) DEVICE — BLLN CATH TRUE DILATION 18MM

## (undated) DEVICE — BLLN LOMA VISTA TRUE 24MM

## (undated) DEVICE — GUIDEWIRE STF .035X260CM STR

## (undated) DEVICE — GUIDEWIRE ANGIO 1.5MM .035X180

## (undated) DEVICE — SHEATH PINNACLE 8FR

## (undated) DEVICE — BAG SNAP KOVER BAND 36 X 28 IN

## (undated) DEVICE — SYR SLIP TIP 20CC

## (undated) DEVICE — STOPCOCK 3-WAY

## (undated) DEVICE — SEAL CANN UNIVERSAL 5-12MM

## (undated) DEVICE — OMNIPAQUE CONTRAST 350MG/100ML

## (undated) DEVICE — TRAY MINOR GEN SURG OMC

## (undated) DEVICE — DRAPE ABDOMINAL TIBURON 14X11

## (undated) DEVICE — FLEXSHEATH DRYSEAL 14FR 33CM

## (undated) DEVICE — CATH IMPULSE FR4 5FR 125CM

## (undated) DEVICE — OMNIPAQUE CONTRAST 300MG/100ML

## (undated) DEVICE — PORT AIRSEAL 12/120MM LPI

## (undated) DEVICE — DRAPE INCISE IOBAN 2 23X17IN

## (undated) DEVICE — INTRODUCER HEMOSTASIS 6.5FR

## (undated) DEVICE — NDL INSUF ULTRA VERESS 120MM

## (undated) DEVICE — SUT CTD VICRYL 0 UND BR CT

## (undated) DEVICE — CATH DXTERITY JR40 100CM 6FR

## (undated) DEVICE — SUT PROLENE 4-0 RB-1 BL MO

## (undated) DEVICE — CATH IMPULSE PIGTAIL 6F 110CM

## (undated) DEVICE — SUT V-LOC 90 GS22 2-0 VIO 23CM

## (undated) DEVICE — BAG TISS RETRV MONARCH 10MM

## (undated) DEVICE — SUT ABS CLIP LAPRA-TY CTD

## (undated) DEVICE — COVER TIP CURVED SCISSORS XI

## (undated) DEVICE — DRAIN CHANNEL ROUND 15FR

## (undated) DEVICE — SUT ETHILON 2-0 PSLX 30IN

## (undated) DEVICE — CATH AL 3.0 6FR

## (undated) DEVICE — KIT GLIDESHEATH SLEND 6FR 10CM

## (undated) DEVICE — ADHESIVE DERMABOND ADVANCED

## (undated) DEVICE — CATH TEMP PACER 5.0FR

## (undated) DEVICE — CATH IMPULSE D6F AL2 5PK

## (undated) DEVICE — FLEXSHEATH DRYSEAL 12FR 33CM

## (undated) DEVICE — R CATH RESPONS QPLR JSN 6F 120

## (undated) DEVICE — BLADE SURG CARBON STEEL SZ11

## (undated) DEVICE — KIT MNTR POLE MT DUL 12&48 MAC

## (undated) DEVICE — CATH PIG145 LANGSTON 6FR 110CM

## (undated) DEVICE — CABLE PACING ALLGTR CLIP 12FT

## (undated) DEVICE — COVER TABLE 44X90 STERILE

## (undated) DEVICE — BLLN LOMA VISTA TRUE 22MM

## (undated) DEVICE — SYR MED RAD 150ML

## (undated) DEVICE — PACER CABLE

## (undated) DEVICE — KIT PROCEDURE STER INLET CLOSU

## (undated) DEVICE — TOWEL OR DISP STRL BLUE 4/PK

## (undated) DEVICE — GUIDEWIRE EMERALD 150CM PTFE

## (undated) DEVICE — CLIP HEMO-LOK MLX LARGE LF

## (undated) DEVICE — SOL ELECTROLUBE ANTI-STIC

## (undated) DEVICE — KIT SURGIFLO HEMOSTATIC MATRIX

## (undated) DEVICE — PACK EP DRAPE OMC

## (undated) DEVICE — KIT PROBE COVER WITH GEL

## (undated) DEVICE — SUT MCRYL PLUS 4-0 PS2 27IN

## (undated) DEVICE — SYR 30CC LUER LOCK

## (undated) DEVICE — SHEATH DRYSEAL 18FR 33CM